# Patient Record
Sex: MALE | Race: WHITE | NOT HISPANIC OR LATINO | URBAN - METROPOLITAN AREA
[De-identification: names, ages, dates, MRNs, and addresses within clinical notes are randomized per-mention and may not be internally consistent; named-entity substitution may affect disease eponyms.]

---

## 2023-11-18 ENCOUNTER — INPATIENT (INPATIENT)
Facility: HOSPITAL | Age: 73
LOS: 17 days | Discharge: NURSING, REHAB OR LONG-TERM CARE | DRG: 981 | End: 2023-12-06
Attending: SURGERY | Admitting: STUDENT IN AN ORGANIZED HEALTH CARE EDUCATION/TRAINING PROGRAM
Payer: MEDICARE

## 2023-11-18 VITALS
DIASTOLIC BLOOD PRESSURE: 79 MMHG | WEIGHT: 279.99 LBS | RESPIRATION RATE: 28 BRPM | HEART RATE: 136 BPM | HEIGHT: 69 IN | OXYGEN SATURATION: 94 % | SYSTOLIC BLOOD PRESSURE: 128 MMHG

## 2023-11-18 DIAGNOSIS — I25.10 ATHEROSCLEROTIC HEART DISEASE OF NATIVE CORONARY ARTERY WITHOUT ANGINA PECTORIS: ICD-10-CM

## 2023-11-18 DIAGNOSIS — Z90.49 ACQUIRED ABSENCE OF OTHER SPECIFIED PARTS OF DIGESTIVE TRACT: Chronic | ICD-10-CM

## 2023-11-18 DIAGNOSIS — I48.92 UNSPECIFIED ATRIAL FLUTTER: ICD-10-CM

## 2023-11-18 DIAGNOSIS — Z95.1 PRESENCE OF AORTOCORONARY BYPASS GRAFT: Chronic | ICD-10-CM

## 2023-11-18 DIAGNOSIS — N17.9 ACUTE KIDNEY FAILURE, UNSPECIFIED: ICD-10-CM

## 2023-11-18 DIAGNOSIS — R06.02 SHORTNESS OF BREATH: ICD-10-CM

## 2023-11-18 DIAGNOSIS — I50.20 UNSPECIFIED SYSTOLIC (CONGESTIVE) HEART FAILURE: ICD-10-CM

## 2023-11-18 DIAGNOSIS — J96.01 ACUTE RESPIRATORY FAILURE WITH HYPOXIA: ICD-10-CM

## 2023-11-18 LAB
ALBUMIN SERPL ELPH-MCNC: 3.5 G/DL — SIGNIFICANT CHANGE UP (ref 3.3–5.2)
ALBUMIN SERPL ELPH-MCNC: 3.5 G/DL — SIGNIFICANT CHANGE UP (ref 3.3–5.2)
ALP SERPL-CCNC: 96 U/L — SIGNIFICANT CHANGE UP (ref 40–120)
ALP SERPL-CCNC: 96 U/L — SIGNIFICANT CHANGE UP (ref 40–120)
ALT FLD-CCNC: 22 U/L — SIGNIFICANT CHANGE UP
ALT FLD-CCNC: 22 U/L — SIGNIFICANT CHANGE UP
ANION GAP SERPL CALC-SCNC: 20 MMOL/L — HIGH (ref 5–17)
ANION GAP SERPL CALC-SCNC: 20 MMOL/L — HIGH (ref 5–17)
APPEARANCE UR: CLEAR — SIGNIFICANT CHANGE UP
APPEARANCE UR: CLEAR — SIGNIFICANT CHANGE UP
APTT BLD: 33.3 SEC — SIGNIFICANT CHANGE UP (ref 24.5–35.6)
APTT BLD: 33.3 SEC — SIGNIFICANT CHANGE UP (ref 24.5–35.6)
APTT BLD: 35.8 SEC — HIGH (ref 24.5–35.6)
APTT BLD: 35.8 SEC — HIGH (ref 24.5–35.6)
AST SERPL-CCNC: 24 U/L — SIGNIFICANT CHANGE UP
AST SERPL-CCNC: 24 U/L — SIGNIFICANT CHANGE UP
B PERT IGG+IGM PNL SER: ABNORMAL
B PERT IGG+IGM PNL SER: ABNORMAL
BACTERIA # UR AUTO: NEGATIVE /HPF — SIGNIFICANT CHANGE UP
BACTERIA # UR AUTO: NEGATIVE /HPF — SIGNIFICANT CHANGE UP
BASOPHILS # BLD AUTO: 0.08 K/UL — SIGNIFICANT CHANGE UP (ref 0–0.2)
BASOPHILS # BLD AUTO: 0.08 K/UL — SIGNIFICANT CHANGE UP (ref 0–0.2)
BASOPHILS NFR BLD AUTO: 0.5 % — SIGNIFICANT CHANGE UP (ref 0–2)
BASOPHILS NFR BLD AUTO: 0.5 % — SIGNIFICANT CHANGE UP (ref 0–2)
BILIRUB SERPL-MCNC: 1.4 MG/DL — SIGNIFICANT CHANGE UP (ref 0.4–2)
BILIRUB SERPL-MCNC: 1.4 MG/DL — SIGNIFICANT CHANGE UP (ref 0.4–2)
BILIRUB UR-MCNC: ABNORMAL
BILIRUB UR-MCNC: ABNORMAL
BUN SERPL-MCNC: 48 MG/DL — HIGH (ref 8–20)
BUN SERPL-MCNC: 48 MG/DL — HIGH (ref 8–20)
CALCIUM SERPL-MCNC: 9.1 MG/DL — SIGNIFICANT CHANGE UP (ref 8.4–10.5)
CALCIUM SERPL-MCNC: 9.1 MG/DL — SIGNIFICANT CHANGE UP (ref 8.4–10.5)
CAST: 16 /LPF — HIGH (ref 0–4)
CAST: 16 /LPF — HIGH (ref 0–4)
CHLORIDE SERPL-SCNC: 102 MMOL/L — SIGNIFICANT CHANGE UP (ref 96–108)
CHLORIDE SERPL-SCNC: 102 MMOL/L — SIGNIFICANT CHANGE UP (ref 96–108)
CK SERPL-CCNC: 43 U/L — SIGNIFICANT CHANGE UP (ref 30–200)
CK SERPL-CCNC: 43 U/L — SIGNIFICANT CHANGE UP (ref 30–200)
CO2 SERPL-SCNC: 21 MMOL/L — LOW (ref 22–29)
CO2 SERPL-SCNC: 21 MMOL/L — LOW (ref 22–29)
COLOR FLD: YELLOW
COLOR FLD: YELLOW
COLOR SPEC: SIGNIFICANT CHANGE UP
COLOR SPEC: SIGNIFICANT CHANGE UP
CREAT SERPL-MCNC: 2.12 MG/DL — HIGH (ref 0.5–1.3)
CREAT SERPL-MCNC: 2.12 MG/DL — HIGH (ref 0.5–1.3)
D DIMER BLD IA.RAPID-MCNC: 769 NG/ML DDU — HIGH
D DIMER BLD IA.RAPID-MCNC: 769 NG/ML DDU — HIGH
DIFF PNL FLD: NEGATIVE — SIGNIFICANT CHANGE UP
DIFF PNL FLD: NEGATIVE — SIGNIFICANT CHANGE UP
EGFR: 33 ML/MIN/1.73M2 — LOW
EGFR: 33 ML/MIN/1.73M2 — LOW
EOSINOPHIL # BLD AUTO: 0.01 K/UL — SIGNIFICANT CHANGE UP (ref 0–0.5)
EOSINOPHIL # BLD AUTO: 0.01 K/UL — SIGNIFICANT CHANGE UP (ref 0–0.5)
EOSINOPHIL NFR BLD AUTO: 0.1 % — SIGNIFICANT CHANGE UP (ref 0–6)
EOSINOPHIL NFR BLD AUTO: 0.1 % — SIGNIFICANT CHANGE UP (ref 0–6)
GAS PNL BLDA: SIGNIFICANT CHANGE UP
GAS PNL BLDA: SIGNIFICANT CHANGE UP
GLUCOSE SERPL-MCNC: 157 MG/DL — HIGH (ref 70–99)
GLUCOSE SERPL-MCNC: 157 MG/DL — HIGH (ref 70–99)
GLUCOSE UR QL: NEGATIVE MG/DL — SIGNIFICANT CHANGE UP
GLUCOSE UR QL: NEGATIVE MG/DL — SIGNIFICANT CHANGE UP
HCT VFR BLD CALC: 47.2 % — SIGNIFICANT CHANGE UP (ref 39–50)
HCT VFR BLD CALC: 47.2 % — SIGNIFICANT CHANGE UP (ref 39–50)
HGB BLD-MCNC: 15.3 G/DL — SIGNIFICANT CHANGE UP (ref 13–17)
HGB BLD-MCNC: 15.3 G/DL — SIGNIFICANT CHANGE UP (ref 13–17)
IMM GRANULOCYTES NFR BLD AUTO: 0.7 % — SIGNIFICANT CHANGE UP (ref 0–0.9)
IMM GRANULOCYTES NFR BLD AUTO: 0.7 % — SIGNIFICANT CHANGE UP (ref 0–0.9)
INR BLD: 1.45 RATIO — HIGH (ref 0.85–1.18)
INR BLD: 1.45 RATIO — HIGH (ref 0.85–1.18)
KETONES UR-MCNC: ABNORMAL MG/DL
KETONES UR-MCNC: ABNORMAL MG/DL
LEUKOCYTE ESTERASE UR-ACNC: ABNORMAL
LEUKOCYTE ESTERASE UR-ACNC: ABNORMAL
LYMPHOCYTES # BLD AUTO: 0.86 K/UL — LOW (ref 1–3.3)
LYMPHOCYTES # BLD AUTO: 0.86 K/UL — LOW (ref 1–3.3)
LYMPHOCYTES # BLD AUTO: 5.3 % — LOW (ref 13–44)
LYMPHOCYTES # BLD AUTO: 5.3 % — LOW (ref 13–44)
LYMPHOCYTES # FLD: 10 % — SIGNIFICANT CHANGE UP
LYMPHOCYTES # FLD: 10 % — SIGNIFICANT CHANGE UP
MAGNESIUM SERPL-MCNC: 1.8 MG/DL — SIGNIFICANT CHANGE UP (ref 1.6–2.6)
MAGNESIUM SERPL-MCNC: 1.8 MG/DL — SIGNIFICANT CHANGE UP (ref 1.6–2.6)
MCHC RBC-ENTMCNC: 28.3 PG — SIGNIFICANT CHANGE UP (ref 27–34)
MCHC RBC-ENTMCNC: 28.3 PG — SIGNIFICANT CHANGE UP (ref 27–34)
MCHC RBC-ENTMCNC: 32.4 GM/DL — SIGNIFICANT CHANGE UP (ref 32–36)
MCHC RBC-ENTMCNC: 32.4 GM/DL — SIGNIFICANT CHANGE UP (ref 32–36)
MCV RBC AUTO: 87.4 FL — SIGNIFICANT CHANGE UP (ref 80–100)
MCV RBC AUTO: 87.4 FL — SIGNIFICANT CHANGE UP (ref 80–100)
MONOCYTES # BLD AUTO: 1.45 K/UL — HIGH (ref 0–0.9)
MONOCYTES # BLD AUTO: 1.45 K/UL — HIGH (ref 0–0.9)
MONOCYTES NFR BLD AUTO: 9 % — SIGNIFICANT CHANGE UP (ref 2–14)
MONOCYTES NFR BLD AUTO: 9 % — SIGNIFICANT CHANGE UP (ref 2–14)
MONOS+MACROS # FLD: 5 % — SIGNIFICANT CHANGE UP
MONOS+MACROS # FLD: 5 % — SIGNIFICANT CHANGE UP
NEUTROPHILS # BLD AUTO: 13.69 K/UL — HIGH (ref 1.8–7.4)
NEUTROPHILS # BLD AUTO: 13.69 K/UL — HIGH (ref 1.8–7.4)
NEUTROPHILS NFR BLD AUTO: 84.4 % — HIGH (ref 43–77)
NEUTROPHILS NFR BLD AUTO: 84.4 % — HIGH (ref 43–77)
NEUTROPHILS-BODY FLUID: 85 % — SIGNIFICANT CHANGE UP
NEUTROPHILS-BODY FLUID: 85 % — SIGNIFICANT CHANGE UP
NITRITE UR-MCNC: NEGATIVE — SIGNIFICANT CHANGE UP
NITRITE UR-MCNC: NEGATIVE — SIGNIFICANT CHANGE UP
NT-PROBNP SERPL-SCNC: 5657 PG/ML — HIGH (ref 0–300)
NT-PROBNP SERPL-SCNC: 5657 PG/ML — HIGH (ref 0–300)
PH FLD: 7 — SIGNIFICANT CHANGE UP
PH FLD: 7 — SIGNIFICANT CHANGE UP
PH UR: 5 — SIGNIFICANT CHANGE UP (ref 5–8)
PH UR: 5 — SIGNIFICANT CHANGE UP (ref 5–8)
PLATELET # BLD AUTO: 278 K/UL — SIGNIFICANT CHANGE UP (ref 150–400)
PLATELET # BLD AUTO: 278 K/UL — SIGNIFICANT CHANGE UP (ref 150–400)
POTASSIUM SERPL-MCNC: 4 MMOL/L — SIGNIFICANT CHANGE UP (ref 3.5–5.3)
POTASSIUM SERPL-MCNC: 4 MMOL/L — SIGNIFICANT CHANGE UP (ref 3.5–5.3)
POTASSIUM SERPL-SCNC: 4 MMOL/L — SIGNIFICANT CHANGE UP (ref 3.5–5.3)
POTASSIUM SERPL-SCNC: 4 MMOL/L — SIGNIFICANT CHANGE UP (ref 3.5–5.3)
PROT SERPL-MCNC: 6.9 G/DL — SIGNIFICANT CHANGE UP (ref 6.6–8.7)
PROT SERPL-MCNC: 6.9 G/DL — SIGNIFICANT CHANGE UP (ref 6.6–8.7)
PROT UR-MCNC: 100 MG/DL
PROT UR-MCNC: 100 MG/DL
PROTHROM AB SERPL-ACNC: 15.9 SEC — HIGH (ref 9.5–13)
PROTHROM AB SERPL-ACNC: 15.9 SEC — HIGH (ref 9.5–13)
RAPID RVP RESULT: SIGNIFICANT CHANGE UP
RAPID RVP RESULT: SIGNIFICANT CHANGE UP
RBC # BLD: 5.4 M/UL — SIGNIFICANT CHANGE UP (ref 4.2–5.8)
RBC # BLD: 5.4 M/UL — SIGNIFICANT CHANGE UP (ref 4.2–5.8)
RBC # FLD: 16.2 % — HIGH (ref 10.3–14.5)
RBC # FLD: 16.2 % — HIGH (ref 10.3–14.5)
RBC CASTS # UR COMP ASSIST: 1 /HPF — SIGNIFICANT CHANGE UP (ref 0–4)
RBC CASTS # UR COMP ASSIST: 1 /HPF — SIGNIFICANT CHANGE UP (ref 0–4)
RCV VOL RI: 3000 /UL — HIGH (ref 0–0)
RCV VOL RI: 3000 /UL — HIGH (ref 0–0)
SARS-COV-2 RNA SPEC QL NAA+PROBE: SIGNIFICANT CHANGE UP
SARS-COV-2 RNA SPEC QL NAA+PROBE: SIGNIFICANT CHANGE UP
SODIUM SERPL-SCNC: 143 MMOL/L — SIGNIFICANT CHANGE UP (ref 135–145)
SODIUM SERPL-SCNC: 143 MMOL/L — SIGNIFICANT CHANGE UP (ref 135–145)
SP GR SPEC: 1.02 — SIGNIFICANT CHANGE UP (ref 1–1.03)
SP GR SPEC: 1.02 — SIGNIFICANT CHANGE UP (ref 1–1.03)
SQUAMOUS # UR AUTO: 2 /HPF — SIGNIFICANT CHANGE UP (ref 0–5)
SQUAMOUS # UR AUTO: 2 /HPF — SIGNIFICANT CHANGE UP (ref 0–5)
TOTAL NUCLEATED CELL COUNT, BODY FLUID: 3159 /UL — SIGNIFICANT CHANGE UP
TOTAL NUCLEATED CELL COUNT, BODY FLUID: 3159 /UL — SIGNIFICANT CHANGE UP
TROPONIN T, HIGH SENSITIVITY RESULT: 41 NG/L — SIGNIFICANT CHANGE UP (ref 0–51)
TROPONIN T, HIGH SENSITIVITY RESULT: 41 NG/L — SIGNIFICANT CHANGE UP (ref 0–51)
UROBILINOGEN FLD QL: 1 MG/DL — SIGNIFICANT CHANGE UP (ref 0.2–1)
UROBILINOGEN FLD QL: 1 MG/DL — SIGNIFICANT CHANGE UP (ref 0.2–1)
WBC # BLD: 16.2 K/UL — HIGH (ref 3.8–10.5)
WBC # BLD: 16.2 K/UL — HIGH (ref 3.8–10.5)
WBC # FLD AUTO: 16.2 K/UL — HIGH (ref 3.8–10.5)
WBC # FLD AUTO: 16.2 K/UL — HIGH (ref 3.8–10.5)
WBC UR QL: 1 /HPF — SIGNIFICANT CHANGE UP (ref 0–5)
WBC UR QL: 1 /HPF — SIGNIFICANT CHANGE UP (ref 0–5)

## 2023-11-18 PROCEDURE — 93306 TTE W/DOPPLER COMPLETE: CPT | Mod: 26

## 2023-11-18 PROCEDURE — 32557 INSERT CATH PLEURA W/ IMAGE: CPT | Mod: LT

## 2023-11-18 PROCEDURE — 99223 1ST HOSP IP/OBS HIGH 75: CPT

## 2023-11-18 PROCEDURE — 99291 CRITICAL CARE FIRST HOUR: CPT

## 2023-11-18 PROCEDURE — 71045 X-RAY EXAM CHEST 1 VIEW: CPT | Mod: 26,76

## 2023-11-18 PROCEDURE — 71250 CT THORAX DX C-: CPT | Mod: 26,MA

## 2023-11-18 PROCEDURE — 99223 1ST HOSP IP/OBS HIGH 75: CPT | Mod: 25

## 2023-11-18 PROCEDURE — 99221 1ST HOSP IP/OBS SF/LOW 40: CPT

## 2023-11-18 RX ORDER — METOPROLOL TARTRATE 50 MG
5 TABLET ORAL ONCE
Refills: 0 | Status: COMPLETED | OUTPATIENT
Start: 2023-11-18 | End: 2023-11-18

## 2023-11-18 RX ORDER — ASPIRIN/CALCIUM CARB/MAGNESIUM 324 MG
81 TABLET ORAL DAILY
Refills: 0 | Status: DISCONTINUED | OUTPATIENT
Start: 2023-11-18 | End: 2023-11-27

## 2023-11-18 RX ORDER — SPIRONOLACTONE 25 MG/1
25 TABLET, FILM COATED ORAL DAILY
Refills: 0 | Status: DISCONTINUED | OUTPATIENT
Start: 2023-11-18 | End: 2023-11-23

## 2023-11-18 RX ORDER — HEPARIN SODIUM 5000 [USP'U]/ML
10000 INJECTION INTRAVENOUS; SUBCUTANEOUS EVERY 6 HOURS
Refills: 0 | Status: DISCONTINUED | OUTPATIENT
Start: 2023-11-18 | End: 2023-11-22

## 2023-11-18 RX ORDER — METOPROLOL TARTRATE 50 MG
50 TABLET ORAL EVERY 6 HOURS
Refills: 0 | Status: DISCONTINUED | OUTPATIENT
Start: 2023-11-18 | End: 2023-11-25

## 2023-11-18 RX ORDER — METOPROLOL TARTRATE 50 MG
25 TABLET ORAL EVERY 6 HOURS
Refills: 0 | Status: DISCONTINUED | OUTPATIENT
Start: 2023-11-18 | End: 2023-11-18

## 2023-11-18 RX ORDER — ACETAMINOPHEN 500 MG
650 TABLET ORAL EVERY 6 HOURS
Refills: 0 | Status: DISCONTINUED | OUTPATIENT
Start: 2023-11-18 | End: 2023-11-27

## 2023-11-18 RX ORDER — ATORVASTATIN CALCIUM 80 MG/1
20 TABLET, FILM COATED ORAL AT BEDTIME
Refills: 0 | Status: DISCONTINUED | OUTPATIENT
Start: 2023-11-18 | End: 2023-11-27

## 2023-11-18 RX ORDER — METOPROLOL TARTRATE 50 MG
100 TABLET ORAL
Refills: 0 | Status: DISCONTINUED | OUTPATIENT
Start: 2023-11-18 | End: 2023-11-18

## 2023-11-18 RX ORDER — HEPARIN SODIUM 5000 [USP'U]/ML
5000 INJECTION INTRAVENOUS; SUBCUTANEOUS EVERY 6 HOURS
Refills: 0 | Status: DISCONTINUED | OUTPATIENT
Start: 2023-11-18 | End: 2023-11-22

## 2023-11-18 RX ORDER — HEPARIN SODIUM 5000 [USP'U]/ML
INJECTION INTRAVENOUS; SUBCUTANEOUS
Qty: 25000 | Refills: 0 | Status: DISCONTINUED | OUTPATIENT
Start: 2023-11-18 | End: 2023-11-22

## 2023-11-18 RX ORDER — LANOLIN ALCOHOL/MO/W.PET/CERES
3 CREAM (GRAM) TOPICAL AT BEDTIME
Refills: 0 | Status: DISCONTINUED | OUTPATIENT
Start: 2023-11-18 | End: 2023-11-27

## 2023-11-18 RX ORDER — FUROSEMIDE 40 MG
40 TABLET ORAL EVERY 12 HOURS
Refills: 0 | Status: DISCONTINUED | OUTPATIENT
Start: 2023-11-18 | End: 2023-11-24

## 2023-11-18 RX ORDER — HEPARIN SODIUM 5000 [USP'U]/ML
5000 INJECTION INTRAVENOUS; SUBCUTANEOUS EVERY 12 HOURS
Refills: 0 | Status: DISCONTINUED | OUTPATIENT
Start: 2023-11-18 | End: 2023-11-18

## 2023-11-18 RX ORDER — ONDANSETRON 8 MG/1
4 TABLET, FILM COATED ORAL EVERY 8 HOURS
Refills: 0 | Status: DISCONTINUED | OUTPATIENT
Start: 2023-11-18 | End: 2023-12-06

## 2023-11-18 RX ORDER — SODIUM CHLORIDE 9 MG/ML
3 INJECTION INTRAMUSCULAR; INTRAVENOUS; SUBCUTANEOUS ONCE
Refills: 0 | Status: COMPLETED | OUTPATIENT
Start: 2023-11-18 | End: 2023-11-18

## 2023-11-18 RX ORDER — METOPROLOL TARTRATE 50 MG
5 TABLET ORAL EVERY 6 HOURS
Refills: 0 | Status: DISCONTINUED | OUTPATIENT
Start: 2023-11-18 | End: 2023-11-27

## 2023-11-18 RX ADMIN — HEPARIN SODIUM 10000 UNIT(S): 5000 INJECTION INTRAVENOUS; SUBCUTANEOUS at 19:28

## 2023-11-18 RX ADMIN — ATORVASTATIN CALCIUM 20 MILLIGRAM(S): 80 TABLET, FILM COATED ORAL at 21:52

## 2023-11-18 RX ADMIN — SODIUM CHLORIDE 3 MILLILITER(S): 9 INJECTION INTRAMUSCULAR; INTRAVENOUS; SUBCUTANEOUS at 08:51

## 2023-11-18 RX ADMIN — Medication 100 MILLIGRAM(S): at 15:29

## 2023-11-18 RX ADMIN — Medication 50 MILLIGRAM(S): at 19:28

## 2023-11-18 RX ADMIN — Medication 5 MILLIGRAM(S): at 18:24

## 2023-11-18 RX ADMIN — Medication 40 MILLIGRAM(S): at 18:21

## 2023-11-18 RX ADMIN — HEPARIN SODIUM 2300 UNIT(S)/HR: 5000 INJECTION INTRAVENOUS; SUBCUTANEOUS at 19:25

## 2023-11-18 RX ADMIN — Medication 5 MILLIGRAM(S): at 08:36

## 2023-11-18 RX ADMIN — Medication 5 MILLIGRAM(S): at 08:29

## 2023-11-18 NOTE — CONSULT NOTE ADULT - TIME BILLING
time spent reviewing chart, reviewing imaging, interpreting blood gas, bedside assessment, discussion with cardiology, documentation

## 2023-11-18 NOTE — CONSULT NOTE ADULT - SUBJECTIVE AND OBJECTIVE BOX
[facility]                                              CARDIOLOGY AT [place] [address]                                             Telephone: [phone]. Fax:[phone]                                                         CARDIOLOGY CONSULTATION NOTE                                                                                             Consult requested by:    History obtained by: Patient and medical record  Community Cardiologist:    obtained: Yes [  ] No [  ]  Reason for Consultation:   Avialable out pt records reviewed: Yes [  ] No [  ]    Chief complaint:    Patient is a 72y old  Male who presents with a chief complaint of SOB (18 Nov 2023 14:38)        HPI:  Patient is a 73 yo M w/ PMH of CAD s/p CABG, CHF, HTN, HLD presenting for chief complaint of SOB. Patient states he started experiencing SOB and LE swelling for the past few days He states he was seen by his Cardiologist on, Dr. Milian, on Tuesday and his Metoprolol was increased from 50mg to 100mg. He states his symptoms worsened yesterday therefore he came to the ED today. He states he has been unable to walk around without getting short of breath. He reports compliance with medications and diet. Patient denies fever, chills, chest pain, palpitations, cough, wheezing, abdominal pain, nausea, vomiting, diarrhea, constipation, bloody bowel movements, melena, hematemesis, urinary complaints, syncope, calf tenderness, paresthesias.  (18 Nov 2023 14:38)        CARDIAC TESTING   ECHO:    STRESS:    CATH:     ELECTROPHYSIOLOGY:       PAST MEDICAL HISTORY  Coronary artery disease involving native coronary artery of native heart, unspecified whether angina    CHF with unknown LVEF    Primary hypertension    Hyperlipidemia, unspecified hyperlipidemia type          PAST SURGICAL HISTORY  S/P CABG x 5    History of cholecystectomy          SOCIAL HISTORY:  Denies smoking/alcohol/drugs  CIGARETTES:     ALCOHOL:  DRUGS:      FAMILY HISTORY:  FAMILY HISTORY:  Family history of coronary artery bypass surgery (Father)    Family history of hypotension (Mother)        Family History of Cardiovascular Disease:  Yes [  ] No [  ]  Coronary Artery Disease in first degree relative: Yes [  ] No [  ]  Sudden Cardiac Death in First degree relative: Yes [  ] No [  ]      HOME MEDICATIONS:      CURRENT MEDICATIONS:  furosemide   Injectable 40 milliGRAM(s) IV Push every 12 hours  metolazone 5 milliGRAM(s) Oral daily  metoprolol tartrate 100 milliGRAM(s) Oral two times a day  metoprolol tartrate Injectable 5 milliGRAM(s) IV Push every 6 hours PRN     aspirin  chewable  atorvastatin  heparin   Injectable        ALLERGIES: Allergies    No Known Allergies    Intolerances          REVIEW OF SYMPTOMS:   CONSTITUTIONAL: No fever, no chills, no weight loss, no weight gain, no fatigue   ENMT:  No vertigo; No sinus or throat pain  NECK: No pain or stiffness  CARDIOVASCULAR: No chest pain, no dyspnea, no syncope/presyncope, no palpitations, no dizziness, no Orthopnea, no Paroxsymal nocturnal dyspnea  RESPIRATORY: no Shortness of breath, no cough, no wheezing  : No dysuria, no hematuria   GI: No dark color stool, no nausea, no diarrhea, no constipation, no abdominal pain   NEURO: No headache, no slurred speech   MUSCULOSKELETAL: No joint pain or swelling; No muscle, back, or extremity pain  PSYCH: No agitation, no anxiety.    ALL OTHER REVIEW OF SYSTEMS ARE NEGATIVE.      Vital Signs Last 24 Hrs  T(C): 37.1 (18 Nov 2023 15:30), Max: 37.1 (18 Nov 2023 15:30)  T(F): 98.8 (18 Nov 2023 15:30), Max: 98.8 (18 Nov 2023 15:30)  HR: 130 (18 Nov 2023 16:38) (130 - 137)  BP: 158/65 (18 Nov 2023 16:38) (112/56 - 158/65)  BP(mean): --  RR: 26 (18 Nov 2023 16:38) (20 - 32)  SpO2: 95% (18 Nov 2023 16:38) (91% - 95%)    Parameters below as of 18 Nov 2023 16:38  Patient On (Oxygen Delivery Method): nasal cannula  O2 Flow (L/min): 5    INTAKE AND OUTPUT:     PHYSICAL EXAM:  Constitutional: Comfortable . No acute distress.   HEENT: Atraumatic and normocephalic , neck is supple . no JVD. No carotid bruit.  CNS: A&Ox3. No focal deficits.   Respiratory: CTAB, unlabored   Cardiovascular: RRR normal s1 s2. No murmur. No rubs or gallop.  Gastrointestinal: Soft, non-tender. +Bowel sounds.   MSK: full ROM extremities x 4  Extremities: No edema. No cyanosis   Psychiatric: Calm . no agitation.   Skin: Warm and dry, no ulcers on extremities       LABS:                        15.3   16.20 )-----------( 278      ( 18 Nov 2023 09:02 )             47.2     11-18    143  |  102  |  48.0  ----------------------------<  157  4.0   |  21.0  |  2.12    Ca    9.1      18 Nov 2023 09:02  Mg     1.8     11-18    TPro  6.9  /  Alb  3.5  /  TBili  1.4  /  DBili  x   /  AST  24  /  ALT  22  /  AlkPhos  96  11-18    CARDIAC MARKERS ( 18 Nov 2023 09:02 ): 43 U/L / x     / x        PT/INR - ( 18 Nov 2023 09:02 )   PT: 15.9 sec;   INR: 1.45 ratio    PTT - ( 18 Nov 2023 09:02 )  PTT:33.3 sec  Urinalysis Basic - ( 18 Nov 2023 09:02 )    Color: x / Appearance: x / SG: x / pH: x  Gluc: 157 mg/dL / Ketone: x  / Bili: x / Urobili: x   Blood: x / Protein: x / Nitrite: x   Leuk Esterase: x / RBC: x / WBC x   Sq Epi: x / Non Sq Epi: x / Bacteria: x          INTERPRETATION OF TELEMETRY: -140's    ECG:   Prior ECG: Yes [  ] No [  ]      RADIOLOGY & ADDITIONAL STUDIES:    X-ray:  reviewed by me.   CT scan:   MRI:   US:                                                [facility]                                              CARDIOLOGY AT [place] [address]                                             Telephone: [phone]. Fax:[phone]                                                         CARDIOLOGY CONSULTATION NOTE                                                                                             Consult requested by:  Dr. Flores  History obtained by: Patient and medical record  Community Cardiologist: Abdifatah Milian   obtained: Yes [  ] No [X]  Reason for Consultation: SOB  Available out pt records reviewed: Yes [  ] No [X]    Chief complaint:    Patient is a 72y old  Male who presents with a chief complaint of SOB (18 Nov 2023 14:38)    HPI: Patient is a 71 yo male former smoker w/ PMH of CAD, s/p 5V CABG, CHF (unknown EF), HTN, HLD presenting for chief complaint of SOB. Patient states he started experiencing SOB and LE swelling for the past few days He states he was seen by his Cardiologist on, Dr. Milian, on Tuesday and his Metoprolol was increased from 50mg to 100mg. He states his symptoms worsened yesterday therefore he came to the ED today. He states he has been unable to walk around without getting short of breath. One week ago he was walking 6 blocks with usually no symptoms, and since yesterday he has been having SOB with minimal or no exertion. He reports compliance with medications and diet. Patient denies fever, chills, chest pain, palpitations, cough, wheezing, abdominal pain, nausea, vomiting, diarrhea, constipation, bloody bowel movements, melena, hematemesis, urinary complaints, syncope, calf tenderness, paresthesias. A CXR was done in the ED which showed a moderate to large left pleural effusion. A noncontrast CT of the chest was done which showed a moderate to large loculated left pleural effusion. Thoracic surgery was called to evaluate the patient and a 14 fr. chest tube was inserted with 600 mL serous fluid out. An echo was done which revealed an LVEF of 20-25 % and severely reduced RVSF.    CARDIAC TESTING   ECHO: 11/18/2023  Left Ventricle: The left ventricular internal cavity size is moderate to severely increased. Global LV systolic function was severely decreased. Left ventricular ejection fraction, by visual estimation, is 20 to 25%. The mitral in-flow pattern reveals no discernable A-wave, therefore no comment on diastolic function can be made.  Right Ventricle: The right ventricular size is mildly enlarged. RV systolic function is severely reduced.  Left Atrium: Mildly enlarged left atrium.  Right Atrium: Normal right atrial size.  Pericardium: There is no evidence of pericardial effusion.  MitralValve: Mild thickening and calcification of the anterior and posterior mitral valve leaflets. There is mild mitral annular calcification. Mild mitral valve regurgitation is seen.  Tricuspid Valve: Adequate TR velocity was not obtained to accurately assess RVSP.  Aortic Valve: The aortic valve is trileaflet. Sclerotic aortic valve with normal opening.  Pulmonic Valve: Trace pulmonic valve regurgitation.  Aorta: The aortic root is normal in size and structure.  Pulmonary Artery: The main pulmonary artery is normal in size.  Venous: The inferior vena cava was normal sized, with respiratory size variation greater than 50%.    STRESS: N/A    CATH: N/A    ELECTROPHYSIOLOGY: N/A    PAST MEDICAL HISTORY  Coronary artery disease involving native coronary artery of native heart, unspecified whether angina  CHF with unknown LVEF  Primary hypertension  Hyperlipidemia, unspecified hyperlipidemia type    PAST SURGICAL HISTORY  S/P CABG x 5  History of cholecystectomy    SOCIAL HISTORY:   Marital: Single, lives alone  Tobacco: Former < 1ppd smoker for 20 years, quit 11/2021  ETOH: Denies  Drugs: Denies  Caffeine: 3-4 cups coffee daily    FAMILY HISTORY:  Family history of coronary artery bypass surgery (Father)  Family history of hypotension (Mother)    Family History of Cardiovascular Disease:  Yes [X] No [  ]  Coronary Artery Disease in first degree relative: Yes [X] No [  ]  Sudden Cardiac Death in First degree relative: Yes [  ] No [X]    Home Medications:  aspirin 81 mg oral capsule: 1 cap(s) orally once a day (18 Nov 2023 14:28)  Entresto 49 mg-51 mg oral tablet: 1 tab(s) orally 2 times a day (18 Nov 2023 14:28)  Lasix 40 mg oral tablet: 1 tab(s) orally once a day (18 Nov 2023 14:27)  Lipitor 20 mg oral tablet: 1 tab(s) orally once a day (18 Nov 2023 14:28)  metOLazone 5 mg oral tablet: 1 tab(s) orally once a day (18 Nov 2023 14:27)  Metoprolol Tartrate 100 mg oral tablet: 1 tab(s) orally 2 times a day (18 Nov 2023 14:27)    MEDICATIONS  (STANDING):  aspirin  chewable 81 milliGRAM(s) Oral daily  atorvastatin 20 milliGRAM(s) Oral at bedtime  furosemide   Injectable 40 milliGRAM(s) IV Push every 12 hours  heparin  Infusion.  Unit(s)/Hr (23 mL/Hr) IV Continuous <Continuous>  metolazone 5 milliGRAM(s) Oral daily  metoprolol tartrate 25 milliGRAM(s) Oral every 6 hours  spironolactone 25 milliGRAM(s) Oral daily    MEDICATIONS  (PRN):  acetaminophen     Tablet .. 650 milliGRAM(s) Oral every 6 hours PRN Temp greater or equal to 38C (100.4F), Mild Pain (1 - 3)  aluminum hydroxide/magnesium hydroxide/simethicone Suspension 30 milliLiter(s) Oral every 4 hours PRN Dyspepsia  heparin   Injectable 29920 Unit(s) IV Push every 6 hours PRN For aPTT less than 40  heparin   Injectable 5000 Unit(s) IV Push every 6 hours PRN For aPTT between 40 - 57  melatonin 3 milliGRAM(s) Oral at bedtime PRN Insomnia  metoprolol tartrate Injectable 5 milliGRAM(s) IV Push every 6 hours PRN RVR > 130  ondansetron Injectable 4 milliGRAM(s) IV Push every 8 hours PRN Nausea and/or Vomiting    ALLERGIES: No Known Allergies    REVIEW OF SYMPTOMS:   CONSTITUTIONAL: No fever, no chills, no weight loss, no weight gain, + fatigue   ENMT:  No vertigo; No sinus or throat pain  NECK: No pain or stiffness  CARDIOVASCULAR: No chest pain, + SAVAGE, no syncope/presyncope, no palpitations, no dizziness, + Orthopnea, + Paroxsymal nocturnal dyspnea, + edema  RESPIRATORY: + Shortness of breath, no cough, no wheezing  : No dysuria, no hematuria   GI: No dark color stool, no nausea, no diarrhea, no constipation, no abdominal pain   NEURO: No headache, no slurred speech   MUSCULOSKELETAL: No joint pain or swelling; No muscle, back, or extremity pain  PSYCH: No agitation, no anxiety.    ALL OTHER REVIEW OF SYSTEMS ARE NEGATIVE.    Vital Signs Last 24 Hrs  T(C): 37.1 (18 Nov 2023 15:30), Max: 37.1 (18 Nov 2023 15:30)  T(F): 98.8 (18 Nov 2023 15:30), Max: 98.8 (18 Nov 2023 15:30)  HR: 130 (18 Nov 2023 16:38) (130 - 137)  BP: 158/65 (18 Nov 2023 16:38) (112/56 - 158/65)  RR: 26 (18 Nov 2023 16:38) (20 - 32)  SpO2: 95% (18 Nov 2023 16:38) (91% - 95%)    Parameters below as of 18 Nov 2023 16:38  Patient On (Oxygen Delivery Method): nasal cannula  O2 Flow (L/min): 5    INTAKE AND OUTPUT:     PHYSICAL EXAM:  Constitutional: Comfortable . No acute distress.   HEENT: Atraumatic and normocephalic , neck is supple . no JVD. No carotid bruit.  CNS: A&Ox3. No focal deficits.   Respiratory: CTAB, unlabored   Cardiovascular: RRR normal s1 s2. No murmur. No rubs or gallop.  Gastrointestinal: Soft, non-tender. +Bowel sounds.   MSK: full ROM extremities x 4  Extremities: No edema. No cyanosis   Psychiatric: Calm . no agitation.   Skin: Warm and dry, no ulcers on extremities       LABS:                        15.3   16.20 )-----------( 278      ( 18 Nov 2023 09:02 )             47.2     11-18    143  |  102  |  48.0  ----------------------------<  157  4.0   |  21.0  |  2.12    Ca    9.1      18 Nov 2023 09:02  Mg     1.8     11-18    TPro  6.9  /  Alb  3.5  /  TBili  1.4  /  DBili  x   /  AST  24  /  ALT  22  /  AlkPhos  96  11-18    CARDIAC MARKERS ( 18 Nov 2023 09:02 ): 43 U/L / x     / x        PT/INR - ( 18 Nov 2023 09:02 )   PT: 15.9 sec;   INR: 1.45 ratio    PTT - ( 18 Nov 2023 09:02 )  PTT:33.3 sec  D-Dimer: 769    Blood Gas Profile - Arterial (11.18.23 @ 17:36)   pH, Arterial: 7.490: As of 6/9/2020 Reference Ranges have been amended for: DEBRA, COHB, GLUWB,   HCO3, KWB, METHHB, NAWB, O2HB, PCO2.  pCO2, Arterial: 31 mmHg  pO2, Arterial: 78 mmHg  HCO3, Arterial: 24 mmol/L  Base Excess, Arterial: 0.3 mmol/L  Oxygen Saturation, Arterial: 97.6 %  FIO2, Arterial: 6lpm n/c  Blood Gas Comments Arterial: 6lpm n/c  Blood Gas Source Arterial: Arterial    Urinalysis Basic - ( 18 Nov 2023 09:02 )  Color: x / Appearance: x / SG: x / pH: x  Gluc: 157 mg/dL / Ketone: x  / Bili: x / Urobili: x   Blood: x / Protein: x / Nitrite: x   Leuk Esterase: x / RBC: x / WBC x   Sq Epi: x / Non Sq Epi: x / Bacteria: x    INTERPRETATION OF TELEMETRY: Atrial flutter 130-140's    ECG:   Prior ECG: Yes [  ] No [  ]      RADIOLOGY & ADDITIONAL STUDIES:    X-ray:  reviewed by me. Large left pleural effusion  CT scan: 11/18/2023  ·	Few small lymph nodes are present in the mediastinum.  ·	Heart is enlarged in size. Patient is status post CABG. No pericardial effusion is noted.  ·	No endobronchial lesions are noted. Marked compressive atelectasis of the  left lung is noted. This is secondary to moderate to large loculated left pleural effusion. Right lung is essentially clear.  ·	Below the diaphragm, visualized portions of the abdomen demonstrate  thickening of both adrenal glands. Patient is status post cholecystectomy. Low-attenuation lesion in the right kidney is incompletely imaged on this exam.  ·	Degenerative changes of the spine are noted.    MRI:   US:

## 2023-11-18 NOTE — ED ADULT NURSE NOTE - OBJECTIVE STATEMENT
Pt biba from home with c/o progressively worsening sob/reddy since yesterday. along with lower leg swelling. saw Cardiologist on Tuesday and his meds were increased. speaking 5-6 word sentences. + tachycardic and tachypneic, + diminished breath sounds + soft distended abd, + non pitting edema mid calf bilaterally. denies chest pain, fever and chill.s

## 2023-11-18 NOTE — ED ADULT TRIAGE NOTE - PAIN RATING/NUMBER SCALE (0-10): ACTIVITY
Lab and Procedure Follow Up  Communication and follow up of lab work or procedures are important to us. We believe in sharing your results with you promptly. Joining [employer] is the fastest way to receive your results.     To sign up for [employer] account:  ? Log on to [email or web address]  ? Click on new user-sign up now  ? Please fill out the Identify Yourself and follow the directions  ? Or please talk with a Patient  (PSR) for assistance    If you are not participating in [employer], you will receive your results by mail if normal or by telephone if there is a message for you from your provider.  If you have a follow up appointment scheduled we will share your result at that time.  You should receive your results for:      Pap Smear within 2-3 weeks    Mammogram (from the Radiology Dept) within 1-2 weeks    Lab within 1-2 weeks    Ultrasound and Bone Density test within 1-2 weeks    All other test within 2-3 weeks    If you do not hear from our office either by telephone, [email or web address], or in writing within these time frames, please contact the office at [phone]    
5 (moderate pain)

## 2023-11-18 NOTE — ED PROVIDER NOTE - CLINICAL SUMMARY MEDICAL DECISION MAKING FREE TEXT BOX
72-year-old male with history of hypertension CAD status post quintuple bypass November 2021 followed by cardiology/Dr. Milian in  [place]  resents the ED via EMS complaining of worsening dyspnea on exertion since yesterday.  Patient states he was  seen by his cardiologist on Tuesday and metoprolol was increased from 50 mg to 100 mg daily.  Patient has been compliant with all his medications.   Patient states his legs have been swollen  for the last 2 days despite taking his medications.  Patient denies any associated chest pain, nausea, vomiting, diaphoresis or syncope.   EKG sinus tachycardia with no acute changes, O2 sat on room air 94%.  We will check labs including cardiac enzymes BNP and D-dimer chest x-ray and reevaluate

## 2023-11-18 NOTE — H&P ADULT - ASSESSMENT
Patient is a 73 yo M w/ PMH of CAD s/p CABG, CHF, HTN, HLD presenting for chief complaint of SOB. Admitted for acute hypoxemic respiratory failure 2/2 CHF/Pleural effusion.     Acute hypoxemic respiratory failure 2/2 CHF exacerbation/pleural effusion  - pro-BNP: 5657  - Cardiology consulted, follow up recommendations  - TTE done, follow up read  - TSx following, s/p CT. Follow up CXR and fluid studies  - Lasix 40mg IV BID  - Continue Metozalone  - Monitor I&Os, daily weights, fluid/salt restriction  - Age adjusted D-dimer unremarkable  - Supplemental oxygen PRN, wean as tolerated    DEVON vs CKD  - U/a  - Renal US  - Hold Entresto  - possibly in setting of volume overload  - avoid nephrotoxic medications  - monitor BMP, correct electrolytes as needed    Hx of CAD s/p CABG  - Continue ASA, Statin, Lopressor    DVT ppx: Heparin SQ     Patient is a 71 yo M w/ PMH of CAD s/p CABG, CHF, HTN, HLD presenting for chief complaint of SOB. Admitted for acute hypoxemic respiratory failure 2/2 CHF/Pleural effusion.     Acute hypoxemic respiratory failure 2/2 CHF exacerbation/pleural effusion  - pro-BNP: 5657  - Cardiology consulted, follow up recommendations  - TTE done, follow up read  - TSx following, s/p CT. Follow up CXR and fluid studies  - Lasix 40mg IV BID  - Continue Metozalone  - Monitor I&Os, daily weights, fluid/salt restriction  - Age adjusted D-dimer unremarkable  - Supplemental oxygen PRN, wean as tolerated    Leukocytosis  - Denies any signs of infection, afebrile  - Possibly stress induced?  - Check RVP, procalcitonin  - Hold antibiotics and monitor for now    DEVON vs CKD  - U/a  - Renal US  - Hold Entresto  - possibly in setting of volume overload  - avoid nephrotoxic medications  - monitor BMP, correct electrolytes as needed    Hx of CAD s/p CABG  - Continue ASA, Statin, Lopressor    DVT ppx: Heparin SQ     Patient is a 73 yo M w/ PMH of CAD s/p CABG, CHF, HTN, HLD presenting for chief complaint of SOB. Admitted for acute hypoxemic respiratory failure 2/2 CHF/Pleural effusion.     Acute hypoxemic respiratory failure 2/2 CHF exacerbation/pleural effusion  - pro-BNP: 5657  - Cardiology consulted, follow up recommendations  - TTE done, follow up read  - TSx following, s/p CT. Follow up CXR and fluid studies  - Lasix 40mg IV BID  - Continue Metozalone  - Monitor I&Os, daily weights, fluid/salt restriction  - Age adjusted D-dimer unremarkable  - Supplemental oxygen PRN, wean as tolerated    Afib w/ RVR  - Metoprolol 5mg IVP x 2  - Restart home Lopressor  - Metopolol IVP PRN  - Telemetry  - Hx of GIB on AC in past, will follow up with Cardiology regarding AC    Leukocytosis  - Denies any signs of infection, afebrile  - Possibly stress induced?  - Check RVP, procalcitonin  - Hold antibiotics and monitor for now    DEVON vs CKD  - U/a  - Renal US  - Hold Entresto  - possibly in setting of volume overload  - avoid nephrotoxic medications  - monitor BMP, correct electrolytes as needed    Hx of CAD s/p CABG  - Continue ASA, Statin, Lopressor    DVT ppx: Heparin SQ

## 2023-11-18 NOTE — ED ADULT NURSE NOTE - NSFALLHARMRISKINTERV_ED_ALL_ED

## 2023-11-18 NOTE — CONSULT NOTE ADULT - SUBJECTIVE AND OBJECTIVE BOX
HPI:      PAST MEDICAL & SURGICAL HISTORY:      REVIEW OF SYSTEMS      General:No Weight change/ Fatigue/ HA/Dizzy	    Skin/Breast: No Rashes/ Lesions/ Masses  	  Ophthalmologic: No Blurry vision/ Glaucoma/ Blindness  	  ENMT: No Hearing loss/ Drainage/ Lesions	    Respiratory and Thorax: No Cough/ Wheezing/ SOB/ Hemoptysis/ Sputum production  	  Cardiovascular: No Chest pain/ Palpitations/ Diaphoresis	    Gastrointestinal: No Nausea/ Vomiting/ Constipation/ Appetite Change	    Genitourinary: No Heamturia/ Dysuria/ Frequency change/ Impotence	    Musculoskeletal: No Pain/ Weakness/ Claudication	    Neurological: No Seizures/ TIA/CVA/ Parastesias	    Psychiatric: No Dementia/ Depression/ SI/HI	    Hematology/Lymphatics: No hx of bleeding/ Edema	    Endocrine:	No Hyperglycemia/ Hypoglycemia    Allergic/Immunologic:	 No Anaphylaxis/ Intolerance/ Recent illnesses    MEDICATIONS  (STANDING):    MEDICATIONS  (PRN):      Allergies    No Known Allergies    Intolerances        SOCIAL HISTORY:    FAMILY HISTORY:      Vital Signs Last 24 Hrs  T(C): 36.6 (18 Nov 2023 08:36), Max: 36.6 (18 Nov 2023 08:36)  T(F): 97.8 (18 Nov 2023 08:36), Max: 97.8 (18 Nov 2023 08:36)  HR: 133 (18 Nov 2023 08:36) (133 - 136)  BP: 124/72 (18 Nov 2023 08:36) (124/72 - 128/79)  BP(mean): --  RR: 32 (18 Nov 2023 08:36) (28 - 32)  SpO2: 95% (18 Nov 2023 08:36) (94% - 95%)    Parameters below as of 18 Nov 2023 08:36  Patient On (Oxygen Delivery Method): room air        General: WN/WD NAD  Neurology: Awake, nonfocal, LEE x 4  Eyes: Scleras clear, PERRLA/ EOMI, Gross vision intact  ENT:Gross hearing intact, grossly patent pharynx, no stridor  Neck: Neck supple, trachea midline, No JVD,   Respiratory: CTA B/L, No wheezing, rales, rhonchi  CV: RRR, S1S2, no murmurs, rubs or gallops  Abdominal: Soft, NT, ND +BS,   Extremities: No edema, + peripheral pulses  Skin: No Rashes, Hematoma, Ecchymosis  Lymphatic: No Neck, axilla, groin LAD  Psych: Oriented x 3, normal affect  Incisions:   Tubes:    LABS:                        15.3   16.20 )-----------( 278      ( 18 Nov 2023 09:02 )             47.2     11-18    143  |  102  |  48.0<H>  ----------------------------<  157<H>  4.0   |  21.0<L>  |  2.12<H>    Ca    9.1      18 Nov 2023 09:02    TPro  6.9  /  Alb  3.5  /  TBili  1.4  /  DBili  x   /  AST  24  /  ALT  22  /  AlkPhos  96  11-18    PT/INR - ( 18 Nov 2023 09:02 )   PT: 15.9 sec;   INR: 1.45 ratio         PTT - ( 18 Nov 2023 09:02 )  PTT:33.3 sec  Urinalysis Basic - ( 18 Nov 2023 09:02 )    Color: x / Appearance: x / SG: x / pH: x  Gluc: 157 mg/dL / Ketone: x  / Bili: x / Urobili: x   Blood: x / Protein: x / Nitrite: x   Leuk Esterase: x / RBC: x / WBC x   Sq Epi: x / Non Sq Epi: x / Bacteria: x        RADIOLOGY & ADDITIONAL STUDIES:    ASSESSMENT:   72yMale  PLAN: HPI:   72-year-old male with history of hypertension CAD status post quintuple bypass November 2021 followed by cardiology/Dr. Milian in  [place]  resents the ED via EMS complaining of worsening dyspnea on exertion since yesterday.  Patient states he was  seen by his cardiologist on Tuesday and metoprolol was increased from 50 mg to 100 mg daily.  Patient has been compliant with all his medications.   Patient states his legs have been swollen  for the last 2 days despite taking his medications.  Patient denies any associated chest pain, nausea, vomiting, diaphoresis or syncope  PAST MEDICAL & SURGICAL HISTORY:      REVIEW OF SYSTEMS      General: + Weight change/ + Fatigue/ HA/Dizzy	    Skin/Breast: No Rashes/ Lesions/ Masses  	  Ophthalmologic: No Blurry vision/ Glaucoma/ Blindness + glasses  	  ENMT: No Hearing loss/ Drainage/ Lesions	    Respiratory and Thorax: No Cough/ Wheezing/ + SOB/ Hemoptysis/ Sputum production  	  Cardiovascular: No Chest pain/ + Palpitations/ Diaphoresis	    Gastrointestinal: No Nausea/ Vomiting/ Constipation/ Appetite Change	    Genitourinary: No Hematuria/ Dysuria/ Frequency change/ Impotence	    Musculoskeletal: No Pain/ + leg Weakness/ Claudication  	    Neurological: No Seizures/ TIA/CVA/ Parastesias	    Psychiatric: No Dementia/ Depression/ SI/HI	    Hematology/Lymphatics: No hx of bleeding/ Edema	    Endocrine:	No Hyperglycemia/ Hypoglycemia    Allergic/Immunologic:	 No Anaphylaxis/ Intolerance/ Recent illnesses          Allergies    No Known Allergies    Intolerances        SOCIAL HISTORY: Single lives alone  retired retail     FAMILY HISTORY:      Vital Signs Last 24 Hrs  T(C): 36.6 (18 Nov 2023 08:36), Max: 36.6 (18 Nov 2023 08:36)  T(F): 97.8 (18 Nov 2023 08:36), Max: 97.8 (18 Nov 2023 08:36)  HR: 133 (18 Nov 2023 08:36) (133 - 136)  BP: 124/72 (18 Nov 2023 08:36) (124/72 - 128/79)  BP(mean): --  RR: 32 (18 Nov 2023 08:36) (28 - 32)  SpO2: 95% (18 Nov 2023 08:36) (94% - 95%)    Parameters below as of 18 Nov 2023 08:36  Patient On (Oxygen Delivery Method): room air        General: WN/WD NAD  Neurology: Awake, nonfocal, LEE x 4  Eyes: Scleras clear, PERRLA/ EOMI, Gross vision intact  ENT:Gross hearing intact, grossly patent pharynx, no stridor  Neck: Neck supple, trachea midline, No JVD,   Respiratory: Diminished on left side, No wheezing, rales, rhonchi  CV: RRR, S1S2, no murmurs, rubs or gallops  Abdominal: Soft, NT, ND +BS, Obese  Extremities: 2+ B/L LE  edema, + peripheral pulses  Skin: chr pigment changes bilateral lower extrem  Lymphatic: No Neck, axilla, groin LAD  Psych: Oriented x 3, normal affect      LABS:                        15.3   16.20 )-----------( 278      ( 18 Nov 2023 09:02 )             47.2     11-18    143  |  102  |  48.0<H>  ----------------------------<  157<H>  4.0   |  21.0<L>  |  2.12<H>    Ca    9.1      18 Nov 2023 09:02    TPro  6.9  /  Alb  3.5  /  TBili  1.4  /  DBili  x   /  AST  24  /  ALT  22  /  AlkPhos  96  11-18    PT/INR - ( 18 Nov 2023 09:02 )   PT: 15.9 sec;   INR: 1.45 ratio         PTT - ( 18 Nov 2023 09:02 )  PTT:33.3 sec  Urinalysis Basic - ( 18 Nov 2023 09:02 )    Color: x / Appearance: x / SG: x / pH: x  Gluc: 157 mg/dL / Ketone: x  / Bili: x / Urobili: x   Blood: x / Protein: x / Nitrite: x   Leuk Esterase: x / RBC: x / WBC x   Sq Epi: x / Non Sq Epi: x / Bacteria: x        RADIOLOGY & ADDITIONAL STUDIES:  CXR> Mod left effusion    ASSESSMENT:    72-year-old male with history of hypertension CAD status post quintuple bypass November 2021 followed by cardiology/Dr. Milian in  [place]  resents the ED via EMS complaining of worsening dyspnea on exertion since yesterday.  Thoracic surgery consulted treatment Left effusion    PLAN:  CT of chest evaluate effusion  Poss pigtail placement after CT  Images to be reviewed by Dr Encarnacion

## 2023-11-18 NOTE — ED ADULT NURSE REASSESSMENT NOTE - NS ED NURSE REASSESS COMMENT FT1
oob to bathroom with assist ( stretcher outside of door) secondary to bowel movement. pt required help with wiping and cleaning. 2 small quarter sized stage 1-2 breakdown noted between his buttock.
Patient A&Ox4, placed /cardiac monitor.  Pt taken to echo.
Patient tolerated pigtail insertion well, draining clear yellow fluid.  A&Ox4, states decreasing SOB.  Remains tachycardic, tachypneic, denies chest pain.  Admitting MD present at bedside.  Orders pending.  Safety maintained with bed in lowest position, call bell within reach.
Patient A&Ox4, appears resting comfortably in bed.  Dr. Flores ordered metoprolol rescheduled to be given now. Chest tube draining, 900cc of clear yellow fluid drained at this time.  Safety maintained with bed in lowest position, call bell within reach.

## 2023-11-18 NOTE — H&P ADULT - NSHPPHYSICALEXAM_GEN_ALL_CORE
Vital Signs Last 24 Hrs  T(F): 97.8 (18 Nov 2023 08:36), Max: 97.8 (18 Nov 2023 08:36)  HR: 137 (18 Nov 2023 14:09) (130 - 137)  BP: 113/58 (18 Nov 2023 14:09) (112/56 - 139/61)  RR: 32 (18 Nov 2023 14:09) (20 - 32)  SpO2: 94% (18 Nov 2023 14:09) (91% - 95%)    Physical Exam:  Constitutional: NAD  HEENT: NC/AT, PERRL, EOMI, trachea midline, no JVD  Respiratory: Decreased breath sounds. +L CT  Cardiovascular: RRR, no m/g/r  Gastrointestinal: Soft, NT/ND, BS+  Vascular: 2+ peripheral pulses  Neurological: A/O x 3, no focal neurological deficits  Psych: Fair mood/affect  Musculoskeletal: LE swelling 2+ b/l  Skin: No obvious rash, lesions. No jaundice.

## 2023-11-18 NOTE — ED ADULT TRIAGE NOTE - CHIEF COMPLAINT QUOTE
Pt A&Ox4 states "I have been having trouble breathing got worse the past 2 days." BIBA c/o SOB/ SAVAGE x 1 week worsening. Patient has hx of Bypass 2021. Asa 324mg via EMS

## 2023-11-18 NOTE — CONSULT NOTE ADULT - SUBJECTIVE AND OBJECTIVE BOX
Patient is a 72y old  Male who presents with a chief complaint of SOB (2023 17:22)    HPI:  72M with hx of CAD s/P CABG 2021, HTN, HLD, Afib presented with progressive dyspnea over the last several days. Pt was seen by his cardiologist and had his lasix increased and metoprolol increased. Pt also notes increased LE edema. Endorses cough productive of clear sputum. Denies any known prior pulmonary disease. Denies any recent fevers/chills/sick contacts. Denies any associated chest pain. Denies any known prior pleural effusion or pleural drainage. Pt was found to have moderate to large L pleural effusion with associated atelectasis. Pt had L sided chest tube placed by thoracic with           PAST MEDICAL & SURGICAL HISTORY:  Coronary artery disease involving native coronary artery of native heart, unspecified whether angina  CHF with unknown LVEF  Primary hypertension  Hyperlipidemia, unspecified hyperlipidemia type  S/P CABG x 5  History of cholecystectomy    Medications:  furosemide   Injectable 40 milliGRAM(s) IV Push every 12 hours  metolazone 5 milliGRAM(s) Oral daily  metoprolol tartrate 50 milliGRAM(s) Oral every 6 hours  metoprolol tartrate Injectable 5 milliGRAM(s) IV Push every 6 hours PRN  spironolactone 25 milliGRAM(s) Oral daily  acetaminophen     Tablet .. 650 milliGRAM(s) Oral every 6 hours PRN  melatonin 3 milliGRAM(s) Oral at bedtime PRN  ondansetron Injectable 4 milliGRAM(s) IV Push every 8 hours PRN  aspirin  chewable 81 milliGRAM(s) Oral daily  heparin   Injectable 45332 Unit(s) IV Push every 6 hours PRN  heparin   Injectable 5000 Unit(s) IV Push every 6 hours PRN  heparin  Infusion.  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, worked in clothing design    FH: denies family hx of pulmonary disease/malignancy    ICU Vital Signs Last 24 Hrs  T(C): 37.1 (2023 15:30), Max: 37.1 (2023 15:30)  T(F): 98.8 (2023 15:30), Max: 98.8 (2023 15:30)  HR: 138 (2023 18:31) (130 - 138)  BP: 115/80 (2023 18:31) (112/56 - 158/65)  BP(mean): --  ABP: --  ABP(mean): --  RR: 22 (2023 18:31) (20 - 32)  SpO2: 96% (2023 18:) (91% - 96%)    O2 Parameters below as of 2023 18:  Patient On (Oxygen Delivery Method): nasal cannula  O2 Flow (L/min): 5          ABG - ( 2023 17:36 )  pH, Arterial: 7.490 pH, Blood: x     /  pCO2: 31    /  pO2: 78    / HCO3: 24    / Base Excess: 0.3   /  SaO2: 97.6                I&O's Detail        LABS:                        15.3   16.20 )-----------( 278      ( 2023 09:02 )             47.2     11-18    143  |  102  |  48.0<H>  ----------------------------<  157<H>  4.0   |  21.0<L>  |  2.12<H>    Ca    9.1      2023 09:02  Mg     1.8     11-18    TPro  6.9  /  Alb  3.5  /  TBili  1.4  /  DBili  x   /  AST  24  /  ALT  22  /  AlkPhos  96  11-18      CARDIAC MARKERS ( 2023 09:02 )  x     / x     / 43 U/L / x     / x          CAPILLARY BLOOD GLUCOSE        PT/INR - ( 2023 09:02 )   PT: 15.9 sec;   INR: 1.45 ratio         PTT - ( 2023 18:16 )  PTT:35.8 sec  Urinalysis Basic - ( 2023 16:45 )    Color: Dark Yellow / Appearance: Clear / S.023 / pH: x  Gluc: x / Ketone: Trace mg/dL  / Bili: Small / Urobili: 1.0 mg/dL   Blood: x / Protein: 100 mg/dL / Nitrite: Negative   Leuk Esterase: Trace / RBC: 1 /HPF / WBC 1 /HPF   Sq Epi: x / Non Sq Epi: 2 /HPF / Bacteria: Negative /HPF      CULTURES:  Rapid RVP Result: NotDetec ( @ 08:30)      Physical Examination:    General: No acute distress.      HEENT: Pupils equal, reactive to light.  Symmetric.    PULM: Clear to auscultation bilaterally, no significant sputum production    NECK: Supple, no lymphadenopathy, trachea midline    CVS: Regular rate and rhythm, no murmurs, rubs, or gallops    ABD: Soft, nondistended, nontender, normoactive bowel sounds, no masses    EXT: No edema, nontender    SKIN: Warm and well perfused, no rashes noted.    NEURO: Alert, oriented, interactive, nonfocal    DEVICES:     RADIOLOGY: ***    CRITICAL CARE TIME SPENT: ***   Patient is a 72y old  Male who presents with a chief complaint of SOB (2023 17:22)    HPI:  72M former smoker with hx of CAD s/P CABG 2021, HTN, HLD, post operative Afib presented with progressive dyspnea over the last several days. Pt was seen by his cardiologist and had his lasix increased and metoprolol increased. Pt also notes increased LE edema. Endorses cough productive of clear sputum. Denies any known prior pulmonary disease. Denies prior oxygen need. Denies any recent fevers/chills/sick contacts. Denies any associated chest pain. Denies any known prior pleural effusion or pleural drainage. Denies wheezing. Pt was found to have moderate to large L pleural effusion with associated atelectasis. Pt had L sided chest tube placed by thoracic with 600cc drained. Pt also noted to be in Afib/flutter with RVR but hemodynamically stable. Pt reports interval improvement in shortness of breath post drainage. ABG 7.49/31/78 on 6L NC. Echo showed EF 20-25% with severely reduced RV function.             PAST MEDICAL & SURGICAL HISTORY:  Coronary artery disease involving native coronary artery of native heart, unspecified whether angina  CHF with unknown LVEF  Primary hypertension  Hyperlipidemia, unspecified hyperlipidemia type  S/P CABG x 5  History of cholecystectomy    Medications:  furosemide   Injectable 40 milliGRAM(s) IV Push every 12 hours  metolazone 5 milliGRAM(s) Oral daily  metoprolol tartrate 50 milliGRAM(s) Oral every 6 hours  metoprolol tartrate Injectable 5 milliGRAM(s) IV Push every 6 hours PRN  spironolactone 25 milliGRAM(s) Oral daily  acetaminophen     Tablet .. 650 milliGRAM(s) Oral every 6 hours PRN  melatonin 3 milliGRAM(s) Oral at bedtime PRN  ondansetron Injectable 4 milliGRAM(s) IV Push every 8 hours PRN  aspirin  chewable 81 milliGRAM(s) Oral daily  heparin   Injectable 72666 Unit(s) IV Push every 6 hours PRN  heparin   Injectable 5000 Unit(s) IV Push every 6 hours PRN  heparin  Infusion.  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, worked in clothing design    FH: denies family hx of pulmonary disease/malignancy    ICU Vital Signs Last 24 Hrs  T(C): 37.1 (2023 15:30), Max: 37.1 (2023 15:30)  T(F): 98.8 (2023 15:30), Max: 98.8 (2023 15:30)  HR: 138 (2023 18:31) (130 - 138)  BP: 115/80 (2023 18:31) (112/56 - 158/65)  BP(mean): --  ABP: --  ABP(mean): --  RR: 22 (2023 18:31) (20 - 32)  SpO2: 96% (2023 18:31) (91% - 96%)    O2 Parameters below as of 2023 18:31  Patient On (Oxygen Delivery Method): nasal cannula  O2 Flow (L/min): 5    ABG - ( 2023 17:36 )  pH, Arterial: 7.490 pH, Blood: x     /  pCO2: 31    /  pO2: 78    / HCO3: 24    / Base Excess: 0.3   /  SaO2: 97.6      I&O's Detail    LABS:                        15.3   16.20 )-----------( 278      ( 2023 09:02 )             47.2     11-18    143  |  102  |  48.0<H>  ----------------------------<  157<H>  4.0   |  21.0<L>  |  2.12<H>    Ca    9.1      2023 09:02  Mg     1.8     11-18    TPro  6.9  /  Alb  3.5  /  TBili  1.4  /  DBili  x   /  AST  24  /  ALT  22  /  AlkPhos  96  11-18      CARDIAC MARKERS ( 2023 09:02 )  x     / x     / 43 U/L / x     / x          CAPILLARY BLOOD GLUCOSE        PT/INR - ( 2023 09:02 )   PT: 15.9 sec;   INR: 1.45 ratio         PTT - ( 2023 18:16 )  PTT:35.8 sec  Urinalysis Basic - ( 2023 16:45 )    Color: Dark Yellow / Appearance: Clear / S.023 / pH: x  Gluc: x / Ketone: Trace mg/dL  / Bili: Small / Urobili: 1.0 mg/dL   Blood: x / Protein: 100 mg/dL / Nitrite: Negative   Leuk Esterase: Trace / RBC: 1 /HPF / WBC 1 /HPF   Sq Epi: x / Non Sq Epi: 2 /HPF / Bacteria: Negative /HPF      CULTURES:  Rapid RVP Result: NotDetec ( @ 08:30)      Physical Examination:    General: awake, alert, in NAD      HEENT: NC/AT, anicteric, MMM    PULM: L base crackles, L sided chest tube with straw colored drainage     NECK: Supple, trachea midline    CVS: irregular, tachycardiac     ABD: Soft, nondistended, nontender, normoactive bowel sounds    EXT: 2+ bilateral edema, nontender    SKIN: Warm and well perfused, no rashes noted.    NEURO: Alert, oriented, interactive, nonfocal    ROS: negative except per HPI    RADIOLOGY:   < from: CT Chest No Cont (23 @ 12:04) >    CT scan of the chest was obtained without administration of intravenous   contrast.    Few small lymph nodes are present in the mediastinum.    Heart is enlarged in size. Patient is status post CABG. No pericardial   effusion is noted.    No endobronchial lesions are noted. Marked compressive atelectasis of the   left lung is noted. This is secondary to moderate to large loculated left   pleural effusion. Right lung is essentially clear.    Below the diaphragm, visualized portions of the abdomen demonstrate   thickening of both adrenal glands. Patient is status post   cholecystectomy. Low-attenuation lesion in the right kidney is   incompletely imaged on this exam.    Degenerative changes of the spine are noted.    IMPRESSION: Moderate to large loculated left pleural effusion.    < end of copied text >

## 2023-11-18 NOTE — H&P ADULT - HISTORY OF PRESENT ILLNESS
Patient is a 71 yo M w/ PMH of CAD s/p CABG, CHF, HTN, HLD presenting for chief complaint of SOB. Patient states he started experiencing SOB and LE swelling for the past few days He states he was seen by his Cardiologist on, Dr. Milian, on Tuesday and his Metoprolol was increased from 50mg to 100mg. He states his symptoms worsened yesterday therefore he came to the ED today. He states he has been unable to walk around without getting short of breath. He reports compliance with medications and diet. Patient denies fever, chills, chest pain, palpitations, cough, wheezing, abdominal pain, nausea, vomiting, diarrhea, constipation, bloody bowel movements, melena, hematemesis, urinary complaints, syncope, calf tenderness, paresthesias.  Patient is a 73 yo M w/ PMH of CAD s/p CABG, CHF, HTN, HLD, Afib (not on AC due to hx of GIB) presenting for chief complaint of SOB. Patient states he started experiencing SOB and LE swelling for the past few days He states he was seen by his Cardiologist on, Dr. Milian, on Tuesday and his Metoprolol was increased from 50mg to 100mg. He states his symptoms worsened yesterday therefore he came to the ED today. He states he has been unable to walk around without getting short of breath. He reports compliance with medications and diet. Patient denies fever, chills, chest pain, palpitations, cough, wheezing, abdominal pain, nausea, vomiting, diarrhea, constipation, bloody bowel movements, melena, hematemesis, urinary complaints, syncope, calf tenderness, paresthesias.

## 2023-11-18 NOTE — ED PROVIDER NOTE - PROGRESS NOTE DETAILS
Patient seen by cardiology and CT surgery for evaluation of large left pleural effusion.  CT results as noted.  Case discussed with hospitalist and will admit to telemetry

## 2023-11-18 NOTE — CONSULT NOTE ADULT - ASSESSMENT
Patient is a 71 yo male former smoker w/ PMH of CAD, s/p 5V CABG, CHF (unknown EF), HTN, HLD presenting for chief complaint of SOB. Patient states he started experiencing SOB and LE swelling for the past few days He states he was seen by his Cardiologist on, Dr. Milian, on Tuesday and his Metoprolol was increased from 50mg to 100mg. He states his symptoms worsened yesterday therefore he came to the ED today. He states he has been unable to walk around without getting short of breath. One week ago he was walking 6 blocks with usually no symptoms, and since yesterday he has been having SOB with minimal or no exertion. He reports compliance with medications and diet. Patient denies fever, chills, chest pain, palpitations, cough, wheezing, abdominal pain, nausea, vomiting, diarrhea, constipation, bloody bowel movements, melena, hematemesis, urinary complaints, syncope, calf tenderness, paresthesias. A CXR was done in the ED which showed a moderate to large left pleural effusion. A noncontrast CT of the chest was done which showed a moderate to large loculated left pleural effusion. Thoracic surgery was called to evaluate the patient and a 14 fr. chest tube was inserted with 600 mL serous fluid out. An echo was done which revealed an LVEF of 20-25 % and severely reduced RVSF.

## 2023-11-18 NOTE — CONSULT NOTE ADULT - SUBJECTIVE AND OBJECTIVE BOX
[place] [facility]  [facility]   Office: [address] [place] [address]  Telephone: [phone] Fax:[phone]      HPI:  Pt is a 71 y/o m, PMHx significant for CAD s/p CABG x4, CHF(?EF), HTN, HLD, remote AF (transient in the setting of post op CABG), who presented to [facility] ED with complaints of worsening SOB x several days. Pt states he was evaluated by his primary cardiologist (Dr. Milian) on Tuesday for a follow up and had his Metoprolol increased from 50mg BID to 100mg BID, after expressing complaints of some mild SOB . Pt reports his symptoms progressively worsened throughout the week, prompting his visit to the ED today. Pt hypoxic to the 70s on arrival and was placed on supplemental O2 and diuresed. TTE revealed  LVEF of 20-25 % and severely reduced RVSF. CT chest revealed a moderate to large loculated left pleural effusion, and pt underwent L sided chest tube placement with 600cc of serous drainage. Pt noted to be in AFL with RVR, for which the EP service has been consulted.    At time of assessment pt remains in AFL with ventricular rates in the 130s despite multiple pushes of IV lopressor in conjunction with PO metoprolol. Pt reports a brief remote hx of AF after undergoing a CABG in . Pt states he was started on Xarelto at the time and subsequently developed melena so AC was stopped. Pt reports his AF only lasted for the morgan-operative period and reports no known recurrence. At this time pt reports significant improvement in SOB s/p chest tube placement. Currently denies chest pain, palpitations, dizziness, fever, chills.    EKG: AFL, ventricular rate 145.   Telemetry: Atrial flutter with ventricular rates averaging 130s        PAST MEDICAL & SURGICAL HISTORY:  Coronary artery disease involving native coronary artery of native heart, unspecified whether angina      CHF with unknown LVEF      Primary hypertension      Hyperlipidemia, unspecified hyperlipidemia type      S/P CABG x 5      History of cholecystectomy          REVIEW OF SYSTEMS:    CONSTITUTIONAL: No fever, weight loss, or fatigue  EYES: No visual disturbances  NECK: No pain or stiffness  RESPIRATORY: + SOB (improving)   CARDIOVASCULAR: see HPI  GASTROINTESTINAL: No abdominal or epigastric pain. No nausea, vomiting, or hematemesis; No diarrhea or constipation. No melena or hematochezia.  NEUROLOGICAL: No headaches, memory loss, loss of strength, numbness, or tremors  SKIN: No itching, burning, rashes, or lesions       MEDICATIONS  (STANDING):  aspirin  chewable 81 milliGRAM(s) Oral daily  atorvastatin 20 milliGRAM(s) Oral at bedtime  furosemide   Injectable 40 milliGRAM(s) IV Push every 12 hours  heparin  Infusion.  Unit(s)/Hr (23 mL/Hr) IV Continuous <Continuous>  metolazone 5 milliGRAM(s) Oral daily  metoprolol tartrate 50 milliGRAM(s) Oral every 6 hours  spironolactone 25 milliGRAM(s) Oral daily    MEDICATIONS  (PRN):  acetaminophen     Tablet .. 650 milliGRAM(s) Oral every 6 hours PRN Temp greater or equal to 38C (100.4F), Mild Pain (1 - 3)  aluminum hydroxide/magnesium hydroxide/simethicone Suspension 30 milliLiter(s) Oral every 4 hours PRN Dyspepsia  heparin   Injectable 24309 Unit(s) IV Push every 6 hours PRN For aPTT less than 40  heparin   Injectable 5000 Unit(s) IV Push every 6 hours PRN For aPTT between 40 - 57  melatonin 3 milliGRAM(s) Oral at bedtime PRN Insomnia  metoprolol tartrate Injectable 5 milliGRAM(s) IV Push every 6 hours PRN RVR > 130  ondansetron Injectable 4 milliGRAM(s) IV Push every 8 hours PRN Nausea and/or Vomiting      Allergies    No Known Allergies    Intolerances        SOCIAL HISTORY:  Former smoker  Denies ETOH  Denies illicit drug use    FAMILY HISTORY:  Family history of coronary artery bypass surgery (Father)    Family history of hypotension (Mother)        Vital Signs Last 24 Hrs  T(C): 37.1 (2023 15:30), Max: 37.1 (2023 15:30)  T(F): 98.8 (2023 15:30), Max: 98.8 (2023 15:30)  HR: 138 (2023 18:31) (130 - 138)  BP: 115/80 (2023 18:31) (112/56 - 158/65)  BP(mean): --  RR: 22 (2023 18:31) (20 - 32)  SpO2: 96% (2023 18:31) (91% - 96%)    Parameters below as of 2023 18:31  Patient On (Oxygen Delivery Method): nasal cannula  O2 Flow (L/min): 5      Physical Exam:    Constitutional: NAD  Head: normocephalic atraumatic  Resp: Decreased breath sound L lung.  Cardiac: Tachycardic. S1, S1. No murmur.   Abdomen: soft, nondistended, bowel sounds active, nontender to palpation in all four quadrants    Musculoskeletal: full range of motion all extremities  Neuro: Alert and oriented x 3, motor & sensation grossly in tact  Skin: color normal, no rashes or injury  Psych: mood calm   extrems: + 2 LE edema b/l    LABS:                        15.3   16.20 )-----------( 278      ( 2023 09:02 )             47.2   11-18    143  |  102  |  48.0<H>  ----------------------------<  157<H>  4.0   |  21.0<L>  |  2.12<H>    Ca    9.1      2023 09:02  Mg     1.8     11-18    TPro  6.9  /  Alb  3.5  /  TBili  1.4  /  DBili  x   /  AST  24  /  ALT  22  /  AlkPhos  96  11-18  LIVER FUNCTIONS - ( 2023 09:02 )  Alb: 3.5 g/dL / Pro: 6.9 g/dL / ALK PHOS: 96 U/L / ALT: 22 U/L / AST: 24 U/L / GGT: x           PT/INR - ( 2023 09:02 )   PT: 15.9 sec;   INR: 1.45 ratio         PTT - ( 2023 09:02 )  PTT:33.3 secCARDIAC MARKERS ( 2023 09:02 )  x     / x     / 43 U/L / x     / x          Urinalysis Basic - ( 2023 16:45 )    Color: Dark Yellow / Appearance: Clear / S.023 / pH: x  Gluc: x / Ketone: Trace mg/dL  / Bili: Small / Urobili: 1.0 mg/dL   Blood: x / Protein: 100 mg/dL / Nitrite: Negative   Leuk Esterase: Trace / RBC: 1 /HPF / WBC 1 /HPF   Sq Epi: x / Non Sq Epi: 2 /HPF / Bacteria: Negative /HPF        RADIOLOGY & ADDITIONAL STUDIES:    Summary:   1. There is mild concentric left ventricular hypertrophy.   2. Moderate to severely increased left ventricular internal cavity size.   3. Left ventricular ejection fraction, by visual estimation, is 20 to   25%.   4. Severely decreased global left ventricular systolic function.   5. The mitral in-flow pattern reveals no discernable A-wave, therefore   no comment on diastolic function can be made.   6. Mildly enlarged right ventricle.   7. Severely reduced RV systolic function.   8. Mildly enlarged left atrium.   9. Normal right atrial size.  10. Sclerotic aortic valve with normal opening.  11. Mild thickening and calcification of the anterior and posterior   mitral valve leaflets.  12. Mild mitral annular calcification.  13. Mild mitral valve regurgitation.  14. The main pulmonary artery is normal in size.  15. There is no evidence of pericardial effusion.  .  < from: CT Chest No Cont (23 @ 12:04) >  IMPRESSION: Moderate to large loculated left pleural effusion.    < end of copied text >          Assessment:    Pt is a 71 y/o m, PMHx significant for CAD s/p CABG x4, CHF(?EF), HTN, HLD, remote AF (transient in the setting of post op CABG), who presented to [facility] ED with complaints of worsening SOB x several days. Pt states he was evaluated by his primary cardiologist (Dr. Milian) on Tuesday for a follow up and had his Metoprolol increased from 50mg BID to 100mg BID, after expressing complaints of some mild SOB . Pt reports his symptoms progressively worsened throughout the week, prompting his visit to the ED today. Pt hypoxic to the 70s on arrival and was placed on supplemental O2 and diuresed. TTE revealed  LVEF of 20-25 % and severely reduced RVSF. CT chest revealed a moderate to large loculated left pleural effusion, and pt underwent L sided chest tube placement with 600cc of serous drainage. Pt noted to be in AFL with RVR, for which the EP service has been consulted.    At time of assessment pt remains in AFL with ventricular rates in the 130s despite multiple pushes of IV lopressor in conjunction with PO metoprolol. Pt reports a brief remote hx of AF after undergoing a CABG in . Pt states he was started on Xarelto at the time and subsequently developed melena so AC was stopped. Pt reports his AF only lasted for the morgan-operative period and reports no known recurrence. At this time pt reports significant improvement in SOB s/p chest tube placement. Currently denies chest pain, palpitations, dizziness, fever, chills.      Plan  1) AFL with RVR  - Ventricular rates currently averaging 130s  - Increase Metoprolol to 50mg Q6HR. Titrate as BP tolerates  - Rates will be difficult to control while in atrial flutter  - Would avoid use of digoxin given elevated renal function (2.12, baseline unknown). If rates are still uncontrolled despite continued beta blocker therapy, would only given Digoix .25mg x 1, followed by an additional .25mg in 6 hours (NO maintenance dosing). AS per Dr. Bowers    - CHADS2-Vasc: 4 (age, CHF, CAD, and HTN). Agree with Heparin infusion. Monitor PTTs and maintain therapeutic levels  - Can consider QUIN / DCCV when further optimized from HF perspective, as well as post ischemic evaluation  - Maintain telemetry monitoring    2) HFrEF (20-25%)  - Baseline EF unknown  - Records from Dr. Wisemanots office to be obtained  - GDMT and diuresis as per general cardiology  - LHC / RHC tentatively planned for 23    3) L pleural effusion  - s/p chest tube   - management as per thoracic surgery team    Above discussed with Dr. Law

## 2023-11-18 NOTE — CONSULT NOTE ADULT - NS ATTEND AMEND GEN_ALL_CORE FT
Patient seen and examined by me.  Patients son at bedside  T(C): 36.6 (11-18-23 @ 19:22), Max: 37.1 (11-18-23 @ 15:30)  HR: 135 (11-18-23 @ 19:22) (130 - 138)  BP: 123/68 (11-18-23 @ 19:22) (112/56 - 158/65)  RR: 23 (11-18-23 @ 19:22) (20 - 32)  SpO2: 95% (11-18-23 @ 19:22) (91% - 96%)  Patient alert and awake.  Chest- Bilateral Clear BS  Cardiac- S1 and S2  Abdomen- Soft    Assessment/Plan:  1. Acute HFrEF  2. Afib/Afl  3. CAD-S/P CABG    I have discussed my recommendation with the PA which are outlined above.  Will follow.

## 2023-11-18 NOTE — CONSULT NOTE ADULT - ASSESSMENT
72M former smoker with hx of CAD s/P CABG 11/2021, HTN, HLD, Afib presented with acute hypoxic respiratory failure in the setting of HF exacerbation and loculated L pleural effusion now s/p L chest tube placement     Acute hypoxic respiratory failure in the setting of HFrEF exacerbation/L loculated pleural effusion/possible PE  c/w diuresis w/ close monitoring of renal function   monitor strict I/Os   f/u pleural fluid chemistry/cultures and can send cytology   daily CXR while chest tube in place   may need to consider tpa/dornase effusion persists despite chest tube   repeat CT chest post drainage to assess underlying parenchyma   can check procal  rate control   wean oxygen as tolerated for goal sat >90-92%   f/u LE duplex   on heparin drip  would attempt to obtain any prior imaging completed   will need outpatient PFTs and possible sleep study   would obtain prior echo to assess chronicity of reduced LV/RV function   planned for R/LHC cath Monday   pt does not currently require ICU level of care, can monitor on SDU; call with any change in status   tele/

## 2023-11-18 NOTE — ED PROVIDER NOTE - CONSTITUTIONAL, MLM
normal... Well appearing, awake, alert, oriented to person, place, time/situation and appears anxious and uncomfortable

## 2023-11-18 NOTE — ED PROVIDER NOTE - OBJECTIVE STATEMENT
72-year-old male with history of hypertension CAD status post quintuple bypass November 2021 followed by cardiology/Dr. Milian in  [place]  resents the ED via EMS complaining of worsening dyspnea on exertion since yesterday.  Patient states he was  seen by his cardiologist on Tuesday and metoprolol was increased from 50 mg to 100 mg daily.  Patient has been compliant with all his medications.   Patient states his legs have been swollen  for the last 2 days despite taking his medications.  Patient denies any associated chest pain, nausea, vomiting, diaphoresis or syncope

## 2023-11-19 DIAGNOSIS — J90 PLEURAL EFFUSION, NOT ELSEWHERE CLASSIFIED: ICD-10-CM

## 2023-11-19 LAB
ALBUMIN FLD-MCNC: 2.6 G/DL — SIGNIFICANT CHANGE UP
ALBUMIN FLD-MCNC: 2.6 G/DL — SIGNIFICANT CHANGE UP
ALBUMIN SERPL ELPH-MCNC: 2.9 G/DL — LOW (ref 3.3–5.2)
ALBUMIN SERPL ELPH-MCNC: 2.9 G/DL — LOW (ref 3.3–5.2)
ALP SERPL-CCNC: 81 U/L — SIGNIFICANT CHANGE UP (ref 40–120)
ALP SERPL-CCNC: 81 U/L — SIGNIFICANT CHANGE UP (ref 40–120)
ALT FLD-CCNC: 22 U/L — SIGNIFICANT CHANGE UP
ALT FLD-CCNC: 22 U/L — SIGNIFICANT CHANGE UP
ANION GAP SERPL CALC-SCNC: 16 MMOL/L — SIGNIFICANT CHANGE UP (ref 5–17)
ANION GAP SERPL CALC-SCNC: 16 MMOL/L — SIGNIFICANT CHANGE UP (ref 5–17)
ANISOCYTOSIS BLD QL: SLIGHT — SIGNIFICANT CHANGE UP
ANISOCYTOSIS BLD QL: SLIGHT — SIGNIFICANT CHANGE UP
APTT BLD: 46.5 SEC — HIGH (ref 24.5–35.6)
APTT BLD: 46.5 SEC — HIGH (ref 24.5–35.6)
APTT BLD: 80.8 SEC — HIGH (ref 24.5–35.6)
APTT BLD: 80.8 SEC — HIGH (ref 24.5–35.6)
APTT BLD: >200 SEC — CRITICAL HIGH (ref 24.5–35.6)
APTT BLD: >200 SEC — CRITICAL HIGH (ref 24.5–35.6)
AST SERPL-CCNC: 30 U/L — SIGNIFICANT CHANGE UP
AST SERPL-CCNC: 30 U/L — SIGNIFICANT CHANGE UP
BASOPHILS # BLD AUTO: 0 K/UL — SIGNIFICANT CHANGE UP (ref 0–0.2)
BASOPHILS # BLD AUTO: 0 K/UL — SIGNIFICANT CHANGE UP (ref 0–0.2)
BASOPHILS NFR BLD AUTO: 0 % — SIGNIFICANT CHANGE UP (ref 0–2)
BASOPHILS NFR BLD AUTO: 0 % — SIGNIFICANT CHANGE UP (ref 0–2)
BILIRUB SERPL-MCNC: 1.1 MG/DL — SIGNIFICANT CHANGE UP (ref 0.4–2)
BILIRUB SERPL-MCNC: 1.1 MG/DL — SIGNIFICANT CHANGE UP (ref 0.4–2)
BUN SERPL-MCNC: 56 MG/DL — HIGH (ref 8–20)
BUN SERPL-MCNC: 56 MG/DL — HIGH (ref 8–20)
BURR CELLS BLD QL SMEAR: PRESENT — SIGNIFICANT CHANGE UP
BURR CELLS BLD QL SMEAR: PRESENT — SIGNIFICANT CHANGE UP
CALCIUM SERPL-MCNC: 8.5 MG/DL — SIGNIFICANT CHANGE UP (ref 8.4–10.5)
CALCIUM SERPL-MCNC: 8.5 MG/DL — SIGNIFICANT CHANGE UP (ref 8.4–10.5)
CHLORIDE SERPL-SCNC: 102 MMOL/L — SIGNIFICANT CHANGE UP (ref 96–108)
CHLORIDE SERPL-SCNC: 102 MMOL/L — SIGNIFICANT CHANGE UP (ref 96–108)
CO2 SERPL-SCNC: 22 MMOL/L — SIGNIFICANT CHANGE UP (ref 22–29)
CO2 SERPL-SCNC: 22 MMOL/L — SIGNIFICANT CHANGE UP (ref 22–29)
CREAT SERPL-MCNC: 2 MG/DL — HIGH (ref 0.5–1.3)
CREAT SERPL-MCNC: 2 MG/DL — HIGH (ref 0.5–1.3)
EGFR: 35 ML/MIN/1.73M2 — LOW
EGFR: 35 ML/MIN/1.73M2 — LOW
EOSINOPHIL # BLD AUTO: 0 K/UL — SIGNIFICANT CHANGE UP (ref 0–0.5)
EOSINOPHIL # BLD AUTO: 0 K/UL — SIGNIFICANT CHANGE UP (ref 0–0.5)
EOSINOPHIL NFR BLD AUTO: 0 % — SIGNIFICANT CHANGE UP (ref 0–6)
EOSINOPHIL NFR BLD AUTO: 0 % — SIGNIFICANT CHANGE UP (ref 0–6)
GIANT PLATELETS BLD QL SMEAR: PRESENT — SIGNIFICANT CHANGE UP
GIANT PLATELETS BLD QL SMEAR: PRESENT — SIGNIFICANT CHANGE UP
GLUCOSE FLD-MCNC: 47 MG/DL — SIGNIFICANT CHANGE UP
GLUCOSE FLD-MCNC: 47 MG/DL — SIGNIFICANT CHANGE UP
GLUCOSE SERPL-MCNC: 136 MG/DL — HIGH (ref 70–99)
GLUCOSE SERPL-MCNC: 136 MG/DL — HIGH (ref 70–99)
GRAM STN FLD: SIGNIFICANT CHANGE UP
GRAM STN FLD: SIGNIFICANT CHANGE UP
HCT VFR BLD CALC: 44.1 % — SIGNIFICANT CHANGE UP (ref 39–50)
HCT VFR BLD CALC: 44.1 % — SIGNIFICANT CHANGE UP (ref 39–50)
HCV AB S/CO SERPL IA: 0.05 S/CO — SIGNIFICANT CHANGE UP (ref 0–0.99)
HCV AB S/CO SERPL IA: 0.05 S/CO — SIGNIFICANT CHANGE UP (ref 0–0.99)
HCV AB SERPL-IMP: SIGNIFICANT CHANGE UP
HCV AB SERPL-IMP: SIGNIFICANT CHANGE UP
HGB BLD-MCNC: 13.7 G/DL — SIGNIFICANT CHANGE UP (ref 13–17)
HGB BLD-MCNC: 13.7 G/DL — SIGNIFICANT CHANGE UP (ref 13–17)
LDH SERPL L TO P-CCNC: 386 U/L — SIGNIFICANT CHANGE UP
LDH SERPL L TO P-CCNC: 386 U/L — SIGNIFICANT CHANGE UP
LYMPHOCYTES # BLD AUTO: 0.71 K/UL — LOW (ref 1–3.3)
LYMPHOCYTES # BLD AUTO: 0.71 K/UL — LOW (ref 1–3.3)
LYMPHOCYTES # BLD AUTO: 4.4 % — LOW (ref 13–44)
LYMPHOCYTES # BLD AUTO: 4.4 % — LOW (ref 13–44)
MAGNESIUM SERPL-MCNC: 1.9 MG/DL — SIGNIFICANT CHANGE UP (ref 1.6–2.6)
MAGNESIUM SERPL-MCNC: 1.9 MG/DL — SIGNIFICANT CHANGE UP (ref 1.6–2.6)
MANUAL SMEAR VERIFICATION: SIGNIFICANT CHANGE UP
MANUAL SMEAR VERIFICATION: SIGNIFICANT CHANGE UP
MCHC RBC-ENTMCNC: 27.1 PG — SIGNIFICANT CHANGE UP (ref 27–34)
MCHC RBC-ENTMCNC: 27.1 PG — SIGNIFICANT CHANGE UP (ref 27–34)
MCHC RBC-ENTMCNC: 31.1 GM/DL — LOW (ref 32–36)
MCHC RBC-ENTMCNC: 31.1 GM/DL — LOW (ref 32–36)
MCV RBC AUTO: 87.3 FL — SIGNIFICANT CHANGE UP (ref 80–100)
MCV RBC AUTO: 87.3 FL — SIGNIFICANT CHANGE UP (ref 80–100)
MICROCYTES BLD QL: SLIGHT — SIGNIFICANT CHANGE UP
MICROCYTES BLD QL: SLIGHT — SIGNIFICANT CHANGE UP
MONOCYTES # BLD AUTO: 1.28 K/UL — HIGH (ref 0–0.9)
MONOCYTES # BLD AUTO: 1.28 K/UL — HIGH (ref 0–0.9)
MONOCYTES NFR BLD AUTO: 7.9 % — SIGNIFICANT CHANGE UP (ref 2–14)
MONOCYTES NFR BLD AUTO: 7.9 % — SIGNIFICANT CHANGE UP (ref 2–14)
NEUTROPHILS # BLD AUTO: 14.17 K/UL — HIGH (ref 1.8–7.4)
NEUTROPHILS # BLD AUTO: 14.17 K/UL — HIGH (ref 1.8–7.4)
NEUTROPHILS NFR BLD AUTO: 87.7 % — HIGH (ref 43–77)
NEUTROPHILS NFR BLD AUTO: 87.7 % — HIGH (ref 43–77)
OVALOCYTES BLD QL SMEAR: SLIGHT — SIGNIFICANT CHANGE UP
OVALOCYTES BLD QL SMEAR: SLIGHT — SIGNIFICANT CHANGE UP
PLAT MORPH BLD: NORMAL — SIGNIFICANT CHANGE UP
PLAT MORPH BLD: NORMAL — SIGNIFICANT CHANGE UP
PLATELET # BLD AUTO: 256 K/UL — SIGNIFICANT CHANGE UP (ref 150–400)
PLATELET # BLD AUTO: 256 K/UL — SIGNIFICANT CHANGE UP (ref 150–400)
POIKILOCYTOSIS BLD QL AUTO: SIGNIFICANT CHANGE UP
POIKILOCYTOSIS BLD QL AUTO: SIGNIFICANT CHANGE UP
POLYCHROMASIA BLD QL SMEAR: SIGNIFICANT CHANGE UP
POLYCHROMASIA BLD QL SMEAR: SIGNIFICANT CHANGE UP
POTASSIUM SERPL-MCNC: 3.6 MMOL/L — SIGNIFICANT CHANGE UP (ref 3.5–5.3)
POTASSIUM SERPL-MCNC: 3.6 MMOL/L — SIGNIFICANT CHANGE UP (ref 3.5–5.3)
POTASSIUM SERPL-SCNC: 3.6 MMOL/L — SIGNIFICANT CHANGE UP (ref 3.5–5.3)
POTASSIUM SERPL-SCNC: 3.6 MMOL/L — SIGNIFICANT CHANGE UP (ref 3.5–5.3)
PROCALCITONIN SERPL-MCNC: 2.59 NG/ML — HIGH (ref 0.02–0.1)
PROCALCITONIN SERPL-MCNC: 2.59 NG/ML — HIGH (ref 0.02–0.1)
PROT FLD-MCNC: 4.7 G/DL — SIGNIFICANT CHANGE UP
PROT FLD-MCNC: 4.7 G/DL — SIGNIFICANT CHANGE UP
PROT SERPL-MCNC: 6.3 G/DL — LOW (ref 6.6–8.7)
PROT SERPL-MCNC: 6.3 G/DL — LOW (ref 6.6–8.7)
RBC # BLD: 5.05 M/UL — SIGNIFICANT CHANGE UP (ref 4.2–5.8)
RBC # BLD: 5.05 M/UL — SIGNIFICANT CHANGE UP (ref 4.2–5.8)
RBC # FLD: 16.1 % — HIGH (ref 10.3–14.5)
RBC # FLD: 16.1 % — HIGH (ref 10.3–14.5)
RBC BLD AUTO: ABNORMAL
RBC BLD AUTO: ABNORMAL
SODIUM SERPL-SCNC: 140 MMOL/L — SIGNIFICANT CHANGE UP (ref 135–145)
SODIUM SERPL-SCNC: 140 MMOL/L — SIGNIFICANT CHANGE UP (ref 135–145)
SPECIMEN SOURCE: SIGNIFICANT CHANGE UP
SPECIMEN SOURCE: SIGNIFICANT CHANGE UP
WBC # BLD: 16.16 K/UL — HIGH (ref 3.8–10.5)
WBC # BLD: 16.16 K/UL — HIGH (ref 3.8–10.5)
WBC # FLD AUTO: 16.16 K/UL — HIGH (ref 3.8–10.5)
WBC # FLD AUTO: 16.16 K/UL — HIGH (ref 3.8–10.5)

## 2023-11-19 PROCEDURE — 99231 SBSQ HOSP IP/OBS SF/LOW 25: CPT

## 2023-11-19 PROCEDURE — 99232 SBSQ HOSP IP/OBS MODERATE 35: CPT | Mod: 25

## 2023-11-19 PROCEDURE — 76775 US EXAM ABDO BACK WALL LIM: CPT | Mod: 26

## 2023-11-19 PROCEDURE — 93970 EXTREMITY STUDY: CPT | Mod: 26

## 2023-11-19 PROCEDURE — 99233 SBSQ HOSP IP/OBS HIGH 50: CPT

## 2023-11-19 PROCEDURE — 71045 X-RAY EXAM CHEST 1 VIEW: CPT | Mod: 26

## 2023-11-19 RX ORDER — METOPROLOL TARTRATE 50 MG
5 TABLET ORAL ONCE
Refills: 0 | Status: COMPLETED | OUTPATIENT
Start: 2023-11-19 | End: 2023-11-19

## 2023-11-19 RX ADMIN — Medication 50 MILLIGRAM(S): at 11:46

## 2023-11-19 RX ADMIN — Medication 40 MILLIGRAM(S): at 06:48

## 2023-11-19 RX ADMIN — SPIRONOLACTONE 25 MILLIGRAM(S): 25 TABLET, FILM COATED ORAL at 06:41

## 2023-11-19 RX ADMIN — HEPARIN SODIUM 1900 UNIT(S)/HR: 5000 INJECTION INTRAVENOUS; SUBCUTANEOUS at 11:25

## 2023-11-19 RX ADMIN — Medication 50 MILLIGRAM(S): at 22:24

## 2023-11-19 RX ADMIN — Medication 5 MILLIGRAM(S): at 21:30

## 2023-11-19 RX ADMIN — ATORVASTATIN CALCIUM 20 MILLIGRAM(S): 80 TABLET, FILM COATED ORAL at 21:30

## 2023-11-19 RX ADMIN — Medication 81 MILLIGRAM(S): at 11:46

## 2023-11-19 RX ADMIN — HEPARIN SODIUM 0 UNIT(S)/HR: 5000 INJECTION INTRAVENOUS; SUBCUTANEOUS at 10:25

## 2023-11-19 RX ADMIN — HEPARIN SODIUM 2300 UNIT(S)/HR: 5000 INJECTION INTRAVENOUS; SUBCUTANEOUS at 02:28

## 2023-11-19 RX ADMIN — Medication 50 MILLIGRAM(S): at 06:41

## 2023-11-19 RX ADMIN — Medication 50 MILLIGRAM(S): at 00:11

## 2023-11-19 RX ADMIN — HEPARIN SODIUM 2200 UNIT(S)/HR: 5000 INJECTION INTRAVENOUS; SUBCUTANEOUS at 20:19

## 2023-11-19 RX ADMIN — HEPARIN SODIUM 2200 UNIT(S)/HR: 5000 INJECTION INTRAVENOUS; SUBCUTANEOUS at 18:07

## 2023-11-19 RX ADMIN — Medication 40 MILLIGRAM(S): at 17:08

## 2023-11-19 RX ADMIN — Medication 5 MILLIGRAM(S): at 20:18

## 2023-11-19 RX ADMIN — HEPARIN SODIUM 2200 UNIT(S)/HR: 5000 INJECTION INTRAVENOUS; SUBCUTANEOUS at 19:12

## 2023-11-19 RX ADMIN — HEPARIN SODIUM 2300 UNIT(S)/HR: 5000 INJECTION INTRAVENOUS; SUBCUTANEOUS at 06:40

## 2023-11-19 NOTE — PROGRESS NOTE ADULT - PROBLEM SELECTOR PLAN 1
Left PTC placed 11/18  Maintain to H2O seal  Follow up pleural fluid analysis, culture and cytology  Record drainage q12 hours to facilitate timely removal  Daily CXR while PTC in place  Rest of care per primary team  Thoracic Surgery to follow  Plan discussed with Thoracic Surgery Attending/Team in AM rounds

## 2023-11-19 NOTE — PROGRESS NOTE ADULT - SUBJECTIVE AND OBJECTIVE BOX
Subjective: Patient seen and assessed for . Patient states " ." At time of exam,    Pertinent events of the past 24 hours: Uneventful, recovering as expected    VITAL SIGNS  Vital Signs Last 24 Hrs  T(C): 37.1 (11-19-23 @ 07:32), Max: 37.1 (11-18-23 @ 15:30)  T(F): 98.7 (11-19-23 @ 07:32), Max: 98.8 (11-18-23 @ 15:30)  HR: 120 (11-19-23 @ 07:32) (120 - 138)  BP: 100/61 (11-19-23 @ 07:32) (97/62 - 158/65)  RR: 19 (11-19-23 @ 07:32) (19 - 32)  SpO2: 95% (11-19-23 @ 07:32) (91% - 96%)  on (O2)              MEDICATIONS  acetaminophen     Tablet .. 650 milliGRAM(s) Oral every 6 hours PRN  aluminum hydroxide/magnesium hydroxide/simethicone Suspension 30 milliLiter(s) Oral every 4 hours PRN  aspirin  chewable 81 milliGRAM(s) Oral daily  atorvastatin 20 milliGRAM(s) Oral at bedtime  furosemide   Injectable 40 milliGRAM(s) IV Push every 12 hours  heparin   Injectable 11228 Unit(s) IV Push every 6 hours PRN  heparin   Injectable 5000 Unit(s) IV Push every 6 hours PRN  heparin  Infusion.  Unit(s)/Hr IV Continuous <Continuous>  melatonin 3 milliGRAM(s) Oral at bedtime PRN  metolazone 5 milliGRAM(s) Oral daily  metoprolol tartrate 50 milliGRAM(s) Oral every 6 hours  metoprolol tartrate Injectable 5 milliGRAM(s) IV Push every 6 hours PRN  ondansetron Injectable 4 milliGRAM(s) IV Push every 8 hours PRN  spironolactone 25 milliGRAM(s) Oral daily    PHYSICAL EXAM  .physical    Weights:  Daily Height in cm: 175.26 (18 Nov 2023 08:25)    Daily   Admit Wt: Drug Dosing Weight  Height (cm): 175.3 (18 Nov 2023 08:25)  Weight (kg): 127 (18 Nov 2023 08:25)  BMI (kg/m2): 41.3 (18 Nov 2023 08:25)  BSA (m2): 2.38 (18 Nov 2023 08:25)    All laboratory results, radiology and medications reviewed.    LABS  11-19    140  |  102  |  56.0<H>  ----------------------------<  136<H>  3.6   |  22.0  |  2.00<H>    Ca    8.5      19 Nov 2023 01:29  Mg     1.9     11-19    TPro  6.3<L>  /  Alb  2.9<L>  /  TBili  1.1  /  DBili  x   /  AST  30  /  ALT  22  /  AlkPhos  81  11-19                                 13.7   16.16 )-----------( 256      ( 19 Nov 2023 01:29 )             44.1          PT/INR - ( 18 Nov 2023 09:02 )   PT: 15.9 sec;   INR: 1.45 ratio         PTT - ( 19 Nov 2023 01:29 )  PTT:80.8 sec  Bilirubin Total: 1.1 mg/dL (11-19 @ 01:29)  Bilirubin Total: 1.4 mg/dL (11-18 @ 09:02)      < from: Xray Chest 1 View- PORTABLE-Urgent (Xray Chest 1 View- PORTABLE-Urgent .) (11.18.23 @ 15:02) >    INTERPRETATION:  AP chest on November 18,2023 at 8:41 AM. 2 images.   Patient is short of breath.    COMPARISON: None available.    Heart size difficult to assess. Sternotomy is noted.    There is a quite large density occupying the left mid and lower chest   which on follow-up CAT scan to moderate to large loculated effusion.    Follow-up AP chest on November 18, 2023 at 2:53 PM. A catheter left chest   tube is been inserted the base. There is little change in the effusion.    IMPRESSION: Sizable left effusion. Catheter chest tube inserted without   much change.    < end of copied text >                        Subjective: Patient seen and assessed for loculated left pleural effusion. Patient states "I feel okay." At time of exam, patient in NAD, Pt denies chest pain, palpitations, dizziness, headache, shortness of breath, abdominal pain or N/V/D/C.     Pertinent events of the past 24 hours: Uneventful, recovering as expected    VITAL SIGNS  Vital Signs Last 24 Hrs  T(C): 37.1 (11-19-23 @ 07:32), Max: 37.1 (11-18-23 @ 15:30)  T(F): 98.7 (11-19-23 @ 07:32), Max: 98.8 (11-18-23 @ 15:30)  HR: 120 (11-19-23 @ 07:32) (120 - 138)  BP: 100/61 (11-19-23 @ 07:32) (97/62 - 158/65)  RR: 19 (11-19-23 @ 07:32) (19 - 32)  SpO2: 95% (11-19-23 @ 07:32) (91% - 96%)  on (O2)              MEDICATIONS  acetaminophen     Tablet .. 650 milliGRAM(s) Oral every 6 hours PRN  aluminum hydroxide/magnesium hydroxide/simethicone Suspension 30 milliLiter(s) Oral every 4 hours PRN  aspirin  chewable 81 milliGRAM(s) Oral daily  atorvastatin 20 milliGRAM(s) Oral at bedtime  furosemide   Injectable 40 milliGRAM(s) IV Push every 12 hours  heparin   Injectable 71185 Unit(s) IV Push every 6 hours PRN  heparin   Injectable 5000 Unit(s) IV Push every 6 hours PRN  heparin  Infusion.  Unit(s)/Hr IV Continuous <Continuous>  melatonin 3 milliGRAM(s) Oral at bedtime PRN  metolazone 5 milliGRAM(s) Oral daily  metoprolol tartrate 50 milliGRAM(s) Oral every 6 hours  metoprolol tartrate Injectable 5 milliGRAM(s) IV Push every 6 hours PRN  ondansetron Injectable 4 milliGRAM(s) IV Push every 8 hours PRN  spironolactone 25 milliGRAM(s) Oral daily    PHYSICAL EXAM  Constitutional: NAD  Neuro: A+O x 3, non-focal, speech clear and intact  CV: regular rate, regular rhythm, +S1S2, no murmurs or rub  Pulm/chest: expiratory wheeze noted for b/l upper airways, otherwise clear to ascultation, no accessory muscle use noted  Abd: +BS, soft, NT, ND  Ext: LEE x 4, +B/L LE edema   Skin: warm, well perfused  Psych: calm, appropriate affect  Drains: Left PTC to H2O seal draining serous fluid, no air leak no SQ air, c/d/i     Weights:  Daily Height in cm: 175.26 (18 Nov 2023 08:25)    Daily   Admit Wt: Drug Dosing Weight  Height (cm): 175.3 (18 Nov 2023 08:25)  Weight (kg): 127 (18 Nov 2023 08:25)  BMI (kg/m2): 41.3 (18 Nov 2023 08:25)  BSA (m2): 2.38 (18 Nov 2023 08:25)    All laboratory results, radiology and medications reviewed.    LABS  11-19    140  |  102  |  56.0<H>  ----------------------------<  136<H>  3.6   |  22.0  |  2.00<H>    Ca    8.5      19 Nov 2023 01:29  Mg     1.9     11-19    TPro  6.3<L>  /  Alb  2.9<L>  /  TBili  1.1  /  DBili  x   /  AST  30  /  ALT  22  /  AlkPhos  81  11-19                                 13.7   16.16 )-----------( 256      ( 19 Nov 2023 01:29 )             44.1          PT/INR - ( 18 Nov 2023 09:02 )   PT: 15.9 sec;   INR: 1.45 ratio         PTT - ( 19 Nov 2023 01:29 )  PTT:80.8 sec  Bilirubin Total: 1.1 mg/dL (11-19 @ 01:29)  Bilirubin Total: 1.4 mg/dL (11-18 @ 09:02)      < from: Xray Chest 1 View- PORTABLE-Urgent (Xray Chest 1 View- PORTABLE-Urgent .) (11.18.23 @ 15:02) >    INTERPRETATION:  AP chest on November 18,2023 at 8:41 AM. 2 images.   Patient is short of breath.    COMPARISON: None available.    Heart size difficult to assess. Sternotomy is noted.    There is a quite large density occupying the left mid and lower chest   which on follow-up CAT scan to moderate to large loculated effusion.    Follow-up AP chest on November 18, 2023 at 2:53 PM. A catheter left chest   tube is been inserted the base. There is little change in the effusion.    IMPRESSION: Sizable left effusion. Catheter chest tube inserted without   much change.    < end of copied text >

## 2023-11-19 NOTE — PROGRESS NOTE ADULT - SUBJECTIVE AND OBJECTIVE BOX
[facility] Division of Hospital Medicine    Chief Complaint:      SUBJECTIVE / OVERNIGHT EVENTS:    Patient says leg swelling improving.    MEDICATIONS  (STANDING):  aspirin  chewable 81 milliGRAM(s) Oral daily  atorvastatin 20 milliGRAM(s) Oral at bedtime  furosemide   Injectable 40 milliGRAM(s) IV Push every 12 hours  heparin  Infusion.  Unit(s)/Hr (23 mL/Hr) IV Continuous <Continuous>  metolazone 5 milliGRAM(s) Oral daily  metoprolol tartrate 50 milliGRAM(s) Oral every 6 hours  spironolactone 25 milliGRAM(s) Oral daily    MEDICATIONS  (PRN):  acetaminophen     Tablet .. 650 milliGRAM(s) Oral every 6 hours PRN Temp greater or equal to 38C (100.4F), Mild Pain (1 - 3)  aluminum hydroxide/magnesium hydroxide/simethicone Suspension 30 milliLiter(s) Oral every 4 hours PRN Dyspepsia  heparin   Injectable 08150 Unit(s) IV Push every 6 hours PRN For aPTT less than 40  heparin   Injectable 5000 Unit(s) IV Push every 6 hours PRN For aPTT between 40 - 57  melatonin 3 milliGRAM(s) Oral at bedtime PRN Insomnia  metoprolol tartrate Injectable 5 milliGRAM(s) IV Push every 6 hours PRN RVR > 130  ondansetron Injectable 4 milliGRAM(s) IV Push every 8 hours PRN Nausea and/or Vomiting        I&O's Summary    19 Nov 2023 07:01  -  19 Nov 2023 11:24  --------------------------------------------------------  IN: 65 mL / OUT: 500 mL / NET: -435 mL        PHYSICAL EXAM:  Vital Signs Last 24 Hrs  T(C): 36.6 (19 Nov 2023 09:45), Max: 37.1 (18 Nov 2023 15:30)  T(F): 97.8 (19 Nov 2023 09:45), Max: 98.8 (18 Nov 2023 15:30)  HR: 130 (19 Nov 2023 09:45) (120 - 138)  BP: 90/66 (19 Nov 2023 09:45) (90/66 - 158/65)  BP(mean): 74 (19 Nov 2023 09:45) (63 - 74)  RR: 21 (19 Nov 2023 09:45) (19 - 32)  SpO2: 96% (19 Nov 2023 09:45) (94% - 96%)    Parameters below as of 19 Nov 2023 09:45  Patient On (Oxygen Delivery Method): nasal cannula  O2 Flow (L/min): 5          CONSTITUTIONAL: NAD,   ENMT: normocephalic, atraumatic  RESPIRATORY: crackles right lower chest and decreased breath sounds left lower chest. chest tube in place.  CARDIOVASCULAR: Regular rhythm and tachycardia, normal S1 and S2, no murmur/rub/gallop  ABDOMEN: Nontender to palpation, no rebound/guarding; No hepatosplenomegaly  MUSCLOSKELETAL:  b/l LE edema  PSYCH: A+O to person, place, and time; affect appropriate  NEUROLOGY: CN 2-12 are intact and symmetric; no gross deficits;   SKIN: No rashes; no palpable lesions    LABS:                        13.7   16.16 )-----------( 256      ( 19 Nov 2023 01:29 )             44.1     11-19    140  |  102  |  56.0<H>  ----------------------------<  136<H>  3.6   |  22.0  |  2.00<H>    Ca    8.5      19 Nov 2023 01:29  Mg     1.9     11-19    TPro  6.3<L>  /  Alb  2.9<L>  /  TBili  1.1  /  DBili  x   /  AST  30  /  ALT  22  /  AlkPhos  81  11-19    PT/INR - ( 18 Nov 2023 09:02 )   PT: 15.9 sec;   INR: 1.45 ratio         PTT - ( 19 Nov 2023 08:20 )  PTT:>200.0 sec  CARDIAC MARKERS ( 18 Nov 2023 09:02 )  x     / x     / 43 U/L / x     / x          Urinalysis Basic - ( 19 Nov 2023 01:29 )    Color: x / Appearance: x / SG: x / pH: x  Gluc: 136 mg/dL / Ketone: x  / Bili: x / Urobili: x   Blood: x / Protein: x / Nitrite: x   Leuk Esterase: x / RBC: x / WBC x   Sq Epi: x / Non Sq Epi: x / Bacteria: x        Culture - Body Fluid with Gram Stain (collected 18 Nov 2023 14:00)  Source: Pleural Fl Pleural Fluid  Gram Stain (19 Nov 2023 01:10):    polymorphonuclear leukocytes seen    No organisms seen    by cytocentrifuge      CAPILLARY BLOOD GLUCOSE            RADIOLOGY & ADDITIONAL TESTS:  Results Reviewed:   Imaging Personally Reviewed:  Electrocardiogram Personally Reviewed:

## 2023-11-19 NOTE — PROGRESS NOTE ADULT - PROBLEM SELECTOR PLAN 1
- EF now 20-25%, severely reduced RV funciton, mild MR.   - Patient is still acute decompensated.   GDMT       Beta Blocker: Continue metoprolol 50 mg every 6 hours for rapid atrial flutter. Will transition to XL prior to D/C       RAAS Inhibitor: Will hold in setting of DEVON       MRA: Continue Aldactone 25 mg daily       Diuretic: Continue Lasix 40 mg IV every 12 hours and metolazone 5mg.       SGLT2i: Unable to add due to CKD.       Other: N/A  - Monitor on telemetry.   - Strict i/o and daily weights.   - Keep K > 4, Mg > 2.   - Monitor renal function with ongoing diuresis.  - Will try and get records from Dr. Milian's office.

## 2023-11-19 NOTE — PROGRESS NOTE ADULT - ASSESSMENT
72-year-old male with history of hypertension CAD status post quintuple bypass November 2021 followed by cardiology/Dr. Milian in  [place]  resents the ED via EMS complaining of worsening dyspnea on exertion since yesterday.  Thoracic surgery consulted treatment Left effusion

## 2023-11-19 NOTE — PROGRESS NOTE ADULT - PROBLEM SELECTOR PLAN 2
- CHADS2-Vasc: 4 for age, CHF, CAD, and HTN  - Anticoagulation: Continue heparin gtt.   - Rate/Rhythm Control: Continue metoprolol 50mg PO q6, unable to increase due to blood pressure.  - EP following.   - Possible QUIN/DCCV tomorrow after R/LHC.  - Continue telemetry monitoring.

## 2023-11-19 NOTE — PROGRESS NOTE ADULT - ASSESSMENT
A/P: Patient is a 71 yo male former smoker w/ PMH of CAD, s/p 5V CABG, CHF (unknown EF), HTN, HLD presenting for chief complaint of SOB. Patient states he started experiencing SOB and LE swelling for the past few days He states he was seen by his Cardiologist on, Dr. Milian, on Tuesday and his Metoprolol was increased from 50mg to 100mg. He states his symptoms worsened yesterday therefore he came to the ED today. He states he has been unable to walk around without getting short of breath. One week ago he was walking 6 blocks with usually no symptoms, and since yesterday he has been having SOB with minimal or no exertion. He reports compliance with medications and diet. Patient denies fever, chills, chest pain, palpitations, cough, wheezing, abdominal pain, nausea, vomiting, diarrhea, constipation, bloody bowel movements, melena, hematemesis, urinary complaints, syncope, calf tenderness, paresthesias. A CXR was done in the ED which showed a moderate to large left pleural effusion. A noncontrast CT of the chest was done which showed a moderate to large loculated left pleural effusion. Thoracic surgery was called to evaluate the patient and a 14 fr. chest tube was inserted with 600 mL serous fluid out. An echo was done which revealed an LVEF of 20-25 % and severely reduced RVSF.

## 2023-11-19 NOTE — PROGRESS NOTE ADULT - ASSESSMENT
Patient is a 73 yo M w/ PMH of CAD s/p CABG, CHF, HTN, HLD presenting for chief complaint of SOB. Admitted for acute hypoxemic respiratory failure 2/2 CHF/Pleural effusion.     Acute hypoxemic respiratory failure 2/2 CHF exacerbation/pleural effusion  on 6L O2 this morning. wean as able.    acute on chronic systolic CHF  SOB slightly improved as is LE edema  - Lasix 40mg IV BID, Metozalone, and aldactone      Aflutter w/ RVR  HR in 130s.  c/w Metopolol 50 mg Q6H and heparin gtt. EP following and will consider QUIN/CV if HR stays uncontrolled.    Leukocytosis  -no signs of infection    DEVON vs CKD  creatinine 2. not sure what is baseline.  - Renal US without any hydronephrosis       CAD s/p CABG  - Continue ASA, Statin, Lopressor    DVT ppx: Heparin SQ Patient is a 73 yo M w/ PMH of CAD s/p CABG, CHF, HTN, HLD presenting for chief complaint of SOB. Admitted for acute hypoxemic respiratory failure 2/2 CHF/Pleural effusion.     Acute hypoxemic respiratory failure 2/2 CHF exacerbation/pleural effusion  on 6L O2 this morning. wean as able.    acute on chronic systolic CHF  SOB slightly improved as is LE edema  - Lasix 40mg IV BID, Metozalone, and aldactone    Left pleural effusion  s/p chest tube placement      A. flutter w/ RVR  HR in 130s.  c/w Metopolol 50 mg Q6H and heparin gtt. EP following and will consider QUIN/CV if HR stays uncontrolled.    Leukocytosis  -no signs of infection    DEVON vs CKD  creatinine 2. not sure what is baseline.  - Renal US without any hydronephrosis       CAD s/p CABG  - Continue ASA, Statin, Lopressor    DVT ppx: Heparin SQ

## 2023-11-19 NOTE — PROGRESS NOTE ADULT - NS ATTEND AMEND GEN_ALL_CORE FT
Patient seen and examined by me.  Patient is feeling much better than yesterday  Patient alert and awake.  Chest- Bilateral Clear BS  Cardiac- S1 and S2  Abdomen- Soft    Assessment/Plan:  1. Acute HFrEF  2. Afib/Afl  3. CAD-S/P CABG    I have discussed my recommendation with the PA which are outlined above.  Will follow.

## 2023-11-19 NOTE — PROGRESS NOTE ADULT - SUBJECTIVE AND OBJECTIVE BOX
[facility]                                                         CARDIOLOGY AT [facility]                                                                  [address], [place] [address]                                                         Telephone: [phone]. Fax:[phone]                                                                             PROGRESS NOTE    Reason for follow up: HFrEF  Update: Patient states that his breathing is improving today, but still with significant fluid overload. Patient's renal function is stable at this time, but still in rapid atrial flutter.       Review of symptoms:   Cardiac:  No chest pain. No dyspnea. No palpitations.  Respiratory: no cough. No dyspnea  Gastrointestinal: No diarrhea. No abdominal pain. No bleeding.   Neuro: No focal neuro complaints.    Vitals:  T(C): 37.1 (11-19-23 @ 07:32), Max: 37.1 (11-18-23 @ 15:30)  HR: 120 (11-19-23 @ 07:32) (120 - 138)  BP: 100/61 (11-19-23 @ 07:32) (97/62 - 158/65)  RR: 19 (11-19-23 @ 07:32) (19 - 32)  SpO2: 95% (11-19-23 @ 07:32) (91% - 96%)  Wt(kg): --  I&O's Summary    Weight (kg): 127 (11-18 @ 08:25)    PHYSICAL EXAM:  Appearance: Comfortable. No acute distress  HEENT:  Atraumatic. Normocephalic.  Normal oral mucosa  Neurologic: A & O x 3, no gross focal deficits.  Cardiovascular: Tachycardic S1S2, No murmur, no rubs/gallops. No JVD  Respiratory: Decreased BS at b/l bases  Gastrointestinal:  Soft, Non-tender, + BS  Lower Extremities: 2+ Peripheral Pulses, No clubbing, cyanosis, 2+ b/l LE edema  Psychiatry: Patient is calm. No agitation.   Skin: warm and dry.    CURRENT CARDIAC MEDICATIONS:  furosemide   Injectable 40 milliGRAM(s) IV Push every 12 hours  metolazone 5 milliGRAM(s) Oral daily  metoprolol tartrate 50 milliGRAM(s) Oral every 6 hours  metoprolol tartrate Injectable 5 milliGRAM(s) IV Push every 6 hours PRN  spironolactone 25 milliGRAM(s) Oral daily      CURRENT OTHER MEDICATIONS:  acetaminophen     Tablet .. 650 milliGRAM(s) Oral every 6 hours PRN Temp greater or equal to 38C (100.4F), Mild Pain (1 - 3)  melatonin 3 milliGRAM(s) Oral at bedtime PRN Insomnia  ondansetron Injectable 4 milliGRAM(s) IV Push every 8 hours PRN Nausea and/or Vomiting  aluminum hydroxide/magnesium hydroxide/simethicone Suspension 30 milliLiter(s) Oral every 4 hours PRN Dyspepsia  atorvastatin 20 milliGRAM(s) Oral at bedtime  aspirin  chewable 81 milliGRAM(s) Oral daily  heparin   Injectable 89634 Unit(s) IV Push every 6 hours PRN For aPTT less than 40  heparin   Injectable 5000 Unit(s) IV Push every 6 hours PRN For aPTT between 40 - 57  heparin  Infusion.  Unit(s)/Hr (23 mL/Hr) IV Continuous <Continuous>      LABS:	 	  ( 18 Nov 2023 09:02 )  Troponin T  X    ,  CPK  43   , CKMB  X    , BNP X                                  13.7   16.16 )-----------( 256      ( 19 Nov 2023 01:29 )             44.1     11-19    140  |  102  |  56.0<H>  ----------------------------<  136<H>  3.6   |  22.0  |  2.00<H>    Ca    8.5      19 Nov 2023 01:29  Mg     1.9     11-19    TPro  6.3<L>  /  Alb  2.9<L>  /  TBili  1.1  /  DBili  x   /  AST  30  /  ALT  22  /  AlkPhos  81  11-19    PT/INR/PTT ( 19 Nov 2023 01:29 )                       :                       :      X            :       80.8                  .        .                   .              .           .       X           .                                       Lipid Profile:   HgA1c:   TSH:     TELEMETRY: Atrial flutter with RVR  ECG:    DIAGNOSTIC TESTING:  [ ] Echocardiogram:     < from: TTE Echo Complete w/o Contrast w/ Doppler (11.18.23 @ 12:34) >  PHYSICIAN INTERPRETATION:  Left Ventricle: The left ventricular internal cavity size is moderate to   severely increased.  Global LV systolic function was severely decreased. Left ventricular   ejection fraction, by visual estimation, is 20 to 25%. The mitral in-flow   pattern reveals no discernable A-wave, therefore no comment on diastolic   function can be made.  Right Ventricle: The right ventricular size is mildly enlarged. RV   systolic function is severely reduced.  Left Atrium: Mildly enlarged left atrium.  Right Atrium: Normal right atrial size.  Pericardium: There is no evidence of pericardial effusion.  MitralValve: Mild thickening and calcification of the anterior and   posterior mitral valve leaflets. There is mild mitral annular   calcification. Mild mitral valve regurgitation is seen.  Tricuspid Valve: Adequate TR velocity was not obtained to accurately   assess RVSP.  Aortic Valve: The aortic valve is trileaflet. Sclerotic aortic valve with   normal opening.  Pulmonic Valve: Trace pulmonic valve regurgitation.  Aorta: The aortic root is normal in size and structure.  Pulmonary Artery: The main pulmonary artery is normal in size.  Venous: The inferior vena cava was normal sized, with respiratory size   variation greater than 50%.      Summary:   1. There is mild concentric left ventricular hypertrophy.   2. Moderate to severely increased left ventricular internal cavity size.   3. Left ventricular ejection fraction, by visual estimation, is 20 to   25%.   4. Severely decreased global left ventricular systolic function.   5. The mitral in-flow pattern reveals no discernable A-wave, therefore   no comment on diastolic function can be made.   6. Mildly enlarged right ventricle.   7. Severely reduced RV systolic function.   8. Mildly enlarged left atrium.   9. Normal right atrial size.  10. Sclerotic aortic valve with normal opening.  11. Mild thickening and calcification of the anterior and posterior   mitral valve leaflets.  12. Mild mitral annular calcification.  13. Mild mitral valve regurgitation.  14. The main pulmonary artery is normal in size.  15. There is no evidence of pericardial effusion.    MD Nury Electronically signed on 11/18/2023 at 4:20:53 PM    < end of copied text >    [ ]  Catheterization:    [ ] Stress Test:    OTHER:

## 2023-11-20 ENCOUNTER — TRANSCRIPTION ENCOUNTER (OUTPATIENT)
Age: 73
End: 2023-11-20

## 2023-11-20 PROBLEM — I25.10 ATHEROSCLEROTIC HEART DISEASE OF NATIVE CORONARY ARTERY WITHOUT ANGINA PECTORIS: Chronic | Status: ACTIVE | Noted: 2023-11-18

## 2023-11-20 PROBLEM — I50.9 HEART FAILURE, UNSPECIFIED: Chronic | Status: ACTIVE | Noted: 2023-11-18

## 2023-11-20 PROBLEM — E78.5 HYPERLIPIDEMIA, UNSPECIFIED: Chronic | Status: ACTIVE | Noted: 2023-11-18

## 2023-11-20 PROBLEM — I10 ESSENTIAL (PRIMARY) HYPERTENSION: Chronic | Status: ACTIVE | Noted: 2023-11-18

## 2023-11-20 LAB
ANION GAP SERPL CALC-SCNC: 14 MMOL/L — SIGNIFICANT CHANGE UP (ref 5–17)
ANION GAP SERPL CALC-SCNC: 14 MMOL/L — SIGNIFICANT CHANGE UP (ref 5–17)
APTT BLD: 55.9 SEC — HIGH (ref 24.5–35.6)
APTT BLD: 55.9 SEC — HIGH (ref 24.5–35.6)
APTT BLD: 82.1 SEC — HIGH (ref 24.5–35.6)
APTT BLD: 82.1 SEC — HIGH (ref 24.5–35.6)
APTT BLD: 82.6 SEC — HIGH (ref 24.5–35.6)
APTT BLD: 82.6 SEC — HIGH (ref 24.5–35.6)
BUN SERPL-MCNC: 60.1 MG/DL — HIGH (ref 8–20)
BUN SERPL-MCNC: 60.1 MG/DL — HIGH (ref 8–20)
CALCIUM SERPL-MCNC: 8.6 MG/DL — SIGNIFICANT CHANGE UP (ref 8.4–10.5)
CALCIUM SERPL-MCNC: 8.6 MG/DL — SIGNIFICANT CHANGE UP (ref 8.4–10.5)
CHLORIDE SERPL-SCNC: 101 MMOL/L — SIGNIFICANT CHANGE UP (ref 96–108)
CHLORIDE SERPL-SCNC: 101 MMOL/L — SIGNIFICANT CHANGE UP (ref 96–108)
CO2 SERPL-SCNC: 24 MMOL/L — SIGNIFICANT CHANGE UP (ref 22–29)
CO2 SERPL-SCNC: 24 MMOL/L — SIGNIFICANT CHANGE UP (ref 22–29)
CREAT SERPL-MCNC: 1.8 MG/DL — HIGH (ref 0.5–1.3)
CREAT SERPL-MCNC: 1.8 MG/DL — HIGH (ref 0.5–1.3)
EGFR: 40 ML/MIN/1.73M2 — LOW
EGFR: 40 ML/MIN/1.73M2 — LOW
GLUCOSE SERPL-MCNC: 143 MG/DL — HIGH (ref 70–99)
GLUCOSE SERPL-MCNC: 143 MG/DL — HIGH (ref 70–99)
HCT VFR BLD CALC: 43.7 % — SIGNIFICANT CHANGE UP (ref 39–50)
HCT VFR BLD CALC: 43.7 % — SIGNIFICANT CHANGE UP (ref 39–50)
HGB BLD-MCNC: 13.7 G/DL — SIGNIFICANT CHANGE UP (ref 13–17)
HGB BLD-MCNC: 13.7 G/DL — SIGNIFICANT CHANGE UP (ref 13–17)
LDH SERPL L TO P-CCNC: 217 U/L — HIGH (ref 98–192)
LDH SERPL L TO P-CCNC: 217 U/L — HIGH (ref 98–192)
MAGNESIUM SERPL-MCNC: 1.9 MG/DL — SIGNIFICANT CHANGE UP (ref 1.6–2.6)
MAGNESIUM SERPL-MCNC: 1.9 MG/DL — SIGNIFICANT CHANGE UP (ref 1.6–2.6)
MAGNESIUM SERPL-MCNC: 1.9 MG/DL — SIGNIFICANT CHANGE UP (ref 1.8–2.6)
MAGNESIUM SERPL-MCNC: 1.9 MG/DL — SIGNIFICANT CHANGE UP (ref 1.8–2.6)
MCHC RBC-ENTMCNC: 27.4 PG — SIGNIFICANT CHANGE UP (ref 27–34)
MCHC RBC-ENTMCNC: 27.4 PG — SIGNIFICANT CHANGE UP (ref 27–34)
MCHC RBC-ENTMCNC: 31.4 GM/DL — LOW (ref 32–36)
MCHC RBC-ENTMCNC: 31.4 GM/DL — LOW (ref 32–36)
MCV RBC AUTO: 87.4 FL — SIGNIFICANT CHANGE UP (ref 80–100)
MCV RBC AUTO: 87.4 FL — SIGNIFICANT CHANGE UP (ref 80–100)
PLATELET # BLD AUTO: 254 K/UL — SIGNIFICANT CHANGE UP (ref 150–400)
PLATELET # BLD AUTO: 254 K/UL — SIGNIFICANT CHANGE UP (ref 150–400)
POTASSIUM SERPL-MCNC: 3.4 MMOL/L — LOW (ref 3.5–5.3)
POTASSIUM SERPL-MCNC: 3.4 MMOL/L — LOW (ref 3.5–5.3)
POTASSIUM SERPL-SCNC: 3.4 MMOL/L — LOW (ref 3.5–5.3)
POTASSIUM SERPL-SCNC: 3.4 MMOL/L — LOW (ref 3.5–5.3)
RBC # BLD: 5 M/UL — SIGNIFICANT CHANGE UP (ref 4.2–5.8)
RBC # BLD: 5 M/UL — SIGNIFICANT CHANGE UP (ref 4.2–5.8)
RBC # FLD: 16 % — HIGH (ref 10.3–14.5)
RBC # FLD: 16 % — HIGH (ref 10.3–14.5)
SODIUM SERPL-SCNC: 139 MMOL/L — SIGNIFICANT CHANGE UP (ref 135–145)
SODIUM SERPL-SCNC: 139 MMOL/L — SIGNIFICANT CHANGE UP (ref 135–145)
WBC # BLD: 13.18 K/UL — HIGH (ref 3.8–10.5)
WBC # BLD: 13.18 K/UL — HIGH (ref 3.8–10.5)
WBC # FLD AUTO: 13.18 K/UL — HIGH (ref 3.8–10.5)
WBC # FLD AUTO: 13.18 K/UL — HIGH (ref 3.8–10.5)

## 2023-11-20 PROCEDURE — 88305 TISSUE EXAM BY PATHOLOGIST: CPT | Mod: 26

## 2023-11-20 PROCEDURE — 71045 X-RAY EXAM CHEST 1 VIEW: CPT | Mod: 26

## 2023-11-20 PROCEDURE — 88112 CYTOPATH CELL ENHANCE TECH: CPT | Mod: 26

## 2023-11-20 PROCEDURE — 99233 SBSQ HOSP IP/OBS HIGH 50: CPT

## 2023-11-20 PROCEDURE — 88312 SPECIAL STAINS GROUP 1: CPT | Mod: 26

## 2023-11-20 PROCEDURE — 99497 ADVNCD CARE PLAN 30 MIN: CPT

## 2023-11-20 PROCEDURE — 71250 CT THORAX DX C-: CPT | Mod: 26

## 2023-11-20 PROCEDURE — 99232 SBSQ HOSP IP/OBS MODERATE 35: CPT

## 2023-11-20 PROCEDURE — 99231 SBSQ HOSP IP/OBS SF/LOW 25: CPT

## 2023-11-20 RX ORDER — POTASSIUM CHLORIDE 20 MEQ
40 PACKET (EA) ORAL ONCE
Refills: 0 | Status: COMPLETED | OUTPATIENT
Start: 2023-11-20 | End: 2023-11-20

## 2023-11-20 RX ORDER — POTASSIUM CHLORIDE 20 MEQ
40 PACKET (EA) ORAL EVERY 4 HOURS
Refills: 0 | Status: COMPLETED | OUTPATIENT
Start: 2023-11-20 | End: 2023-11-20

## 2023-11-20 RX ORDER — INFLUENZA VIRUS VACCINE 15; 15; 15; 15 UG/.5ML; UG/.5ML; UG/.5ML; UG/.5ML
0.7 SUSPENSION INTRAMUSCULAR ONCE
Refills: 0 | Status: DISCONTINUED | OUTPATIENT
Start: 2023-11-20 | End: 2023-11-28

## 2023-11-20 RX ADMIN — HEPARIN SODIUM 2500 UNIT(S)/HR: 5000 INJECTION INTRAVENOUS; SUBCUTANEOUS at 06:39

## 2023-11-20 RX ADMIN — Medication 5 MILLIGRAM(S): at 09:27

## 2023-11-20 RX ADMIN — Medication 40 MILLIEQUIVALENT(S): at 13:56

## 2023-11-20 RX ADMIN — HEPARIN SODIUM 2500 UNIT(S)/HR: 5000 INJECTION INTRAVENOUS; SUBCUTANEOUS at 18:32

## 2023-11-20 RX ADMIN — Medication 40 MILLIGRAM(S): at 05:32

## 2023-11-20 RX ADMIN — Medication 40 MILLIEQUIVALENT(S): at 08:25

## 2023-11-20 RX ADMIN — HEPARIN SODIUM 2500 UNIT(S)/HR: 5000 INJECTION INTRAVENOUS; SUBCUTANEOUS at 01:32

## 2023-11-20 RX ADMIN — HEPARIN SODIUM 2500 UNIT(S)/HR: 5000 INJECTION INTRAVENOUS; SUBCUTANEOUS at 19:25

## 2023-11-20 RX ADMIN — HEPARIN SODIUM 2500 UNIT(S)/HR: 5000 INJECTION INTRAVENOUS; SUBCUTANEOUS at 15:24

## 2023-11-20 RX ADMIN — Medication 40 MILLIGRAM(S): at 20:19

## 2023-11-20 RX ADMIN — HEPARIN SODIUM 2500 UNIT(S)/HR: 5000 INJECTION INTRAVENOUS; SUBCUTANEOUS at 07:17

## 2023-11-20 RX ADMIN — Medication 5 MILLIGRAM(S): at 02:33

## 2023-11-20 RX ADMIN — Medication 5 MILLIGRAM(S): at 22:09

## 2023-11-20 RX ADMIN — HEPARIN SODIUM 5000 UNIT(S): 5000 INJECTION INTRAVENOUS; SUBCUTANEOUS at 01:30

## 2023-11-20 RX ADMIN — ATORVASTATIN CALCIUM 20 MILLIGRAM(S): 80 TABLET, FILM COATED ORAL at 20:19

## 2023-11-20 RX ADMIN — Medication 50 MILLIGRAM(S): at 20:18

## 2023-11-20 RX ADMIN — Medication 40 MILLIEQUIVALENT(S): at 18:33

## 2023-11-20 RX ADMIN — Medication 50 MILLIGRAM(S): at 05:32

## 2023-11-20 RX ADMIN — Medication 50 MILLIGRAM(S): at 13:56

## 2023-11-20 RX ADMIN — Medication 81 MILLIGRAM(S): at 13:56

## 2023-11-20 RX ADMIN — Medication 50 MILLIGRAM(S): at 23:09

## 2023-11-20 RX ADMIN — SPIRONOLACTONE 25 MILLIGRAM(S): 25 TABLET, FILM COATED ORAL at 05:32

## 2023-11-20 RX ADMIN — HEPARIN SODIUM 2500 UNIT(S)/HR: 5000 INJECTION INTRAVENOUS; SUBCUTANEOUS at 08:23

## 2023-11-20 NOTE — PROGRESS NOTE ADULT - ASSESSMENT
72-year-old male with history of hypertension CAD status post quintuple bypass November 2021 followed by cardiology/Dr. Milian in  [place]  resents the ED via EMS complaining of worsening dyspnea on exertion since yesterday.  Thoracic surgery consulted treatment Left effusion.     Left PTC palced- 600 ml serous on insertion  11/20 CXR with increasing opacification of left chest- PTC flushed/stripped minimal output- CT Chest pending

## 2023-11-20 NOTE — PROGRESS NOTE ADULT - SUBJECTIVE AND OBJECTIVE BOX
Subjective: Patient seen and assessed for left pleural effusion. Patient states "I feel fine." At time of exam, patient in NAD, Pt denies chest pain, palpitations, dizziness, headache, shortness of breath, abdominal pain or N/V/D/C.     Pertinent events of the past 24 hours: Increasing left chest opacification on CXR     Tele: Aflutter 130s    VITAL SIGNS  Vital Signs Last 24 Hrs  T(C): 36.5 (11-20-23 @ 08:13), Max: 36.9 (11-20-23 @ 00:10)  T(F): 97.7 (11-20-23 @ 08:13), Max: 98.4 (11-20-23 @ 00:10)  HR: 132 (11-20-23 @ 09:11) (127 - 137)  BP: 114/70 (11-20-23 @ 09:11) (90/60 - 121/74)  RR: 32 (11-20-23 @ 09:11) (17 - 33)  SpO2: 96% (11-20-23 @ 09:11) (94% - 100%)  on (O2)              MEDICATIONS  acetaminophen     Tablet .. 650 milliGRAM(s) Oral every 6 hours PRN  aluminum hydroxide/magnesium hydroxide/simethicone Suspension 30 milliLiter(s) Oral every 4 hours PRN  aspirin  chewable 81 milliGRAM(s) Oral daily  atorvastatin 20 milliGRAM(s) Oral at bedtime  furosemide   Injectable 40 milliGRAM(s) IV Push every 12 hours  heparin   Injectable 37033 Unit(s) IV Push every 6 hours PRN  heparin   Injectable 5000 Unit(s) IV Push every 6 hours PRN  heparin  Infusion.  Unit(s)/Hr IV Continuous <Continuous>  influenza  Vaccine (HIGH DOSE) 0.7 milliLiter(s) IntraMuscular once  melatonin 3 milliGRAM(s) Oral at bedtime PRN  metolazone 5 milliGRAM(s) Oral daily  metoprolol tartrate 50 milliGRAM(s) Oral every 6 hours  metoprolol tartrate Injectable 5 milliGRAM(s) IV Push every 6 hours PRN  ondansetron Injectable 4 milliGRAM(s) IV Push every 8 hours PRN  potassium chloride    Tablet ER 40 milliEquivalent(s) Oral every 4 hours  spironolactone 25 milliGRAM(s) Oral daily    PHYSICAL EXAM  Constitutional: NAD  Neuro: A+O x 3, non-focal, speech clear and intact  CV: regular rate, regular rhythm, +S1S2, no murmurs or rub  Pulm/chest: Left chest diminished, otherwise clear to ascultation, no accessory muscle use noted  Abd: +BS, soft, NT, ND  Ext: LEE x 4, +B/L LE edema   Skin: warm, well perfused  Psych: calm, appropriate affect  Drains: Left PTC to H2O seal draining serous fluid, no air leak no SQ air, c/d/i     Intake and Output  11-19 @ 07:01  -  11-20 @ 07:00  --------------------------------------------------------  IN: 717 mL / OUT: 2300 mL / NET: -1583 mL    11-20 @ 07:01 - 11-20 @ 09:44  --------------------------------------------------------  IN: 150 mL / OUT: 400 mL / NET: -250 mL    Weights:  Daily     Daily   Admit Wt: Drug Dosing Weight  Height (cm): 175.3 (18 Nov 2023 08:25)  Weight (kg): 127 (18 Nov 2023 08:25)  BMI (kg/m2): 41.3 (18 Nov 2023 08:25)  BSA (m2): 2.38 (18 Nov 2023 08:25)    All laboratory results, radiology and medications reviewed.    LABS  11-20    139  |  101  |  60.1<H>  ----------------------------<  143<H>  3.4<L>   |  24.0  |  1.80<H>    Ca    8.6      20 Nov 2023 00:57  Mg     1.9     11-20    TPro  6.3<L>  /  Alb  2.9<L>  /  TBili  1.1  /  DBili  x   /  AST  30  /  ALT  22  /  AlkPhos  81  11-19                                 13.7   13.18 )-----------( 254      ( 20 Nov 2023 00:57 )             43.7          PTT - ( 20 Nov 2023 07:17 )  PTT:82.6 sec      < from: CT Chest No Cont (11.20.23 @ 09:09) >    INTERPRETATION:  Clinical information: Follow-up examination. Exam is   compared to previous study of 11/18/2023.    CT scan of the chest was obtained without administration of intravenous   contrast.    Several lymph nodes are present in the pretracheal space, AP window,   anterior mediastinum and the subcarinal region.    Heart is enlarged in size. Patient is status post CABG. No pericardial  effusion is noted.    Mucous/secretions are noted within the left lower lobe bronchus. 1.4 cm   ill-defined opacity is noted in the right lower lobe. Near complete   atelectasis of the left lung is noted. Large loculated left pleural   effusion is noted. A left pigtail catheter and droplets of air are   present within the left pleural effusion.    Below the diaphragm, visualized portions of the abdomen demonstrate   patient to be status post cholecystectomy. Extensive thickening of both   adrenal glands is noted. Low-attenuation lesion in the right kidney is   incompletely imaged on this exam.    Degenerative changes of the spine are noted. Subcutaneous emphysema is   noted in the left lateral chest wall.    IMPRESSION: Large loculated lefthydropneumothorax containing a pigtail   catheter.    Mucus/secretions in the left lower lobe bronchus.    1.4 cm ill-defined opacity is noted in the right lower lobe. It is   indeterminate based on this exam. Follow-up CT scan is recommended in 3   months to ensure resolution.    < end of copied text >

## 2023-11-20 NOTE — PROGRESS NOTE ADULT - CONVERSATION DETAILS
Discussed diagnosis and prognosis of CHF. this is new diagnosis for him apparently. he says he has not been admitted to hospital before with CHF. explained to him the incurable nature of CHF and need to be compliant with diet and meds. He voiced understanding. He asked me how CHF is treated and I explained the medications used to treat CHF. discussed his preference for resuscitation efforts including DNR/DNI. He said he will think about it and let me know tomorrow. He wants to continue with aggressive care at the moment

## 2023-11-20 NOTE — DIETITIAN INITIAL EVALUATION ADULT - ORAL INTAKE PTA/DIET HISTORY
Pt found asleep. spoke with sister at bedside. Report ht 6'1 and unsure of UBW. weight per chart 280lbs. Reports that he has a good appetite and eating well. Will defer education until pt more alert.

## 2023-11-20 NOTE — PROGRESS NOTE ADULT - PROBLEM SELECTOR PLAN 1
Left PTC placed 11/18  Maintain to H2O seal  Pleural fluid exudative by light's criteria  Culture negative  Follow up cytology  Record drainage q12 hours to facilitate timely removal  Daily CXR while PTC in place  Increasing left chest opacification  CT Chest as above- Dr. Encarnacion to review- formal plan to follow   Encourage incentive spirometry/Chest PT   Rest of care per primary team  Thoracic Surgery to follow  Plan discussed with Thoracic Surgery Attending/Team in AM rounds

## 2023-11-20 NOTE — DISCHARGE NOTE NURSING/CASE MANAGEMENT/SOCIAL WORK - PATIENT PORTAL LINK FT
You can access the FollowMyHealth Patient Portal offered by [facility] by registering at the following website: [email or web address]. By joining [employer]’s FollowMyHealth portal, you will also be able to view your health information using other applications (apps) compatible with our system. You can access the FollowMyHealth Patient Portal offered by [facility] by registering at the following website: [email or web address]. By joining [employer]’s FollowMyHealth portal, you will also be able to view your health information using other applications (apps) compatible with our system. You can access the FollowMyHealth Patient Portal offered by [facility] by registering at the following website: [email or web address]. By joining [employer]’s FollowMyHealth portal, you will also be able to view your health information using other applications (apps) compatible with our system.

## 2023-11-20 NOTE — DISCHARGE NOTE NURSING/CASE MANAGEMENT/SOCIAL WORK - NSDCFUADDAPPT_GEN_ALL_CORE_FT
STAR patient : CHF    Your primary MD office will call you for an appt.     Cardio  appt. made for you with Dr Cunningham's office.     You reported no concerns in obtaining medications , new meds will be sent to [facility] at d/c. for your convenience.    STAR patient : CHF    Your primary MD office will call you for an appt.     Cardio  appt. made for you with Dr Cunningham's office.     You reported no concerns in obtaining medications , new meds will be sent to [facility] at d/c. for your convenience.    STAR patient : CHF    Your primary MD office will call you for an appt.     Cardio  appt. made for you with Dr Cunningham's office.     You reported no concerns in obtaining medications , new meds will be sent to [facility] at d/c. for your convenience.    STAR patient : CHF    Appt. made with Dr Edmonds- for 12/11/23, @ 9:00- [facility].  If you are unable to attend your pre-scheduled appt.   please contact the office directly at [phone]    Cardio  appt. made for you with Dr Cunningham's office.     You reported no concerns in obtaining medications , new meds will be sent to [facility] at d/c. for your convenience.    STAR patient : CHF    Appt. made with Dr Edmonds- for 12/11/23, @ 9:00- [facility].  If you are unable to attend your pre-scheduled appt.   please contact the office directly at [phone]    Cardio  appt. made for you with Dr Cunningham's office.     You reported no concerns in obtaining medications , new meds will be sent to [facility] at d/c. for your convenience.    STAR patient : CHF    Appt. made with Dr Edmonds- for 12/11/23, @ 9:00- [facility].  If you are unable to attend your pre-scheduled appt.   please contact the office directly at [phone]    Cardio  appt. made for you with Dr Cunningham's office.     You reported no concerns in obtaining medications , new meds will be sent to [facility] at d/c. for your convenience.

## 2023-11-20 NOTE — PROGRESS NOTE ADULT - NS ATTEND AMEND GEN_ALL_CORE FT
Patient seen and examined by me. Seen in presence of: n/a. 71 y/o male with PMH of CAD Cagb, CHF  (unknown EF) HTN, HLD presents with sob and CT revealed  a moderate to large loculated left pleural effusion.  Echo revealed echo was done which revealed an LVEF of 20-25 % and severely reduced RVSF. found to be in AFL with RVR,  b/p on soft side so difficult to treat.  Scheduled for QUIN cardioversion [facility]  RHC today however worsening opacification of left lung and increasing o2 requirements    Atrial flutter with RVR  Hypoxemic respiratory failure  Mechanical ventilation  HFrEF, NYHA IV, ACC C  Severe RV systolic dysfunction  Biventricular failure  Coronary artery disease s/p CABG  hypertension  hyperlipidemia  Mod-larger loculated left pleural effusion    T(C): 36.4 (11-20-23 @ 15:21), Max: 36.9 (11-20-23 @ 00:10)  HR: 138 (11-20-23 @ 20:00) (127 - 138)  BP: 105/80 (11-20-23 @ 20:00) (93/69 - 121/74)  RR: 22 (11-20-23 @ 20:00) (18 - 33)  SpO2: 97% (11-20-23 @ 20:00) (93% - 98%)    Chest: decreased breath sounds  Cardiac: irregularly irregular  Abdomen: Soft      EP plan: QUIN/CV + Bronchcoscopy 11/21.    continued diuresis  ischemic evaluation to be considered once hemodynamically more appropriate.    I have discussed my recommendation with the ACP which are outlined above. Thank you for allowing me to participate in the care of this patient.

## 2023-11-20 NOTE — DIETITIAN INITIAL EVALUATION ADULT - PERTINENT LABORATORY DATA
11-20    139  |  101  |  60.1<H>  ----------------------------<  143<H>  3.4<L>   |  24.0  |  1.80<H>    Ca    8.6      20 Nov 2023 00:57  Mg     1.9     11-20    TPro  6.3<L>  /  Alb  2.9<L>  /  TBili  1.1  /  DBili  x   /  AST  30  /  ALT  22  /  AlkPhos  81  11-19

## 2023-11-20 NOTE — DISCHARGE NOTE NURSING/CASE MANAGEMENT/SOCIAL WORK - NSDCCRTYPESERV_GEN_ALL_CORE_FT
Appointment made with DR. SIMMS  ( in Dr Cunningham group )  on  12/11  at  9:00am  at  [address] [place] [address] .    Appointment made with DR. SIMMS  ( in Dr Cunningham group )  on  12/11  at  9:00am  at  [address] [place] [address] .    Appointment made with DR. SIMMS  ( in Dr Cunningham group )  on  12/11  at  9:00am  at  [address] [place] [address] .

## 2023-11-20 NOTE — PROGRESS NOTE ADULT - PROBLEM SELECTOR PLAN 1
EF likely low previously  was on Entresto out patient  Bnp [id number]  IN: 717 mL / OUT: 2300 mL / NET: -1583 mL    GDMT:  Diuretic:  Lasix 40 q12h  Metolazone 5mg qd  MRA:  spironolactone  Beta blocker:  Metoprolol   Entresto on hold due to b/p

## 2023-11-20 NOTE — DISCHARGE NOTE NURSING/CASE MANAGEMENT/SOCIAL WORK - NSDCCRNUMBER_GEN_ALL_CORE_FT
If you are unable to attend your pre-scheduled appointment, please contact the office directly at [phone]  to reschedule. If you are unable to attend your pre-scheduled appointment, please contact the office directly at [phone]  to reschedule. If you are unable to attend your pre-scheduled appointment, please contact the office directly at [phone]  to reschedule.

## 2023-11-20 NOTE — DIETITIAN INITIAL EVALUATION ADULT - OTHER INFO
72-year-old male with history of hypertension CAD status post quintuple bypass November 2021 followed by cardiology/Dr. Milian in  [place]  resents the ED via EMS complaining of worsening dyspnea on exertion since yesterday.  Thoracic surgery consulted treatment Left effusion.

## 2023-11-20 NOTE — PROGRESS NOTE ADULT - PROBLEM SELECTOR PLAN 2
c/w heparin and metoprolol  Cardioversion when more stable c/w heparin and metoprolol  Cardioversion when more stable  Metoprolol  Rate control  for now but is difficult

## 2023-11-20 NOTE — PROGRESS NOTE ADULT - SUBJECTIVE AND OBJECTIVE BOX
[facility]                                                         CARDIOLOGY AT [facility]                                                                  [address] [address]                                                         Telephone: [phone]. Fax:[phone]                                                                             PROGRESS NOTE    Reason for follow up: Follow up on atrial fib /sob  Update:  CXR with total opacification of lung  only 700 cc via chest tube  may need broch  02 requirements have increased  Remains in Atrial fib with rapid jose response      Review of symptoms:   Cardiac:  No chest pain. ++ dyspnea. No palpitations.  Respiratory: no cough. No dyspnea  Gastrointestinal: No diarrhea. No abdominal pain. No bleeding.   Neuro: No focal neuro complaints.      Vitals:  T(C): 36.5 (11-20-23 @ 08:13), Max: 36.9 (11-20-23 @ 00:10)  HR: 130 (11-20-23 @ 08:13) (127 - 137)  BP: 102/70 (11-20-23 @ 08:13) (90/60 - 121/74)  RR: 33 (11-20-23 @ 08:13) (17 - 33)  SpO2: 94% (11-20-23 @ 08:13) (94% - 100%)  Wt(kg): --  I&O's Summary    19 Nov 2023 07:01  -  20 Nov 2023 07:00  --------------------------------------------------------  IN: 717 mL / OUT: 2300 mL / NET: -1583 mL    20 Nov 2023 07:01  -  20 Nov 2023 09:07  --------------------------------------------------------  IN: 0 mL / OUT: 300 mL / NET: -300 mL      Weight (kg): 127 (11-18 @ 08:25)    PHYSICAL EXAM:  Appearance: Comfortable. No acute distress  HEENT:  Atraumatic. Normocephalic.  Normal oral mucosa  Neurologic: A & O x 3, no gross focal deficits.  Cardiovascular: RRR S1 S2, No murmur, no rubs/gallops. No JVD  Respiratory: Lungs Decreased bs left lung  Gastrointestinal:  Soft, Non-tender, + BS  Lower Extremities: trace edema  Psychiatry: Patient is calm. No agitation.   Skin: warm and dry.      CURRENT MEDICATIONS:  MEDICATIONS  (STANDING):  aspirin  chewable 81 milliGRAM(s) Oral daily  atorvastatin 20 milliGRAM(s) Oral at bedtime  furosemide   Injectable 40 milliGRAM(s) IV Push every 12 hours  heparin  Infusion.  Unit(s)/Hr (23 mL/Hr) IV Continuous <Continuous>  influenza  Vaccine (HIGH DOSE) 0.7 milliLiter(s) IntraMuscular once  metolazone 5 milliGRAM(s) Oral daily  metoprolol tartrate 50 milliGRAM(s) Oral every 6 hours  potassium chloride    Tablet ER 40 milliEquivalent(s) Oral every 4 hours  spironolactone 25 milliGRAM(s) Oral daily      LABS:	 	  CARDIAC MARKERS ( 18 Nov 2023 09:02 )  x     / x     / 43 U/L / x     / x      p-BNP 18 Nov 2023 09:02: x                              13.7   13.18 )-----------( 254      ( 20 Nov 2023 00:57 )             43.7     11-20    139  |  101  |  60.1<H>  ----------------------------<  143<H>  3.4<L>   |  24.0  |  1.80<H>    Ca    8.6      20 Nov 2023 00:57  Mg     1.9     11-20    TPro  6.3<L>  /  Alb  2.9<L>  /  TBili  1.1  /  DBili  x   /  AST  30  /  ALT  22  /  AlkPhos  81  11-19    TELEMETRY: Atrial fib with mod ventricular response    DIAGNOSTIC TESTING:  [ ] Echocardiogram: < from: TTE Echo Complete w/o Contrast w/ Doppler (11.18.23 @ 12:34) >  Summary:   1. There is mild concentric left ventricular hypertrophy.   2. Moderate to severely increased left ventricular internal cavity size.   3. Left ventricular ejection fraction, by visual estimation, is 20 to   25%.   4. Severely decreased global left ventricular systolic function.   5. The mitral in-flow pattern reveals no discernable A-wave, therefore   no comment on diastolic function can be made.   6. Mildly enlarged right ventricle.   7. Severely reduced RV systolic function.   8. Mildly enlarged left atrium.   9. Normal right atrial size.  10. Sclerotic aortic valve with normal opening.  11. Mild thickening and calcification of the anterior and posterior   mitral valve leaflets.  12. Mild mitral annular calcification.  13. Mild mitral valve regurgitation.  14. The main pulmonary artery is normal in size.  15. There is no evidence of pericardial effusion.         Writer verified patient with parent/guardian. Writer discussed lab results and medication with parent. Parent voiced verbal understanding                                                            [facility]                                                         CARDIOLOGY AT [facility]                                                                  [address] [address]                                                         Telephone: [phone]. Fax:[phone]                                                                             PROGRESS NOTE    Reason for follow up: Follow up on atrial flutter /sob  Update:  CXR with total opacification of lung  only 700 cc via chest tube  may need broch  02 requirements have increased  Remains in Atrial fib with rapid jose response      Review of symptoms:   Cardiac:  No chest pain. ++ dyspnea. No palpitations.  Respiratory: no cough. No dyspnea  Gastrointestinal: No diarrhea. No abdominal pain. No bleeding.   Neuro: No focal neuro complaints.      Vitals:  T(C): 36.5 (11-20-23 @ 08:13), Max: 36.9 (11-20-23 @ 00:10)  HR: 130 (11-20-23 @ 08:13) (127 - 137)  BP: 102/70 (11-20-23 @ 08:13) (90/60 - 121/74)  RR: 33 (11-20-23 @ 08:13) (17 - 33)  SpO2: 94% (11-20-23 @ 08:13) (94% - 100%)  Wt(kg): --  I&O's Summary    19 Nov 2023 07:01  -  20 Nov 2023 07:00  --------------------------------------------------------  IN: 717 mL / OUT: 2300 mL / NET: -1583 mL    20 Nov 2023 07:01  -  20 Nov 2023 09:07  --------------------------------------------------------  IN: 0 mL / OUT: 300 mL / NET: -300 mL      Weight (kg): 127 (11-18 @ 08:25)    PHYSICAL EXAM:  Appearance: Comfortable. No acute distress  HEENT:  Atraumatic. Normocephalic.  Normal oral mucosa  Neurologic: A & O x 3, no gross focal deficits.  Cardiovascular: RRR S1 S2, No murmur, no rubs/gallops. No JVD  Respiratory: Lungs Decreased bs left lung  Gastrointestinal:  Soft, Non-tender, + BS  Lower Extremities: trace edema  Psychiatry: Patient is calm. No agitation.   Skin: warm and dry.      CURRENT MEDICATIONS:  MEDICATIONS  (STANDING):  aspirin  chewable 81 milliGRAM(s) Oral daily  atorvastatin 20 milliGRAM(s) Oral at bedtime  furosemide   Injectable 40 milliGRAM(s) IV Push every 12 hours  heparin  Infusion.  Unit(s)/Hr (23 mL/Hr) IV Continuous <Continuous>  influenza  Vaccine (HIGH DOSE) 0.7 milliLiter(s) IntraMuscular once  metolazone 5 milliGRAM(s) Oral daily  metoprolol tartrate 50 milliGRAM(s) Oral every 6 hours  potassium chloride    Tablet ER 40 milliEquivalent(s) Oral every 4 hours  spironolactone 25 milliGRAM(s) Oral daily      LABS:	 	  CARDIAC MARKERS ( 18 Nov 2023 09:02 )  x     / x     / 43 U/L / x     / x      p-BNP 18 Nov 2023 09:02: x                              13.7   13.18 )-----------( 254      ( 20 Nov 2023 00:57 )             43.7     11-20    139  |  101  |  60.1<H>  ----------------------------<  143<H>  3.4<L>   |  24.0  |  1.80<H>    Ca    8.6      20 Nov 2023 00:57  Mg     1.9     11-20    TPro  6.3<L>  /  Alb  2.9<L>  /  TBili  1.1  /  DBili  x   /  AST  30  /  ALT  22  /  AlkPhos  81  11-19    TELEMETRY: Atrial fib with mod ventricular response    DIAGNOSTIC TESTING:  [ ] Echocardiogram: < from: TTE Echo Complete w/o Contrast w/ Doppler (11.18.23 @ 12:34) >  Summary:   1. There is mild concentric left ventricular hypertrophy.   2. Moderate to severely increased left ventricular internal cavity size.   3. Left ventricular ejection fraction, by visual estimation, is 20 to   25%.   4. Severely decreased global left ventricular systolic function.   5. The mitral in-flow pattern reveals no discernable A-wave, therefore   no comment on diastolic function can be made.   6. Mildly enlarged right ventricle.   7. Severely reduced RV systolic function.   8. Mildly enlarged left atrium.   9. Normal right atrial size.  10. Sclerotic aortic valve with normal opening.  11. Mild thickening and calcification of the anterior and posterior   mitral valve leaflets.  12. Mild mitral annular calcification.  13. Mild mitral valve regurgitation.  14. The main pulmonary artery is normal in size.  15. There is no evidence of pericardial effusion.

## 2023-11-20 NOTE — PROGRESS NOTE ADULT - ASSESSMENT
Patient is a 73 yo M w/ PMH of CAD s/p CABG, CHF, HTN, HLD presenting for chief complaint of SOB. Admitted for acute hypoxemic respiratory failure 2/2 CHF/Pleural effusion.     Acute hypoxemic respiratory failure 2/2 CHF exacerbation/pleural effusion  on 3L O2 this morning. wean as able.    acute on chronic systolic CHF  SOB improved as is LE edema. He denies feeling dyspnea but is slightly tachypneic  - Lasix 40mg IV BID, Metozalone, and aldactone. BP low normal so will not tolerate ACEI or ARB for now. was on enteresto at home.    Left hydropneumothorax  s/p chest tube placement. CT chest was done today as per thoracic and it showed large left hydropneumothorax.      A. flutter w/ RVR  HR persisting in 130s.  c/w Metopolol 50 mg Q6H and heparin gtt. EP following and will consider QUIN/CV after improvement in CHF symptoms.    Hypokalemia. replaced.    Leukocytosis  -no signs of infection    DEVON vs CKD  creatinine 2. not sure what is baseline.  - Renal US without any hydronephrosis       CAD s/p CABG  - Continue ASA, Statin, Lopressor    DVT ppx: Heparin SQ

## 2023-11-20 NOTE — CHART NOTE - NSCHARTNOTEFT_GEN_A_CORE
Spoke with thoracic surgical team. Will plan on a combined QUIN/DCCV + bronchoscopy for tomorrow. Please keep SLOANE blakely MD.

## 2023-11-20 NOTE — PROGRESS NOTE ADULT - ASSESSMENT
71yo male with PMH for CAD s/p CABG x4, CHF(?EF), HTN, HLD, remote AF (transient in the postop setting of CABG with no reoccurrence- not on AC) who presented to [facility] ED with complaints of worsening SOB found to be hypoxic to the 70s with a large loculated left pleural effusion and in atrial flutter. TTE with LVEF of 20-25 % and severely reduced RVSF.     Left sided chest tube placed on 11/18; CXR with increasing left chest opacification- CT chest ordered.     Recommendations:     1. AFL with RVR  - Continue telemetry monitoring  - HR ~130bpm. Rates will be difficult to control while in atrial flutter. Pt is currently on Metoprolol 50mg every 6 hours. Cant use Cardizem given depressed EF and would avoid Digoxin given renal function (Cr ~2.0). Will d/w Attending.   - CHADS2-Vasc: 4 (age, CHF, CAD, and HTN). Currently on Heparin infusion. Monitor PTTs and maintain therapeutic levels.  - Will plan on a QUIN guided cardioversion once optimized from HF perspective, as well as post ischemic evaluation    2. HFrEF (20-25%)  - Baseline EF unknown  - GDMT and diuresis as per general cardiology  - R/LHC cancelled for today    3. L pleural effusion  - s/p chest tube   - management as per thoracic surgery team         73yo male with PMH for CAD s/p CABG x4, CHF(?EF), HTN, HLD, remote AF (transient in the postop setting of CABG with no reoccurrence- not on AC) who presented to [facility] ED with complaints of worsening SOB found to be hypoxic to the 70s with a large loculated left pleural effusion and in atrial flutter. TTE with LVEF of 20-25 % and severely reduced RVSF.     Left sided chest tube placed on 11/18; CXR with increasing left chest opacification- CT chest ordered.     Recommendations:     1. AFL with RVR  - Continue telemetry monitoring  - HR ~130bpm. Rates will be difficult to control while in atrial flutter. Pt is currently on Metoprolol 50mg every 6 hours. Cant use Cardizem given depressed EF and would avoid Digoxin given renal function (Cr ~2.0). Will likely tolerate HRs- will d/w Attending.   - CHADS2-Vasc: 4 (age, CHF, CAD, and HTN). Currently on Heparin infusion. Monitor PTTs and maintain therapeutic levels.  - Will plan on a QUIN guided cardioversion once optimized from HF perspective, as well as post ischemic evaluation    2. HFrEF (20-25%)  - Baseline EF unknown  - GDMT and diuresis as per general cardiology  - R/LHC cancelled for today    3. L pleural effusion  - s/p chest tube   - management as per thoracic surgery team

## 2023-11-20 NOTE — DIETITIAN INITIAL EVALUATION ADULT - NSFNSGIIOFT_GEN_A_CORE
11-19-23 @ 07:01  -  11-20-23 @ 07:00  --------------------------------------------------------  OUT:    Chest Tube (mL): 275 mL  Total OUT: 275 mL    Total NET: -275 mL

## 2023-11-20 NOTE — DIETITIAN INITIAL EVALUATION ADULT - PERTINENT MEDS FT
MEDICATIONS  (STANDING):  aspirin  chewable 81 milliGRAM(s) Oral daily  atorvastatin 20 milliGRAM(s) Oral at bedtime  furosemide   Injectable 40 milliGRAM(s) IV Push every 12 hours  heparin  Infusion.  Unit(s)/Hr (23 mL/Hr) IV Continuous <Continuous>  influenza  Vaccine (HIGH DOSE) 0.7 milliLiter(s) IntraMuscular once  metolazone 5 milliGRAM(s) Oral daily  metoprolol tartrate 50 milliGRAM(s) Oral every 6 hours  potassium chloride    Tablet ER 40 milliEquivalent(s) Oral every 4 hours  spironolactone 25 milliGRAM(s) Oral daily    MEDICATIONS  (PRN):  acetaminophen     Tablet .. 650 milliGRAM(s) Oral every 6 hours PRN Temp greater or equal to 38C (100.4F), Mild Pain (1 - 3)  aluminum hydroxide/magnesium hydroxide/simethicone Suspension 30 milliLiter(s) Oral every 4 hours PRN Dyspepsia  heparin   Injectable 83608 Unit(s) IV Push every 6 hours PRN For aPTT less than 40  heparin   Injectable 5000 Unit(s) IV Push every 6 hours PRN For aPTT between 40 - 57  melatonin 3 milliGRAM(s) Oral at bedtime PRN Insomnia  metoprolol tartrate Injectable 5 milliGRAM(s) IV Push every 6 hours PRN RVR > 130  ondansetron Injectable 4 milliGRAM(s) IV Push every 8 hours PRN Nausea and/or Vomiting

## 2023-11-20 NOTE — PROGRESS NOTE ADULT - ASSESSMENT
73 y/o male with PMH of CAD Cagb, CHF  (unknown EF) HTN, HLD presents with sob and CT revealed  a moderate to large loculated left pleural effusion.  Echo revealed echo was done which revealed an LVEF of 20-25 % and severely reduced RVSF. found to be in AFL with RVR,  b/p on soft side so difficult to treat.  Scheduled for QUIN cardioversion C  RHC today however worsening opacification of left lung and increasing o2 requirements

## 2023-11-20 NOTE — DISCHARGE NOTE NURSING/CASE MANAGEMENT/SOCIAL WORK - NSDCPEFALRISK_GEN_ALL_CORE
For information on Fall & Injury Prevention, visit: [email or web address] OR  [email or web address] OR  https://www.cdc.gov/steadi/patient.html For information on Fall & Injury Prevention, visit: [email or web address]://[email or web address]/[email or web address]-[email or web address]-[email or web address] OR  https://[email or web address]/[email or web address]-[email or web address]-[email or web address]-injury OR  https://www.cdc.gov/steadi/patient.html For information on Fall & Injury Prevention, visit: [email or web address]://[email or web address] OR  [email or web address]://[email or web address]-injury OR  https://www.cdc.gov/steadi/patient.html

## 2023-11-20 NOTE — PROGRESS NOTE ADULT - SUBJECTIVE AND OBJECTIVE BOX
[facility] Division of Hospital Medicine    Chief Complaint:      SUBJECTIVE / OVERNIGHT EVENTS:    Patient denies any complains.    MEDICATIONS  (STANDING):  aspirin  chewable 81 milliGRAM(s) Oral daily  atorvastatin 20 milliGRAM(s) Oral at bedtime  furosemide   Injectable 40 milliGRAM(s) IV Push every 12 hours  heparin  Infusion.  Unit(s)/Hr (23 mL/Hr) IV Continuous <Continuous>  influenza  Vaccine (HIGH DOSE) 0.7 milliLiter(s) IntraMuscular once  metolazone 5 milliGRAM(s) Oral daily  metoprolol tartrate 50 milliGRAM(s) Oral every 6 hours  potassium chloride    Tablet ER 40 milliEquivalent(s) Oral every 4 hours  spironolactone 25 milliGRAM(s) Oral daily    MEDICATIONS  (PRN):  acetaminophen     Tablet .. 650 milliGRAM(s) Oral every 6 hours PRN Temp greater or equal to 38C (100.4F), Mild Pain (1 - 3)  aluminum hydroxide/magnesium hydroxide/simethicone Suspension 30 milliLiter(s) Oral every 4 hours PRN Dyspepsia  heparin   Injectable 73109 Unit(s) IV Push every 6 hours PRN For aPTT less than 40  heparin   Injectable 5000 Unit(s) IV Push every 6 hours PRN For aPTT between 40 - 57  melatonin 3 milliGRAM(s) Oral at bedtime PRN Insomnia  metoprolol tartrate Injectable 5 milliGRAM(s) IV Push every 6 hours PRN RVR > 130  ondansetron Injectable 4 milliGRAM(s) IV Push every 8 hours PRN Nausea and/or Vomiting        I&O's Summary    19 Nov 2023 07:01  -  20 Nov 2023 07:00  --------------------------------------------------------  IN: 717 mL / OUT: 2300 mL / NET: -1583 mL    20 Nov 2023 07:01  -  20 Nov 2023 14:31  --------------------------------------------------------  IN: 475 mL / OUT: 920 mL / NET: -445 mL        PHYSICAL EXAM:  Vital Signs Last 24 Hrs  T(C): 36.5 (20 Nov 2023 08:13), Max: 36.9 (20 Nov 2023 00:10)  T(F): 97.7 (20 Nov 2023 08:13), Max: 98.4 (20 Nov 2023 00:10)  HR: 131 (20 Nov 2023 14:00) (127 - 137)  BP: 106/62 (20 Nov 2023 14:00) (90/60 - 121/74)  BP(mean): 96 (20 Nov 2023 06:00) (65 - 96)  RR: 27 (20 Nov 2023 14:00) (17 - 33)  SpO2: 93% (20 Nov 2023 14:00) (93% - 100%)    Parameters below as of 20 Nov 2023 14:00  Patient On (Oxygen Delivery Method): nasal cannula  O2 Flow (L/min): 3          CONSTITUTIONAL: NAD,   ENMT: normocephalic, atraumatic  RESPIRATORY: crackles right lower chest and decreased breath sounds left lower chest. chest tube in place.  CARDIOVASCULAR: Regular rhythm and tachycardia, normal S1 and S2, no murmur/rub/gallop  ABDOMEN: Nontender to palpation, no rebound/guarding; No hepatosplenomegaly  MUSCLOSKELETAL:  b/l LE edema  PSYCH: A+O to person, place, and time; affect appropriate  NEUROLOGY: CN 2-12 are intact and symmetric; no gross deficits;   SKIN: No rashes; no palpable lesions  LABS:                        13.7   13.18 )-----------( 254      ( 20 Nov 2023 00:57 )             43.7     11-20    139  |  101  |  60.1<H>  ----------------------------<  143<H>  3.4<L>   |  24.0  |  1.80<H>    Ca    8.6      20 Nov 2023 00:57  Mg     1.9     11-20    TPro  6.3<L>  /  Alb  2.9<L>  /  TBili  1.1  /  DBili  x   /  AST  30  /  ALT  22  /  AlkPhos  81  11-19    PTT - ( 20 Nov 2023 07:17 )  PTT:82.6 sec      Urinalysis Basic - ( 20 Nov 2023 00:57 )    Color: x / Appearance: x / SG: x / pH: x  Gluc: 143 mg/dL / Ketone: x  / Bili: x / Urobili: x   Blood: x / Protein: x / Nitrite: x   Leuk Esterase: x / RBC: x / WBC x   Sq Epi: x / Non Sq Epi: x / Bacteria: x        Culture - Fungal, Body Fluid (collected 18 Nov 2023 14:00)  Source: Pleural Fl Pleural Fluid  Preliminary Report (20 Nov 2023 09:27):    Testing in progress    Culture - Body Fluid with Gram Stain (collected 18 Nov 2023 14:00)  Source: Pleural Fl Pleural Fluid  Gram Stain (19 Nov 2023 01:10):    polymorphonuclear leukocytes seen    No organisms seen    by cytocentrifuge  Preliminary Report (19 Nov 2023 18:56):    No growth to date.      CAPILLARY BLOOD GLUCOSE            RADIOLOGY & ADDITIONAL TESTS:  Results Reviewed:   Imaging Personally Reviewed:  Electrocardiogram Personally Reviewed:

## 2023-11-20 NOTE — DIETITIAN INITIAL EVALUATION ADULT - NSFNSPHYEXAMSKINFT_GEN_A_CORE
Pressure Injury 1: Right:, buttock, Stage II  Pressure Injury 2: Left:, buttock, Stage II  Pressure Injury 3: Left:, buttock, Suspected deep tissue injury  Pressure Injury 4: Right:, buttock, Suspected deep tissue injury  Pressure Injury 5: none, none  Pressure Injury 6: none, none  Pressure Injury 7: none, none  Pressure Injury 8: none, none  Pressure Injury 9: none, none  Pressure Injury 10: none, none  Pressure Injury 11: none, none

## 2023-11-21 PROBLEM — Z00.00 ENCOUNTER FOR PREVENTIVE HEALTH EXAMINATION: Status: ACTIVE | Noted: 2023-11-21

## 2023-11-21 LAB
ANION GAP SERPL CALC-SCNC: 16 MMOL/L — SIGNIFICANT CHANGE UP (ref 5–17)
ANION GAP SERPL CALC-SCNC: 16 MMOL/L — SIGNIFICANT CHANGE UP (ref 5–17)
APTT BLD: 60.9 SEC — HIGH (ref 24.5–35.6)
APTT BLD: 60.9 SEC — HIGH (ref 24.5–35.6)
APTT BLD: 72.2 SEC — HIGH (ref 24.5–35.6)
APTT BLD: 72.2 SEC — HIGH (ref 24.5–35.6)
BUN SERPL-MCNC: 59.9 MG/DL — HIGH (ref 8–20)
BUN SERPL-MCNC: 59.9 MG/DL — HIGH (ref 8–20)
CALCIUM SERPL-MCNC: 9 MG/DL — SIGNIFICANT CHANGE UP (ref 8.4–10.5)
CALCIUM SERPL-MCNC: 9 MG/DL — SIGNIFICANT CHANGE UP (ref 8.4–10.5)
CHLORIDE SERPL-SCNC: 98 MMOL/L — SIGNIFICANT CHANGE UP (ref 96–108)
CHLORIDE SERPL-SCNC: 98 MMOL/L — SIGNIFICANT CHANGE UP (ref 96–108)
CO2 SERPL-SCNC: 24 MMOL/L — SIGNIFICANT CHANGE UP (ref 22–29)
CO2 SERPL-SCNC: 24 MMOL/L — SIGNIFICANT CHANGE UP (ref 22–29)
CREAT SERPL-MCNC: 1.67 MG/DL — HIGH (ref 0.5–1.3)
CREAT SERPL-MCNC: 1.67 MG/DL — HIGH (ref 0.5–1.3)
EGFR: 44 ML/MIN/1.73M2 — LOW
EGFR: 44 ML/MIN/1.73M2 — LOW
GLUCOSE SERPL-MCNC: 172 MG/DL — HIGH (ref 70–99)
GLUCOSE SERPL-MCNC: 172 MG/DL — HIGH (ref 70–99)
HCT VFR BLD CALC: 48.3 % — SIGNIFICANT CHANGE UP (ref 39–50)
HCT VFR BLD CALC: 48.3 % — SIGNIFICANT CHANGE UP (ref 39–50)
HGB BLD-MCNC: 15 G/DL — SIGNIFICANT CHANGE UP (ref 13–17)
HGB BLD-MCNC: 15 G/DL — SIGNIFICANT CHANGE UP (ref 13–17)
MAGNESIUM SERPL-MCNC: 2 MG/DL — SIGNIFICANT CHANGE UP (ref 1.6–2.6)
MAGNESIUM SERPL-MCNC: 2 MG/DL — SIGNIFICANT CHANGE UP (ref 1.6–2.6)
MCHC RBC-ENTMCNC: 27.7 PG — SIGNIFICANT CHANGE UP (ref 27–34)
MCHC RBC-ENTMCNC: 27.7 PG — SIGNIFICANT CHANGE UP (ref 27–34)
MCHC RBC-ENTMCNC: 31.1 GM/DL — LOW (ref 32–36)
MCHC RBC-ENTMCNC: 31.1 GM/DL — LOW (ref 32–36)
MCV RBC AUTO: 89.1 FL — SIGNIFICANT CHANGE UP (ref 80–100)
MCV RBC AUTO: 89.1 FL — SIGNIFICANT CHANGE UP (ref 80–100)
PLATELET # BLD AUTO: 193 K/UL — SIGNIFICANT CHANGE UP (ref 150–400)
PLATELET # BLD AUTO: 193 K/UL — SIGNIFICANT CHANGE UP (ref 150–400)
POTASSIUM SERPL-MCNC: 3.9 MMOL/L — SIGNIFICANT CHANGE UP (ref 3.5–5.3)
POTASSIUM SERPL-MCNC: 3.9 MMOL/L — SIGNIFICANT CHANGE UP (ref 3.5–5.3)
POTASSIUM SERPL-SCNC: 3.9 MMOL/L — SIGNIFICANT CHANGE UP (ref 3.5–5.3)
POTASSIUM SERPL-SCNC: 3.9 MMOL/L — SIGNIFICANT CHANGE UP (ref 3.5–5.3)
RBC # BLD: 5.42 M/UL — SIGNIFICANT CHANGE UP (ref 4.2–5.8)
RBC # BLD: 5.42 M/UL — SIGNIFICANT CHANGE UP (ref 4.2–5.8)
RBC # FLD: 15.9 % — HIGH (ref 10.3–14.5)
RBC # FLD: 15.9 % — HIGH (ref 10.3–14.5)
SODIUM SERPL-SCNC: 138 MMOL/L — SIGNIFICANT CHANGE UP (ref 135–145)
SODIUM SERPL-SCNC: 138 MMOL/L — SIGNIFICANT CHANGE UP (ref 135–145)
WBC # BLD: 14.11 K/UL — HIGH (ref 3.8–10.5)
WBC # BLD: 14.11 K/UL — HIGH (ref 3.8–10.5)
WBC # FLD AUTO: 14.11 K/UL — HIGH (ref 3.8–10.5)
WBC # FLD AUTO: 14.11 K/UL — HIGH (ref 3.8–10.5)

## 2023-11-21 PROCEDURE — 99233 SBSQ HOSP IP/OBS HIGH 50: CPT

## 2023-11-21 PROCEDURE — 99231 SBSQ HOSP IP/OBS SF/LOW 25: CPT

## 2023-11-21 PROCEDURE — 71045 X-RAY EXAM CHEST 1 VIEW: CPT | Mod: 26

## 2023-11-21 PROCEDURE — 99232 SBSQ HOSP IP/OBS MODERATE 35: CPT

## 2023-11-21 RX ORDER — POLYETHYLENE GLYCOL 3350 17 G/17G
17 POWDER, FOR SOLUTION ORAL DAILY
Refills: 0 | Status: DISCONTINUED | OUTPATIENT
Start: 2023-11-21 | End: 2023-11-27

## 2023-11-21 RX ORDER — SENNA PLUS 8.6 MG/1
2 TABLET ORAL AT BEDTIME
Refills: 0 | Status: DISCONTINUED | OUTPATIENT
Start: 2023-11-21 | End: 2023-11-27

## 2023-11-21 RX ADMIN — SPIRONOLACTONE 25 MILLIGRAM(S): 25 TABLET, FILM COATED ORAL at 06:05

## 2023-11-21 RX ADMIN — HEPARIN SODIUM 2500 UNIT(S)/HR: 5000 INJECTION INTRAVENOUS; SUBCUTANEOUS at 06:54

## 2023-11-21 RX ADMIN — HEPARIN SODIUM 2500 UNIT(S)/HR: 5000 INJECTION INTRAVENOUS; SUBCUTANEOUS at 07:23

## 2023-11-21 RX ADMIN — Medication 50 MILLIGRAM(S): at 11:16

## 2023-11-21 RX ADMIN — ATORVASTATIN CALCIUM 20 MILLIGRAM(S): 80 TABLET, FILM COATED ORAL at 22:29

## 2023-11-21 RX ADMIN — Medication 40 MILLIGRAM(S): at 17:02

## 2023-11-21 RX ADMIN — HEPARIN SODIUM 2500 UNIT(S)/HR: 5000 INJECTION INTRAVENOUS; SUBCUTANEOUS at 21:37

## 2023-11-21 RX ADMIN — Medication 81 MILLIGRAM(S): at 11:16

## 2023-11-21 RX ADMIN — Medication 50 MILLIGRAM(S): at 17:02

## 2023-11-21 RX ADMIN — Medication 40 MILLIGRAM(S): at 06:05

## 2023-11-21 RX ADMIN — Medication 50 MILLIGRAM(S): at 06:05

## 2023-11-21 RX ADMIN — HEPARIN SODIUM 2500 UNIT(S)/HR: 5000 INJECTION INTRAVENOUS; SUBCUTANEOUS at 17:10

## 2023-11-21 RX ADMIN — HEPARIN SODIUM 2500 UNIT(S)/HR: 5000 INJECTION INTRAVENOUS; SUBCUTANEOUS at 19:17

## 2023-11-21 NOTE — PROGRESS NOTE ADULT - NS ATTEND AMEND GEN_ALL_CORE FT
Atrial flutter with RVR and LV dysfunction. Also has loculated left pleural effusion of undetermined etiology which may require bronchoscopy. Would prefer rate control for flutter until bronchoscopy is performed. Rates much lower than 130 may not be achievable in atrial flutter and would have to be acceptable until cardioversion can be performed. Would avoid cardioversion prior to bronchoscopy as that may require interruption of anticoagulation - which is a high risk for CVA post DCCV.

## 2023-11-21 NOTE — PROGRESS NOTE ADULT - ASSESSMENT
72-year-old male with history of hypertension CAD status post quintuple bypass November 2021 followed by cardiology/Dr. Milian in  [place]  resents the ED via EMS complaining of worsening dyspnea on exertion since yesterday.  Thoracic surgery consulted treatment Left effusion. Left PTC placed 11/28.

## 2023-11-21 NOTE — PROGRESS NOTE ADULT - ASSESSMENT
71 y/o male with PMH of CAD Cagb, CHF  (unknown EF) HTN, HLD presents with sob and CT revealed  a moderate to large loculated left pleural effusion.  Echo revealed echo was done which revealed an LVEF of 20-25 % and severely reduced RVSF. found to be in AFL with RVR,  b/p on soft side so difficult to treat.  Scheduled for QUIN cardioversion C  RHC today however worsening opacification of left lung and increasing o2 requirements

## 2023-11-21 NOTE — PROGRESS NOTE ADULT - SUBJECTIVE AND OBJECTIVE BOX
[facility] Division of Hospital Medicine    Chief Complaint:      SUBJECTIVE / OVERNIGHT EVENTS:    Patient denies any complaints    MEDICATIONS  (STANDING):  aspirin  chewable 81 milliGRAM(s) Oral daily  atorvastatin 20 milliGRAM(s) Oral at bedtime  furosemide   Injectable 40 milliGRAM(s) IV Push every 12 hours  heparin  Infusion.  Unit(s)/Hr (23 mL/Hr) IV Continuous <Continuous>  influenza  Vaccine (HIGH DOSE) 0.7 milliLiter(s) IntraMuscular once  metolazone 5 milliGRAM(s) Oral daily  metoprolol tartrate 50 milliGRAM(s) Oral every 6 hours  polyethylene glycol 3350 17 Gram(s) Oral daily  senna 2 Tablet(s) Oral at bedtime  spironolactone 25 milliGRAM(s) Oral daily    MEDICATIONS  (PRN):  acetaminophen     Tablet .. 650 milliGRAM(s) Oral every 6 hours PRN Temp greater or equal to 38C (100.4F), Mild Pain (1 - 3)  aluminum hydroxide/magnesium hydroxide/simethicone Suspension 30 milliLiter(s) Oral every 4 hours PRN Dyspepsia  bisacodyl Suppository 10 milliGRAM(s) Rectal daily PRN Constipation  heparin   Injectable 08853 Unit(s) IV Push every 6 hours PRN For aPTT less than 40  heparin   Injectable 5000 Unit(s) IV Push every 6 hours PRN For aPTT between 40 - 57  melatonin 3 milliGRAM(s) Oral at bedtime PRN Insomnia  metoprolol tartrate Injectable 5 milliGRAM(s) IV Push every 6 hours PRN RVR > 130  ondansetron Injectable 4 milliGRAM(s) IV Push every 8 hours PRN Nausea and/or Vomiting        I&O's Summary    20 Nov 2023 07:01  -  21 Nov 2023 07:00  --------------------------------------------------------  IN: 1520 mL / OUT: 2705 mL / NET: -1185 mL    21 Nov 2023 07:01  -  21 Nov 2023 15:57  --------------------------------------------------------  IN: 390 mL / OUT: 700 mL / NET: -310 mL        PHYSICAL EXAM:  Vital Signs Last 24 Hrs  T(C): 37.2 (21 Nov 2023 07:28), Max: 37.2 (21 Nov 2023 07:28)  T(F): 98.9 (21 Nov 2023 07:28), Max: 98.9 (21 Nov 2023 07:28)  HR: 131 (21 Nov 2023 12:00) (131 - 138)  BP: 116/66 (21 Nov 2023 12:00) (93/69 - 124/103)  BP(mean): 98 (21 Nov 2023 08:00) (66 - 98)  RR: 30 (21 Nov 2023 12:00) (20 - 33)  SpO2: 96% (21 Nov 2023 12:00) (94% - 100%)    Parameters below as of 21 Nov 2023 12:00  Patient On (Oxygen Delivery Method): nasal cannula  O2 Flow (L/min): 2          CONSTITUTIONAL: NAD,   ENMT: normocephalic, atraumatic  RESPIRATORY: crackles right lower chest and decreased breath sounds left lower chest. chest tube in place.  CARDIOVASCULAR: Regular rhythm and tachycardia, normal S1 and S2, no murmur/rub/gallop  ABDOMEN: Nontender to palpation, no rebound/guarding; No hepatosplenomegaly  MUSCLOSKELETAL:  b/l LE edema  PSYCH: A+O to person, place, and time; affect appropriate  NEUROLOGY: CN 2-12 are intact and symmetric; no gross deficits;   SKIN: No rashes; no palpable lesions    LABS:                        15.0   14.11 )-----------( 193      ( 21 Nov 2023 05:50 )             48.3     11-21    138  |  98  |  59.9<H>  ----------------------------<  172<H>  3.9   |  24.0  |  1.67<H>    Ca    9.0      21 Nov 2023 05:50  Mg     2.0     11-21      PTT - ( 21 Nov 2023 05:50 )  PTT:72.2 sec      Urinalysis Basic - ( 21 Nov 2023 05:50 )    Color: x / Appearance: x / SG: x / pH: x  Gluc: 172 mg/dL / Ketone: x  / Bili: x / Urobili: x   Blood: x / Protein: x / Nitrite: x   Leuk Esterase: x / RBC: x / WBC x   Sq Epi: x / Non Sq Epi: x / Bacteria: x        CAPILLARY BLOOD GLUCOSE            RADIOLOGY & ADDITIONAL TESTS:  Results Reviewed:   Imaging Personally Reviewed:  Electrocardiogram Personally Reviewed:

## 2023-11-21 NOTE — PROGRESS NOTE ADULT - PROBLEM SELECTOR PLAN 1
Left PTC placed 11/18  Maintain to H2O seal  Pleural fluid exudative by light's criteria  Culture negative  Follow up cytology  Record drainage q12 hours to facilitate timely removal  Daily CXR while PTC in place  Increasing left chest opacification  CT chest reviewed by Dr. Encarnacion, pt will need VATS decort. Plan for OR tomorrow. NPO after midnight. Please turn heparin gtt off at 9AM.   Spoke with EP, recommending VATs prior to cardioversion due to need for anticoagulation post cardioversion.   Encourage incentive spirometry/Chest PT   Rest of care per primary team  Thoracic Surgery to follow  Plan discussed with Dr. Encarnacion

## 2023-11-21 NOTE — PROGRESS NOTE ADULT - SUBJECTIVE AND OBJECTIVE BOX
Subjective:  Patient seen and examined for left pleural effusion, s/p L PTC placed 11/18. Patient resting in bed on 2L NC, in no apparent distress. Patient reports "my breathing has been okay" Patient denies chest pain, SOB, abdominal pain, N/V/D/C, or any other acute complaints at this time.    PAST MEDICAL & SURGICAL HISTORY:  Coronary artery disease involving native coronary artery of native heart, unspecified whether angina    CHF with unknown LVEF    Primary hypertension    Hyperlipidemia, unspecified hyperlipidemia type    S/P CABG x 5    History of cholecystectomy    VITAL SIGNS  Vital Signs Last 24 Hrs  T(C): 36.4 (11-21-23 @ 17:05), Max: 37.2 (11-21-23 @ 07:28)  T(F): 97.6 (11-21-23 @ 17:05), Max: 98.9 (11-21-23 @ 07:28)  HR: 134 (11-21-23 @ 16:00) (131 - 138)  BP: 115/50 (11-21-23 @ 16:00) (93/69 - 124/103)  RR: 33 (11-21-23 @ 16:00) (20 - 33)  SpO2: 95% (11-21-23 @ 16:00) (94% - 100%)  on (O2)              MEDICATIONS  acetaminophen     Tablet .. 650 milliGRAM(s) Oral every 6 hours PRN  aluminum hydroxide/magnesium hydroxide/simethicone Suspension 30 milliLiter(s) Oral every 4 hours PRN  aspirin  chewable 81 milliGRAM(s) Oral daily  atorvastatin 20 milliGRAM(s) Oral at bedtime  bisacodyl Suppository 10 milliGRAM(s) Rectal daily PRN  furosemide   Injectable 40 milliGRAM(s) IV Push every 12 hours  heparin   Injectable 20713 Unit(s) IV Push every 6 hours PRN  heparin   Injectable 5000 Unit(s) IV Push every 6 hours PRN  heparin  Infusion.  Unit(s)/Hr IV Continuous <Continuous>  influenza  Vaccine (HIGH DOSE) 0.7 milliLiter(s) IntraMuscular once  melatonin 3 milliGRAM(s) Oral at bedtime PRN  metolazone 5 milliGRAM(s) Oral daily  metoprolol tartrate 50 milliGRAM(s) Oral every 6 hours  metoprolol tartrate Injectable 5 milliGRAM(s) IV Push every 6 hours PRN  ondansetron Injectable 4 milliGRAM(s) IV Push every 8 hours PRN  polyethylene glycol 3350 17 Gram(s) Oral daily  senna 2 Tablet(s) Oral at bedtime  spironolactone 25 milliGRAM(s) Oral daily        11-20 @ 07:01  -  11-21 @ 07:00  --------------------------------------------------------  IN: 1520 mL / OUT: 2705 mL / NET: -1185 mL    11-21 @ 07:01  -  11-21 @ 17:27  --------------------------------------------------------  IN: 390 mL / OUT: 700 mL / NET: -310 mL      Weights:  Daily     Daily   Admit Wt: Drug Dosing Weight  Height (cm): 175.3 (18 Nov 2023 08:25)  Weight (kg): 127 (18 Nov 2023 08:25)  BMI (kg/m2): 41.3 (18 Nov 2023 08:25)  BSA (m2): 2.38 (18 Nov 2023 08:25)    LABS  11-21    138  |  98  |  59.9<H>  ----------------------------<  172<H>  3.9   |  24.0  |  1.67<H>    Ca    9.0      21 Nov 2023 05:50  Mg     2.0     11-21                                   15.0   14.11 )-----------( 193      ( 21 Nov 2023 05:50 )             48.3          PTT - ( 21 Nov 2023 05:50 )  PTT:72.2 sec      DIAGNOSTICS:  All laboratory results, radiology and medications reviewed.    < from: CT Chest No Cont (11.20.23 @ 09:09) >  IMPRESSION: Large loculated lefthydropneumothorax containing a pigtail   catheter.    Mucus/secretions in the left lower lobe bronchus.    1.4 cm ill-defined opacity is noted in the right lower lobe. It is   indeterminate based on this exam. Follow-up CT scan is recommended in 3   months to ensure resolution.    --- End of Report ---      CARIE BENITEZ MD; Attending Radiologist  This document has been electronically signed. Nov 20 2023  9:29AM    < end of copied text >      PHYSICAL EXAM  General: alert, awake, no acute distress  Neurology: alert and oriented x 3, nonfocal, no gross deficits  Respiratory: decreased breath sounds on the left, on 2L NC, no accessory muscle use  CV: tachycardic, normal S1, S2  Abdomen: soft, nontender, nondistended, + bowel sounds  Extremities: warm, well perfused. + b/l LE edema  Chest tubes: L PTC in place to WS, no airleak noted, draining serous fluid

## 2023-11-21 NOTE — PROGRESS NOTE ADULT - PROBLEM SELECTOR PLAN 1
Low na diet  Lasix 40 IVP bid   Metlozone 5 mg qd    Cardiac meds  furosemide   Injectable 40 milliGRAM(s) IV Push every 12 hours  metoprolol tartrate 50 milliGRAM(s) Oral every 6 hours  spironolactone 25 milliGRAM(s) Oral daily  Metlozone 5 mg qd  consdier arb once diuresed and after cath Low na diet  Hydropneumothorax  additional chest tube to be inserted   c/w Lasix 40 IVP bid   Metolazone 5 mg qd    Cardiac meds  furosemide   Injectable 40 milliGRAM(s) IV Push every 12 hours  metoprolol tartrate 50 milliGRAM(s) Oral every 6 hours  spironolactone 25 milliGRAM(s) Oral daily  Metlozone 5 mg qd  consdier arb once diuresed and after cath

## 2023-11-21 NOTE — PROGRESS NOTE ADULT - ASSESSMENT
Patient is a 73 yo M w/ PMH of CAD s/p CABG, CHF, HTN, HLD presenting for chief complaint of SOB. Admitted for acute hypoxemic respiratory failure 2/2 CHF/Pleural effusion.     Acute hypoxemic respiratory failure 2/2 CHF exacerbation/pleural effusion  on 2L O2 this morning. wean as able.    acute on chronic systolic CHF  patient denies feeling SOB. still has leg edema  - Lasix 40mg IV BID, Metozalone, and aldactone. BP low normal so will not tolerate ACEI or ARB for now. was on enteresto at home.    Left hydropneumothorax  s/p chest tube placement. CT chest showed large left hydropneumothorax and secretions . There was a plan for broncho in tandem with QUIN but both were canceled today       A. flutter w/ RVR  HR persisting in 130s.  c/w Metopolol 50 mg Q6H and heparin gtt. EP following    Hypokalemia. resolved    Leukocytosis  -no signs of infection    DEVON vs CKD  creatinine trending down and is 1.6. not sure what is baseline.  - Renal US without any hydronephrosis       CAD s/p CABG  - Continue ASA, Statin, Lopressor    DVT ppx: Heparin SQ

## 2023-11-21 NOTE — PROGRESS NOTE ADULT - SUBJECTIVE AND OBJECTIVE BOX
[facility]                                                         CARDIOLOGY AT [facility]                                                                  [address] [address]                                                         Telephone: [phone]. Fax:[phone]                                                                             PROGRESS NOTE    Reason for follow up:  CHF & Atrial fib  Update: Additional chest tube to be inserted by Thoracic  Will speak to interventional tomorrow regarding diagnostic cath  Remains atrial fib with rapid rates  Patient states he feels better today      Review of symptoms:   Cardiac:  No chest pain. No dyspnea. No palpitations.  Respiratory: no cough. No dyspnea  Gastrointestinal: No diarrhea. No abdominal pain. No bleeding.   Neuro: No focal neuro complaints.      Vitals:  T(C): 37.2 (11-21-23 @ 07:28), Max: 37.2 (11-21-23 @ 07:28)  HR: 133 (11-21-23 @ 08:00) (131 - 138)  BP: 113/89 (11-21-23 @ 08:00) (93/69 - 119/77)  RR: 29 (11-21-23 @ 08:00) (20 - 33)  SpO2: 95% (11-21-23 @ 08:00) (93% - 100%)  Wt(kg): --  I&O's Summary    20 Nov 2023 07:01  -  21 Nov 2023 07:00  --------------------------------------------------------  IN: 1520 mL / OUT: 2705 mL / NET: -1185 mL      Weight (kg): 127 (11-18 @ 08:25)      PHYSICAL EXAM:  Appearance: Comfortable. No acute distress  HEENT:  Atraumatic. Normocephalic.  Normal oral mucosa  Neurologic: A & O x 3, no gross focal deficits.  Cardiovascular: RRR S1 S2, Regular  Respiratory: Decreased breath sounds in Left lung   Gastrointestinal:  Soft, Non-tender, + BS  Lower Extremities: + edema   Psychiatry: Patient is calm. No agitation.   Skin: warm and dry.      CURRENT MEDICATIONS:  MEDICATIONS  (STANDING):  aspirin  chewable 81 milliGRAM(s) Oral daily  atorvastatin 20 milliGRAM(s) Oral at bedtime  furosemide   Injectable 40 milliGRAM(s) IV Push every 12 hours  heparin  Infusion.  Unit(s)/Hr (23 mL/Hr) IV Continuous <Continuous>  influenza  Vaccine (HIGH DOSE) 0.7 milliLiter(s) IntraMuscular once  metolazone 5 milliGRAM(s) Oral daily  metoprolol tartrate 50 milliGRAM(s) Oral every 6 hours  polyethylene glycol 3350 17 Gram(s) Oral daily  senna 2 Tablet(s) Oral at bedtime  spironolactone 25 milliGRAM(s) Oral daily    LABS:	 	                       15.0   14.11 )-----------( 193      ( 21 Nov 2023 05:50 )             48.3     11-21    138  |  98  |  59.9<H>  ----------------------------<  172<H>  3.9   |  24.0  |  1.67<H>    Ca    9.0      21 Nov 2023 05:50  Mg     2.0     11-21    TELEMETRY: Atrial fib  DIAGNOSTIC TESTING:  [ ] Echocardiogram: < from: TTE Echo Complete w/o Contrast w/ Doppler (11.18.23 @ 12:34) >  Summary:   1. There is mild concentric left ventricular hypertrophy.   2. Moderate to severely increased left ventricular internal cavity size.   3. Left ventricular ejection fraction, by visual estimation, is 20 to   25%.   4. Severely decreased global left ventricular systolic function.   5. The mitral in-flow pattern reveals no discernable A-wave, therefore   no comment on diastolic function can be made.   6. Mildly enlarged right ventricle.   7. Severely reduced RV systolic function.   8. Mildly enlarged left atrium.   9. Normal right atrial size.  10. Sclerotic aortic valve with normal opening.  11. Mild thickening and calcification of the anterior and posterior   mitral valve leaflets.  12. Mild mitral annular calcification.  13. Mild mitral valve regurgitation.  14. The main pulmonary artery is normal in size.  15. There is no evidence of pericardial effusion.

## 2023-11-21 NOTE — PROGRESS NOTE ADULT - SUBJECTIVE AND OBJECTIVE BOX
Patient seen today in bed sitting up fasting. No overnight complaints. Awating today's procedures  Per OR scheduling patient not on for any procedures today with Dr. Encarnacion. Deferred due to pending LHC/RHC    TELE: Atrial flutter in the mid 130s with occasional PVCs     MEDICATIONS  (STANDING):  aspirin  chewable 81 milliGRAM(s) Oral daily  atorvastatin 20 milliGRAM(s) Oral at bedtime  furosemide   Injectable 40 milliGRAM(s) IV Push every 12 hours  heparin  Infusion.  Unit(s)/Hr (23 mL/Hr) IV Continuous <Continuous>  influenza  Vaccine (HIGH DOSE) 0.7 milliLiter(s) IntraMuscular once  metolazone 5 milliGRAM(s) Oral daily  metoprolol tartrate 50 milliGRAM(s) Oral every 6 hours  spironolactone 25 milliGRAM(s) Oral daily    MEDICATIONS  (PRN):  acetaminophen     Tablet .. 650 milliGRAM(s) Oral every 6 hours PRN Temp greater or equal to 38C (100.4F), Mild Pain (1 - 3)  aluminum hydroxide/magnesium hydroxide/simethicone Suspension 30 milliLiter(s) Oral every 4 hours PRN Dyspepsia  heparin   Injectable 5000 Unit(s) IV Push every 6 hours PRN For aPTT between 40 - 57  heparin   Injectable 43006 Unit(s) IV Push every 6 hours PRN For aPTT less than 40  melatonin 3 milliGRAM(s) Oral at bedtime PRN Insomnia  metoprolol tartrate Injectable 5 milliGRAM(s) IV Push every 6 hours PRN RVR > 130  ondansetron Injectable 4 milliGRAM(s) IV Push every 8 hours PRN Nausea and/or Vomiting    Allergies  No Known Allergies    PAST MEDICAL & SURGICAL HISTORY:  Coronary artery disease involving native coronary artery of native heart, unspecified whether angina  CHF with unknown LVEF  Primary hypertension  Hyperlipidemia, unspecified hyperlipidemia type  S/P CABG x 5  History of cholecystectomy    Vital Signs Last 24 Hrs  T(C): 36.2 (21 Nov 2023 04:00), Max: 36.9 (21 Nov 2023 00:00)  T(F): 97.1 (21 Nov 2023 04:00), Max: 98.4 (21 Nov 2023 00:00)  HR: 133 (21 Nov 2023 08:00) (131 - 138)  BP: 113/89 (21 Nov 2023 08:00) (93/69 - 119/77)  BP(mean): 98 (21 Nov 2023 08:00) (66 - 98)  RR: 29 (21 Nov 2023 08:00) (20 - 33)  SpO2: 95% (21 Nov 2023 08:00) (93% - 100%)    Parameters below as of 21 Nov 2023 08:00  Patient On (Oxygen Delivery Method): nasal cannula  O2 Flow (L/min): 2    Physical Exam:  Constitutional: NAD, AAOx3  Cardiovascular: +S1S2 IR; rapid aflutter at time of exam; 2/6 GREG at LSB  Pulmonary: Chest tube in place clearing yellowish fluid. Absent breath sounds on L compared to R  GI: soft NTND +BS  Extremities: 2+ pitting edema bilaterally   Neuro: non focal, LEE x4    LABS:                        15.0   14.11 )-----------( 193      ( 21 Nov 2023 05:50 )             48.3     138  |  98  |  59.9<H>  ----------------------------<  172<H>  3.9   |  24.0  |  1.67<H>  Ca    9.0      21 Nov 2023 05:50  Mg     2.0     11-21  PTT - ( 21 Nov 2023 05:50 )  PTT:72.2 sec    CT chest: 11/20/23  IMPRESSION: Large loculated left hydropneumothorax containing a pigtail   catheter.  Mucus/secretions in the left lower lobe bronchus.  1.4 cm ill-defined opacity is noted in the right lower lobe. It is   indeterminate based on this exam. Follow-up CT scan is recommended in 3   months to ensure resolution.    TTE 11/18/23  Summary:   1. There is mild concentric left ventricular hypertrophy.   2. Moderate to severely increased left ventricular internal cavity size.   3. Left ventricular ejection fraction, by visual estimation, is 20 to 25%.   4. Severely decreased global left ventricular systolic function.   5. The mitral in-flow pattern reveals no discernable A-wave, therefore no comment on diastolic function can be made.   6. Mildly enlarged right ventricle.   7. Severely reduced RV systolic function.   8. Mildly enlarged left atrium.   9. Normal right atrial size.  10. Sclerotic aortic valve with normal opening.  11. Mild thickening and calcification of the anterior and posterior mitral valve leaflets.  12. Mild mitral annular calcification.  13. Mild mitral valve regurgitation.  14. The main pulmonary artery is normal in size.  15. There is no evidence of pericardial effusion.      A/P  72 year old male patient with a history of obesity, CAD s/p CABG x4 (2020 [facility] Mignon at [facility]), HFrEF, EF 20-25%, HTN, HLD, remote AF (transient in the postop setting of CABG with no reoccurrence- not on AC) who is admitted with hypoxia, large loculated left pleural effusion, acute HFrEF exacerbation with reduced RV function, and in rapid likley typical atrial flutter. EP following for rapid atrial flutter    Remains rapid currently in the 130s. CHADSVASC: 4  Has been NPO for attempted combined QUIN/DCCV vs ablation with simultaneous bronchoscopy - currently postponed due to lack of ischemic evalution    - Awaiting Cardiology follow up  - Rate control will be difficult to achieve due to nature of atrial flutter  - Continue IV heparin and rate control with Lopressor q6h. Can uptitrate if clinically feasible.   - Will follow closely. EP plan contingent on ischemic evaluation and Thoracic plan. If rhythm control is pursued will need to be on uninterrupted anticoagulation for 30 days.   - Will discuss with Dr. Ceballos

## 2023-11-21 NOTE — PROGRESS NOTE ADULT - NS ATTEND AMEND GEN_ALL_CORE FT
Patient was planned for QUIN/DCCV + Bronchoscopy which has been postponed for request of ischemic evaluation prior too procedures.    Not a candidate for CCTA given rapid atrial flutter.  Not a candidate for nuclear stress test or stress echo given rapid atrial flutter    LHC is only remaining option but he has elevated procal + WBC count. We will discuss with interventional cardiology tomorrow AM if LHC an option.

## 2023-11-21 NOTE — PROGRESS NOTE ADULT - PROBLEM SELECTOR PLAN 2
c/w iv heparin   metoprolol 50 q6h for rate control  Cardioversion per EP  will are considering Cath tomorrow

## 2023-11-22 DIAGNOSIS — I48.92 UNSPECIFIED ATRIAL FLUTTER: ICD-10-CM

## 2023-11-22 LAB
ANION GAP SERPL CALC-SCNC: 16 MMOL/L — SIGNIFICANT CHANGE UP (ref 5–17)
ANION GAP SERPL CALC-SCNC: 16 MMOL/L — SIGNIFICANT CHANGE UP (ref 5–17)
APTT BLD: 25.5 SEC — SIGNIFICANT CHANGE UP (ref 24.5–35.6)
APTT BLD: 25.5 SEC — SIGNIFICANT CHANGE UP (ref 24.5–35.6)
APTT BLD: 34.6 SEC — SIGNIFICANT CHANGE UP (ref 24.5–35.6)
APTT BLD: 34.6 SEC — SIGNIFICANT CHANGE UP (ref 24.5–35.6)
APTT BLD: 71.9 SEC — HIGH (ref 24.5–35.6)
APTT BLD: 71.9 SEC — HIGH (ref 24.5–35.6)
BLD GP AB SCN SERPL QL: SIGNIFICANT CHANGE UP
BLD GP AB SCN SERPL QL: SIGNIFICANT CHANGE UP
BUN SERPL-MCNC: 69.1 MG/DL — HIGH (ref 8–20)
BUN SERPL-MCNC: 69.1 MG/DL — HIGH (ref 8–20)
CALCIUM SERPL-MCNC: 9.1 MG/DL — SIGNIFICANT CHANGE UP (ref 8.4–10.5)
CALCIUM SERPL-MCNC: 9.1 MG/DL — SIGNIFICANT CHANGE UP (ref 8.4–10.5)
CHLORIDE SERPL-SCNC: 99 MMOL/L — SIGNIFICANT CHANGE UP (ref 96–108)
CHLORIDE SERPL-SCNC: 99 MMOL/L — SIGNIFICANT CHANGE UP (ref 96–108)
CO2 SERPL-SCNC: 25 MMOL/L — SIGNIFICANT CHANGE UP (ref 22–29)
CO2 SERPL-SCNC: 25 MMOL/L — SIGNIFICANT CHANGE UP (ref 22–29)
CREAT SERPL-MCNC: 1.83 MG/DL — HIGH (ref 0.5–1.3)
CREAT SERPL-MCNC: 1.83 MG/DL — HIGH (ref 0.5–1.3)
EGFR: 39 ML/MIN/1.73M2 — LOW
EGFR: 39 ML/MIN/1.73M2 — LOW
GLUCOSE SERPL-MCNC: 174 MG/DL — HIGH (ref 70–99)
GLUCOSE SERPL-MCNC: 174 MG/DL — HIGH (ref 70–99)
HCT VFR BLD CALC: 44.7 % — SIGNIFICANT CHANGE UP (ref 39–50)
HCT VFR BLD CALC: 44.7 % — SIGNIFICANT CHANGE UP (ref 39–50)
HGB BLD-MCNC: 13.6 G/DL — SIGNIFICANT CHANGE UP (ref 13–17)
HGB BLD-MCNC: 13.6 G/DL — SIGNIFICANT CHANGE UP (ref 13–17)
INR BLD: 1.46 RATIO — HIGH (ref 0.85–1.18)
INR BLD: 1.46 RATIO — HIGH (ref 0.85–1.18)
MAGNESIUM SERPL-MCNC: 2.1 MG/DL — SIGNIFICANT CHANGE UP (ref 1.6–2.6)
MAGNESIUM SERPL-MCNC: 2.1 MG/DL — SIGNIFICANT CHANGE UP (ref 1.6–2.6)
MCHC RBC-ENTMCNC: 26.8 PG — LOW (ref 27–34)
MCHC RBC-ENTMCNC: 26.8 PG — LOW (ref 27–34)
MCHC RBC-ENTMCNC: 30.4 GM/DL — LOW (ref 32–36)
MCHC RBC-ENTMCNC: 30.4 GM/DL — LOW (ref 32–36)
MCV RBC AUTO: 88.2 FL — SIGNIFICANT CHANGE UP (ref 80–100)
MCV RBC AUTO: 88.2 FL — SIGNIFICANT CHANGE UP (ref 80–100)
PLATELET # BLD AUTO: 273 K/UL — SIGNIFICANT CHANGE UP (ref 150–400)
PLATELET # BLD AUTO: 273 K/UL — SIGNIFICANT CHANGE UP (ref 150–400)
POTASSIUM SERPL-MCNC: 3.6 MMOL/L — SIGNIFICANT CHANGE UP (ref 3.5–5.3)
POTASSIUM SERPL-MCNC: 3.6 MMOL/L — SIGNIFICANT CHANGE UP (ref 3.5–5.3)
POTASSIUM SERPL-SCNC: 3.6 MMOL/L — SIGNIFICANT CHANGE UP (ref 3.5–5.3)
POTASSIUM SERPL-SCNC: 3.6 MMOL/L — SIGNIFICANT CHANGE UP (ref 3.5–5.3)
PROTHROM AB SERPL-ACNC: 16 SEC — HIGH (ref 9.5–13)
PROTHROM AB SERPL-ACNC: 16 SEC — HIGH (ref 9.5–13)
RBC # BLD: 5.07 M/UL — SIGNIFICANT CHANGE UP (ref 4.2–5.8)
RBC # BLD: 5.07 M/UL — SIGNIFICANT CHANGE UP (ref 4.2–5.8)
RBC # FLD: 15.9 % — HIGH (ref 10.3–14.5)
RBC # FLD: 15.9 % — HIGH (ref 10.3–14.5)
SODIUM SERPL-SCNC: 140 MMOL/L — SIGNIFICANT CHANGE UP (ref 135–145)
SODIUM SERPL-SCNC: 140 MMOL/L — SIGNIFICANT CHANGE UP (ref 135–145)
WBC # BLD: 17.1 K/UL — HIGH (ref 3.8–10.5)
WBC # BLD: 17.1 K/UL — HIGH (ref 3.8–10.5)
WBC # FLD AUTO: 17.1 K/UL — HIGH (ref 3.8–10.5)
WBC # FLD AUTO: 17.1 K/UL — HIGH (ref 3.8–10.5)

## 2023-11-22 PROCEDURE — 99233 SBSQ HOSP IP/OBS HIGH 50: CPT

## 2023-11-22 PROCEDURE — 71045 X-RAY EXAM CHEST 1 VIEW: CPT | Mod: 26

## 2023-11-22 PROCEDURE — 99232 SBSQ HOSP IP/OBS MODERATE 35: CPT

## 2023-11-22 PROCEDURE — 99223 1ST HOSP IP/OBS HIGH 75: CPT

## 2023-11-22 RX ORDER — HEPARIN SODIUM 5000 [USP'U]/ML
2500 INJECTION INTRAVENOUS; SUBCUTANEOUS
Qty: 25000 | Refills: 0 | Status: DISCONTINUED | OUTPATIENT
Start: 2023-11-22 | End: 2023-11-23

## 2023-11-22 RX ORDER — HEPARIN SODIUM 5000 [USP'U]/ML
10000 INJECTION INTRAVENOUS; SUBCUTANEOUS EVERY 6 HOURS
Refills: 0 | Status: DISCONTINUED | OUTPATIENT
Start: 2023-11-22 | End: 2023-11-27

## 2023-11-22 RX ORDER — HEPARIN SODIUM 5000 [USP'U]/ML
2500 INJECTION INTRAVENOUS; SUBCUTANEOUS
Qty: 25000 | Refills: 0 | Status: DISCONTINUED | OUTPATIENT
Start: 2023-11-22 | End: 2023-11-22

## 2023-11-22 RX ORDER — DIGOXIN 250 MCG
125 TABLET ORAL EVERY 8 HOURS
Refills: 0 | Status: COMPLETED | OUTPATIENT
Start: 2023-11-22 | End: 2023-11-23

## 2023-11-22 RX ORDER — HEPARIN SODIUM 5000 [USP'U]/ML
5000 INJECTION INTRAVENOUS; SUBCUTANEOUS EVERY 6 HOURS
Refills: 0 | Status: DISCONTINUED | OUTPATIENT
Start: 2023-11-22 | End: 2023-11-27

## 2023-11-22 RX ORDER — DIGOXIN 250 MCG
250 TABLET ORAL ONCE
Refills: 0 | Status: COMPLETED | OUTPATIENT
Start: 2023-11-22 | End: 2023-11-22

## 2023-11-22 RX ORDER — HEPARIN SODIUM 5000 [USP'U]/ML
10000 INJECTION INTRAVENOUS; SUBCUTANEOUS ONCE
Refills: 0 | Status: COMPLETED | OUTPATIENT
Start: 2023-11-22 | End: 2023-11-22

## 2023-11-22 RX ADMIN — HEPARIN SODIUM 10000 UNIT(S): 5000 INJECTION INTRAVENOUS; SUBCUTANEOUS at 23:40

## 2023-11-22 RX ADMIN — POLYETHYLENE GLYCOL 3350 17 GRAM(S): 17 POWDER, FOR SOLUTION ORAL at 12:52

## 2023-11-22 RX ADMIN — HEPARIN SODIUM 2500 UNIT(S)/HR: 5000 INJECTION INTRAVENOUS; SUBCUTANEOUS at 15:32

## 2023-11-22 RX ADMIN — HEPARIN SODIUM 2500 UNIT(S)/HR: 5000 INJECTION INTRAVENOUS; SUBCUTANEOUS at 19:49

## 2023-11-22 RX ADMIN — HEPARIN SODIUM 3000 UNIT(S)/HR: 5000 INJECTION INTRAVENOUS; SUBCUTANEOUS at 23:42

## 2023-11-22 RX ADMIN — Medication 50 MILLIGRAM(S): at 06:13

## 2023-11-22 RX ADMIN — HEPARIN SODIUM 2500 UNIT(S)/HR: 5000 INJECTION INTRAVENOUS; SUBCUTANEOUS at 04:51

## 2023-11-22 RX ADMIN — ATORVASTATIN CALCIUM 20 MILLIGRAM(S): 80 TABLET, FILM COATED ORAL at 22:49

## 2023-11-22 RX ADMIN — SPIRONOLACTONE 25 MILLIGRAM(S): 25 TABLET, FILM COATED ORAL at 08:47

## 2023-11-22 RX ADMIN — SENNA PLUS 2 TABLET(S): 8.6 TABLET ORAL at 22:48

## 2023-11-22 RX ADMIN — Medication 40 MILLIGRAM(S): at 17:55

## 2023-11-22 RX ADMIN — HEPARIN SODIUM 10000 UNIT(S): 5000 INJECTION INTRAVENOUS; SUBCUTANEOUS at 15:34

## 2023-11-22 RX ADMIN — Medication 5 MILLIGRAM(S): at 03:22

## 2023-11-22 RX ADMIN — Medication 125 MICROGRAM(S): at 20:04

## 2023-11-22 RX ADMIN — Medication 81 MILLIGRAM(S): at 12:52

## 2023-11-22 RX ADMIN — Medication 50 MILLIGRAM(S): at 19:00

## 2023-11-22 RX ADMIN — HEPARIN SODIUM 2500 UNIT(S)/HR: 5000 INJECTION INTRAVENOUS; SUBCUTANEOUS at 19:47

## 2023-11-22 RX ADMIN — Medication 50 MILLIGRAM(S): at 01:41

## 2023-11-22 RX ADMIN — Medication 250 MICROGRAM(S): at 12:51

## 2023-11-22 RX ADMIN — Medication 50 MILLIGRAM(S): at 12:52

## 2023-11-22 RX ADMIN — HEPARIN SODIUM 2500 UNIT(S)/HR: 5000 INJECTION INTRAVENOUS; SUBCUTANEOUS at 09:14

## 2023-11-22 RX ADMIN — HEPARIN SODIUM 2500 UNIT(S)/HR: 5000 INJECTION INTRAVENOUS; SUBCUTANEOUS at 15:18

## 2023-11-22 RX ADMIN — HEPARIN SODIUM 2500 UNIT(S)/HR: 5000 INJECTION INTRAVENOUS; SUBCUTANEOUS at 07:31

## 2023-11-22 NOTE — PROGRESS NOTE ADULT - ASSESSMENT
72 year old male patient with a history of obesity, CAD s/p CABG x4 (2020 Talhajoon [place] at [facility]), HFrEF, EF 20-25%, HTN, HLD, remote AF (transient in the postop setting of CABG with no reoccurrence- not on AC) who is admitted with hypoxia, large loculated left pleural effusion, acute HFrEF exacerbation with reduced RV function, and in rapid likley typical atrial flutter. EP following for rapid atrial flutter    Remains rapid currently in the 130s. CHADSVASC: 4  Planned for VATS procedure today with Thoracic team    Plan:    1. AFL with RVR  - Continue telemetry monitoring  - HR ~130bpm. Rates will be difficult to control while in atrial flutter. Pt is currently on Metoprolol 50mg every 6 hours. Can uptitrate if clinically feasible. Cant use Cardizem given depressed EF and would avoid Digoxin given renal function (Cr ~2.0). Will likely tolerate HRs- will d/w Attending.   - CHADS2-Vasc: 4 (age, CHF, CAD, and HTN). Currently on Heparin infusion. Monitor PTTs and maintain therapeutic levels.  - EP plan contingent on ischemic evaluation and Thoracic plan. If rhythm control is pursued will need to be on uninterrupted anticoagulation for 30 days.     2. HFrEF (20-25%)  - GDMT and diuresis as per general cardiology  - Cardiology with plans for LHC later this week.    3. Loculated pleural effusion  - management as per thoracic surgery team  - VATS planned for today with Thoracic team      72 year old male patient with a history of obesity, CAD s/p CABG x4 (2020 Talhajoon Groves at [facility]), HFrEF, EF 20-25%, HTN, HLD, remote AF (transient in the postop setting of CABG with no reoccurrence- not on AC) who is admitted with hypoxia, large loculated left pleural effusion, acute HFrEF exacerbation with reduced RV function, and in rapid likley typical atrial flutter. EP following for rapid atrial flutter    Remains rapid currently in the 130s. CHADSVASC: 4  Planned for VATS procedure today with Thoracic team    Plan:    1. AFL with RVR  - Continue telemetry monitoring  - HR ~130bpm. Rates will be difficult to control while in atrial flutter. Pt is currently on Metoprolol 50mg every 6 hours. Can uptitrate if clinically feasible. Cant use Cardizem given depressed EF and would avoid Digoxin given renal function (Cr ~2.0).   - CHADS2-Vasc: 4 (age, CHF, CAD, and HTN). Currently on Heparin infusion. Monitor PTTs and maintain therapeutic levels.  - EP plan contingent on ischemic evaluation and Thoracic plan. If rhythm control is pursued will need to be on uninterrupted anticoagulation for 30 days.     2. HFrEF (20-25%)  - GDMT and diuresis as per general cardiology  - Cardiology with plans for LHC later this week.    3. Loculated pleural effusion  - management as per thoracic surgery team  - VATS planned for today with Thoracic team      72 year old male patient with a history of obesity, CAD s/p CABG x4 (2020 Talha Groves at [facility]), HFrEF, EF 20-25%, HTN, HLD, remote AF (transient in the postop setting of CABG with no reoccurrence- not on AC) who is admitted with hypoxia, large loculated left pleural effusion, acute HFrEF exacerbation with reduced RV function, and in rapid likley typical atrial flutter. EP following for rapid atrial flutter    Remains rapid currently in the 130s. CHADSVASC: 4  Planned for VATS procedure today with Thoracic team    Plan:    1. AFL with RVR  - Continue telemetry monitoring  - HR ~130bpm. Rates will be difficult to control while in atrial flutter. Pt is currently on Metoprolol 50mg every 6 hours. Can uptitrate if clinically feasible. Cant use Cardizem given depressed EF and would avoid Digoxin given renal function (Cr ~2.0).   - CHADS2-Vasc: 4 (age, CHF, CAD, and HTN). Currently on Heparin infusion. Monitor PTTs and maintain therapeutic levels.  - EP plan contingent on ischemic evaluation and Thoracic plan. If rhythm control is pursued will need to be on uninterrupted anticoagulation for 30 days.     2. HFrEF (20-25%)  - GDMT and diuresis as per general cardiology  - Cardiology with plans for C later this week.    3. Loculated pleural effusion  - management as per thoracic surgery team  - VATS planned for today with Thoracic team     *****Addendum****  VATS procedure postponed now until next week in hoped to achieve better rate control.  We are limited in rate control but can do a trial of RENALLY dosed digoxin for short term use only. Recommend giving 0.25mcg x 1 now, followed by 0.125mcg every 8 hours x 2 doses, followed by 0.0625mcg daily. Digoxin level should be checked on Day 4 (a couple hours before dose). A few days after his VATS procedure when his AC can be resumed, will plan on a QUIN guided cardioversion verses ablation.      72 year old male patient with a history of obesity, CAD s/p CABG x4 (2020 Talha Groves at [facility]), HFrEF, EF 20-25%, HTN, HLD, remote AF (transient in the postop setting of CABG with no reoccurrence- not on AC) who is admitted with hypoxia, large loculated left pleural effusion, acute HFrEF exacerbation with reduced RV function, and in rapid likley typical atrial flutter. EP following for rapid atrial flutter    Remains rapid currently in the 130s. CHADSVASC: 4  Planned for VATS procedure today with Thoracic team    Plan:    1. AFL with RVR  - Continue telemetry monitoring  - HR ~130bpm. Rates will be difficult to control while in atrial flutter. Pt is currently on Metoprolol 50mg every 6 hours. Can uptitrate if clinically feasible. Cant use Cardizem given depressed EF and would avoid Digoxin given renal function (Cr ~2.0).   - CHADS2-Vasc: 4 (age, CHF, CAD, and HTN). Currently on Heparin infusion. Monitor PTTs and maintain therapeutic levels.  - EP plan contingent on ischemic evaluation and Thoracic plan. If rhythm control is pursued will need to be on uninterrupted anticoagulation for 30 days.     2. HFrEF (20-25%)  - GDMT and diuresis as per general cardiology  - Cardiology with plans for C later this week.    3. Loculated pleural effusion  - management as per thoracic surgery team  - VATS planned for today with Thoracic team     *****Addendum****  VATS procedure postponed now until next week in hoped to achieve better rate control.  We are limited in rate control but can do a trial of RENALLY dosed digoxin for short term use only. Recommend giving 250mcg x 1 now, followed by 125mcg every 8 hours x 2 doses, followed by 62.5mcg daily. Digoxin level should be checked on Day 4 (a couple hours before dose). A few days after his VATS procedure when his AC can be resumed, will plan on a QUIN guided cardioversion verses ablation.      72 year old male patient with a history of obesity, CAD s/p CABG x4 (2020 Talha Groves at [facility]), HFrEF, EF 20-25%, HTN, HLD, remote AF (transient in the postop setting of CABG with no reoccurrence- not on AC) who is admitted with hypoxia, large loculated left pleural effusion, acute HFrEF exacerbation with reduced RV function, and in rapid likley typical atrial flutter. EP following for rapid atrial flutter    Remains rapid currently in the 130s. CHADSVASC: 4  Planned for VATS procedure today with Thoracic team    Plan:    1. AFL with RVR  - Continue telemetry monitoring  - HR ~130bpm. Rates will be difficult to control while in atrial flutter. Pt is currently on Metoprolol 50mg every 6 hours. Can uptitrate if clinically feasible. Cant use Cardizem given depressed EF and would avoid Digoxin given renal function (Cr ~2.0).   - CHADS2-Vasc: 4 (age, CHF, CAD, and HTN). Currently on Heparin infusion. Monitor PTTs and maintain therapeutic levels.  - EP plan contingent on ischemic evaluation and Thoracic plan. If rhythm control is pursued will need to be on uninterrupted anticoagulation for 30 days.     2. HFrEF (20-25%)  - GDMT and diuresis as per general cardiology  - Cardiology with plans for C later this week.    3. Loculated pleural effusion  - management as per thoracic surgery team  - VATS planned for today with Thoracic team     *****Addendum****  VATS procedure postponed now until next week in hoped to achieve better rate control.  We are limited in rate control but can do a trial of RENALLY dosed digoxin for short term use only. Recommend giving 250mcg x 1 now, followed by 125mcg every 8 hours x 2 doses, followed by 125mcg daily. Digoxin level should be checked on Day 4 (a couple hours before dose). A few days after his VATS procedure when his AC can be resumed, will plan on a QUIN guided cardioversion verses ablation.

## 2023-11-22 NOTE — PROGRESS NOTE ADULT - NS ATTEND AMEND GEN_ALL_CORE FT
.  .  Patient was planned for VATS procedure today but remains in AFL with rapid rates. Discussed with both Dr. Mujica & Alysha. Patient requires VATS to treat his loculated pleural effusions and this should be intervened upon soon (cannot wait 1 month). VATS cannot be performed on anticoagulation so therefore we must pursue rate control of his atrial flutter to allow for optimization for VATS surgery. Although he has renal dysfunction, I would recommend using Digoxin for rate control in addition to Metoprolol.    Once VATS is complete and AC is acceptable from thoracic surgery endpoint, I would recommend QUIN +/- CV vs ablation. Ischemic evaluation can be performed before CV/ablation if needed as per Cardiology.    Gaurav Bolivar MD  Clinical Cardiac Electrophysiology

## 2023-11-22 NOTE — PROGRESS NOTE ADULT - PROBLEM SELECTOR PLAN 1
Left PTC placed 11/18  Maintain to H2O seal  Pleural fluid exudative by light's criteria  Culture negative  Follow up cytology  Record drainage q12 hours to facilitate timely removal  Daily CXR while PTC in place  Increasing left chest opacification  CT chest reviewed by Dr. Encarnacion, pt will need VATS decort.    Per EP, best optimization is to control his HR prior to surgery.    Plan for surgery on Monday 11/27 with goal of controlling his rate prior. EP will plan for cardioversion after VATS once AC can be restarted.   Encourage incentive spirometry/Chest PT   Rest of care per primary team  Thoracic Surgery to follow  Plan discussed with Dr. Encarnacion

## 2023-11-22 NOTE — PROGRESS NOTE ADULT - SUBJECTIVE AND OBJECTIVE BOX
Subjective: Pt seen and examined, resting comfortable in bed, voices frustration about procedures being cancelled. Pt is scheduled for VATS today with thoracic team.     TELE: AFL ~130bpm, PVCs    MEDICATIONS  (STANDING):  aspirin  chewable 81 milliGRAM(s) Oral daily  atorvastatin 20 milliGRAM(s) Oral at bedtime  furosemide   Injectable 40 milliGRAM(s) IV Push every 12 hours  heparin  Infusion.  Unit(s)/Hr (23 mL/Hr) IV Continuous <Continuous>  influenza  Vaccine (HIGH DOSE) 0.7 milliLiter(s) IntraMuscular once  metolazone 5 milliGRAM(s) Oral daily  metoprolol tartrate 50 milliGRAM(s) Oral every 6 hours  polyethylene glycol 3350 17 Gram(s) Oral daily  senna 2 Tablet(s) Oral at bedtime  spironolactone 25 milliGRAM(s) Oral daily    MEDICATIONS  (PRN):  acetaminophen     Tablet .. 650 milliGRAM(s) Oral every 6 hours PRN Temp greater or equal to 38C (100.4F), Mild Pain (1 - 3)  aluminum hydroxide/magnesium hydroxide/simethicone Suspension 30 milliLiter(s) Oral every 4 hours PRN Dyspepsia  bisacodyl Suppository 10 milliGRAM(s) Rectal daily PRN Constipation  heparin   Injectable 96195 Unit(s) IV Push every 6 hours PRN For aPTT less than 40  heparin   Injectable 5000 Unit(s) IV Push every 6 hours PRN For aPTT between 40 - 57  melatonin 3 milliGRAM(s) Oral at bedtime PRN Insomnia  metoprolol tartrate Injectable 5 milliGRAM(s) IV Push every 6 hours PRN RVR > 130  ondansetron Injectable 4 milliGRAM(s) IV Push every 8 hours PRN Nausea and/or Vomiting      Allergies  No Known Allergies      Vital Signs Last 24 Hrs  T(C): 36.6 (22 Nov 2023 08:07), Max: 36.7 (21 Nov 2023 20:39)  T(F): 97.9 (22 Nov 2023 08:07), Max: 98.1 (21 Nov 2023 20:39)  HR: 132 (22 Nov 2023 10:00) (129 - 138)  BP: 105/75 (22 Nov 2023 10:00) (99/59 - 127/72)  BP(mean): 81 (22 Nov 2023 06:03) (70 - 86)  RR: 26 (22 Nov 2023 10:00) (26 - 33)  SpO2: 95% (22 Nov 2023 10:00) (94% - 98%)    Parameters below as of 22 Nov 2023 10:00  Patient On (Oxygen Delivery Method): nasal cannula  O2 Flow (L/min): 6      Physical Exam:  Constitutional: NAD, AAOx3, on supplemental O2  Cardiovascular: Regular, tachycardic   Pulmonary: Limited BS on left   GI: obese, soft NTND   Extremities: 1+ LE edema with venous stasis changes  Neuro: non focal, LEE x4    LABS:                        13.6   17.10 )-----------( 273      ( 22 Nov 2023 07:28 )             44.7     11-22    140  |  99  |  69.1<H>  ----------------------------<  174<H>  3.6   |  25.0  |  1.83<H>    Ca    9.1      22 Nov 2023 07:28  Mg     2.1     11-22      PTT - ( 22 Nov 2023 07:28 )  PTT:71.9 sec  Urinalysis Basic - ( 22 Nov 2023 07:28 )    Color: x / Appearance: x / SG: x / pH: x  Gluc: 174 mg/dL / Ketone: x  / Bili: x / Urobili: x   Blood: x / Protein: x / Nitrite: x   Leuk Esterase: x / RBC: x / WBC x   Sq Epi: x / Non Sq Epi: x / Bacteria: x        RADIOLOGY & ADDITIONAL TESTS:  TTE 11/18/2023  Summary:   1. There is mild concentric left ventricular hypertrophy.   2. Moderate to severely increased left ventricular internal cavity size.   3. Left ventricular ejection fraction, by visual estimation, is 20 to   25%.   4. Severely decreased global left ventricular systolic function.   5. The mitral in-flow pattern reveals no discernable A-wave, therefore   no comment on diastolic function can be made.   6. Mildly enlarged right ventricle.   7. Severely reduced RV systolic function.   8. Mildly enlarged left atrium.   9. Normal right atrial size.  10. Sclerotic aortic valve with normal opening.  11. Mild thickening and calcification of the anterior and posterior   mitral valve leaflets.  12. Mild mitral annular calcification.  13. Mild mitral valve regurgitation.  14. The main pulmonary artery is normal in size.  15. There is no evidence of pericardial effusion.

## 2023-11-22 NOTE — PROGRESS NOTE ADULT - ASSESSMENT
72-year-old male with history of hypertension CAD status post quintuple bypass November 2021 followed by cardiology/Dr. Milian in  [place]  resents the ED via EMS complaining of worsening dyspnea on exertion since yesterday.  Thoracic surgery consulted treatment Left effusion. Left PTC placed 11/28.

## 2023-11-22 NOTE — PROGRESS NOTE ADULT - SUBJECTIVE AND OBJECTIVE BOX
[facility] Division of Hospital Medicine    Chief Complaint:      SUBJECTIVE / OVERNIGHT EVENTS:    Patient denies any complaints.     MEDICATIONS  (STANDING):  aspirin  chewable 81 milliGRAM(s) Oral daily  atorvastatin 20 milliGRAM(s) Oral at bedtime  digoxin     Tablet 125 MICROGram(s) Oral every 8 hours  furosemide   Injectable 40 milliGRAM(s) IV Push every 12 hours  heparin  Infusion.  Unit(s)/Hr (23 mL/Hr) IV Continuous <Continuous>  influenza  Vaccine (HIGH DOSE) 0.7 milliLiter(s) IntraMuscular once  metolazone 5 milliGRAM(s) Oral daily  metoprolol tartrate 50 milliGRAM(s) Oral every 6 hours  polyethylene glycol 3350 17 Gram(s) Oral daily  senna 2 Tablet(s) Oral at bedtime  spironolactone 25 milliGRAM(s) Oral daily    MEDICATIONS  (PRN):  acetaminophen     Tablet .. 650 milliGRAM(s) Oral every 6 hours PRN Temp greater or equal to 38C (100.4F), Mild Pain (1 - 3)  aluminum hydroxide/magnesium hydroxide/simethicone Suspension 30 milliLiter(s) Oral every 4 hours PRN Dyspepsia  bisacodyl Suppository 10 milliGRAM(s) Rectal daily PRN Constipation  heparin   Injectable 09891 Unit(s) IV Push every 6 hours PRN For aPTT less than 40  heparin   Injectable 5000 Unit(s) IV Push every 6 hours PRN For aPTT between 40 - 57  melatonin 3 milliGRAM(s) Oral at bedtime PRN Insomnia  metoprolol tartrate Injectable 5 milliGRAM(s) IV Push every 6 hours PRN RVR > 130  ondansetron Injectable 4 milliGRAM(s) IV Push every 8 hours PRN Nausea and/or Vomiting        I&O's Summary    21 Nov 2023 07:01  -  22 Nov 2023 07:00  --------------------------------------------------------  IN: 965 mL / OUT: 2230 mL / NET: -1265 mL        PHYSICAL EXAM:  Vital Signs Last 24 Hrs  T(C): 36.6 (22 Nov 2023 08:07), Max: 36.7 (21 Nov 2023 20:39)  T(F): 97.9 (22 Nov 2023 08:07), Max: 98.1 (21 Nov 2023 20:39)  HR: 132 (22 Nov 2023 10:00) (129 - 138)  BP: 105/75 (22 Nov 2023 10:00) (99/59 - 127/72)  BP(mean): 81 (22 Nov 2023 06:03) (70 - 86)  RR: 26 (22 Nov 2023 10:00) (26 - 33)  SpO2: 95% (22 Nov 2023 10:00) (94% - 98%)    Parameters below as of 22 Nov 2023 10:00  Patient On (Oxygen Delivery Method): nasal cannula  O2 Flow (L/min): 6          CONSTITUTIONAL: NAD,   ENMT: normocephalic, atraumatic  RESPIRATORY: crackles right lower chest and decreased breath sounds left lower chest. chest tube in place.  CARDIOVASCULAR: Regular rhythm and tachycardia, normal S1 and S2, no murmur/rub/gallop  ABDOMEN: Nontender to palpation, no rebound/guarding; No hepatosplenomegaly  MUSCLOSKELETAL:  b/l LE edema  PSYCH: A+O to person, place, and time; affect appropriate  NEUROLOGY: CN 2-12 are intact and symmetric; no gross deficits;   SKIN: No rashes; no palpable lesions    LABS:                        13.6   17.10 )-----------( 273      ( 22 Nov 2023 07:28 )             44.7     11-22    140  |  99  |  69.1<H>  ----------------------------<  174<H>  3.6   |  25.0  |  1.83<H>    Ca    9.1      22 Nov 2023 07:28  Mg     2.1     11-22      PT/INR - ( 22 Nov 2023 12:52 )   PT: 16.0 sec;   INR: 1.46 ratio         PTT - ( 22 Nov 2023 12:52 )  PTT:34.6 sec      Urinalysis Basic - ( 22 Nov 2023 07:28 )    Color: x / Appearance: x / SG: x / pH: x  Gluc: 174 mg/dL / Ketone: x  / Bili: x / Urobili: x   Blood: x / Protein: x / Nitrite: x   Leuk Esterase: x / RBC: x / WBC x   Sq Epi: x / Non Sq Epi: x / Bacteria: x        CAPILLARY BLOOD GLUCOSE            RADIOLOGY & ADDITIONAL TESTS:  Results Reviewed:   Imaging Personally Reviewed:  Electrocardiogram Personally Reviewed:

## 2023-11-22 NOTE — PROGRESS NOTE ADULT - NS ATTEND AMEND GEN_ALL_CORE FT
Discussed with Thoracic Surgery.    Elevating WBC count and suspected infection pulmonary origin, along with uncontrolled Atrial arrythmia makes this candidate elevated risk.  He requires a VATS decortication for treatment.    TS team will check with anaesthesia. We will then engage regarding whether to proceed with LHC or accept non-modifiable risks at present. Patient understands the plan from the cardiovascular standpoint.    Will be on standby to engage Interventional Cardiology if required.

## 2023-11-22 NOTE — CHART NOTE - NSCHARTNOTEFT_GEN_A_CORE
Called by RN for SOB. Seen and assessed at bedside, patient endorsing SOB which began about an hour ago. He is due for Lasix, patient with multiple drinks at bedside. States he often gets the same SOB at home and breathing exercises help but he feels the NC limits his ability to take deep breaths. Patient removed NC with provider at bedside and did breathing exercises endorsing improvement in symptoms.     VSS, continues to have A-Flutter with rates in the 130s.     PE:  General: Laying comfortably in bed, NAD  Respiratory: Nonlabored, clear to auscultation b/l, no accessory muscle use, speaks in full sentences  Cardiac: Clear S1/S2, brisk cap refill  Neuro: A&Ox3, pupils equal and reactive to light b/l, nonfocal, follows commands    A/P: SOB likely secondary to CHF  - Instructed patient to limit fluid intake. Patient to receive Lasix, RN to evaluate for improvement in respiratory status after administration. Will defer CXR given clear lungs and lack of respiratory distress, consider if no improvement with standing Lasix.    - RN to continue to monitor, to alert provider with any change in patient status.

## 2023-11-22 NOTE — PROGRESS NOTE ADULT - SUBJECTIVE AND OBJECTIVE BOX
[facility]                                                         CARDIOLOGY AT [facility]                                                                  [address], [place] [address]                                                         Telephone: [phone]. Fax:[phone]                                                                             PROGRESS NOTE    Reason for follow up: HFrEF, aflutter  Update: going for VATS today      Review of symptoms:   Cardiac:  No chest pain. No dyspnea. No palpitations.  Respiratory: no cough. No dyspnea  Gastrointestinal: No diarrhea. No abdominal pain. No bleeding.   Neuro: No focal neuro complaints.    Vitals:  T(C): 36.6 (11-22-23 @ 08:07), Max: 36.7 (11-21-23 @ 20:39)  HR: 133 (11-22-23 @ 06:03) (129 - 138)  BP: 104/70 (11-22-23 @ 06:03) (101/52 - 127/72)  RR: 28 (11-22-23 @ 06:03) (28 - 33)  SpO2: 96% (11-22-23 @ 06:03) (94% - 98%)  Wt(kg): --  I&O's Summary    21 Nov 2023 07:01  -  22 Nov 2023 07:00  --------------------------------------------------------  IN: 965 mL / OUT: 2230 mL / NET: -1265 mL      Weight (kg): 127 (11-18 @ 08:25)    PHYSICAL EXAM:  Appearance: Comfortable. No acute distress  HEENT:  Atraumatic. Normocephalic.  Normal oral mucosa  Neurologic: A & O x 3, no gross focal deficits.  Cardiovascular: irregular S1 S2, No murmur, no rubs/gallops. No JVD  Respiratory: Lungs clear to auscultation, unlabored   Gastrointestinal:  Soft, Non-tender, + BS  Lower Extremities: 2+ Peripheral Pulses, No clubbing, cyanosis, +edema  Psychiatry: Patient is calm. No agitation.   Skin: warm and dry.    CURRENT CARDIAC MEDICATIONS:  furosemide   Injectable 40 milliGRAM(s) IV Push every 12 hours  metolazone 5 milliGRAM(s) Oral daily  metoprolol tartrate 50 milliGRAM(s) Oral every 6 hours  metoprolol tartrate Injectable 5 milliGRAM(s) IV Push every 6 hours PRN  spironolactone 25 milliGRAM(s) Oral daily      CURRENT OTHER MEDICATIONS:  acetaminophen     Tablet .. 650 milliGRAM(s) Oral every 6 hours PRN Temp greater or equal to 38C (100.4F), Mild Pain (1 - 3)  melatonin 3 milliGRAM(s) Oral at bedtime PRN Insomnia  ondansetron Injectable 4 milliGRAM(s) IV Push every 8 hours PRN Nausea and/or Vomiting  aluminum hydroxide/magnesium hydroxide/simethicone Suspension 30 milliLiter(s) Oral every 4 hours PRN Dyspepsia  bisacodyl Suppository 10 milliGRAM(s) Rectal daily PRN Constipation  polyethylene glycol 3350 17 Gram(s) Oral daily  senna 2 Tablet(s) Oral at bedtime  atorvastatin 20 milliGRAM(s) Oral at bedtime  aspirin  chewable 81 milliGRAM(s) Oral daily  heparin   Injectable 40442 Unit(s) IV Push every 6 hours PRN For aPTT less than 40  heparin   Injectable 5000 Unit(s) IV Push every 6 hours PRN For aPTT between 40 - 57  heparin  Infusion.  Unit(s)/Hr (23 mL/Hr) IV Continuous <Continuous>  influenza  Vaccine (HIGH DOSE) 0.7 milliLiter(s) IntraMuscular once      LABS:	 	  ( 18 Nov 2023 09:02 )  Troponin T  X    ,  CPK  43   , CKMB  X    , BNP X                                  13.6   17.10 )-----------( 273      ( 22 Nov 2023 07:28 )             44.7     11-22    140  |  99  |  69.1<H>  ----------------------------<  174<H>  3.6   |  25.0  |  1.83<H>    Ca    9.1      22 Nov 2023 07:28  Mg     2.1     11-22      PT/INR/PTT ( 22 Nov 2023 07:28 )                       :                       :      X            :       71.9                  .        .                   .              .           .       X           .                                       Lipid Profile:   HgA1c:   TSH:     TELEMETRY: aflutter 130s      DIAGNOSTIC TESTING:  [ ] Echocardiogram:   < from: TTE Echo Complete w/o Contrast w/ Doppler (11.18.23 @ 12:34) >  PHYSICIAN INTERPRETATION:  Left Ventricle: The left ventricular internal cavity size is moderate to   severely increased.  Global LV systolic function was severely decreased. Left ventricular   ejection fraction, by visual estimation, is 20 to 25%. The mitral in-flow   pattern reveals no discernable A-wave, therefore no comment on diastolic   function can be made.  Right Ventricle: The right ventricular size is mildly enlarged. RV   systolic function is severely reduced.  Left Atrium: Mildly enlarged left atrium.  Right Atrium: Normal right atrial size.  Pericardium: There is no evidence of pericardial effusion.  MitralValve: Mild thickening and calcification of the anterior and   posterior mitral valve leaflets. There is mild mitral annular   calcification. Mild mitral valve regurgitation is seen.  Tricuspid Valve: Adequate TR velocity was not obtained to accurately   assess RVSP.  Aortic Valve: The aortic valve is trileaflet. Sclerotic aortic valve with   normal opening.  Pulmonic Valve: Trace pulmonic valve regurgitation.  Aorta: The aortic root is normal in size and structure.  Pulmonary Artery: The main pulmonary artery is normal in size.  Venous: The inferior vena cava was normal sized, with respiratory size   variation greater than 50%.    < end of copied text >

## 2023-11-22 NOTE — PROGRESS NOTE ADULT - SUBJECTIVE AND OBJECTIVE BOX
Subjective:  Patient seen and examined for large loculated left pleural effusion, s/p L PTC placed 11/18. Patient resting in bed, on 2L NC in no apparent distress. Patient reports "My breathing is good". Patient denies chest pain, SOB, abdominal pain, N/V/D/C, or any other acute complaints at this time.      PAST MEDICAL & SURGICAL HISTORY:  Coronary artery disease involving native coronary artery of native heart, unspecified whether angina    CHF with unknown LVEF    Primary hypertension    Hyperlipidemia, unspecified hyperlipidemia type    S/P CABG x 5    History of cholecystectomy     VITAL SIGNS  Vital Signs Last 24 Hrs  T(C): 36.6 (11-22-23 @ 08:07), Max: 36.7 (11-21-23 @ 20:39)  T(F): 97.9 (11-22-23 @ 08:07), Max: 98.1 (11-21-23 @ 20:39)  HR: 132 (11-22-23 @ 10:00) (129 - 138)  BP: 105/75 (11-22-23 @ 10:00) (99/59 - 127/72)  RR: 26 (11-22-23 @ 10:00) (26 - 33)  SpO2: 95% (11-22-23 @ 10:00) (94% - 98%)  on (O2)              MEDICATIONS  acetaminophen     Tablet .. 650 milliGRAM(s) Oral every 6 hours PRN  aluminum hydroxide/magnesium hydroxide/simethicone Suspension 30 milliLiter(s) Oral every 4 hours PRN  aspirin  chewable 81 milliGRAM(s) Oral daily  atorvastatin 20 milliGRAM(s) Oral at bedtime  bisacodyl Suppository 10 milliGRAM(s) Rectal daily PRN  digoxin     Tablet 250 MICROGram(s) Oral once  digoxin     Tablet 125 MICROGram(s) Oral every 8 hours  furosemide   Injectable 40 milliGRAM(s) IV Push every 12 hours  heparin   Injectable 39716 Unit(s) IV Push every 6 hours PRN  heparin   Injectable 5000 Unit(s) IV Push every 6 hours PRN  heparin  Infusion.  Unit(s)/Hr IV Continuous <Continuous>  influenza  Vaccine (HIGH DOSE) 0.7 milliLiter(s) IntraMuscular once  melatonin 3 milliGRAM(s) Oral at bedtime PRN  metolazone 5 milliGRAM(s) Oral daily  metoprolol tartrate 50 milliGRAM(s) Oral every 6 hours  metoprolol tartrate Injectable 5 milliGRAM(s) IV Push every 6 hours PRN  ondansetron Injectable 4 milliGRAM(s) IV Push every 8 hours PRN  polyethylene glycol 3350 17 Gram(s) Oral daily  senna 2 Tablet(s) Oral at bedtime  spironolactone 25 milliGRAM(s) Oral daily        11-21 @ 07:01  -  11-22 @ 07:00  --------------------------------------------------------  IN: 965 mL / OUT: 2230 mL / NET: -1265 mL      Weights:  Daily     Daily   Admit Wt: Drug Dosing Weight  Height (cm): 175.3 (18 Nov 2023 08:25)  Weight (kg): 127 (18 Nov 2023 08:25)  BMI (kg/m2): 41.3 (18 Nov 2023 08:25)  BSA (m2): 2.38 (18 Nov 2023 08:25)    LABS  11-22    140  |  99  |  69.1<H>  ----------------------------<  174<H>  3.6   |  25.0  |  1.83<H>    Ca    9.1      22 Nov 2023 07:28  Mg     2.1     11-22                                   13.6   17.10 )-----------( 273      ( 22 Nov 2023 07:28 )             44.7          PTT - ( 22 Nov 2023 07:28 )  PTT:71.9 sec      DIAGNOSTICS:  All laboratory results, radiology and medications reviewed.    PHYSICAL EXAM  General: *well nourished, well developed, no acute distress  Neurology: *alert and oriented x 3, nonfocal, no gross deficits  Respiratory: clear to auscultation bilaterally*  CV: regular rate and rhythm, normal S1, S2*  Abdomen: soft, nontender, nondistended, positive bowel sounds, last bowel movement   Extremities: warm, well perfused. no edema*. + DP pulses  Incisions: midline sternal incision, + mepilex, c/d/i. sternum stable.  Chest tubes:   Epicardial Wires:    > EPM (settings) / isolated       Subjective:  Patient seen and examined for large loculated left pleural effusion, s/p L PTC placed 11/18. Patient resting in bed, on 2L NC in no apparent distress. Patient reports "My breathing is good". Patient denies chest pain, SOB, abdominal pain, N/V/D/C, or any other acute complaints at this time.      PAST MEDICAL & SURGICAL HISTORY:  Coronary artery disease involving native coronary artery of native heart, unspecified whether angina    CHF with unknown LVEF    Primary hypertension    Hyperlipidemia, unspecified hyperlipidemia type    S/P CABG x 5    History of cholecystectomy     VITAL SIGNS  Vital Signs Last 24 Hrs  T(C): 36.6 (11-22-23 @ 08:07), Max: 36.7 (11-21-23 @ 20:39)  T(F): 97.9 (11-22-23 @ 08:07), Max: 98.1 (11-21-23 @ 20:39)  HR: 132 (11-22-23 @ 10:00) (129 - 138)  BP: 105/75 (11-22-23 @ 10:00) (99/59 - 127/72)  RR: 26 (11-22-23 @ 10:00) (26 - 33)  SpO2: 95% (11-22-23 @ 10:00) (94% - 98%)  on (O2)              MEDICATIONS  acetaminophen     Tablet .. 650 milliGRAM(s) Oral every 6 hours PRN  aluminum hydroxide/magnesium hydroxide/simethicone Suspension 30 milliLiter(s) Oral every 4 hours PRN  aspirin  chewable 81 milliGRAM(s) Oral daily  atorvastatin 20 milliGRAM(s) Oral at bedtime  bisacodyl Suppository 10 milliGRAM(s) Rectal daily PRN  digoxin     Tablet 250 MICROGram(s) Oral once  digoxin     Tablet 125 MICROGram(s) Oral every 8 hours  furosemide   Injectable 40 milliGRAM(s) IV Push every 12 hours  heparin   Injectable 29668 Unit(s) IV Push every 6 hours PRN  heparin   Injectable 5000 Unit(s) IV Push every 6 hours PRN  heparin  Infusion.  Unit(s)/Hr IV Continuous <Continuous>  influenza  Vaccine (HIGH DOSE) 0.7 milliLiter(s) IntraMuscular once  melatonin 3 milliGRAM(s) Oral at bedtime PRN  metolazone 5 milliGRAM(s) Oral daily  metoprolol tartrate 50 milliGRAM(s) Oral every 6 hours  metoprolol tartrate Injectable 5 milliGRAM(s) IV Push every 6 hours PRN  ondansetron Injectable 4 milliGRAM(s) IV Push every 8 hours PRN  polyethylene glycol 3350 17 Gram(s) Oral daily  senna 2 Tablet(s) Oral at bedtime  spironolactone 25 milliGRAM(s) Oral daily        11-21 @ 07:01  -  11-22 @ 07:00  --------------------------------------------------------  IN: 965 mL / OUT: 2230 mL / NET: -1265 mL      Weights:  Daily     Daily   Admit Wt: Drug Dosing Weight  Height (cm): 175.3 (18 Nov 2023 08:25)  Weight (kg): 127 (18 Nov 2023 08:25)  BMI (kg/m2): 41.3 (18 Nov 2023 08:25)  BSA (m2): 2.38 (18 Nov 2023 08:25)    LABS  11-22    140  |  99  |  69.1<H>  ----------------------------<  174<H>  3.6   |  25.0  |  1.83<H>    Ca    9.1      22 Nov 2023 07:28  Mg     2.1     11-22                                   13.6   17.10 )-----------( 273      ( 22 Nov 2023 07:28 )             44.7          PTT - ( 22 Nov 2023 07:28 )  PTT:71.9 sec      DIAGNOSTICS:  All laboratory results, radiology and medications reviewed.    PHYSICAL EXAM  General: alert, awake, no acute distress  Neurology: alert and oriented x 3, nonfocal, no gross deficits  Respiratory: decreased breath sounds on the left, on 2L NC, no accessory muscle use  CV: tachycardic, normal S1, S2  Abdomen: soft, nontender, nondistended, + bowel sounds  Extremities: warm, well perfused. + b/l LE edema  Chest tubes: L PTC in place to WS, no airleak noted, draining serous fluid

## 2023-11-22 NOTE — PROGRESS NOTE ADULT - ASSESSMENT
Patient is a 71 yo M w/ PMH of CAD s/p CABG, CHF, HTN, HLD presenting for chief complaint of SOB. Admitted for acute hypoxemic respiratory failure 2/2 CHF/Pleural effusion.     Acute hypoxemic respiratory failure 2/2 CHF exacerbation/pleural effusion  Patient became SOB and O2 was increased to 6L O2 this morning. wean as able.    acute on chronic systolic CHF  patient denies feeling SOB. still has leg edema  - Lasix 40mg IV BID, Metozalone, and aldactone. BP low normal so will not tolerate ACEI or ARB for now. was on enteresto at home.    Left hydropneumothorax  s/p chest tube placement. CT chest showed large left hydropneumothorax and secretions. Thoracic surgery plan to perform VATS next Monday after his HR is better controlled.       A. flutter w/ RVR  HR persisting in 130s.  c/w Metopolol 50 mg Q6H and heparin gtt. EP following and has decided to use digoxin to control the rate in preparation for the VATS    Hypokalemia. resolved    Leukocytosis  -no obvious source of infection    DEVON vs CKD  creatinine increased to 1.8. not sure what is baseline.  - Renal US without any hydronephrosis       CAD s/p CABG  - Continue ASA, Statin, Lopressor    DVT ppx: Heparin SQ

## 2023-11-23 LAB
ANION GAP SERPL CALC-SCNC: 13 MMOL/L — SIGNIFICANT CHANGE UP (ref 5–17)
ANION GAP SERPL CALC-SCNC: 13 MMOL/L — SIGNIFICANT CHANGE UP (ref 5–17)
APTT BLD: 66.5 SEC — HIGH (ref 24.5–35.6)
APTT BLD: 66.5 SEC — HIGH (ref 24.5–35.6)
APTT BLD: 89.3 SEC — HIGH (ref 24.5–35.6)
APTT BLD: 89.3 SEC — HIGH (ref 24.5–35.6)
APTT BLD: >200 SEC — CRITICAL HIGH (ref 24.5–35.6)
BUN SERPL-MCNC: 74.4 MG/DL — HIGH (ref 8–20)
BUN SERPL-MCNC: 74.4 MG/DL — HIGH (ref 8–20)
CALCIUM SERPL-MCNC: 9 MG/DL — SIGNIFICANT CHANGE UP (ref 8.4–10.5)
CALCIUM SERPL-MCNC: 9 MG/DL — SIGNIFICANT CHANGE UP (ref 8.4–10.5)
CHLORIDE SERPL-SCNC: 94 MMOL/L — LOW (ref 96–108)
CHLORIDE SERPL-SCNC: 94 MMOL/L — LOW (ref 96–108)
CO2 SERPL-SCNC: 26 MMOL/L — SIGNIFICANT CHANGE UP (ref 22–29)
CO2 SERPL-SCNC: 26 MMOL/L — SIGNIFICANT CHANGE UP (ref 22–29)
CREAT SERPL-MCNC: 1.79 MG/DL — HIGH (ref 0.5–1.3)
CREAT SERPL-MCNC: 1.79 MG/DL — HIGH (ref 0.5–1.3)
EGFR: 40 ML/MIN/1.73M2 — LOW
EGFR: 40 ML/MIN/1.73M2 — LOW
GLUCOSE SERPL-MCNC: 184 MG/DL — HIGH (ref 70–99)
GLUCOSE SERPL-MCNC: 184 MG/DL — HIGH (ref 70–99)
GRAM STN FLD: ABNORMAL
GRAM STN FLD: ABNORMAL
HCT VFR BLD CALC: 43.1 % — SIGNIFICANT CHANGE UP (ref 39–50)
HCT VFR BLD CALC: 43.1 % — SIGNIFICANT CHANGE UP (ref 39–50)
HCT VFR BLD CALC: 43.4 % — SIGNIFICANT CHANGE UP (ref 39–50)
HCT VFR BLD CALC: 43.4 % — SIGNIFICANT CHANGE UP (ref 39–50)
HCT VFR BLD CALC: 44 % — SIGNIFICANT CHANGE UP (ref 39–50)
HCT VFR BLD CALC: 44 % — SIGNIFICANT CHANGE UP (ref 39–50)
HGB BLD-MCNC: 13.5 G/DL — SIGNIFICANT CHANGE UP (ref 13–17)
HGB BLD-MCNC: 13.5 G/DL — SIGNIFICANT CHANGE UP (ref 13–17)
HGB BLD-MCNC: 13.6 G/DL — SIGNIFICANT CHANGE UP (ref 13–17)
HGB BLD-MCNC: 13.6 G/DL — SIGNIFICANT CHANGE UP (ref 13–17)
HGB BLD-MCNC: 13.9 G/DL — SIGNIFICANT CHANGE UP (ref 13–17)
HGB BLD-MCNC: 13.9 G/DL — SIGNIFICANT CHANGE UP (ref 13–17)
MAGNESIUM SERPL-MCNC: 2.1 MG/DL — SIGNIFICANT CHANGE UP (ref 1.6–2.6)
MAGNESIUM SERPL-MCNC: 2.1 MG/DL — SIGNIFICANT CHANGE UP (ref 1.6–2.6)
MAGNESIUM SERPL-MCNC: 2.2 MG/DL — SIGNIFICANT CHANGE UP (ref 1.6–2.6)
MAGNESIUM SERPL-MCNC: 2.2 MG/DL — SIGNIFICANT CHANGE UP (ref 1.6–2.6)
MCHC RBC-ENTMCNC: 27.6 PG — SIGNIFICANT CHANGE UP (ref 27–34)
MCHC RBC-ENTMCNC: 31.3 GM/DL — LOW (ref 32–36)
MCHC RBC-ENTMCNC: 31.6 GM/DL — LOW (ref 32–36)
MCHC RBC-ENTMCNC: 31.6 GM/DL — LOW (ref 32–36)
MCV RBC AUTO: 87.3 FL — SIGNIFICANT CHANGE UP (ref 80–100)
MCV RBC AUTO: 87.3 FL — SIGNIFICANT CHANGE UP (ref 80–100)
MCV RBC AUTO: 88 FL — SIGNIFICANT CHANGE UP (ref 80–100)
MCV RBC AUTO: 88 FL — SIGNIFICANT CHANGE UP (ref 80–100)
MCV RBC AUTO: 88.2 FL — SIGNIFICANT CHANGE UP (ref 80–100)
MCV RBC AUTO: 88.2 FL — SIGNIFICANT CHANGE UP (ref 80–100)
PLATELET # BLD AUTO: 244 K/UL — SIGNIFICANT CHANGE UP (ref 150–400)
PLATELET # BLD AUTO: 244 K/UL — SIGNIFICANT CHANGE UP (ref 150–400)
PLATELET # BLD AUTO: 253 K/UL — SIGNIFICANT CHANGE UP (ref 150–400)
PLATELET # BLD AUTO: 253 K/UL — SIGNIFICANT CHANGE UP (ref 150–400)
PLATELET # BLD AUTO: 266 K/UL — SIGNIFICANT CHANGE UP (ref 150–400)
PLATELET # BLD AUTO: 266 K/UL — SIGNIFICANT CHANGE UP (ref 150–400)
POTASSIUM SERPL-MCNC: 3.6 MMOL/L — SIGNIFICANT CHANGE UP (ref 3.5–5.3)
POTASSIUM SERPL-MCNC: 3.6 MMOL/L — SIGNIFICANT CHANGE UP (ref 3.5–5.3)
POTASSIUM SERPL-SCNC: 3.6 MMOL/L — SIGNIFICANT CHANGE UP (ref 3.5–5.3)
POTASSIUM SERPL-SCNC: 3.6 MMOL/L — SIGNIFICANT CHANGE UP (ref 3.5–5.3)
RBC # BLD: 4.9 M/UL — SIGNIFICANT CHANGE UP (ref 4.2–5.8)
RBC # BLD: 4.9 M/UL — SIGNIFICANT CHANGE UP (ref 4.2–5.8)
RBC # BLD: 4.92 M/UL — SIGNIFICANT CHANGE UP (ref 4.2–5.8)
RBC # BLD: 4.92 M/UL — SIGNIFICANT CHANGE UP (ref 4.2–5.8)
RBC # BLD: 5.04 M/UL — SIGNIFICANT CHANGE UP (ref 4.2–5.8)
RBC # BLD: 5.04 M/UL — SIGNIFICANT CHANGE UP (ref 4.2–5.8)
RBC # FLD: 15.9 % — HIGH (ref 10.3–14.5)
SODIUM SERPL-SCNC: 133 MMOL/L — LOW (ref 135–145)
SODIUM SERPL-SCNC: 133 MMOL/L — LOW (ref 135–145)
WBC # BLD: 16.94 K/UL — HIGH (ref 3.8–10.5)
WBC # BLD: 16.94 K/UL — HIGH (ref 3.8–10.5)
WBC # BLD: 18.58 K/UL — HIGH (ref 3.8–10.5)
WBC # BLD: 18.58 K/UL — HIGH (ref 3.8–10.5)
WBC # BLD: 19.09 K/UL — HIGH (ref 3.8–10.5)
WBC # BLD: 19.09 K/UL — HIGH (ref 3.8–10.5)
WBC # FLD AUTO: 16.94 K/UL — HIGH (ref 3.8–10.5)
WBC # FLD AUTO: 16.94 K/UL — HIGH (ref 3.8–10.5)
WBC # FLD AUTO: 18.58 K/UL — HIGH (ref 3.8–10.5)
WBC # FLD AUTO: 18.58 K/UL — HIGH (ref 3.8–10.5)
WBC # FLD AUTO: 19.09 K/UL — HIGH (ref 3.8–10.5)
WBC # FLD AUTO: 19.09 K/UL — HIGH (ref 3.8–10.5)

## 2023-11-23 PROCEDURE — 99233 SBSQ HOSP IP/OBS HIGH 50: CPT

## 2023-11-23 PROCEDURE — 99231 SBSQ HOSP IP/OBS SF/LOW 25: CPT

## 2023-11-23 PROCEDURE — 71045 X-RAY EXAM CHEST 1 VIEW: CPT | Mod: 26

## 2023-11-23 RX ORDER — PIPERACILLIN AND TAZOBACTAM 4; .5 G/20ML; G/20ML
3.38 INJECTION, POWDER, LYOPHILIZED, FOR SOLUTION INTRAVENOUS ONCE
Refills: 0 | Status: COMPLETED | OUTPATIENT
Start: 2023-11-23 | End: 2023-11-23

## 2023-11-23 RX ORDER — DIGOXIN 250 MCG
250 TABLET ORAL DAILY
Refills: 0 | Status: DISCONTINUED | OUTPATIENT
Start: 2023-11-24 | End: 2023-11-24

## 2023-11-23 RX ORDER — HEPARIN SODIUM 5000 [USP'U]/ML
2100 INJECTION INTRAVENOUS; SUBCUTANEOUS
Qty: 25000 | Refills: 0 | Status: DISCONTINUED | OUTPATIENT
Start: 2023-11-23 | End: 2023-11-27

## 2023-11-23 RX ORDER — DIGOXIN 250 MCG
125 TABLET ORAL ONCE
Refills: 0 | Status: COMPLETED | OUTPATIENT
Start: 2023-11-23 | End: 2023-11-23

## 2023-11-23 RX ORDER — PIPERACILLIN AND TAZOBACTAM 4; .5 G/20ML; G/20ML
3.38 INJECTION, POWDER, LYOPHILIZED, FOR SOLUTION INTRAVENOUS EVERY 8 HOURS
Refills: 0 | Status: DISCONTINUED | OUTPATIENT
Start: 2023-11-24 | End: 2023-11-30

## 2023-11-23 RX ADMIN — HEPARIN SODIUM 2100 UNIT(S)/HR: 5000 INJECTION INTRAVENOUS; SUBCUTANEOUS at 19:23

## 2023-11-23 RX ADMIN — HEPARIN SODIUM 2100 UNIT(S)/HR: 5000 INJECTION INTRAVENOUS; SUBCUTANEOUS at 07:48

## 2023-11-23 RX ADMIN — PIPERACILLIN AND TAZOBACTAM 200 GRAM(S): 4; .5 INJECTION, POWDER, LYOPHILIZED, FOR SOLUTION INTRAVENOUS at 23:42

## 2023-11-23 RX ADMIN — Medication 50 MILLIGRAM(S): at 12:47

## 2023-11-23 RX ADMIN — Medication 81 MILLIGRAM(S): at 12:47

## 2023-11-23 RX ADMIN — Medication 125 MICROGRAM(S): at 04:05

## 2023-11-23 RX ADMIN — POLYETHYLENE GLYCOL 3350 17 GRAM(S): 17 POWDER, FOR SOLUTION ORAL at 12:50

## 2023-11-23 RX ADMIN — SENNA PLUS 2 TABLET(S): 8.6 TABLET ORAL at 22:18

## 2023-11-23 RX ADMIN — Medication 50 MILLIGRAM(S): at 18:33

## 2023-11-23 RX ADMIN — HEPARIN SODIUM 2100 UNIT(S)/HR: 5000 INJECTION INTRAVENOUS; SUBCUTANEOUS at 12:45

## 2023-11-23 RX ADMIN — HEPARIN SODIUM 2100 UNIT(S)/HR: 5000 INJECTION INTRAVENOUS; SUBCUTANEOUS at 04:26

## 2023-11-23 RX ADMIN — Medication 50 MILLIGRAM(S): at 23:44

## 2023-11-23 RX ADMIN — Medication 40 MILLIGRAM(S): at 18:33

## 2023-11-23 RX ADMIN — HEPARIN SODIUM 0 UNIT(S)/HR: 5000 INJECTION INTRAVENOUS; SUBCUTANEOUS at 03:28

## 2023-11-23 RX ADMIN — HEPARIN SODIUM 2100 UNIT(S)/HR: 5000 INJECTION INTRAVENOUS; SUBCUTANEOUS at 18:58

## 2023-11-23 RX ADMIN — SPIRONOLACTONE 25 MILLIGRAM(S): 25 TABLET, FILM COATED ORAL at 05:20

## 2023-11-23 RX ADMIN — HEPARIN SODIUM 2100 UNIT(S)/HR: 5000 INJECTION INTRAVENOUS; SUBCUTANEOUS at 06:11

## 2023-11-23 RX ADMIN — ATORVASTATIN CALCIUM 20 MILLIGRAM(S): 80 TABLET, FILM COATED ORAL at 22:18

## 2023-11-23 RX ADMIN — Medication 50 MILLIGRAM(S): at 06:10

## 2023-11-23 RX ADMIN — Medication 40 MILLIGRAM(S): at 05:20

## 2023-11-23 RX ADMIN — Medication 125 MICROGRAM(S): at 16:32

## 2023-11-23 RX ADMIN — Medication 50 MILLIGRAM(S): at 00:03

## 2023-11-23 NOTE — CHART NOTE - NSCHARTNOTEFT_GEN_A_CORE
MICU consulted for Afib with RVR. Spoke with EP who recommend increasing digoxin dose en lieu of esmolol drip at this time. Will regroup with cardiac anesthesia tomorrow and reconsider esmolol drip at that time. Please reconsult for any further concerns.

## 2023-11-23 NOTE — PROGRESS NOTE ADULT - SUBJECTIVE AND OBJECTIVE BOX
Subjective: Pt seen and examined, reports some increasing SOB. Denies CP, palp, or dizziness. WBC noted to be uptrending, 18.6 today. Denies any fevers or chills     TELE: AFL ~130bpm     MEDICATIONS  (STANDING):  aspirin  chewable 81 milliGRAM(s) Oral daily  atorvastatin 20 milliGRAM(s) Oral at bedtime  furosemide   Injectable 40 milliGRAM(s) IV Push every 12 hours  heparin  Infusion. 2100 Unit(s)/Hr (21 mL/Hr) IV Continuous <Continuous>  influenza  Vaccine (HIGH DOSE) 0.7 milliLiter(s) IntraMuscular once  metolazone 5 milliGRAM(s) Oral daily  metoprolol tartrate 50 milliGRAM(s) Oral every 6 hours  polyethylene glycol 3350 17 Gram(s) Oral daily  senna 2 Tablet(s) Oral at bedtime  spironolactone 25 milliGRAM(s) Oral daily    MEDICATIONS  (PRN):  acetaminophen     Tablet .. 650 milliGRAM(s) Oral every 6 hours PRN Temp greater or equal to 38C (100.4F), Mild Pain (1 - 3)  aluminum hydroxide/magnesium hydroxide/simethicone Suspension 30 milliLiter(s) Oral every 4 hours PRN Dyspepsia  bisacodyl Suppository 10 milliGRAM(s) Rectal daily PRN Constipation  heparin   Injectable 81571 Unit(s) IV Push every 6 hours PRN For aPTT less than 40  heparin   Injectable 5000 Unit(s) IV Push every 6 hours PRN For aPTT between 40 - 57  melatonin 3 milliGRAM(s) Oral at bedtime PRN Insomnia  metoprolol tartrate Injectable 5 milliGRAM(s) IV Push every 6 hours PRN RVR > 130  ondansetron Injectable 4 milliGRAM(s) IV Push every 8 hours PRN Nausea and/or Vomiting      Allergies  No Known Allergies      Vital Signs Last 24 Hrs  T(C): 36.5 (23 Nov 2023 07:43), Max: 36.7 (23 Nov 2023 04:00)  T(F): 97.7 (23 Nov 2023 07:43), Max: 98.1 (23 Nov 2023 04:00)  HR: 129 (23 Nov 2023 06:00) (129 - 137)  BP: 98/65 (23 Nov 2023 06:00) (83/79 - 111/48)  BP(mean): 76 (23 Nov 2023 06:00) (59 - 79)  RR: 18 (23 Nov 2023 06:00) (18 - 33)  SpO2: 95% (23 Nov 2023 06:00) (95% - 98%)    Parameters below as of 23 Nov 2023 06:00  Patient On (Oxygen Delivery Method): nasal cannula  O2 Flow (L/min): 6      Physical Exam:  Constitutional: NAD, AAOx3, on supplemental O2  Cardiovascular: Regular, tachycardic   Pulmonary: Limited BS on left   GI: obese, soft NTND   Extremities: 1+ LE edema with venous stasis changes  Neuro: non focal, LEE x4    LABS:                        13.9   18.58 )-----------( 244      ( 23 Nov 2023 02:32 )             44.0     11-23    133<L>  |  94<L>  |  74.4<H>  ----------------------------<  184<H>  3.6   |  26.0  |  1.79<H>    Ca    9.0      23 Nov 2023 02:32  Mg     2.1     11-23      PT/INR - ( 22 Nov 2023 12:52 )   PT: 16.0 sec;   INR: 1.46 ratio         PTT - ( 23 Nov 2023 06:32 )  PTT:89.3 sec  Urinalysis Basic - ( 23 Nov 2023 02:32 )    Color: x / Appearance: x / SG: x / pH: x  Gluc: 184 mg/dL / Ketone: x  / Bili: x / Urobili: x   Blood: x / Protein: x / Nitrite: x   Leuk Esterase: x / RBC: x / WBC x   Sq Epi: x / Non Sq Epi: x / Bacteria: x        RADIOLOGY & ADDITIONAL TESTS:  ADIOLOGY & ADDITIONAL TESTS:  TTE 11/18/2023  Summary:   1. There is mild concentric left ventricular hypertrophy.   2. Moderate to severely increased left ventricular internal cavity size.   3. Left ventricular ejection fraction, by visual estimation, is 20 to   25%.   4. Severely decreased global left ventricular systolic function.   5. The mitral in-flow pattern reveals no discernable A-wave, therefore   no comment on diastolic function can be made.   6. Mildly enlarged right ventricle.   7. Severely reduced RV systolic function.   8. Mildly enlarged left atrium.   9. Normal right atrial size.  10. Sclerotic aortic valve with normal opening.  11. Mild thickening and calcification of the anterior and posterior   mitral valve leaflets.  12. Mild mitral annular calcification.  13. Mild mitral valve regurgitation.  14. The main pulmonary artery is normal in size.  15. There is no evidence of pericardial effusion.

## 2023-11-23 NOTE — PROGRESS NOTE ADULT - ASSESSMENT
72 year old male patient with a history of obesity, CAD s/p CABG x4 ([address] [place] at [facility]), HFrEF, EF 20-25%, HTN, HLD, remote AF (transient in the postop setting of CABG with no reoccurrence- not on AC) who is admitted with hypoxia, large loculated left pleural effusion, acute HFrEF exacerbation with reduced RV function, and in rapid likley typical atrial flutter. EP following for rapid atrial flutter    Remains rapid currently in the 130s. CHADSVASC: 4  Planned for VATS procedure on Monday with Thoracic team pending better HR control. Started on renally dosed Digoxin but HR remain ~130bpm.     Plan:    1. AFL with RVR  - Continue telemetry monitoring  - HR ~130bpm. Rates will be difficult to control while in atrial flutter. Pt is currently on Metoprolol 50mg every 6 hours and renally dosed Digoxin.   - CHADS2-Vasc: 4 (age, CHF, CAD, and HTN). Currently on Heparin infusion. Monitor PTTs and maintain therapeutic levels.  - EP plan contingent on ischemic evaluation and Thoracic plan. If rhythm control is pursued will need to be on uninterrupted anticoagulation for 30 days.     2. HFrEF (20-25%)  - GDMT and diuresis as per general cardiology  - Cardiology with plans for [facility]     3. Loculated pleural effusion  - management as per thoracic surgery team  - VATS possibly Monday     4. Leukocytosis  - Mgmt per primary team        72 year old male patient with a history of obesity, CAD s/p CABG x4 (2020 [place] at [facility]), HFrEF, EF 20-25%, HTN, HLD, remote AF (transient in the postop setting of CABG with no reoccurrence- not on AC) who is admitted with hypoxia, large loculated left pleural effusion, acute HFrEF exacerbation with reduced RV function, and in rapid likley typical atrial flutter. EP following for rapid atrial flutter    Remains rapid currently in the 130s. CHADSVASC: 4  Planned for VATS procedure on Monday with Thoracic team pending better HR control. Started on renally dosed Digoxin but HR remain ~130bpm.     Plan:    1. AFL with RVR  - Continue telemetry monitoring  - HR ~130bpm. Rates will be difficult to control while in atrial flutter. Pt is currently on Metoprolol 50mg every 6 hours and renally dosed Digoxin. Will consider upgrading to MICU for Esmolol gtt.  - CHADS2-Vasc: 4 (age, CHF, CAD, and HTN). Currently on Heparin infusion. Monitor PTTs and maintain therapeutic levels.  - EP plan contingent on ischemic evaluation and Thoracic plan. If rhythm control is pursued will need to be on uninterrupted anticoagulation for 30 days.     2. HFrEF (20-25%)  - GDMT and diuresis as per general cardiology  - Cardiology with plans for [facility]     3. Loculated pleural effusion  - management as per thoracic surgery team  - VATS possibly Monday     4. Leukocytosis  - Mgmt per primary team     - Full recommendations to follow        72 year old male patient with a history of obesity, CAD s/p CABG x4 (2020 [place] at [facility]), HFrEF, EF 20-25%, HTN, HLD, remote AF (transient in the postop setting of CABG with no reoccurrence- not on AC) who is admitted with hypoxia, large loculated left pleural effusion, acute HFrEF exacerbation with reduced RV function, and in rapid likley typical atrial flutter. EP following for rapid atrial flutter    Remains rapid currently in the 130s. CHADSVASC: 4  Planned for VATS procedure on Monday with Thoracic team pending better HR control. Started on renally dosed Digoxin but HR remain ~130bpm.     Plan:    1. AFL with RVR  - Continue telemetry monitoring  - HR ~130bpm. Rates will be difficult to control while in atrial flutter. Pt is currently on Metoprolol 50mg every 6 hours and renally dosed Digoxin. Will consider upgrading to MICU for Esmolol gtt.  - CHADS2-Vasc: 4 (age, CHF, CAD, and HTN). Currently on Heparin infusion. Monitor PTTs and maintain therapeutic levels.  - EP plan contingent on ischemic evaluation and Thoracic plan. If rhythm control is pursued will need to be on uninterrupted anticoagulation for 30 days.     2. HFrEF (20-25%)  - GDMT and diuresis as per general cardiology  - Cardiology with plans for [facility]     3. Loculated pleural effusion  - management as per thoracic surgery team  - VATS possibly Monday     4. Leukocytosis  - Mgmt per primary team     ***Addendum***  -MICU consulted and after a shared discussion agreed to a trial of higher dose Digoxin at 250mcg daily to see if that help achieve better heart rate control prior to starting Esmolol gtt.     - Full recommendations to follow

## 2023-11-23 NOTE — PROGRESS NOTE ADULT - SUBJECTIVE AND OBJECTIVE BOX
[facility] Division of Hospital Medicine    Chief Complaint:      SUBJECTIVE / OVERNIGHT EVENTS:    Patient denies any complaints.    MEDICATIONS  (STANDING):  aspirin  chewable 81 milliGRAM(s) Oral daily  atorvastatin 20 milliGRAM(s) Oral at bedtime  furosemide   Injectable 40 milliGRAM(s) IV Push every 12 hours  heparin  Infusion. 2100 Unit(s)/Hr (21 mL/Hr) IV Continuous <Continuous>  influenza  Vaccine (HIGH DOSE) 0.7 milliLiter(s) IntraMuscular once  metolazone 5 milliGRAM(s) Oral daily  metoprolol tartrate 50 milliGRAM(s) Oral every 6 hours  polyethylene glycol 3350 17 Gram(s) Oral daily  senna 2 Tablet(s) Oral at bedtime  spironolactone 25 milliGRAM(s) Oral daily    MEDICATIONS  (PRN):  acetaminophen     Tablet .. 650 milliGRAM(s) Oral every 6 hours PRN Temp greater or equal to 38C (100.4F), Mild Pain (1 - 3)  aluminum hydroxide/magnesium hydroxide/simethicone Suspension 30 milliLiter(s) Oral every 4 hours PRN Dyspepsia  bisacodyl Suppository 10 milliGRAM(s) Rectal daily PRN Constipation  heparin   Injectable 01880 Unit(s) IV Push every 6 hours PRN For aPTT less than 40  heparin   Injectable 5000 Unit(s) IV Push every 6 hours PRN For aPTT between 40 - 57  melatonin 3 milliGRAM(s) Oral at bedtime PRN Insomnia  metoprolol tartrate Injectable 5 milliGRAM(s) IV Push every 6 hours PRN RVR > 130  ondansetron Injectable 4 milliGRAM(s) IV Push every 8 hours PRN Nausea and/or Vomiting        I&O's Summary    22 Nov 2023 07:01  -  23 Nov 2023 07:00  --------------------------------------------------------  IN: 419 mL / OUT: 405 mL / NET: 14 mL        PHYSICAL EXAM:  Vital Signs Last 24 Hrs  T(C): 36.5 (23 Nov 2023 07:43), Max: 36.7 (23 Nov 2023 04:00)  T(F): 97.7 (23 Nov 2023 07:43), Max: 98.1 (23 Nov 2023 04:00)  HR: 132 (23 Nov 2023 10:00) (129 - 137)  BP: 103/80 (23 Nov 2023 10:00) (83/79 - 120/105)  BP(mean): 87 (23 Nov 2023 10:00) (59 - 111)  RR: 30 (23 Nov 2023 10:00) (18 - 33)  SpO2: 90% (23 Nov 2023 08:00) (90% - 98%)    Parameters below as of 23 Nov 2023 08:00  Patient On (Oxygen Delivery Method): nasal cannula  O2 Flow (L/min): 6          CONSTITUTIONAL: NAD,   ENMT: normocephalic, atraumatic  RESPIRATORY: crackles right lower chest and decreased breath sounds left lower chest. chest tube in place.  CARDIOVASCULAR: Regular rhythm and tachycardia, normal S1 and S2, no murmur/rub/gallop  ABDOMEN: Nontender to palpation, no rebound/guarding; No hepatosplenomegaly  MUSCLOSKELETAL:  b/l LE edema  PSYCH: A+O to person, place, and time; affect appropriate  NEUROLOGY: CN 2-12 are intact and symmetric; no gross deficits;   SKIN: No rashes; no palpable lesions    LABS:                        13.5   19.09 )-----------( 266      ( 23 Nov 2023 11:22 )             43.1     11-23    133<L>  |  94<L>  |  74.4<H>  ----------------------------<  184<H>  3.6   |  26.0  |  1.79<H>    Ca    9.0      23 Nov 2023 02:32  Mg     2.1     11-23      PT/INR - ( 22 Nov 2023 12:52 )   PT: 16.0 sec;   INR: 1.46 ratio         PTT - ( 23 Nov 2023 10:27 )  PTT:66.5 sec      Urinalysis Basic - ( 23 Nov 2023 02:32 )    Color: x / Appearance: x / SG: x / pH: x  Gluc: 184 mg/dL / Ketone: x  / Bili: x / Urobili: x   Blood: x / Protein: x / Nitrite: x   Leuk Esterase: x / RBC: x / WBC x   Sq Epi: x / Non Sq Epi: x / Bacteria: x        CAPILLARY BLOOD GLUCOSE            RADIOLOGY & ADDITIONAL TESTS:  Results Reviewed:   Imaging Personally Reviewed:  Electrocardiogram Personally Reviewed:

## 2023-11-23 NOTE — PROGRESS NOTE ADULT - ASSESSMENT
Patient is a 71 yo M w/ PMH of CAD s/p CABG, CHF, HTN, HLD presenting for chief complaint of SOB. Admitted for acute hypoxemic respiratory failure 2/2 CHF/Pleural effusion.     Acute hypoxemic respiratory failure 2/2 CHF exacerbation/pleural effusion  Patient still on 6L O2 this morning. wean as able.    acute on chronic systolic CHF  patient denies feeling SOB. still has leg edema  - Lasix 40mg IV BID, Metozalone, but aldactone discontinued to allow higher BP for esmolol gtt. BP low normal so will not tolerate ACEI or ARB for now. was on enteresto at home.    Left hydropneumothorax  s/p chest tube placement. CT chest showed large left hydropneumothorax and secretions. Thoracic surgery plan to perform VATS next Monday after his HR is better controlled.       A. flutter w/ RVR  HR persisting in 130s.  on Metopolol 50 mg Q6H and digoxin and heparin gtt. EP following and discussed with Dr. Bolivar who wants to start him on esmolol gtt for better HR control in preparation for the VATS. I spoke with MICU PA for transfering the patient to MICU and they will come and assess the patient. will dc metoprolol once esmolol gtt stopped.     Hypokalemia. resolved    Leukocytosis  -no obvious source of infection. blood cultures ordered    DEVON vs CKD  creatinine stable around 1.8. not sure what is baseline.  - Renal US without any hydronephrosis       CAD s/p CABG  - Continue ASA, Statin, Lopressor    DVT ppx: Heparin SQ

## 2023-11-23 NOTE — PROGRESS NOTE ADULT - SUBJECTIVE AND OBJECTIVE BOX
Subjective: Patient seen and assessed for . Patient states " ." At time of exam,    VITAL SIGNS  Vital Signs Last 24 Hrs  T(C): 36.5 (11-23-23 @ 07:43), Max: 36.7 (11-23-23 @ 04:00)  T(F): 97.7 (11-23-23 @ 07:43), Max: 98.1 (11-23-23 @ 04:00)  HR: 130 (11-23-23 @ 08:00) (129 - 137)  BP: 120/105 (11-23-23 @ 08:00) (83/79 - 120/105)  RR: 25 (11-23-23 @ 08:00) (18 - 33)  SpO2: 90% (11-23-23 @ 08:00) (90% - 98%)  on (O2)              MEDICATIONS  acetaminophen     Tablet .. 650 milliGRAM(s) Oral every 6 hours PRN  aluminum hydroxide/magnesium hydroxide/simethicone Suspension 30 milliLiter(s) Oral every 4 hours PRN  aspirin  chewable 81 milliGRAM(s) Oral daily  atorvastatin 20 milliGRAM(s) Oral at bedtime  bisacodyl Suppository 10 milliGRAM(s) Rectal daily PRN  furosemide   Injectable 40 milliGRAM(s) IV Push every 12 hours  heparin   Injectable 67973 Unit(s) IV Push every 6 hours PRN  heparin   Injectable 5000 Unit(s) IV Push every 6 hours PRN  heparin  Infusion. 2100 Unit(s)/Hr IV Continuous <Continuous>  influenza  Vaccine (HIGH DOSE) 0.7 milliLiter(s) IntraMuscular once  melatonin 3 milliGRAM(s) Oral at bedtime PRN  metolazone 5 milliGRAM(s) Oral daily  metoprolol tartrate 50 milliGRAM(s) Oral every 6 hours  metoprolol tartrate Injectable 5 milliGRAM(s) IV Push every 6 hours PRN  ondansetron Injectable 4 milliGRAM(s) IV Push every 8 hours PRN  polyethylene glycol 3350 17 Gram(s) Oral daily  senna 2 Tablet(s) Oral at bedtime  spironolactone 25 milliGRAM(s) Oral daily    PHYSICAL EXAM  .physical    Intake and Output  11-22 @ 07:01  -  11-23 @ 07:00  --------------------------------------------------------  IN: 419 mL / OUT: 405 mL / NET: 14 mL    Weights:  Daily     Daily   Admit Wt: Drug Dosing Weight  Height (cm): 175.3 (18 Nov 2023 08:25)  Weight (kg): 127 (18 Nov 2023 08:25)  BMI (kg/m2): 41.3 (18 Nov 2023 08:25)  BSA (m2): 2.38 (18 Nov 2023 08:25)    All laboratory results, radiology and medications reviewed.    LABS  11-23    133<L>  |  94<L>  |  74.4<H>  ----------------------------<  184<H>  3.6   |  26.0  |  1.79<H>    Ca    9.0      23 Nov 2023 02:32  Mg     2.1     11-23                                   13.9   18.58 )-----------( 244      ( 23 Nov 2023 02:32 )             44.0          PT/INR - ( 22 Nov 2023 12:52 )   PT: 16.0 sec;   INR: 1.46 ratio         PTT - ( 23 Nov 2023 06:32 )  PTT:89.3 sec    < from: Xray Chest 1 View- PORTABLE-Routine (Xray Chest 1 View- PORTABLE-Routine in AM.) (11.22.23 @ 05:26) >    INTERPRETATION:  Chest one view 11/21/2023 5:04 AM    HISTORY: Follow-up pleural effusion    COMPARISON STUDY: 11/20/2023    Frontal expiratory view of the chest shows the heart to be probably   similar in size. Left base pigtail catheter is again noted. The lungs   show less congestion of the right lung with similar left chest whiteout   and there is no evidence of pneumothorax nor right pleural effusion.    Chest one view 11/22/2023 5:14 AM  Compared to the prior study, there is slight clearing of the left base.    IMPRESSION:  Leftchest whiteout as noted.    < end of copied text >                      Subjective: Patient seen and assessed for loculated left pleural effusion. Patient states "I feel okay, but they're going to move me to the ICU." At time of exam, Pt denies chest pain, palpitations, dizziness, headache, shortness of breath, abdominal pain or N/V/D/C.     VITAL SIGNS  Vital Signs Last 24 Hrs  T(C): 36.5 (11-23-23 @ 07:43), Max: 36.7 (11-23-23 @ 04:00)  T(F): 97.7 (11-23-23 @ 07:43), Max: 98.1 (11-23-23 @ 04:00)  HR: 130 (11-23-23 @ 08:00) (129 - 137)  BP: 120/105 (11-23-23 @ 08:00) (83/79 - 120/105)  RR: 25 (11-23-23 @ 08:00) (18 - 33)  SpO2: 90% (11-23-23 @ 08:00) (90% - 98%)  on (O2)              MEDICATIONS  acetaminophen     Tablet .. 650 milliGRAM(s) Oral every 6 hours PRN  aluminum hydroxide/magnesium hydroxide/simethicone Suspension 30 milliLiter(s) Oral every 4 hours PRN  aspirin  chewable 81 milliGRAM(s) Oral daily  atorvastatin 20 milliGRAM(s) Oral at bedtime  bisacodyl Suppository 10 milliGRAM(s) Rectal daily PRN  furosemide   Injectable 40 milliGRAM(s) IV Push every 12 hours  heparin   Injectable 65585 Unit(s) IV Push every 6 hours PRN  heparin   Injectable 5000 Unit(s) IV Push every 6 hours PRN  heparin  Infusion. 2100 Unit(s)/Hr IV Continuous <Continuous>  influenza  Vaccine (HIGH DOSE) 0.7 milliLiter(s) IntraMuscular once  melatonin 3 milliGRAM(s) Oral at bedtime PRN  metolazone 5 milliGRAM(s) Oral daily  metoprolol tartrate 50 milliGRAM(s) Oral every 6 hours  metoprolol tartrate Injectable 5 milliGRAM(s) IV Push every 6 hours PRN  ondansetron Injectable 4 milliGRAM(s) IV Push every 8 hours PRN  polyethylene glycol 3350 17 Gram(s) Oral daily  senna 2 Tablet(s) Oral at bedtime  spironolactone 25 milliGRAM(s) Oral daily    PHYSICAL EXAM  Constitutional: NAD  Neuro: A+O x 3, non-focal, speech clear and intact  CV: tachycardic 130s,  +S1S2  Pulm/chest: lung sounds CTA and equal bilaterally, no accessory muscle use noted  Abd: +BS, soft, NT, ND  Ext: LEE x 4, no C/C/E  Skin: warm, well perfused  Psych: calm, appropriate affect  Drains: Left PTC to H2O seal draining thin serous fluid, no air leak, no SQ air, c/d/i      Intake and Output  11-22 @ 07:01  -  11-23 @ 07:00  --------------------------------------------------------  IN: 419 mL / OUT: 405 mL / NET: 14 mL    Weights:  Daily     Daily   Admit Wt: Drug Dosing Weight  Height (cm): 175.3 (18 Nov 2023 08:25)  Weight (kg): 127 (18 Nov 2023 08:25)  BMI (kg/m2): 41.3 (18 Nov 2023 08:25)  BSA (m2): 2.38 (18 Nov 2023 08:25)    All laboratory results, radiology and medications reviewed.    LABS  11-23    133<L>  |  94<L>  |  74.4<H>  ----------------------------<  184<H>  3.6   |  26.0  |  1.79<H>    Ca    9.0      23 Nov 2023 02:32  Mg     2.1     11-23                                   13.9   18.58 )-----------( 244      ( 23 Nov 2023 02:32 )             44.0          PT/INR - ( 22 Nov 2023 12:52 )   PT: 16.0 sec;   INR: 1.46 ratio         PTT - ( 23 Nov 2023 06:32 )  PTT:89.3 sec    < from: Xray Chest 1 View- PORTABLE-Routine (Xray Chest 1 View- PORTABLE-Routine in AM.) (11.22.23 @ 05:26) >    INTERPRETATION:  Chest one view 11/21/2023 5:04 AM    HISTORY: Follow-up pleural effusion    COMPARISON STUDY: 11/20/2023    Frontal expiratory view of the chest shows the heart to be probably   similar in size. Left base pigtail catheter is again noted. The lungs   show less congestion of the right lung with similar left chest whiteout   and there is no evidence of pneumothorax nor right pleural effusion.    Chest one view 11/22/2023 5:14 AM  Compared to the prior study, there is slight clearing of the left base.    IMPRESSION:  Leftchest whiteout as noted.    < end of copied text >

## 2023-11-23 NOTE — PROGRESS NOTE ADULT - ASSESSMENT
72-year-old male with history of hypertension CAD status post quintuple bypass November 2021 followed by cardiology/Dr. Milian in  [place]  resents the ED via EMS complaining of worsening dyspnea on exertion since yesterday.  Thoracic surgery consulted treatment Left effusion. Left PTC placed 11/28.

## 2023-11-23 NOTE — PROGRESS NOTE ADULT - NS ATTEND AMEND GEN_ALL_CORE FT
.  .  Rates continue to be very difficult to control. Will trial higher dose of Digoxin on top of current doses of Metoprolol. Unfortunately ICU beds are full at the moment but eventually can consider transfer to MICU for Esmolol gtt. Will need to discuss with anesthesia team to ensure they are OK with having patient undergo surgery on esmolol gtt as other efforts to control rates have been limited. Continue heparin gtt.    Gaurav Bolivar MD  Clinical Cardiac Electrophysiology

## 2023-11-24 LAB
ALBUMIN SERPL ELPH-MCNC: 2.8 G/DL — LOW (ref 3.3–5.2)
ALBUMIN SERPL ELPH-MCNC: 2.8 G/DL — LOW (ref 3.3–5.2)
ALP SERPL-CCNC: 222 U/L — HIGH (ref 40–120)
ALP SERPL-CCNC: 222 U/L — HIGH (ref 40–120)
ALT FLD-CCNC: 135 U/L — HIGH
ALT FLD-CCNC: 135 U/L — HIGH
ANION GAP SERPL CALC-SCNC: 18 MMOL/L — HIGH (ref 5–17)
ANION GAP SERPL CALC-SCNC: 18 MMOL/L — HIGH (ref 5–17)
ANION GAP SERPL CALC-SCNC: 19 MMOL/L — HIGH (ref 5–17)
ANION GAP SERPL CALC-SCNC: 19 MMOL/L — HIGH (ref 5–17)
APPEARANCE UR: ABNORMAL
APPEARANCE UR: ABNORMAL
APTT BLD: 61.7 SEC — HIGH (ref 24.5–35.6)
APTT BLD: 61.7 SEC — HIGH (ref 24.5–35.6)
APTT BLD: 65.6 SEC — HIGH (ref 24.5–35.6)
APTT BLD: 65.6 SEC — HIGH (ref 24.5–35.6)
AST SERPL-CCNC: 131 U/L — HIGH
AST SERPL-CCNC: 131 U/L — HIGH
BACTERIA # UR AUTO: NEGATIVE /HPF — SIGNIFICANT CHANGE UP
BACTERIA # UR AUTO: NEGATIVE /HPF — SIGNIFICANT CHANGE UP
BASOPHILS # BLD AUTO: 0 K/UL — SIGNIFICANT CHANGE UP (ref 0–0.2)
BASOPHILS # BLD AUTO: 0 K/UL — SIGNIFICANT CHANGE UP (ref 0–0.2)
BASOPHILS NFR BLD AUTO: 0 % — SIGNIFICANT CHANGE UP (ref 0–2)
BASOPHILS NFR BLD AUTO: 0 % — SIGNIFICANT CHANGE UP (ref 0–2)
BILIRUB SERPL-MCNC: 1.7 MG/DL — SIGNIFICANT CHANGE UP (ref 0.4–2)
BILIRUB SERPL-MCNC: 1.7 MG/DL — SIGNIFICANT CHANGE UP (ref 0.4–2)
BILIRUB UR-MCNC: NEGATIVE — SIGNIFICANT CHANGE UP
BILIRUB UR-MCNC: NEGATIVE — SIGNIFICANT CHANGE UP
BUN SERPL-MCNC: 79.8 MG/DL — HIGH (ref 8–20)
BUN SERPL-MCNC: 79.8 MG/DL — HIGH (ref 8–20)
BUN SERPL-MCNC: 85.2 MG/DL — HIGH (ref 8–20)
BUN SERPL-MCNC: 85.2 MG/DL — HIGH (ref 8–20)
CALCIUM SERPL-MCNC: 8.8 MG/DL — SIGNIFICANT CHANGE UP (ref 8.4–10.5)
CALCIUM SERPL-MCNC: 8.8 MG/DL — SIGNIFICANT CHANGE UP (ref 8.4–10.5)
CALCIUM SERPL-MCNC: 9.5 MG/DL — SIGNIFICANT CHANGE UP (ref 8.4–10.5)
CALCIUM SERPL-MCNC: 9.5 MG/DL — SIGNIFICANT CHANGE UP (ref 8.4–10.5)
CAST: 4 /LPF — SIGNIFICANT CHANGE UP (ref 0–4)
CAST: 4 /LPF — SIGNIFICANT CHANGE UP (ref 0–4)
CHLORIDE SERPL-SCNC: 92 MMOL/L — LOW (ref 96–108)
CHLORIDE SERPL-SCNC: 92 MMOL/L — LOW (ref 96–108)
CHLORIDE SERPL-SCNC: 93 MMOL/L — LOW (ref 96–108)
CHLORIDE SERPL-SCNC: 93 MMOL/L — LOW (ref 96–108)
CO2 SERPL-SCNC: 25 MMOL/L — SIGNIFICANT CHANGE UP (ref 22–29)
CO2 SERPL-SCNC: 25 MMOL/L — SIGNIFICANT CHANGE UP (ref 22–29)
CO2 SERPL-SCNC: 29 MMOL/L — SIGNIFICANT CHANGE UP (ref 22–29)
CO2 SERPL-SCNC: 29 MMOL/L — SIGNIFICANT CHANGE UP (ref 22–29)
COLOR SPEC: SIGNIFICANT CHANGE UP
COLOR SPEC: SIGNIFICANT CHANGE UP
CREAT SERPL-MCNC: 1.96 MG/DL — HIGH (ref 0.5–1.3)
CREAT SERPL-MCNC: 1.96 MG/DL — HIGH (ref 0.5–1.3)
CREAT SERPL-MCNC: 2.15 MG/DL — HIGH (ref 0.5–1.3)
CREAT SERPL-MCNC: 2.15 MG/DL — HIGH (ref 0.5–1.3)
DIFF PNL FLD: NEGATIVE — SIGNIFICANT CHANGE UP
DIFF PNL FLD: NEGATIVE — SIGNIFICANT CHANGE UP
EGFR: 32 ML/MIN/1.73M2 — LOW
EGFR: 32 ML/MIN/1.73M2 — LOW
EGFR: 36 ML/MIN/1.73M2 — LOW
EGFR: 36 ML/MIN/1.73M2 — LOW
EOSINOPHIL # BLD AUTO: 0 K/UL — SIGNIFICANT CHANGE UP (ref 0–0.5)
EOSINOPHIL # BLD AUTO: 0 K/UL — SIGNIFICANT CHANGE UP (ref 0–0.5)
EOSINOPHIL NFR BLD AUTO: 0 % — SIGNIFICANT CHANGE UP (ref 0–6)
EOSINOPHIL NFR BLD AUTO: 0 % — SIGNIFICANT CHANGE UP (ref 0–6)
GIANT PLATELETS BLD QL SMEAR: PRESENT — SIGNIFICANT CHANGE UP
GIANT PLATELETS BLD QL SMEAR: PRESENT — SIGNIFICANT CHANGE UP
GLUCOSE BLDC GLUCOMTR-MCNC: 150 MG/DL — HIGH (ref 70–99)
GLUCOSE BLDC GLUCOMTR-MCNC: 150 MG/DL — HIGH (ref 70–99)
GLUCOSE SERPL-MCNC: 166 MG/DL — HIGH (ref 70–99)
GLUCOSE SERPL-MCNC: 166 MG/DL — HIGH (ref 70–99)
GLUCOSE SERPL-MCNC: 206 MG/DL — HIGH (ref 70–99)
GLUCOSE SERPL-MCNC: 206 MG/DL — HIGH (ref 70–99)
GLUCOSE UR QL: NEGATIVE MG/DL — SIGNIFICANT CHANGE UP
GLUCOSE UR QL: NEGATIVE MG/DL — SIGNIFICANT CHANGE UP
HCT VFR BLD CALC: 46.5 % — SIGNIFICANT CHANGE UP (ref 39–50)
HCT VFR BLD CALC: 46.5 % — SIGNIFICANT CHANGE UP (ref 39–50)
HCT VFR BLD CALC: 48.1 % — SIGNIFICANT CHANGE UP (ref 39–50)
HCT VFR BLD CALC: 48.1 % — SIGNIFICANT CHANGE UP (ref 39–50)
HGB BLD-MCNC: 14.2 G/DL — SIGNIFICANT CHANGE UP (ref 13–17)
HGB BLD-MCNC: 14.2 G/DL — SIGNIFICANT CHANGE UP (ref 13–17)
HGB BLD-MCNC: 14.9 G/DL — SIGNIFICANT CHANGE UP (ref 13–17)
HGB BLD-MCNC: 14.9 G/DL — SIGNIFICANT CHANGE UP (ref 13–17)
INR BLD: 1.48 RATIO — HIGH (ref 0.85–1.18)
INR BLD: 1.48 RATIO — HIGH (ref 0.85–1.18)
KETONES UR-MCNC: NEGATIVE MG/DL — SIGNIFICANT CHANGE UP
KETONES UR-MCNC: NEGATIVE MG/DL — SIGNIFICANT CHANGE UP
LACTATE SERPL-SCNC: 1.7 MMOL/L — SIGNIFICANT CHANGE UP (ref 0.5–2)
LACTATE SERPL-SCNC: 1.7 MMOL/L — SIGNIFICANT CHANGE UP (ref 0.5–2)
LACTATE SERPL-SCNC: 4.1 MMOL/L — CRITICAL HIGH (ref 0.5–2)
LACTATE SERPL-SCNC: 4.1 MMOL/L — CRITICAL HIGH (ref 0.5–2)
LEUKOCYTE ESTERASE UR-ACNC: NEGATIVE — SIGNIFICANT CHANGE UP
LEUKOCYTE ESTERASE UR-ACNC: NEGATIVE — SIGNIFICANT CHANGE UP
LYMPHOCYTES # BLD AUTO: 1.01 K/UL — SIGNIFICANT CHANGE UP (ref 1–3.3)
LYMPHOCYTES # BLD AUTO: 1.01 K/UL — SIGNIFICANT CHANGE UP (ref 1–3.3)
LYMPHOCYTES # BLD AUTO: 11.3 % — LOW (ref 13–44)
LYMPHOCYTES # BLD AUTO: 11.3 % — LOW (ref 13–44)
MAGNESIUM SERPL-MCNC: 2.2 MG/DL — SIGNIFICANT CHANGE UP (ref 1.6–2.6)
MAGNESIUM SERPL-MCNC: 2.2 MG/DL — SIGNIFICANT CHANGE UP (ref 1.6–2.6)
MANUAL SMEAR VERIFICATION: SIGNIFICANT CHANGE UP
MANUAL SMEAR VERIFICATION: SIGNIFICANT CHANGE UP
MCHC RBC-ENTMCNC: 26.6 PG — LOW (ref 27–34)
MCHC RBC-ENTMCNC: 26.6 PG — LOW (ref 27–34)
MCHC RBC-ENTMCNC: 27.3 PG — SIGNIFICANT CHANGE UP (ref 27–34)
MCHC RBC-ENTMCNC: 27.3 PG — SIGNIFICANT CHANGE UP (ref 27–34)
MCHC RBC-ENTMCNC: 30.5 GM/DL — LOW (ref 32–36)
MCHC RBC-ENTMCNC: 30.5 GM/DL — LOW (ref 32–36)
MCHC RBC-ENTMCNC: 31 GM/DL — LOW (ref 32–36)
MCHC RBC-ENTMCNC: 31 GM/DL — LOW (ref 32–36)
MCV RBC AUTO: 87.1 FL — SIGNIFICANT CHANGE UP (ref 80–100)
MCV RBC AUTO: 87.1 FL — SIGNIFICANT CHANGE UP (ref 80–100)
MCV RBC AUTO: 88.1 FL — SIGNIFICANT CHANGE UP (ref 80–100)
MCV RBC AUTO: 88.1 FL — SIGNIFICANT CHANGE UP (ref 80–100)
MONOCYTES # BLD AUTO: 0.23 K/UL — SIGNIFICANT CHANGE UP (ref 0–0.9)
MONOCYTES # BLD AUTO: 0.23 K/UL — SIGNIFICANT CHANGE UP (ref 0–0.9)
MONOCYTES NFR BLD AUTO: 2.6 % — SIGNIFICANT CHANGE UP (ref 2–14)
MONOCYTES NFR BLD AUTO: 2.6 % — SIGNIFICANT CHANGE UP (ref 2–14)
MYELOCYTES NFR BLD: 1.7 % — HIGH (ref 0–0)
MYELOCYTES NFR BLD: 1.7 % — HIGH (ref 0–0)
NEUTROPHILS # BLD AUTO: 7.41 K/UL — HIGH (ref 1.8–7.4)
NEUTROPHILS # BLD AUTO: 7.41 K/UL — HIGH (ref 1.8–7.4)
NEUTROPHILS NFR BLD AUTO: 82.6 % — HIGH (ref 43–77)
NEUTROPHILS NFR BLD AUTO: 82.6 % — HIGH (ref 43–77)
NITRITE UR-MCNC: NEGATIVE — SIGNIFICANT CHANGE UP
NITRITE UR-MCNC: NEGATIVE — SIGNIFICANT CHANGE UP
NON-GYNECOLOGICAL CYTOLOGY STUDY: SIGNIFICANT CHANGE UP
NON-GYNECOLOGICAL CYTOLOGY STUDY: SIGNIFICANT CHANGE UP
OVALOCYTES BLD QL SMEAR: SLIGHT — SIGNIFICANT CHANGE UP
OVALOCYTES BLD QL SMEAR: SLIGHT — SIGNIFICANT CHANGE UP
PH UR: 5 — SIGNIFICANT CHANGE UP (ref 5–8)
PH UR: 5 — SIGNIFICANT CHANGE UP (ref 5–8)
PLAT MORPH BLD: NORMAL — SIGNIFICANT CHANGE UP
PLAT MORPH BLD: NORMAL — SIGNIFICANT CHANGE UP
PLATELET # BLD AUTO: 268 K/UL — SIGNIFICANT CHANGE UP (ref 150–400)
PLATELET # BLD AUTO: 268 K/UL — SIGNIFICANT CHANGE UP (ref 150–400)
PLATELET # BLD AUTO: 328 K/UL — SIGNIFICANT CHANGE UP (ref 150–400)
PLATELET # BLD AUTO: 328 K/UL — SIGNIFICANT CHANGE UP (ref 150–400)
POIKILOCYTOSIS BLD QL AUTO: SLIGHT — SIGNIFICANT CHANGE UP
POIKILOCYTOSIS BLD QL AUTO: SLIGHT — SIGNIFICANT CHANGE UP
POLYCHROMASIA BLD QL SMEAR: SLIGHT — SIGNIFICANT CHANGE UP
POLYCHROMASIA BLD QL SMEAR: SLIGHT — SIGNIFICANT CHANGE UP
POTASSIUM SERPL-MCNC: 3.4 MMOL/L — LOW (ref 3.5–5.3)
POTASSIUM SERPL-MCNC: 3.4 MMOL/L — LOW (ref 3.5–5.3)
POTASSIUM SERPL-MCNC: 3.7 MMOL/L — SIGNIFICANT CHANGE UP (ref 3.5–5.3)
POTASSIUM SERPL-MCNC: 3.7 MMOL/L — SIGNIFICANT CHANGE UP (ref 3.5–5.3)
POTASSIUM SERPL-SCNC: 3.4 MMOL/L — LOW (ref 3.5–5.3)
POTASSIUM SERPL-SCNC: 3.4 MMOL/L — LOW (ref 3.5–5.3)
POTASSIUM SERPL-SCNC: 3.7 MMOL/L — SIGNIFICANT CHANGE UP (ref 3.5–5.3)
POTASSIUM SERPL-SCNC: 3.7 MMOL/L — SIGNIFICANT CHANGE UP (ref 3.5–5.3)
PROCALCITONIN SERPL-MCNC: 0.74 NG/ML — HIGH (ref 0.02–0.1)
PROCALCITONIN SERPL-MCNC: 0.74 NG/ML — HIGH (ref 0.02–0.1)
PROT SERPL-MCNC: 7.4 G/DL — SIGNIFICANT CHANGE UP (ref 6.6–8.7)
PROT SERPL-MCNC: 7.4 G/DL — SIGNIFICANT CHANGE UP (ref 6.6–8.7)
PROT UR-MCNC: NEGATIVE MG/DL — SIGNIFICANT CHANGE UP
PROT UR-MCNC: NEGATIVE MG/DL — SIGNIFICANT CHANGE UP
PROTHROM AB SERPL-ACNC: 16.2 SEC — HIGH (ref 9.5–13)
PROTHROM AB SERPL-ACNC: 16.2 SEC — HIGH (ref 9.5–13)
RAPID RVP RESULT: SIGNIFICANT CHANGE UP
RAPID RVP RESULT: SIGNIFICANT CHANGE UP
RBC # BLD: 5.34 M/UL — SIGNIFICANT CHANGE UP (ref 4.2–5.8)
RBC # BLD: 5.34 M/UL — SIGNIFICANT CHANGE UP (ref 4.2–5.8)
RBC # BLD: 5.46 M/UL — SIGNIFICANT CHANGE UP (ref 4.2–5.8)
RBC # BLD: 5.46 M/UL — SIGNIFICANT CHANGE UP (ref 4.2–5.8)
RBC # FLD: 15.9 % — HIGH (ref 10.3–14.5)
RBC BLD AUTO: ABNORMAL
RBC BLD AUTO: ABNORMAL
RBC CASTS # UR COMP ASSIST: 1 /HPF — SIGNIFICANT CHANGE UP (ref 0–4)
RBC CASTS # UR COMP ASSIST: 1 /HPF — SIGNIFICANT CHANGE UP (ref 0–4)
SARS-COV-2 RNA SPEC QL NAA+PROBE: SIGNIFICANT CHANGE UP
SARS-COV-2 RNA SPEC QL NAA+PROBE: SIGNIFICANT CHANGE UP
SODIUM SERPL-SCNC: 137 MMOL/L — SIGNIFICANT CHANGE UP (ref 135–145)
SODIUM SERPL-SCNC: 137 MMOL/L — SIGNIFICANT CHANGE UP (ref 135–145)
SODIUM SERPL-SCNC: 139 MMOL/L — SIGNIFICANT CHANGE UP (ref 135–145)
SODIUM SERPL-SCNC: 139 MMOL/L — SIGNIFICANT CHANGE UP (ref 135–145)
SP GR SPEC: 1.01 — SIGNIFICANT CHANGE UP (ref 1–1.03)
SP GR SPEC: 1.01 — SIGNIFICANT CHANGE UP (ref 1–1.03)
SQUAMOUS # UR AUTO: 1 /HPF — SIGNIFICANT CHANGE UP (ref 0–5)
SQUAMOUS # UR AUTO: 1 /HPF — SIGNIFICANT CHANGE UP (ref 0–5)
UROBILINOGEN FLD QL: 1 MG/DL — SIGNIFICANT CHANGE UP (ref 0.2–1)
UROBILINOGEN FLD QL: 1 MG/DL — SIGNIFICANT CHANGE UP (ref 0.2–1)
VARIANT LYMPHS # BLD: 1.8 % — SIGNIFICANT CHANGE UP (ref 0–6)
VARIANT LYMPHS # BLD: 1.8 % — SIGNIFICANT CHANGE UP (ref 0–6)
WBC # BLD: 18.95 K/UL — HIGH (ref 3.8–10.5)
WBC # BLD: 18.95 K/UL — HIGH (ref 3.8–10.5)
WBC # BLD: 8.97 K/UL — SIGNIFICANT CHANGE UP (ref 3.8–10.5)
WBC # BLD: 8.97 K/UL — SIGNIFICANT CHANGE UP (ref 3.8–10.5)
WBC # FLD AUTO: 18.95 K/UL — HIGH (ref 3.8–10.5)
WBC # FLD AUTO: 18.95 K/UL — HIGH (ref 3.8–10.5)
WBC # FLD AUTO: 8.97 K/UL — SIGNIFICANT CHANGE UP (ref 3.8–10.5)
WBC # FLD AUTO: 8.97 K/UL — SIGNIFICANT CHANGE UP (ref 3.8–10.5)
WBC UR QL: 0 /HPF — SIGNIFICANT CHANGE UP (ref 0–5)
WBC UR QL: 0 /HPF — SIGNIFICANT CHANGE UP (ref 0–5)

## 2023-11-24 PROCEDURE — 71045 X-RAY EXAM CHEST 1 VIEW: CPT | Mod: 26

## 2023-11-24 PROCEDURE — 99233 SBSQ HOSP IP/OBS HIGH 50: CPT

## 2023-11-24 PROCEDURE — 99231 SBSQ HOSP IP/OBS SF/LOW 25: CPT

## 2023-11-24 RX ORDER — VANCOMYCIN HCL 1 G
1000 VIAL (EA) INTRAVENOUS
Refills: 0 | Status: DISCONTINUED | OUTPATIENT
Start: 2023-11-25 | End: 2023-11-25

## 2023-11-24 RX ORDER — DIGOXIN 250 MCG
125 TABLET ORAL DAILY
Refills: 0 | Status: DISCONTINUED | OUTPATIENT
Start: 2023-11-25 | End: 2023-11-25

## 2023-11-24 RX ORDER — SODIUM CHLORIDE 9 MG/ML
500 INJECTION INTRAMUSCULAR; INTRAVENOUS; SUBCUTANEOUS ONCE
Refills: 0 | Status: COMPLETED | OUTPATIENT
Start: 2023-11-24 | End: 2023-11-24

## 2023-11-24 RX ORDER — VANCOMYCIN HCL 1 G
1000 VIAL (EA) INTRAVENOUS ONCE
Refills: 0 | Status: COMPLETED | OUTPATIENT
Start: 2023-11-24 | End: 2023-11-24

## 2023-11-24 RX ORDER — ALBUMIN HUMAN 25 %
100 VIAL (ML) INTRAVENOUS ONCE
Refills: 0 | Status: COMPLETED | OUTPATIENT
Start: 2023-11-24 | End: 2023-11-24

## 2023-11-24 RX ORDER — VANCOMYCIN HCL 1 G
1000 VIAL (EA) INTRAVENOUS EVERY 12 HOURS
Refills: 0 | Status: DISCONTINUED | OUTPATIENT
Start: 2023-11-24 | End: 2023-11-24

## 2023-11-24 RX ORDER — POTASSIUM CHLORIDE 20 MEQ
40 PACKET (EA) ORAL ONCE
Refills: 0 | Status: COMPLETED | OUTPATIENT
Start: 2023-11-24 | End: 2023-11-24

## 2023-11-24 RX ORDER — SODIUM CHLORIDE 9 MG/ML
250 INJECTION INTRAMUSCULAR; INTRAVENOUS; SUBCUTANEOUS ONCE
Refills: 0 | Status: COMPLETED | OUTPATIENT
Start: 2023-11-24 | End: 2023-11-24

## 2023-11-24 RX ORDER — ACETAMINOPHEN 500 MG
1000 TABLET ORAL ONCE
Refills: 0 | Status: COMPLETED | OUTPATIENT
Start: 2023-11-24 | End: 2023-11-24

## 2023-11-24 RX ADMIN — HEPARIN SODIUM 2100 UNIT(S)/HR: 5000 INJECTION INTRAVENOUS; SUBCUTANEOUS at 07:24

## 2023-11-24 RX ADMIN — Medication 50 MILLIGRAM(S): at 06:41

## 2023-11-24 RX ADMIN — Medication 250 MILLIGRAM(S): at 01:42

## 2023-11-24 RX ADMIN — Medication 400 MILLIGRAM(S): at 01:42

## 2023-11-24 RX ADMIN — Medication 650 MILLIGRAM(S): at 10:37

## 2023-11-24 RX ADMIN — PIPERACILLIN AND TAZOBACTAM 25 GRAM(S): 4; .5 INJECTION, POWDER, LYOPHILIZED, FOR SOLUTION INTRAVENOUS at 05:49

## 2023-11-24 RX ADMIN — Medication 50 MILLILITER(S): at 10:36

## 2023-11-24 RX ADMIN — POLYETHYLENE GLYCOL 3350 17 GRAM(S): 17 POWDER, FOR SOLUTION ORAL at 10:39

## 2023-11-24 RX ADMIN — PIPERACILLIN AND TAZOBACTAM 25 GRAM(S): 4; .5 INJECTION, POWDER, LYOPHILIZED, FOR SOLUTION INTRAVENOUS at 14:07

## 2023-11-24 RX ADMIN — SODIUM CHLORIDE 250 MILLILITER(S): 9 INJECTION INTRAMUSCULAR; INTRAVENOUS; SUBCUTANEOUS at 02:52

## 2023-11-24 RX ADMIN — Medication 250 MILLIGRAM(S): at 05:43

## 2023-11-24 RX ADMIN — Medication 40 MILLIEQUIVALENT(S): at 10:37

## 2023-11-24 RX ADMIN — SODIUM CHLORIDE 500 MILLILITER(S): 9 INJECTION INTRAMUSCULAR; INTRAVENOUS; SUBCUTANEOUS at 04:36

## 2023-11-24 RX ADMIN — Medication 50 MILLIGRAM(S): at 10:37

## 2023-11-24 RX ADMIN — Medication 650 MILLIGRAM(S): at 11:55

## 2023-11-24 RX ADMIN — Medication 50 MILLIGRAM(S): at 23:28

## 2023-11-24 RX ADMIN — HEPARIN SODIUM 2100 UNIT(S)/HR: 5000 INJECTION INTRAVENOUS; SUBCUTANEOUS at 19:07

## 2023-11-24 RX ADMIN — PIPERACILLIN AND TAZOBACTAM 25 GRAM(S): 4; .5 INJECTION, POWDER, LYOPHILIZED, FOR SOLUTION INTRAVENOUS at 22:01

## 2023-11-24 RX ADMIN — ATORVASTATIN CALCIUM 20 MILLIGRAM(S): 80 TABLET, FILM COATED ORAL at 22:01

## 2023-11-24 RX ADMIN — Medication 40 MILLIGRAM(S): at 05:32

## 2023-11-24 RX ADMIN — Medication 81 MILLIGRAM(S): at 10:37

## 2023-11-24 RX ADMIN — Medication 1000 MILLIGRAM(S): at 02:00

## 2023-11-24 RX ADMIN — SENNA PLUS 2 TABLET(S): 8.6 TABLET ORAL at 22:01

## 2023-11-24 RX ADMIN — Medication 250 MICROGRAM(S): at 05:42

## 2023-11-24 NOTE — CHART NOTE - NSCHARTNOTEFT_GEN_A_CORE
Pt with BP 82/50  Seen at bedside, resting comfortably in NAD, A&Ox3  Denies chest pain, SOB, n/v/d/c, abdominal pain, HA, dizziness, blurry vision  Remainder of vital signs stable  Continues to spike fevers, cooling blanket ordered  500cc NS bolus ordered for hypotension, BP responding appropriately

## 2023-11-24 NOTE — PHARMACOTHERAPY INTERVENTION NOTE - COMMENTS
Vanco 1 g was administered at 0100 this am then another dose was administered at 0500 this morning.  Pt scr rising 2.15 today was 1.96 yesterday.  Will hold further doses today and order a level for tomorrow morning.  Decreased vanco frequency to daily from q12 and will have it start tomorrow.
Code Rapid Response Attended

## 2023-11-24 NOTE — PROGRESS NOTE ADULT - ASSESSMENT
Assessment/Recommendations:   72 year old male patient with HTN, hyperlipidemia, obesity BMI 41.3, CAD s/p CABG x4 (2020 [facility] at [facility]), HFrEF, EF 20-25%, remote AF (transient in the postop setting of CABG with no reoccurrence- not on AC). He presented to [facility] ED on 11/18 with several day history of dyspnea and lower extremity swelling. Admitted with acute respiratory failure due to large pleural effusion, acute HFrEF exacerbation with reduced RV function, and atrial flutter (likely typical) with RVR.   CT chest showed large loculated pleural effusion and he had chest tube placement on 11/18.        who is admitted with hypoxia, large loculated left pleural effusion, acute HFrEF exacerbation with reduced RV function, and in rapid likley typical atrial flutter. EP following for rapid atrial flutter    Remains rapid currently in the 130s. CHADSVASC: 4  Planned for VATS procedure on Monday with Thoracic team pending better HR control. Started on renally dosed Digoxin but HR remain ~130bpm.     Plan:    1. AFL with RVR  - Continue telemetry monitoring  - HR ~130bpm. Rates will be difficult to control while in atrial flutter. Pt is currently on Metoprolol 50mg every 6 hours and renally dosed Digoxin. Will consider upgrading to MICU for Esmolol gtt.  - CHADS2-Vasc: 4 (age, CHF, CAD, and HTN). Currently on Heparin infusion. Monitor PTTs and maintain therapeutic levels.  - EP plan contingent on ischemic evaluation and Thoracic plan. If rhythm control is pursued will need to be on uninterrupted anticoagulation for 30 days.     2. HFrEF (20-25%)  - GDMT and diuresis as per general cardiology  - Cardiology with plans for [facility]     3. Loculated pleural effusion  - management as per thoracic surgery team  - VATS possibly Monday     4. Leukocytosis  - Mgmt per primary team    Assessment/Recommendations:   72 year old male patient with HTN, hyperlipidemia, obesity BMI 41.3, CAD s/p CABG x4 (2020 [facility] Mignon at [facility]), HFrEF, EF 20-25%, remote AF (transient in the postop setting of CABG with no reoccurrence- not on AC). He presented to [facility] ED on 11/18 with several day history of dyspnea and lower extremity swelling. Admitted with acute respiratory failure due to large pleural effusion, acute HFrEF exacerbation with reduced RV function, and atrial flutter (likely typical) with RVR.   CT chest showed large loculated pleural effusion and he had chest tube placement on 11/18. He has worsening left chest opacification with loculation despite PTC. Thoracic surgery following for possible VATS.   He remains in atrial flutter with difficult to control rates. Digoxin was added to metoprolol with no real improvement noted.     # AFlutter with RVR   - HR ~ 130 bpm. Rates will be difficult to control while in atrial flutter.   - Currently on metoprolol tartrate 50mg Q 6 hrs, and digoxin 250 mcg daily. Will favor reducing digoxin to 125mcg alternating with 250mcg every other day in light of CKD. Creat 2.15 today.   - CHADSVASc = 4 (age, CHF, CAD, HTN). Maintain heparin gtt. Monitor PTT and maintain therapeutic levels.   - Continue telemetry monitoring.   - Monitor electrolytes. Keep K > 4, Mg > 2   - EP plan contingent upon ischemic workup, and thoracic plan.     # HFrEF, EF 20-25%   - Daily weights.   - Strict I/Os   - GDMT and diuresis as per general cardiology   - LHC/ischemic workup when medically optimized     # Loculated pleural effusion   - s/p PTC 11/18   - Thoracic surgery following. Possible VATS on 11/27     # Fever, leukocytosis   - s/p code sepsis overnight 11/23->11/24   - source likely left pleural effusion   - on empiric IV vanco, IV zosyn   - hypotension, lactate improved     Seen and discussed with Dr. Bolivar. Recommendations final when signed by EP attending.      Assessment/Recommendations:   72 year old male patient with HTN, hyperlipidemia, obesity BMI 41.3, CAD s/p CABG x4 (2020 [facility]ha Mignon at [facility]), HFrEF, EF 20-25%, remote AF (transient in the postop setting of CABG with no reoccurrence- not on AC). He presented to [facility] ED on 11/18 with several day history of dyspnea and lower extremity swelling. Admitted with acute respiratory failure due to large pleural effusion, acute HFrEF exacerbation with reduced RV function, and atrial flutter (likely typical) with RVR.   CT chest showed large loculated pleural effusion and he had chest tube placement on 11/18. He has worsening left chest opacification with loculation despite PTC. Thoracic surgery following for possible VATS.   He remains in atrial flutter with difficult to control rates. Digoxin was added to metoprolol with no real improvement noted.     # AFlutter with RVR   - HR ~ 130 bpm. Rates will be difficult to control while in atrial flutter.   - Currently on metoprolol tartrate 50mg Q 6 hrs, and digoxin 250 mcg daily. Will reduce digoxin to 125mcg daily in light of CKD. Creat 2.15 today.   - Digoxin level in AM   - CHADSVASc = 4 (age, CHF, CAD, HTN). Maintain heparin gtt. Monitor PTT and maintain therapeutic levels.   - Continue telemetry monitoring.   - Monitor electrolytes. Keep K > 4, Mg > 2   - EP plan contingent upon ischemic workup, and thoracic plan.     # HFrEF, EF 20-25%   - Daily weights.   - Strict I/Os   - GDMT and diuresis as per general cardiology   - LHC/ischemic workup when medically optimized     # Loculated pleural effusion   - s/p PTC 11/18   - Thoracic surgery following. Possible VATS on 11/27     # Fever, leukocytosis   - s/p code sepsis overnight 11/23->11/24   - source likely left pleural effusion   - on empiric IV vanco, IV zosyn   - hypotension, lactate improved     Seen and discussed with Dr. Bolivar. Recommendations final when signed by EP attending.      72 year old male patient with HTN, hyperlipidemia, obesity BMI 41.3, CAD s/p CABG x4 (2020 [facility]), HFrEF, EF 20-25%, remote AF (transient in the postop setting of CABG with no reoccurrence- not on AC). He presented to [facility] ED on 11/18 with several day history of dyspnea and lower extremity swelling. Admitted with acute respiratory failure due to large pleural effusion, acute HFrEF exacerbation with reduced RV function, and atrial flutter (likely typical) with RVR.   CT chest showed large loculated pleural effusion and he had chest tube placement on 11/18. He has worsening left chest opacification with loculation despite PTC. Thoracic surgery following for possible VATS.   He remains in atrial flutter with difficult to control rates. Digoxin was added to metoprolol with no real improvement noted.     # AFlutter with RVR   - HR ~ 130 bpm. Rates will be difficult to control while in atrial flutter.   - Currently on metoprolol tartrate 50mg Q 6 hrs, and digoxin 250 mcg daily. Will reduce digoxin to 125mcg daily in light of CKD. Creat 2.15 today.   - Digoxin level in AM   - CHADSVASc = 4 (age, CHF, CAD, HTN). Maintain heparin gtt. Monitor PTT and maintain therapeutic levels.   - Continue telemetry monitoring.   - Monitor electrolytes. Keep K > 4, Mg > 2   - EP plan contingent upon ischemic workup, and thoracic plan.     # HFrEF, EF 20-25%   - Daily weights.   - Strict I/Os   - GDMT and diuresis as per general cardiology   - LHC/ischemic workup when medically optimized     # Loculated pleural effusion   - s/p PTC 11/18   - Thoracic surgery following. Possible VATS on 11/27     # Fever, leukocytosis   - s/p code sepsis overnight 11/23->11/24   - source likely left pleural effusion   - on empiric IV vanco, IV zosyn   - hypotension, lactate improved     Seen and discussed with Dr. Bolivar. Recommendations final when signed by EP attending.

## 2023-11-24 NOTE — PROGRESS NOTE ADULT - PROBLEM SELECTOR PLAN 1
Left PTC placed 11/18  Maintain to H2O seal  Pleural fluid exudative by light's criteria  Culture negative  Follow up cytology  Record drainage q12 hours to facilitate timely removal  Daily CXR while PTC in place  Increasing left chest opacification  CT chest reviewed by Dr. Encarnacion, pt will need VATS decort.    discuss with EP increasing leucocytosis and febrile overnight 11/24, EP concern will need to move surgery sooner.   EP following will discuss with anesthesia on HR control     Plan for surgery on Sunday 11/26  EP will plan for cardioversion after VATS once AC can be restarted.   Encourage incentive spirometry/Chest PT   Rest of care per primary team  Thoracic Surgery to follow  Plan discussed with Dr. Go

## 2023-11-24 NOTE — PROGRESS NOTE ADULT - SUBJECTIVE AND OBJECTIVE BOX
Pt seen sitting in bedside chair. States his breathing is improved. Denies chest pain, palpitation, dizziness.   Pt with fever overnight, Tmax 101.3, and code sepsis protocol initiated.     PAST MEDICAL & SURGICAL HISTORY:  Coronary artery disease involving native coronary artery of native heart, unspecified whether angina  CHF with unknown LVEF  Primary hypertension  Hyperlipidemia, unspecified hyperlipidemia type  S/P CABG x 5  History of cholecystectomy    MEDICATIONS  (STANDING):  aspirin  chewable 81 milliGRAM(s) Oral daily  atorvastatin 20 milliGRAM(s) Oral at bedtime  digoxin     Tablet 250 MICROGram(s) Oral daily  furosemide   Injectable 40 milliGRAM(s) IV Push every 12 hours  heparin  Infusion. 2100 Unit(s)/Hr (21 mL/Hr) IV Continuous <Continuous>  influenza  Vaccine (HIGH DOSE) 0.7 milliLiter(s) IntraMuscular once  metolazone 5 milliGRAM(s) Oral daily  metoprolol tartrate 50 milliGRAM(s) Oral every 6 hours  piperacillin/tazobactam IVPB.. 3.375 Gram(s) IV Intermittent every 8 hours  polyethylene glycol 3350 17 Gram(s) Oral daily  senna 2 Tablet(s) Oral at bedtime    MEDICATIONS  (PRN):  acetaminophen     Tablet .. 650 milliGRAM(s) Oral every 6 hours PRN Temp greater or equal to 38C (100.4F), Mild Pain (1 - 3)  aluminum hydroxide/magnesium hydroxide/simethicone Suspension 30 milliLiter(s) Oral every 4 hours PRN Dyspepsia  bisacodyl Suppository 10 milliGRAM(s) Rectal daily PRN Constipation  heparin   Injectable 90486 Unit(s) IV Push every 6 hours PRN For aPTT less than 40  heparin   Injectable 5000 Unit(s) IV Push every 6 hours PRN For aPTT between 40 - 57  melatonin 3 milliGRAM(s) Oral at bedtime PRN Insomnia  metoprolol tartrate Injectable 5 milliGRAM(s) IV Push every 6 hours PRN RVR > 130  ondansetron Injectable 4 milliGRAM(s) IV Push every 8 hours PRN Nausea and/or Vomiting    Allergies:  No Known Allergies    Vital Signs Last 24 Hrs  T(C): 36.5 (2023 09:48), Max: 37.9 (2023 04:37)  T(F): 97.7 (2023 09:48), Max: 100.3 (2023 04:37)  HR: 135 (2023 08:00) (128 - 135)  BP: 95/46 (2023 08:00) (82/52 - 116/78)  BP(mean): 58 (2023 08:00) (58 - 94)  RR: 18 (2023 08:00) (18 - 37)  SpO2: 96% (2023 08:00) (94% - 98%)    Parameters below as of 2023 08:00  Patient On (Oxygen Delivery Method): nasal cannula  O2 Flow (L/min): 6    Physical Exam:  Constitutional: awake, alert, mild work of breathing  Cardiovascular: +S1S2 tachycardic   Pulmonary: decreased breath sounds   GI: soft NTND +BS  Extremities: 1+ pedal edema, venous stasis changes   Neuro: non focal, LEE x4    LABS:                        14.2   18.95 )-----------( 268      ( 2023 06:33 )             46.5     11    137  |  93<L>  |  85.2<H>  ----------------------------<  206<H>  3.4<L>   |  25.0  |  2.15<H>    Ca    8.8      2023 06:33  Mg     2.2         TPro  7.4  /  Alb  2.8<L>  /  TBili  1.7  /  DBili  x   /  AST  131<H>  /  ALT  135<H>  /  AlkPhos  222<H>  1124    PT/INR - ( 2023 01:25 )   PT: 16.2 sec;   INR: 1.48 ratio       PTT - ( 2023 06:33 )  PTT:65.6 sec  Urinalysis Basic - ( 2023 09:00 )    Color: Dark Yellow / Appearance: Cloudy / S.015 / pH: x  Gluc: x / Ketone: Negative mg/dL  / Bili: Negative / Urobili: 1.0 mg/dL   Blood: x / Protein: Negative mg/dL / Nitrite: Negative   Leuk Esterase: Negative / RBC: 1 /HPF / WBC 0 /HPF   Sq Epi: x / Non Sq Epi: 1 /HPF / Bacteria: Negative /HPF    RADIOLOGY & ADDITIONAL TESTS:  Echo: 23: Summary:   1. There is mild concentric left ventricular hypertrophy.   2. Moderate to severely increased left ventricular internal cavity size.   3. Left ventricular ejection fraction, by visual estimation, is 20 to 25%.   4. Severely decreased global left ventricular systolic function.   5. The mitral in-flow pattern reveals no discernable A-wave, therefore no comment on diastolic function can be made.   6. Mildly enlarged right ventricle.   7. Severely reduced RV systolic function.   8. Mildly enlarged left atrium.   9. Normal right atrial size.  10. Sclerotic aortic valve with normal opening.  11. Mild thickening and calcification of the anterior and posterior mitral valve leaflets.  12. Mild mitral annular calcification.  13. Mild mitral valve regurgitation.  14. The main pulmonary artery is normal in size.  15. There is no evidence of pericardial effusion.  Otf Frederick MD Electronically signed on 2023 at 4:20:53 PM    CXR: 23:   IMPRESSION:  Left chest whiteout as noted.  Thank you for the courtesy of this referral.  --- End of Report ---  BENNIE GREEN MD; Attending Interventional Radiologist  This document has been electronically signed. 2023  3:05PM

## 2023-11-24 NOTE — PROGRESS NOTE ADULT - ASSESSMENT
Patient is a 73 yo M w/ PMH of CAD s/p CABG, CHF, HTN, HLD presenting for chief complaint of SOB. Admitted for acute hypoxemic respiratory failure 2/2 CHF/Pleural effusion.     sepsis 2/2 left empyema. left pleural fluid is growing streptococcus intermedius. blood cultures pending. had fever overnight. c/w zosyn and vancomycin. has left sided chest tube. Plan for VATS on monday.    Hypotension. BP running low today. received 750 cc bolus and then albumiin. hold lasix, metolazone and holding parameters on metorpolol    Acute hypoxemic respiratory failure 2/2 CHF exacerbation/pleural effusion  Patient on 2L O2 this morning. wean as able.    acute on chronic systolic CHF  leg edema improving. hold diuretics due to low BP    A. flutter w/ RVR  HR persisting in 130s.  on Metopolol 50 mg Q6H with holding parameteres. digoxin dose increased and c/w heparin gtt. EP following. plan is to continue current regimen     Hypokalemia. replaced      DEVON   creatinine worsened today to 2.1. not sure what is baseline.  - Renal US without any hydronephrosis       CAD s/p CABG  - Continue ASA, Statin, Lopressor    DVT ppx: Heparin SQ

## 2023-11-24 NOTE — PROGRESS NOTE ADULT - ASSESSMENT
72-year-old male with history of hypertension CAD status post quintuple bypass November 2021 followed by cardiology/Dr. Milian in  [place]  resents the ED via EMS complaining of worsening dyspnea on exertion since yesterday.  Thoracic surgery consulted treatment Left effusion. Left PTC placed 11/28.

## 2023-11-24 NOTE — CHART NOTE - NSCHARTNOTEFT_GEN_A_CORE
Called by RN for hypotension (85/64)  Remainder of VSS (known AFlutter RVR unchanged from baseline this admission), patient asymptomatic in NAD.   MICU consulted overnight for Esmolol gtt in setting of uncontrolled A-Flutter. EP following.   RRT/Code sepsis overnight, received 750 cc IVF overnight.   Patient with EF of 20% and large pleural effusion.   25% albumin ordered STAT.   Repeat BP in 30 min.  RN to continue to monitor, to alert provider w/ any change in patient status. Called by RN for hypotension (85/64)  Remainder of VSS (known AFlutter RVR unchanged from baseline this admission), patient asymptomatic in NAD.   MICU consulted overnight for Esmolol gtt in setting of uncontrolled A-Flutter. EP following.   RRT/Code sepsis overnight, received 750 cc IVF overnight.   Patient with EF of 20% and large pleural effusion.   25% albumin ordered STAT.   Repeat BP in 30 min.  K repleted.   RN to continue to monitor, to alert provider w/ any change in patient status.

## 2023-11-24 NOTE — CHART NOTE - NSCHARTNOTEFT_GEN_A_CORE
CODE SEPSIS 2HR FOLLOW UP NOTE:    2HR follow up completed at 02:47. Code sepsis called at 00:47 for rectal temp 101.3, RR 30s, HR 150s    Pt re seen at bedside.  Rigors have resolved.  Denies chest pain, SOB, n/v/d/c, abdominal pain, HA, dizziness, blurry vision.    VITAL SIGNS:  T    RR 25  BP 99/88  SPO2 92%      PHYSICAL EXAM:  GENERAL: NAD, lying in bed comfortably  HEAD:  Atraumatic, Normocephalic  EYES: EOMI, conjunctiva and sclera clear  CHEST/LUNG: Decreased breath sounds to L side, unlabored  HEART: Tachycardic, irregular, +S1, S2  ABDOMEN: Soft, Nontender, Nondistended   EXTREMITIES:  2+ Peripheral Pulses, brisk capillary refill. No clubbing, cyanosis, or edema  NERVOUS SYSTEM:  Alert & Oriented X3, speech clear. Answers questions appropriately. No focal neurological deficits   MSK: FROM all 4 extremities, full and equal strength  SKIN: Warm, dry    Pt is a 72y old Male s/p a Code Sepsis called for fever 101.3, tachycardia 150, RR 30     SUSPECTED SOURCE: Likely L pleural effusion, culture from pleural fluid showing strep intermedius    STAT LABS Reviewed:   - CBC w/diff   - CMP   - Blood cultures x2 pending  - Lactate 4.1, repeat 2hrs activated  - Procalcitonin   - PT/PTT/INR  - UA pending  - Urine Culture if UA positive     IMAGING/DIAGNOSTIC STUDIES ORDERED:   CXR reviewed, appears L pleural effusion slightly improved compared to prior, pending official read by radiology    ANTIBIOTICS ORDERED:   Zosyn and vancomycin    FLUIDS GIVEN:   250cc NS bolus over 1hr given lactate 4.1      PLAN OF CARE/ INTERVENTIONS PLANNED:   - abx + fluids as above  - VS q30min x 1hr, then q1hr x 6hrs  - F/u outstanding labs + imaging      DISPOSITION:  - Remain at current level of care. CODE SEPSIS 2HR FOLLOW UP NOTE:    2HR follow up completed at 02:47. Code sepsis called at 00:47 for rectal temp 101.3, RR 30s, HR 150s    Pt re seen at bedside.  Rigors have resolved.  Denies chest pain, SOB, n/v/d/c, abdominal pain, HA, dizziness, blurry vision.    VITAL SIGNS:  T 101.6    RR 25  BP 99/88  SPO2 92%      PHYSICAL EXAM:  GENERAL: NAD, lying in bed comfortably  HEAD:  Atraumatic, Normocephalic  EYES: EOMI, conjunctiva and sclera clear  CHEST/LUNG: Decreased breath sounds to L side, unlabored  HEART: Tachycardic, irregular, +S1, S2  ABDOMEN: Soft, Nontender, Nondistended   EXTREMITIES:  2+ Peripheral Pulses, brisk capillary refill. No clubbing, cyanosis, or edema  NERVOUS SYSTEM:  Alert & Oriented X3, speech clear. Answers questions appropriately. No focal neurological deficits   MSK: FROM all 4 extremities, full and equal strength  SKIN: Warm, dry    Pt is a 72y old Male s/p a Code Sepsis called for fever 101.3, tachycardia 150, RR 30     SUSPECTED SOURCE: Likely L pleural effusion, culture from pleural fluid showing strep intermedius    STAT LABS Reviewed:   - CBC w/diff   - CMP   - Blood cultures x2 pending  - Lactate 4.1, repeat 2hrs activated  - Procalcitonin   - PT/PTT/INR  - UA pending  - Urine Culture if UA positive     IMAGING/DIAGNOSTIC STUDIES ORDERED:   CXR reviewed, appears L pleural effusion slightly improved compared to prior, pending official read by radiology    ANTIBIOTICS ORDERED:   Zosyn and vancomycin    FLUIDS GIVEN:   250cc NS bolus over 1hr given lactate 4.1      PLAN OF CARE/ INTERVENTIONS PLANNED:   - abx + fluids as above  - VS q30min x 1hr, then q1hr x 6hrs  - F/u outstanding labs + imaging      DISPOSITION:  - Remain at current level of care.

## 2023-11-24 NOTE — CHART NOTE - NSCHARTNOTEFT_GEN_A_CORE
RRT called for patient with rigors and subsequently upgraded to code sepsis after pt found to have rectal temp 101.3, RR 30s, HR 150s    Pt seen at bedside.  Denies chest pain, SOB, n/v/d/c, abdominal pain, HA, dizziness, blurry vision.    VITAL SIGNS:  T 101.3 rectal    RR 30  /60  SPO2 92%    PHYSICAL EXAM:  GENERAL: NAD, lying in bed comfortably  HEAD:  Atraumatic, Normocephalic  EYES: EOMI, conjunctiva and sclera clear  CHEST/LUNG: Clear to auscultation bilaterally; No rales, rhonchi, wheezing, or rubs. Unlabored respirations  HEART: Tachycardic, irregular, +S1, S2  ABDOMEN: Soft, Nontender, Nondistended   EXTREMITIES:  2+ Peripheral Pulses, brisk capillary refill. No clubbing, cyanosis, or edema  NERVOUS SYSTEM:  Alert & Oriented X3, speech clear. Answers questions appropriately. No focal neurological deficits   MSK: FROM all 4 extremities, full and equal strength  SKIN: Warm, dry    Pt is a 72y old Male s/p a Code Sepsis called for fever 101.3, tachycardia 150, RR 30     SUSPECTED SOURCE: Likely L pleural effusion, culture from pleural fluid showing strep intermedius    STAT LABS ORDERED:   - CBC w/diff  - CMP  - Blood cultures x2  - Lactate  - Procalcitonin   - PT/PTT/INR  - UA  - Urine Culture if UA positive     IMAGING/DIAGNOSTIC STUDIES ORDERED:   CXR    ANTIBIOTICS ORDERED:   Zosyn and vancomycin    FLUIDS GIVEN:   Defer fluids at this time given pleural effusion and EF 20-25%      PLAN OF CARE/ INTERVENTIONS PLANNED:   - abx + fluids as above  - VS q30min x 1hr, then q1hr x 6hrs  - F/u labs + imaging  - 2hr bedside follow up planned    DISPOSITION:  - Remain at current level of care  - Transfer to Telemetry  - Transfer to Stepdown  - Transfer to MICU/ CT ICU/ SICU/ Neuro ICU RRT called at 00:47 for patient with rigors and subsequently upgraded to code sepsis after pt found to have rectal temp 101.3, RR 30s, HR 150s    Pt seen at bedside.  Denies chest pain, SOB, n/v/d/c, abdominal pain, HA, dizziness, blurry vision.    VITAL SIGNS:  T 101.3 rectal    RR 30  /60  SPO2 92%    PHYSICAL EXAM:  GENERAL: NAD, lying in bed comfortably  HEAD:  Atraumatic, Normocephalic  EYES: EOMI, conjunctiva and sclera clear  CHEST/LUNG: Clear to auscultation bilaterally; No rales, rhonchi, wheezing, or rubs. Unlabored respirations  HEART: Tachycardic, irregular, +S1, S2  ABDOMEN: Soft, Nontender, Nondistended   EXTREMITIES:  2+ Peripheral Pulses, brisk capillary refill. No clubbing, cyanosis, or edema  NERVOUS SYSTEM:  Alert & Oriented X3, speech clear. Answers questions appropriately. No focal neurological deficits   MSK: FROM all 4 extremities, full and equal strength  SKIN: Warm, dry    Pt is a 72y old Male s/p a Code Sepsis called for fever 101.3, tachycardia 150, RR 30     SUSPECTED SOURCE: Likely L pleural effusion, culture from pleural fluid showing strep intermedius    STAT LABS ORDERED:   - CBC w/diff  - CMP  - Blood cultures x2  - Lactate  - Procalcitonin   - PT/PTT/INR  - UA  - Urine Culture if UA positive     IMAGING/DIAGNOSTIC STUDIES ORDERED:   CXR    ANTIBIOTICS ORDERED:   Zosyn and vancomycin    FLUIDS GIVEN:   Defer fluids at this time given pleural effusion and EF 20-25%      PLAN OF CARE/ INTERVENTIONS PLANNED:   - abx + fluids as above  - VS q30min x 1hr, then q1hr x 6hrs  - F/u labs + imaging  - 2hr bedside follow up planned    DISPOSITION:  - Remain at current level of care  - Transfer to Telemetry  - Transfer to Stepdown  - Transfer to MICU/ CT ICU/ SICU/ Neuro ICU RRT called at 00:47 for patient with rigors and subsequently upgraded to code sepsis after pt found to have rectal temp 101.3, RR 30s, HR 150s    Pt seen at bedside.  Denies chest pain, SOB, n/v/d/c, abdominal pain, HA, dizziness, blurry vision.    VITAL SIGNS:  T 101.3 rectal    RR 30  /60  SPO2 92%    PHYSICAL EXAM:  GENERAL: NAD, lying in bed comfortably  HEAD:  Atraumatic, Normocephalic  EYES: EOMI, conjunctiva and sclera clear  CHEST/LUNG: Clear to auscultation bilaterally; No rales, rhonchi, wheezing, or rubs. Unlabored respirations  HEART: Tachycardic, irregular, +S1, S2  ABDOMEN: Soft, Nontender, Nondistended   EXTREMITIES:  2+ Peripheral Pulses, brisk capillary refill. No clubbing, cyanosis, or edema  NERVOUS SYSTEM:  Alert & Oriented X3, speech clear. Answers questions appropriately. No focal neurological deficits   MSK: FROM all 4 extremities, full and equal strength  SKIN: Warm, dry    Pt is a 72y old Male s/p a Code Sepsis called for fever 101.3, tachycardia 150, RR 30     SUSPECTED SOURCE: Likely L pleural effusion, culture from pleural fluid showing strep intermedius    STAT LABS ORDERED:   - CBC w/diff  - CMP  - Blood cultures x2  - Lactate  - Procalcitonin   - PT/PTT/INR  - UA  - Urine Culture if UA positive     IMAGING/DIAGNOSTIC STUDIES ORDERED:   CXR    ANTIBIOTICS ORDERED:   Zosyn and vancomycin    FLUIDS GIVEN:   Defer fluids at this time given pleural effusion and EF 20-25%      PLAN OF CARE/ INTERVENTIONS PLANNED:   - abx + fluids as above  - VS q30min x 1hr, then q1hr x 6hrs  - F/u labs + imaging  - 2hr bedside follow up planned    DISPOSITION:  - Remain at current level of care RRT called at 00:47 for patient with rigors and subsequently upgraded to code sepsis after pt found to have rectal temp 101.3, RR 30s, HR 150s    Pt seen at bedside.  Denies chest pain, SOB, n/v/d/c, abdominal pain, HA, dizziness, blurry vision.    VITAL SIGNS:  T 101.3 rectal    RR 30  /60  SPO2 92%    PHYSICAL EXAM:  GENERAL: NAD, lying in bed comfortably  HEAD:  Atraumatic, Normocephalic  EYES: EOMI, conjunctiva and sclera clear  CHEST/LUNG: Decreased breath sounds over L lung  HEART: Tachycardic, irregular, +S1, S2  ABDOMEN: Soft, Nontender, Nondistended   EXTREMITIES:  2+ Peripheral Pulses, brisk capillary refill. No clubbing, cyanosis, or edema  NERVOUS SYSTEM:  Alert & Oriented X3, speech clear. Answers questions appropriately. No focal neurological deficits   MSK: FROM all 4 extremities, full and equal strength  SKIN: Warm, dry    Pt is a 72y old Male s/p a Code Sepsis called for fever 101.3, tachycardia 150, RR 30     SUSPECTED SOURCE: Likely L pleural effusion, culture from pleural fluid showing strep intermedius    STAT LABS ORDERED:   - CBC w/diff  - CMP  - Blood cultures x2  - Lactate  - Procalcitonin   - PT/PTT/INR  - UA  - Urine Culture if UA positive     IMAGING/DIAGNOSTIC STUDIES ORDERED:   CXR    ANTIBIOTICS ORDERED:   Zosyn and vancomycin    FLUIDS GIVEN:   Defer fluids at this time given pleural effusion and EF 20-25%      PLAN OF CARE/ INTERVENTIONS PLANNED:   - abx + fluids as above  - ofirmev   - VS q30min x 1hr, then q1hr x 6hrs  - F/u labs + imaging  - 2hr bedside follow up planned    DISPOSITION:  - Remain at current level of care

## 2023-11-24 NOTE — PROGRESS NOTE ADULT - NS ATTEST RISK PROBLEM GEN_ALL_CORE FT
acute respiratory failure with hypoxia. acute systolic CHF. A/flutter with RVR
uncontrolled atrial flutter and acute respiratory failure with hypoxia.
A.flutter with RVR, hypokalemia and acute systolic CHF
A.flutter with RVR. acute respiratory failure with hypoxia.
acute systolic CHF. A.flutter with RVR
sepsis and left empyema with low BP at high risk of decompensation.

## 2023-11-24 NOTE — PROGRESS NOTE ADULT - NS PANP COMMENT GEN_ALL_CORE FT
.  .  Patient with persistent 2:1 AFL. Earlier today his rates had improved a bit to the 100s suggestive of digoxin effect. Today however he is more hypotensive which may be iatrogenic due to higher doses of Metoprolol or may be reflective of sepsis as he is now febrile as well. I spoke with Dr. Mujica and he and I agree that it may be harder to get any better optimization of the patient before surgery. I also spoke with Dr. Encarnacion and informed him of such. I will discuss with anesthesia team this patient's case as I feel that it will be difficult to have any significant improvement in his rates without source control of his infection, which will likely only be achieved with VATS and washout. If he can be supported with IV esmolol + vasopressors as needed then we can pursue ischemic evaluation as well as restoration of normal rhythm with either cardioversion or ablation once he can be restarted on anticoagulation.    Gaurav Bolivar MD  Clinical Cardiac Electrophysiology

## 2023-11-24 NOTE — PROGRESS NOTE ADULT - SUBJECTIVE AND OBJECTIVE BOX
Brief summary: Pt being seen for left loculated hydropneumothorax , with plan for VATS 11/27  Patient seen and examined. Notes, flowsheets, medications, radiologic images and labs reviewed.    Patient denies acute pain with radiating or aggravating factors.  He denies chest pain, shortness of breath, palpitations, headache, dizziness, nausea, or vomiting.      Overnight events:  febrile overnight 100.3    PAST MEDICAL & SURGICAL HISTORY:  Coronary artery disease involving native coronary artery of native heart, unspecified whether angina      CHF with unknown LVEF      Primary hypertension      Hyperlipidemia, unspecified hyperlipidemia type      S/P CABG x 5      History of cholecystectomy          MEDICATIONS  acetaminophen     Tablet .. 650 milliGRAM(s) Oral every 6 hours PRN  aluminum hydroxide/magnesium hydroxide/simethicone Suspension 30 milliLiter(s) Oral every 4 hours PRN  aspirin  chewable 81 milliGRAM(s) Oral daily  atorvastatin 20 milliGRAM(s) Oral at bedtime  bisacodyl Suppository 10 milliGRAM(s) Rectal daily PRN  heparin   Injectable 39931 Unit(s) IV Push every 6 hours PRN  heparin   Injectable 5000 Unit(s) IV Push every 6 hours PRN  heparin  Infusion. 2100 Unit(s)/Hr IV Continuous <Continuous>  influenza  Vaccine (HIGH DOSE) 0.7 milliLiter(s) IntraMuscular once  melatonin 3 milliGRAM(s) Oral at bedtime PRN  metoprolol tartrate 50 milliGRAM(s) Oral every 6 hours  metoprolol tartrate Injectable 5 milliGRAM(s) IV Push every 6 hours PRN  ondansetron Injectable 4 milliGRAM(s) IV Push every 8 hours PRN  piperacillin/tazobactam IVPB.. 3.375 Gram(s) IV Intermittent every 8 hours  polyethylene glycol 3350 17 Gram(s) Oral daily  senna 2 Tablet(s) Oral at bedtime  MEDICATIONS  (PRN):  acetaminophen     Tablet .. 650 milliGRAM(s) Oral every 6 hours PRN Temp greater or equal to 38C (100.4F), Mild Pain (1 - 3)  aluminum hydroxide/magnesium hydroxide/simethicone Suspension 30 milliLiter(s) Oral every 4 hours PRN Dyspepsia  bisacodyl Suppository 10 milliGRAM(s) Rectal daily PRN Constipation  heparin   Injectable 42278 Unit(s) IV Push every 6 hours PRN For aPTT less than 40  heparin   Injectable 5000 Unit(s) IV Push every 6 hours PRN For aPTT between 40 - 57  melatonin 3 milliGRAM(s) Oral at bedtime PRN Insomnia  metoprolol tartrate Injectable 5 milliGRAM(s) IV Push every 6 hours PRN RVR > 130  ondansetron Injectable 4 milliGRAM(s) IV Push every 8 hours PRN Nausea and/or Vomiting      Daily                                 14.2   18.95 )-----------( 268      ( 24 Nov 2023 06:33 )             46.5   11-24    137  |  93<L>  |  85.2<H>  ----------------------------<  206<H>  3.4<L>   |  25.0  |  2.15<H>    Ca    8.8      24 Nov 2023 06:33  Mg     2.2     11-24    TPro  7.4  /  Alb  2.8<L>  /  TBili  1.7  /  DBili  x   /  AST  131<H>  /  ALT  135<H>  /  AlkPhos  222<H>  11-24      PT/INR - ( 24 Nov 2023 01:25 )   PT: 16.2 sec;   INR: 1.48 ratio         PTT - ( 24 Nov 2023 06:33 )  PTT:65.6 sec      Objective:  T(C): 36.5 (11-24-23 @ 09:48), Max: 37.9 (11-24-23 @ 04:37)  HR: 123 (11-24-23 @ 14:00) (123 - 135)  BP: 98/60 (11-24-23 @ 14:00) (82/52 - 116/78)  RR: 16 (11-24-23 @ 14:00) (16 - 37)  SpO2: 95% (11-24-23 @ 14:00) (94% - 98%)  Wt(kg): --CAPILLARY BLOOD GLUCOSE      POCT Blood Glucose.: 150 mg/dL (24 Nov 2023 00:49)  I&O's Summary    23 Nov 2023 07:01  -  24 Nov 2023 07:00  --------------------------------------------------------  IN: 483 mL / OUT: 916 mL / NET: -433 mL    24 Nov 2023 07:01  -  24 Nov 2023 15:48  --------------------------------------------------------  IN: 384 mL / OUT: 700 mL / NET: -316 mL        Physical Exam  Neuro: A+O x 3, non-focal, speech clear and intact  HEENT:  NCAT, PERRL, EOMI. No conjuctival edema or icterus, no thrush.  Neck: supple, trachea midline  Pulm: Left diminished and right CTA, no rales/rhonchi/wheezing, no accessory muscle use noted  CV: irregular irregular, +S1S2  Abd: soft, NT, ND, + BS  Ext: LEE x 4, no edema, no cyanosis or clubbing, 2+ DP b/l, distal motor/neuro/circ intact.   Skin: warm, dry, well perfused      Imaging:  CXR:  < from: Xray Chest 1 View- PORTABLE-Routine (Xray Chest 1 View- PORTABLE-Routine in AM.) (11.22.23 @ 05:26) >    ACC: [id number] EXAM:  XR CHEST PORTABLE ROUTINE 1V   ORDERED BY: ELLE ESCOBAR     ACC: [id number] EXAM:  XR CHEST PORTABLE ROUTINE 1V   ORDERED BY: AMALIA MENDOZA     PROCEDURE DATE:  11/21/2023          INTERPRETATION:  Chest one view 11/21/2023 5:04 AM    HISTORY: Follow-up pleural effusion    COMPARISON STUDY: 11/20/2023    Frontal expiratory view of the chest shows the heart to be probably   similar in size. Left base pigtail catheter is again noted. The lungs   show less congestion of the right lung with similar left chest whiteout   and there is no evidence of pneumothorax nor right pleural effusion.    Chest one view 11/22/2023 5:14 AM  Compared to the prior study, there is slight clearing of the left base.    IMPRESSION:  Leftchest whiteout as noted.        Thank you for the courtesy of this referral.    --- End of Report ---            BENNIE GREEN MD; Attending Interventional Radiologist  This document has been electronically signed. Nov 22 2023  3:05PM    < end of copied text >      ECG:    < from: 12 Lead ECG (11.18.23 @ 08:21) >    Ventricular Rate 145 BPM    Atrial Rate 145 BPM    P-R Interval 132 ms    QRS Duration 122 ms    Q-T Interval 278 ms    QTC Calculation(Bazett) 431 ms    P Axis 50 degrees    R Axis -29 degrees    T Axis 154 degrees    Diagnosis Line r/o at flutterwith variable block  Anteroseptal infarct , age undetermined  ST & T wave abnormality, consider lateral ischemia  Abnormal ECG    Confirmed by CARIE MAJANO ([id number]) on 11/18/2023 1:41:55 PM    < end of copied text >

## 2023-11-24 NOTE — PROGRESS NOTE ADULT - NS ATTEND AMEND GEN_ALL_CORE FT
Agree with above.     From the cardiovascular perspective, I've discussed with the patient we need to accept the risks of a non-cardiac procedure as necessary with best rate control we can achieve with non-modifiable cardiac risks, given code sepsis. He agrees.     Agree with EP, needs IV infusion for rate control.

## 2023-11-24 NOTE — PROGRESS NOTE ADULT - SUBJECTIVE AND OBJECTIVE BOX
[facility] Division of Hospital Medicine    Chief Complaint:      SUBJECTIVE / OVERNIGHT EVENTS:    Patient had fever and code sepsis overnight. now his BP is running low      MEDICATIONS  (STANDING):  aspirin  chewable 81 milliGRAM(s) Oral daily  atorvastatin 20 milliGRAM(s) Oral at bedtime  heparin  Infusion. 2100 Unit(s)/Hr (21 mL/Hr) IV Continuous <Continuous>  influenza  Vaccine (HIGH DOSE) 0.7 milliLiter(s) IntraMuscular once  metoprolol tartrate 50 milliGRAM(s) Oral every 6 hours  piperacillin/tazobactam IVPB.. 3.375 Gram(s) IV Intermittent every 8 hours  polyethylene glycol 3350 17 Gram(s) Oral daily  senna 2 Tablet(s) Oral at bedtime    MEDICATIONS  (PRN):  acetaminophen     Tablet .. 650 milliGRAM(s) Oral every 6 hours PRN Temp greater or equal to 38C (100.4F), Mild Pain (1 - 3)  aluminum hydroxide/magnesium hydroxide/simethicone Suspension 30 milliLiter(s) Oral every 4 hours PRN Dyspepsia  bisacodyl Suppository 10 milliGRAM(s) Rectal daily PRN Constipation  heparin   Injectable 66848 Unit(s) IV Push every 6 hours PRN For aPTT less than 40  heparin   Injectable 5000 Unit(s) IV Push every 6 hours PRN For aPTT between 40 - 57  melatonin 3 milliGRAM(s) Oral at bedtime PRN Insomnia  metoprolol tartrate Injectable 5 milliGRAM(s) IV Push every 6 hours PRN RVR > 130  ondansetron Injectable 4 milliGRAM(s) IV Push every 8 hours PRN Nausea and/or Vomiting        I&O's Summary    2023 07:  -  2023 07:00  --------------------------------------------------------  IN: 483 mL / OUT: 916 mL / NET: -433 mL    2023 07:01  -  2023 14:28  --------------------------------------------------------  IN: 384 mL / OUT: 700 mL / NET: -316 mL        PHYSICAL EXAM:  Vital Signs Last 24 Hrs  T(C): 36.5 (2023 09:48), Max: 37.9 (2023 04:37)  T(F): 97.7 (2023 09:48), Max: 100.3 (2023 04:37)  HR: 123 (2023 14:00) (123 - 135)  BP: 82/67 (2023 14:00) (82/52 - 116/78)  BP(mean): 72 (2023 14:00) (58 - 94)  RR: 16 (:00) (16 - 37)  SpO2: 95% (2023 14:00) (94% - 98%)    Parameters below as of 2023 14:00  Patient On (Oxygen Delivery Method): nasal cannula  O2 Flow (L/min): 2          CONSTITUTIONAL: appears lethargic   ENMT: normocephalic, atraumatic  RESPIRATORY: clear to auscultation right side. decreased breath sounds left lower chest. chest tube in place.  CARDIOVASCULAR: Regular rhythm and tachycardia, normal S1 and S2, no murmur/rub/gallop  ABDOMEN: Nontender to palpation, no rebound/guarding; No hepatosplenomegaly  MUSCLOSKELETAL:  b/l LE edema much improved  PSYCH: A+O to person, place, and time; affect appropriate  NEUROLOGY: CN 2-12 are intact and symmetric; no gross deficits;       LABS:                        14.2   18.95 )-----------( 268      ( 2023 06:33 )             46.5     11-24    137  |  93<L>  |  85.2<H>  ----------------------------<  206<H>  3.4<L>   |  25.0  |  2.15<H>    Ca    8.8      2023 06:33  Mg     2.2     11-24    TPro  7.4  /  Alb  2.8<L>  /  TBili  1.7  /  DBili  x   /  AST  131<H>  /  ALT  135<H>  /  AlkPhos  222<H>  11-24    PT/INR - ( 2023 01:25 )   PT: 16.2 sec;   INR: 1.48 ratio         PTT - ( 2023 06:33 )  PTT:65.6 sec      Urinalysis Basic - ( 2023 09:00 )    Color: Dark Yellow / Appearance: Cloudy / S.015 / pH: x  Gluc: x / Ketone: Negative mg/dL  / Bili: Negative / Urobili: 1.0 mg/dL   Blood: x / Protein: Negative mg/dL / Nitrite: Negative   Leuk Esterase: Negative / RBC: 1 /HPF / WBC 0 /HPF   Sq Epi: x / Non Sq Epi: 1 /HPF / Bacteria: Negative /HPF        CAPILLARY BLOOD GLUCOSE      POCT Blood Glucose.: 150 mg/dL (2023 00:49)        RADIOLOGY & ADDITIONAL TESTS:  Results Reviewed:   Imaging Personally Reviewed:  Electrocardiogram Personally Reviewed:

## 2023-11-24 NOTE — PROGRESS NOTE ADULT - ASSESSMENT
73 y/o male with PMH of CAD Cagb, CHF  (unknown EF) HTN, HLD presents with sob and CT revealed  a moderate to large loculated left pleural effusion.    Echo revealed echo was done which revealed an LVEF of 20-25 % and severely reduced RVSF. found to be in AFL with RVR,  b/p on soft side so difficult to treat.    Scheduled for QUIN cardioversion LHC  RHC today however worsening opacification of left lung and increasing o2 requirements      Problem/Plan - 1:  ·  Problem: HFrEF (heart failure with reduced ejection fraction). 20-25%  · SP RRT / code sepsis with fever and hypotension last night / cooling blanket / fevers resolved     -Remains on oxygen  -BNP 5.6K  -DEVON noted, US renal noted. Holding ACE/ARB at this time  -C/w IV diuretics  -LHC/  Ischemic eval  when acute issues are resolve   -consider ICU eval /     Problem/Plan - 2:  ·  Problem: Atrial flutter. / difficult to control   ·  EP recs/ digoxin added / esmolol drip being considered   -Rates not controlled  -continue IV Heparin for AC   -For rhythm control once LHC is performed.    Left loculated  pleural effusion   -Thoracic surg following   -PTC placed Left 11/18/23  Vats procedure on hold for HR control / sepsis mangement     D/W Dr Mujica  73 y/o male with PMH of CAD Cagb, CHF  (unknown EF) HTN, HLD presents with sob and CT revealed  a moderate to large loculated left pleural effusion.    Echo revealed echo was done which revealed an LVEF of 20-25 % and severely reduced RVSF. found to be in AFL with RVR,  b/p on soft side so difficult to treat.    Scheduled for QUIN cardioversion LHC  RHC however worsening opacification of left lung and increasing o2 requirements      Problem/Plan - 1:  ·  Problem: HFrEF (heart failure with reduced ejection fraction). 20-25%  · SP RRT / code sepsis with fever and hypotension last night / cooling blanket / fevers resolved     -Remains on oxygen  -BNP 5.6K  -DEVON noted, US renal noted. Holding ACE/ARB at this time  -C/w IV diuretics  -LHC/  Ischemic eval  when acute issues are resolve   -consider ICU eval /     Problem/Plan - 2:  ·  Problem: Atrial flutter. / difficult to control   ·  EP recs/ digoxin added / esmolol drip being considered   -Rates not controlled  -continue IV Heparin for AC   -For rhythm control once LHC is performed.    Left loculated  pleural effusion   -Thoracic surg following   -PTC placed Left 11/18/23  Vats procedure on hold for HR control / sepsis mangement     D/W Dr Mujica

## 2023-11-24 NOTE — PROGRESS NOTE ADULT - SUBJECTIVE AND OBJECTIVE BOX
[facility]                                                         CARDIOLOGY AT [facility]                                                                  [address], [place] [address]                                                         Telephone: [phone]. Fax:[phone]                                                                             PROGRESS NOTE    Reason for follow up: Aflutter   Update: "feels ok"   fever  101.3 / hypotension and code sepsis overnight   cooling blanket in use / tolerating diet     Review of symptoms:   Cardiac:  No chest pain. +dyspnea. No palpitations.  Respiratory: no cough. No dyspnea  Gastrointestinal: No diarrhea. No abdominal pain. No bleeding.   Neuro: No focal neuro complaints.    Vitals:  T(C): 37.9 (11-24-23 @ 04:37), Max: 37.9 (11-24-23 @ 04:37)  HR: 135 (11-24-23 @ 08:00) (128 - 135)  BP: 95/46 (11-24-23 @ 08:00) (82/52 - 116/78)  RR: 18 (11-24-23 @ 08:00) (18 - 37)  SpO2: 96% (11-24-23 @ 08:00) (94% - 98%)  Wt(kg): --  I&O's Summary    23 Nov 2023 07:01  -  24 Nov 2023 07:00  --------------------------------------------------------  IN: 483 mL / OUT: 916 mL / NET: -433 mL    24 Nov 2023 07:01  -  24 Nov 2023 09:18  --------------------------------------------------------  IN: 263 mL / OUT: 300 mL / NET: -37 mL          PHYSICAL EXAM:  Appearance: + dyspnea   HEENT:  Atraumatic. Normocephalic.  Normal oral mucosa  Neurologic: A & O x 3, no gross focal deficits.  Cardiovascular: RRR S1 S2, 130  No murmur, no rubs/gallops. No JVD  Respiratory:  diminished bs/ left chest tube in place   Gastrointestinal:  Soft, Non-tender, + BS  Lower Extremities: 2+ Peripheral Pulses, No clubbing, cyanosis, trace  edema bilat   Psychiatry: Patient is calm. No agitation.   Skin: warm and dry.    CURRENT CARDIAC MEDICATIONS:  digoxin     Tablet 250 MICROGram(s) Oral daily  furosemide   Injectable 40 milliGRAM(s) IV Push every 12 hours  metolazone 5 milliGRAM(s) Oral daily  metoprolol tartrate 50 milliGRAM(s) Oral every 6 hours  metoprolol tartrate Injectable 5 milliGRAM(s) IV Push every 6 hours PRN      CURRENT OTHER MEDICATIONS:  piperacillin/tazobactam IVPB.. 3.375 Gram(s) IV Intermittent every 8 hours  acetaminophen     Tablet .. 650 milliGRAM(s) Oral every 6 hours PRN Temp greater or equal to 38C (100.4F), Mild Pain (1 - 3)  melatonin 3 milliGRAM(s) Oral at bedtime PRN Insomnia  ondansetron Injectable 4 milliGRAM(s) IV Push every 8 hours PRN Nausea and/or Vomiting  aluminum hydroxide/magnesium hydroxide/simethicone Suspension 30 milliLiter(s) Oral every 4 hours PRN Dyspepsia  bisacodyl Suppository 10 milliGRAM(s) Rectal daily PRN Constipation  polyethylene glycol 3350 17 Gram(s) Oral daily  senna 2 Tablet(s) Oral at bedtime  atorvastatin 20 milliGRAM(s) Oral at bedtime  aspirin  chewable 81 milliGRAM(s) Oral daily  heparin   Injectable 89522 Unit(s) IV Push every 6 hours PRN For aPTT less than 40  heparin   Injectable 5000 Unit(s) IV Push every 6 hours PRN For aPTT between 40 - 57  heparin  Infusion. 2100 Unit(s)/Hr (21 mL/Hr) IV Continuous <Continuous>  influenza  Vaccine (HIGH DOSE) 0.7 milliLiter(s) IntraMuscular once      LABS:	 	  ( 18 Nov 2023 09:02 )  Troponin T  X    ,  CPK  43   , CKMB  X    , BNP X                                  14.2   18.95 )-----------( 268      ( 24 Nov 2023 06:33 )             46.5     11-24    137  |  93<L>  |  85.2<H>  ----------------------------<  206<H>  3.4<L>   |  25.0  |  2.15<H>    Ca    8.8      24 Nov 2023 06:33  Mg     2.2     11-24    TPro  7.4  /  Alb  2.8<L>  /  TBili  1.7  /  DBili  x   /  AST  131<H>  /  ALT  135<H>  /  AlkPhos  222<H>  11-24    PT/INR/PTT ( 24 Nov 2023 06:33 )                       :                       :      X            :       65.6                  .        .                   .              .           .       X           .                                       Lipid Profile:   HgA1c:   TSH:     TELEMETRY: AFlutter 130' s    DIAGNOSTIC TESTING:  < from: TTE Echo Complete w/o Contrast w/ Doppler (11.18.23 @ 12:34) >  PHYSICIAN INTERPRETATION:  Left Ventricle: The left ventricular internal cavity size is moderate to   severely increased.  Global LV systolic function was severely decreased. Left ventricular   ejection fraction, by visual estimation, is 20 to 25%. The mitral in-flow   pattern reveals no discernable A-wave, therefore no comment on diastolic   function can be made.  Right Ventricle: The right ventricular size is mildly enlarged. RV   systolic function is severely reduced.  Left Atrium: Mildly enlarged left atrium.  Right Atrium: Normal right atrial size.  Pericardium: There is no evidence of pericardial effusion.  MitralValve: Mild thickening and calcification of the anterior and   posterior mitral valve leaflets. There is mild mitral annular   calcification. Mild mitral valve regurgitation is seen.  Tricuspid Valve: Adequate TR velocity was not obtained to accurately   assess RVSP.  Aortic Valve: The aortic valve is trileaflet. Sclerotic aortic valve with   normal opening.  Pulmonic Valve: Trace pulmonic valve regurgitation.  Aorta: The aortic root is normal in size and structure.  Pulmonary Artery: The main pulmonary artery is normal in size.  Venous: The inferior vena cava was normal sized, with respiratory size   variation greater than 50%.     from: CT Chest No Cont (11.20.23 @ 09:09) >  IMPRESSION: Large loculated lefthydropneumothorax containing a pigtail   catheter.    Mucus/secretions in the left lower lobe bronchus.    1.4 cm ill-defined opacity is noted in the right lower lobe. It is   indeterminate based on this exam. Follow-up CT scan is recommended in 3   months to ensure resolution.

## 2023-11-25 LAB
ALBUMIN SERPL ELPH-MCNC: 2.5 G/DL — LOW (ref 3.3–5.2)
ALBUMIN SERPL ELPH-MCNC: 2.5 G/DL — LOW (ref 3.3–5.2)
ALP SERPL-CCNC: 175 U/L — HIGH (ref 40–120)
ALP SERPL-CCNC: 175 U/L — HIGH (ref 40–120)
ALT FLD-CCNC: 108 U/L — HIGH
ALT FLD-CCNC: 108 U/L — HIGH
ANION GAP SERPL CALC-SCNC: 16 MMOL/L — SIGNIFICANT CHANGE UP (ref 5–17)
ANION GAP SERPL CALC-SCNC: 16 MMOL/L — SIGNIFICANT CHANGE UP (ref 5–17)
AST SERPL-CCNC: 139 U/L — HIGH
AST SERPL-CCNC: 139 U/L — HIGH
BILIRUB SERPL-MCNC: 1.6 MG/DL — SIGNIFICANT CHANGE UP (ref 0.4–2)
BILIRUB SERPL-MCNC: 1.6 MG/DL — SIGNIFICANT CHANGE UP (ref 0.4–2)
BUN SERPL-MCNC: 91.1 MG/DL — HIGH (ref 8–20)
BUN SERPL-MCNC: 91.1 MG/DL — HIGH (ref 8–20)
CALCIUM SERPL-MCNC: 8.7 MG/DL — SIGNIFICANT CHANGE UP (ref 8.4–10.5)
CALCIUM SERPL-MCNC: 8.7 MG/DL — SIGNIFICANT CHANGE UP (ref 8.4–10.5)
CHLORIDE SERPL-SCNC: 95 MMOL/L — LOW (ref 96–108)
CHLORIDE SERPL-SCNC: 95 MMOL/L — LOW (ref 96–108)
CO2 SERPL-SCNC: 25 MMOL/L — SIGNIFICANT CHANGE UP (ref 22–29)
CO2 SERPL-SCNC: 25 MMOL/L — SIGNIFICANT CHANGE UP (ref 22–29)
CREAT SERPL-MCNC: 2.15 MG/DL — HIGH (ref 0.5–1.3)
CREAT SERPL-MCNC: 2.15 MG/DL — HIGH (ref 0.5–1.3)
DIGOXIN SERPL-MCNC: 0.6 NG/ML — LOW (ref 0.8–2)
DIGOXIN SERPL-MCNC: 0.6 NG/ML — LOW (ref 0.8–2)
EGFR: 32 ML/MIN/1.73M2 — LOW
EGFR: 32 ML/MIN/1.73M2 — LOW
GLUCOSE BLDC GLUCOMTR-MCNC: 170 MG/DL — HIGH (ref 70–99)
GLUCOSE BLDC GLUCOMTR-MCNC: 170 MG/DL — HIGH (ref 70–99)
GLUCOSE SERPL-MCNC: 175 MG/DL — HIGH (ref 70–99)
GLUCOSE SERPL-MCNC: 175 MG/DL — HIGH (ref 70–99)
HCT VFR BLD CALC: 40.9 % — SIGNIFICANT CHANGE UP (ref 39–50)
HCT VFR BLD CALC: 40.9 % — SIGNIFICANT CHANGE UP (ref 39–50)
HGB BLD-MCNC: 12.9 G/DL — LOW (ref 13–17)
HGB BLD-MCNC: 12.9 G/DL — LOW (ref 13–17)
MCHC RBC-ENTMCNC: 27.4 PG — SIGNIFICANT CHANGE UP (ref 27–34)
MCHC RBC-ENTMCNC: 27.4 PG — SIGNIFICANT CHANGE UP (ref 27–34)
MCHC RBC-ENTMCNC: 31.5 GM/DL — LOW (ref 32–36)
MCHC RBC-ENTMCNC: 31.5 GM/DL — LOW (ref 32–36)
MCV RBC AUTO: 86.8 FL — SIGNIFICANT CHANGE UP (ref 80–100)
MCV RBC AUTO: 86.8 FL — SIGNIFICANT CHANGE UP (ref 80–100)
PLATELET # BLD AUTO: 314 K/UL — SIGNIFICANT CHANGE UP (ref 150–400)
PLATELET # BLD AUTO: 314 K/UL — SIGNIFICANT CHANGE UP (ref 150–400)
POTASSIUM SERPL-MCNC: 3.5 MMOL/L — SIGNIFICANT CHANGE UP (ref 3.5–5.3)
POTASSIUM SERPL-MCNC: 3.5 MMOL/L — SIGNIFICANT CHANGE UP (ref 3.5–5.3)
POTASSIUM SERPL-SCNC: 3.5 MMOL/L — SIGNIFICANT CHANGE UP (ref 3.5–5.3)
POTASSIUM SERPL-SCNC: 3.5 MMOL/L — SIGNIFICANT CHANGE UP (ref 3.5–5.3)
PROT SERPL-MCNC: 6.5 G/DL — LOW (ref 6.6–8.7)
PROT SERPL-MCNC: 6.5 G/DL — LOW (ref 6.6–8.7)
RBC # BLD: 4.71 M/UL — SIGNIFICANT CHANGE UP (ref 4.2–5.8)
RBC # BLD: 4.71 M/UL — SIGNIFICANT CHANGE UP (ref 4.2–5.8)
RBC # FLD: 15.7 % — HIGH (ref 10.3–14.5)
RBC # FLD: 15.7 % — HIGH (ref 10.3–14.5)
SODIUM SERPL-SCNC: 136 MMOL/L — SIGNIFICANT CHANGE UP (ref 135–145)
SODIUM SERPL-SCNC: 136 MMOL/L — SIGNIFICANT CHANGE UP (ref 135–145)
VANCOMYCIN TROUGH SERPL-MCNC: 8.3 UG/ML — LOW (ref 10–20)
VANCOMYCIN TROUGH SERPL-MCNC: 8.3 UG/ML — LOW (ref 10–20)
WBC # BLD: 15.88 K/UL — HIGH (ref 3.8–10.5)
WBC # BLD: 15.88 K/UL — HIGH (ref 3.8–10.5)
WBC # FLD AUTO: 15.88 K/UL — HIGH (ref 3.8–10.5)
WBC # FLD AUTO: 15.88 K/UL — HIGH (ref 3.8–10.5)

## 2023-11-25 PROCEDURE — 99233 SBSQ HOSP IP/OBS HIGH 50: CPT

## 2023-11-25 PROCEDURE — 99223 1ST HOSP IP/OBS HIGH 75: CPT

## 2023-11-25 PROCEDURE — 71045 X-RAY EXAM CHEST 1 VIEW: CPT | Mod: 26

## 2023-11-25 PROCEDURE — 99231 SBSQ HOSP IP/OBS SF/LOW 25: CPT

## 2023-11-25 RX ORDER — DIGOXIN 250 MCG
125 TABLET ORAL ONCE
Refills: 0 | Status: DISCONTINUED | OUTPATIENT
Start: 2023-11-25 | End: 2023-11-25

## 2023-11-25 RX ORDER — TRAMADOL HYDROCHLORIDE 50 MG/1
25 TABLET ORAL THREE TIMES A DAY
Refills: 0 | Status: DISCONTINUED | OUTPATIENT
Start: 2023-11-25 | End: 2023-11-27

## 2023-11-25 RX ORDER — METOPROLOL TARTRATE 50 MG
75 TABLET ORAL EVERY 6 HOURS
Refills: 0 | Status: DISCONTINUED | OUTPATIENT
Start: 2023-11-25 | End: 2023-11-27

## 2023-11-25 RX ORDER — DIGOXIN 250 MCG
250 TABLET ORAL DAILY
Refills: 0 | Status: DISCONTINUED | OUTPATIENT
Start: 2023-11-25 | End: 2023-11-27

## 2023-11-25 RX ORDER — POTASSIUM CHLORIDE 20 MEQ
20 PACKET (EA) ORAL ONCE
Refills: 0 | Status: DISCONTINUED | OUTPATIENT
Start: 2023-11-25 | End: 2023-11-26

## 2023-11-25 RX ADMIN — HEPARIN SODIUM 2100 UNIT(S)/HR: 5000 INJECTION INTRAVENOUS; SUBCUTANEOUS at 07:42

## 2023-11-25 RX ADMIN — PIPERACILLIN AND TAZOBACTAM 25 GRAM(S): 4; .5 INJECTION, POWDER, LYOPHILIZED, FOR SOLUTION INTRAVENOUS at 14:59

## 2023-11-25 RX ADMIN — HEPARIN SODIUM 2100 UNIT(S)/HR: 5000 INJECTION INTRAVENOUS; SUBCUTANEOUS at 19:29

## 2023-11-25 RX ADMIN — HEPARIN SODIUM 2100 UNIT(S)/HR: 5000 INJECTION INTRAVENOUS; SUBCUTANEOUS at 08:39

## 2023-11-25 RX ADMIN — Medication 75 MILLIGRAM(S): at 11:35

## 2023-11-25 RX ADMIN — Medication 250 MILLIGRAM(S): at 11:41

## 2023-11-25 RX ADMIN — Medication 75 MILLIGRAM(S): at 18:30

## 2023-11-25 RX ADMIN — PIPERACILLIN AND TAZOBACTAM 25 GRAM(S): 4; .5 INJECTION, POWDER, LYOPHILIZED, FOR SOLUTION INTRAVENOUS at 05:37

## 2023-11-25 RX ADMIN — SENNA PLUS 2 TABLET(S): 8.6 TABLET ORAL at 22:36

## 2023-11-25 RX ADMIN — Medication 650 MILLIGRAM(S): at 06:00

## 2023-11-25 RX ADMIN — ATORVASTATIN CALCIUM 20 MILLIGRAM(S): 80 TABLET, FILM COATED ORAL at 22:36

## 2023-11-25 RX ADMIN — HEPARIN SODIUM 2100 UNIT(S)/HR: 5000 INJECTION INTRAVENOUS; SUBCUTANEOUS at 22:39

## 2023-11-25 RX ADMIN — Medication 650 MILLIGRAM(S): at 05:36

## 2023-11-25 RX ADMIN — Medication 125 MICROGRAM(S): at 05:37

## 2023-11-25 RX ADMIN — PIPERACILLIN AND TAZOBACTAM 25 GRAM(S): 4; .5 INJECTION, POWDER, LYOPHILIZED, FOR SOLUTION INTRAVENOUS at 22:38

## 2023-11-25 RX ADMIN — Medication 81 MILLIGRAM(S): at 11:35

## 2023-11-25 RX ADMIN — Medication 50 MILLIGRAM(S): at 05:37

## 2023-11-25 RX ADMIN — POLYETHYLENE GLYCOL 3350 17 GRAM(S): 17 POWDER, FOR SOLUTION ORAL at 11:35

## 2023-11-25 NOTE — PROGRESS NOTE ADULT - SUBJECTIVE AND OBJECTIVE BOX
seen for empyema    no acute complaints  mild sob/ no chest pain/palps  ros negative      MEDICATIONS  (STANDING):  aspirin  chewable 81 milliGRAM(s) Oral daily  atorvastatin 20 milliGRAM(s) Oral at bedtime  digoxin     Tablet 250 MICROGram(s) Oral daily  heparin  Infusion. 2100 Unit(s)/Hr (21 mL/Hr) IV Continuous <Continuous>  influenza  Vaccine (HIGH DOSE) 0.7 milliLiter(s) IntraMuscular once  metoprolol tartrate 75 milliGRAM(s) Oral every 6 hours  piperacillin/tazobactam IVPB.. 3.375 Gram(s) IV Intermittent every 8 hours  polyethylene glycol 3350 17 Gram(s) Oral daily  potassium chloride    Tablet ER 20 milliEquivalent(s) Oral once  senna 2 Tablet(s) Oral at bedtime  vancomycin  IVPB 1000 milliGRAM(s) IV Intermittent <User Schedule>    MEDICATIONS  (PRN):  acetaminophen     Tablet .. 650 milliGRAM(s) Oral every 6 hours PRN Temp greater or equal to 38C (100.4F), Mild Pain (1 - 3)  aluminum hydroxide/magnesium hydroxide/simethicone Suspension 30 milliLiter(s) Oral every 4 hours PRN Dyspepsia  bisacodyl Suppository 10 milliGRAM(s) Rectal daily PRN Constipation  heparin   Injectable 47104 Unit(s) IV Push every 6 hours PRN For aPTT less than 40  heparin   Injectable 5000 Unit(s) IV Push every 6 hours PRN For aPTT between 40 - 57  melatonin 3 milliGRAM(s) Oral at bedtime PRN Insomnia  metoprolol tartrate Injectable 5 milliGRAM(s) IV Push every 6 hours PRN RVR > 130  ondansetron Injectable 4 milliGRAM(s) IV Push every 8 hours PRN Nausea and/or Vomiting      Allergies    No Known Allergies      Vital Signs Last 24 Hrs  T(C): 36.4 (25 Nov 2023 04:07), Max: 36.9 (24 Nov 2023 20:00)  T(F): 97.5 (25 Nov 2023 04:07), Max: 98.5 (24 Nov 2023 20:00)  HR: 125 (25 Nov 2023 12:00) (123 - 132)  BP: 118/85 (25 Nov 2023 12:00) (85/53 - 132/71)  BP(mean): 98 (25 Nov 2023 12:00) (64 - 98)  RR: 24 (25 Nov 2023 12:00) (16 - 34)  SpO2: 95% (25 Nov 2023 06:00) (95% - 99%)    Parameters below as of 25 Nov 2023 12:00  Patient On (Oxygen Delivery Method): nasal cannula  O2 Flow (L/min): 2      PHYSICAL EXAM:    GENERAL: NAD  CHEST/LUNG: dec bs left base, left chest tube   HEART: Regular rate and rhythm; S1 S2  ABDOMEN: Soft,  Bowel sounds present  EXTREMITIES:  no edema   NERVOUS SYSTEM:  Alert & Oriented X3, Motor Strength 5/5 B/L upper and lower extremities  PSYCH: normal mood, appropriate response.    LABS:                        12.9   15.88 )-----------( 314      ( 25 Nov 2023 04:28 )             40.9     11-25    136  |  95<L>  |  91.1<H>  ----------------------------<  175<H>  3.5   |  25.0  |  2.15<H>    Ca    8.7      25 Nov 2023 04:28  Mg     2.2     11-24    TPro  6.5<L>  /  Alb  2.5<L>  /  TBili  1.6  /  DBili  x   /  AST  139<H>  /  ALT  108<H>  /  AlkPhos  175<H>  11-25    PT/INR - ( 24 Nov 2023 01:25 )   PT: 16.2 sec;   INR: 1.48 ratio         PTT - ( 24 Nov 2023 06:33 )  PTT:65.6 sec  Urinalysis Basic - ( 25 Nov 2023 04:28 )    Color: x / Appearance: x / SG: x / pH: x  Gluc: 175 mg/dL / Ketone: x  / Bili: x / Urobili: x   Blood: x / Protein: x / Nitrite: x   Leuk Esterase: x / RBC: x / WBC x   Sq Epi: x / Non Sq Epi: x / Bacteria: x        CAPILLARY BLOOD GLUCOSE      POCT Blood Glucose.: 170 mg/dL (25 Nov 2023 11:16)        RADIOLOGY & ADDITIONAL TESTS:

## 2023-11-25 NOTE — PROGRESS NOTE ADULT - SUBJECTIVE AND OBJECTIVE BOX
Patient seen today in bed with family at bedside. Patient and family quite frustrated by prolonged hospitalization.     TELE: Aflutter with rates in the 130s.     MEDICATIONS  (STANDING):  aspirin  chewable 81 milliGRAM(s) Oral daily  atorvastatin 20 milliGRAM(s) Oral at bedtime  digoxin     Tablet 125 MICROGram(s) Oral daily  heparin  Infusion. 2100 Unit(s)/Hr (21 mL/Hr) IV Continuous <Continuous>  influenza  Vaccine (HIGH DOSE) 0.7 milliLiter(s) IntraMuscular once  metoprolol tartrate 75 milliGRAM(s) Oral every 6 hours  piperacillin/tazobactam IVPB.. 3.375 Gram(s) IV Intermittent every 8 hours  polyethylene glycol 3350 17 Gram(s) Oral daily  senna 2 Tablet(s) Oral at bedtime  vancomycin  IVPB 1000 milliGRAM(s) IV Intermittent <User Schedule>    MEDICATIONS  (PRN):  acetaminophen     Tablet .. 650 milliGRAM(s) Oral every 6 hours PRN Temp greater or equal to 38C (100.4F), Mild Pain (1 - 3)  aluminum hydroxide/magnesium hydroxide/simethicone Suspension 30 milliLiter(s) Oral every 4 hours PRN Dyspepsia  bisacodyl Suppository 10 milliGRAM(s) Rectal daily PRN Constipation  heparin   Injectable 17817 Unit(s) IV Push every 6 hours PRN For aPTT less than 40  heparin   Injectable 5000 Unit(s) IV Push every 6 hours PRN For aPTT between 40 - 57  melatonin 3 milliGRAM(s) Oral at bedtime PRN Insomnia  metoprolol tartrate Injectable 5 milliGRAM(s) IV Push every 6 hours PRN RVR > 130  ondansetron Injectable 4 milliGRAM(s) IV Push every 8 hours PRN Nausea and/or Vomiting    Allergies  No Known Allergies    PAST MEDICAL & SURGICAL HISTORY:  Coronary artery disease involving native coronary artery of native heart, unspecified whether angina  CHF with unknown LVEF  Primary hypertension  Hyperlipidemia, unspecified hyperlipidemia type  S/P CABG x 5  History of cholecystectomy    Vital Signs Last 24 Hrs  T(C): 36.4 (25 Nov 2023 04:07), Max: 36.9 (24 Nov 2023 20:00)  T(F): 97.5 (25 Nov 2023 04:07), Max: 98.5 (24 Nov 2023 20:00)  HR: 130 (25 Nov 2023 10:00) (123 - 132)  BP: 96/75 (25 Nov 2023 10:00) (82/67 - 132/71)  BP(mean): 83 (25 Nov 2023 10:00) (64 - 93)  RR: 34 (25 Nov 2023 10:00) (16 - 34)  SpO2: 95% (25 Nov 2023 06:00) (94% - 99%)    Parameters below as of 25 Nov 2023 10:00  Patient On (Oxygen Delivery Method): nasal cannula  O2 Flow (L/min): 2    Physical Exam:  Constitutional: awake, alert, mild work of breathing  Cardiovascular: +S1S2 tachycardic; IR AFlutter at time of exam.   Pulmonary: decreased breath sounds on L>R   GI: soft NTND +BS  Extremities: 1+ pedal edema, venous stasis changes   Neuro: non focal, LEE x4    LABS:                        12.9   15.88 )-----------( 314      ( 25 Nov 2023 04:28 )             40.9     136  |  95<L>  |  91.1<H>  ----------------------------<  175<H>  3.5   |  25.0  |  2.15<H>  Ca    8.7      25 Nov 2023 04:28  Mg     2.2     11-24  TPro  6.5<L>  /  Alb  2.5<L>  /  TBili  1.6  /  DBili  x   /  AST  139<H>  /  ALT  108<H>  /  AlkPhos  175<H>  11-25  PT/INR - ( 24 Nov 2023 01:25 )   PT: 16.2 sec;   INR: 1.48 ratio    PTT - ( 24 Nov 2023 06:33 )  PTT:65.6 sec    A/P  72 year old male patient with HTN, hyperlipidemia, obesity BMI 41.3, CAD s/p CABG x4 (2020 [facility] at [facility]), HFrEF, EF 20-25%, remote AF (transient in the postop setting of CABG with no reoccurrence- not on AC). He presented to [facility] ED on 11/18 with several day history of dyspnea and lower extremity swelling. Admitted with acute respiratory failure due to large pleural effusion, acute HFrEF exacerbation with reduced RV function, and atrial flutter (likely typical) with RVR.     CHADSVASC: 4  He remains in atrial flutter with difficult to control rates. Digoxin was added. No real improvement.  VATS planned as add on for Monday 11/27    - Rate control strategy for now. Digoxin added with little of no effect  - Level at 0.6  - Monitor electrolytes. Keep K > 4, Mg > 2   - GDMT and diuresis as per general cardiology   - Thoracic surgery following. VATS and washout on 11/27   - Can be supported with Esmolol gtt and vasopressors intra-op  - Ischemic evaluation and restoration of SR with DCCV vs ablation once anticoagulation can be restarted.   - Discussed with Thoracic NP, family, and Dr. Bolivar.      Patient seen today in bed with family at bedside. Patient and family quite frustrated by prolonged hospitalization.     TELE: Aflutter with rates in the 130s.     MEDICATIONS  (STANDING):  aspirin  chewable 81 milliGRAM(s) Oral daily  atorvastatin 20 milliGRAM(s) Oral at bedtime  digoxin     Tablet 125 MICROGram(s) Oral daily  heparin  Infusion. 2100 Unit(s)/Hr (21 mL/Hr) IV Continuous <Continuous>  influenza  Vaccine (HIGH DOSE) 0.7 milliLiter(s) IntraMuscular once  metoprolol tartrate 75 milliGRAM(s) Oral every 6 hours  piperacillin/tazobactam IVPB.. 3.375 Gram(s) IV Intermittent every 8 hours  polyethylene glycol 3350 17 Gram(s) Oral daily  senna 2 Tablet(s) Oral at bedtime  vancomycin  IVPB 1000 milliGRAM(s) IV Intermittent <User Schedule>    MEDICATIONS  (PRN):  acetaminophen     Tablet .. 650 milliGRAM(s) Oral every 6 hours PRN Temp greater or equal to 38C (100.4F), Mild Pain (1 - 3)  aluminum hydroxide/magnesium hydroxide/simethicone Suspension 30 milliLiter(s) Oral every 4 hours PRN Dyspepsia  bisacodyl Suppository 10 milliGRAM(s) Rectal daily PRN Constipation  heparin   Injectable 60261 Unit(s) IV Push every 6 hours PRN For aPTT less than 40  heparin   Injectable 5000 Unit(s) IV Push every 6 hours PRN For aPTT between 40 - 57  melatonin 3 milliGRAM(s) Oral at bedtime PRN Insomnia  metoprolol tartrate Injectable 5 milliGRAM(s) IV Push every 6 hours PRN RVR > 130  ondansetron Injectable 4 milliGRAM(s) IV Push every 8 hours PRN Nausea and/or Vomiting    Allergies  No Known Allergies    PAST MEDICAL & SURGICAL HISTORY:  Coronary artery disease involving native coronary artery of native heart, unspecified whether angina  CHF with unknown LVEF  Primary hypertension  Hyperlipidemia, unspecified hyperlipidemia type  S/P CABG x 5  History of cholecystectomy    Vital Signs Last 24 Hrs  T(C): 36.4 (25 Nov 2023 04:07), Max: 36.9 (24 Nov 2023 20:00)  T(F): 97.5 (25 Nov 2023 04:07), Max: 98.5 (24 Nov 2023 20:00)  HR: 130 (25 Nov 2023 10:00) (123 - 132)  BP: 96/75 (25 Nov 2023 10:00) (82/67 - 132/71)  BP(mean): 83 (25 Nov 2023 10:00) (64 - 93)  RR: 34 (25 Nov 2023 10:00) (16 - 34)  SpO2: 95% (25 Nov 2023 06:00) (94% - 99%)    Parameters below as of 25 Nov 2023 10:00  Patient On (Oxygen Delivery Method): nasal cannula  O2 Flow (L/min): 2    Physical Exam:  Constitutional: awake, alert, mild work of breathing  Cardiovascular: +S1S2 tachycardic; IR AFlutter at time of exam.   Pulmonary: decreased breath sounds on L>R   GI: soft NTND +BS  Extremities: 1+ pedal edema, venous stasis changes   Neuro: non focal, LEE x4    LABS:                        12.9   15.88 )-----------( 314      ( 25 Nov 2023 04:28 )             40.9     136  |  95<L>  |  91.1<H>  ----------------------------<  175<H>  3.5   |  25.0  |  2.15<H>  Ca    8.7      25 Nov 2023 04:28  Mg     2.2     11-24  TPro  6.5<L>  /  Alb  2.5<L>  /  TBili  1.6  /  DBili  x   /  AST  139<H>  /  ALT  108<H>  /  AlkPhos  175<H>  11-25  PT/INR - ( 24 Nov 2023 01:25 )   PT: 16.2 sec;   INR: 1.48 ratio    PTT - ( 24 Nov 2023 06:33 )  PTT:65.6 sec    A/P  72 year old male patient with HTN, hyperlipidemia, obesity BMI 41.3, CAD s/p CABG x4 (2020 [facility] at [facility]), HFrEF, EF 20-25%, remote AF (transient in the postop setting of CABG with no reoccurrence- not on AC). He presented to [facility] ED on 11/18 with several day history of dyspnea and lower extremity swelling. Admitted with acute respiratory failure due to large pleural effusion, acute HFrEF exacerbation with reduced RV function, and atrial flutter (likely typical) with RVR.     CHADSVASC: 4  He remains in atrial flutter with difficult to control rates. Digoxin was added. No real improvement.  VATS planned as add on for Monday 11/27    - Rate control strategy for now. Digoxin added with little of no effect - increase to 0.25 mg daily for 2 days then back to 0.125mg daily  - Level at 0.6  - Inc Metoprolol to 75mg q6h  - Monitor electrolytes. Keep K > 4, Mg > 2   - GDMT and diuresis as per general cardiology   - Thoracic surgery following. VATS and washout on 11/27   - Can be supported with Esmolol gtt and vasopressors intra-op  - Ischemic evaluation and restoration of SR with DCCV vs ablation once anticoagulation can be restarted.   - Discussed with Thoracic NP, family, and Dr. Bolivar.

## 2023-11-25 NOTE — CONSULT NOTE ADULT - SUBJECTIVE AND OBJECTIVE BOX
[facility]  INFECTIOUS DISEASES at [place] and [place]  =====================================================       Luis Ivey MD                                                        Diplomates American Board of Internal Medicine & Infectious Diseases                * [place] Office - Appt - Tel  [phone] Fax [phone]                * [place] Office - Appt - Tel [phone] Fax [phone]                                  Hospital Consult line:  [phone]  =====================================================      [id number]  GRETA AGUAYO        CC: Patient is a 72y old  Male who presents with a chief complaint of SOB (24 Nov 2023 15:47)      HPI: Patient is a 73 yo M w/ PMH of CAD s/p CABG, CHF, HTN, HLD, Afib (not on AC due to hx of GIB) presenting for chief complaint of SOB. Patient states he started experiencing SOB and LE swelling for the past few days He states he was seen by his Cardiologist on, Dr. Milian, on Tuesday and his Metoprolol was increased from 50mg to 100mg. He states his symptoms worsened yesterday therefore he came to the ED today. He states he has been unable to walk around without getting short of breath. He reports compliance with medications and diet. Patient denies fever, chills, chest pain, palpitations, cough, wheezing, abdominal pain, nausea, vomiting, diarrhea, constipation, bloody bowel movements, melena, hematemesis, urinary complaints, syncope, calf tenderness, paresthesias.  (18 Nov 2023 14:38)    ______________________________________________________  PAST MEDICAL & SURGICAL HISTORY:  Coronary artery disease involving native coronary artery of native heart, unspecified whether angina  CHF   Primary hypertension  Hyperlipidemia  S/P CABG x 5  History of cholecystectomy    Social history:      FAMILY HISTORY:  Family history of coronary artery bypass surgery (Father)    ______________________________________________________  Allergies    No Known Allergies    Intolerances        ______________________________________________________  MEDICATIONS:  Antibiotics:  piperacillin/tazobactam IVPB.. 3.375 Gram(s) IV Intermittent every 8 hours  vancomycin  IVPB 1000 milliGRAM(s) IV Intermittent <User Schedule>    Other medications:  aspirin  chewable 81 milliGRAM(s) Oral daily  atorvastatin 20 milliGRAM(s) Oral at bedtime  digoxin     Tablet 125 MICROGram(s) Oral daily  heparin  Infusion. 2100 Unit(s)/Hr IV Continuous <Continuous>  influenza  Vaccine (HIGH DOSE) 0.7 milliLiter(s) IntraMuscular once  metoprolol tartrate 75 milliGRAM(s) Oral every 6 hours  polyethylene glycol 3350 17 Gram(s) Oral daily  senna 2 Tablet(s) Oral at bedtime    ______________________________________________________  REVIEW OF SYSTEMS:  CONSTITUTIONAL:  as per HPI   HEENT:  No diplopia or blurred vision.  No earache, sore throat or runny nose.  CARDIOVASCULAR:  No chest pain. Improving LE edema   RESPIRATORY:  as per HPI   GASTROINTESTINAL:  No nausea, vomiting, abdominal pain or diarrhea.  GENITOURINARY:  No dysuria, frequency or urgency. No blood in urine  MUSCULOSKELETAL:  no joint aches, no muscle pain  SKIN:  No change in skin, hair or nails.  NEUROLOGIC:  No headaches, seizures  PSYCHIATRIC:  No disorder of thought or mood.  ENDOCRINE:  No heat or cold intolerance  HEMATOLOGICAL:  No easy bruising or bleeding.     _____________________________________________________  PHYSICAL EXAM:  GEN: AAOx4, in NAD. Obese   HEENT: normocephalic and atraumatic. PERRL.  Anicteric sclerae. Moist mucous membranes. No mucosal lesions. No nasal discharge.   NECK: Supple. No palpable neck masses or LN  LUNGS: unlabored breathing, but mild dyspnea during conversation. Decreased BS in left lung. Left CT in place  HEART: IRR, tachycardic   ABDOMEN: Soft, NT, ND, obese, no palpable masses.  +BS.    : no Burger catheter. PureWick  EXTREMITIES: ed  MSK: No joint deformity or swelling  LYMPH: no palpable cervical, supraclavicular, axillary or inguinal lymph nodes  NEUROLOGIC: Grossly no motor focal deficits   PSYCHIATRIC: Appropriate affect and mood.  SKIN: No rash or jaundice  LINES: PIV     ______________________________________________________      Vitals:  T(F): 97.5 (25 Nov 2023 04:07), Max: 98.5 (24 Nov 2023 20:00)  HR: 129 (25 Nov 2023 06:00)  BP: 126/58 (25 Nov 2023 06:00)  RR: 18 (25 Nov 2023 06:00)  SpO2: 95% (25 Nov 2023 06:00) (94% - 99%)  temp max in last 48H T(F): , Max: 101 (11-24-23 @ 00:50)    Current Antibiotics:  piperacillin/tazobactam IVPB.. 3.375 Gram(s) IV Intermittent every 8 hours  vancomycin  IVPB 1000 milliGRAM(s) IV Intermittent <User Schedule>    Other medications:  aspirin  chewable 81 milliGRAM(s) Oral daily  atorvastatin 20 milliGRAM(s) Oral at bedtime  digoxin     Tablet 125 MICROGram(s) Oral daily  heparin  Infusion. 2100 Unit(s)/Hr IV Continuous <Continuous>  influenza  Vaccine (HIGH DOSE) 0.7 milliLiter(s) IntraMuscular once  metoprolol tartrate 75 milliGRAM(s) Oral every 6 hours  polyethylene glycol 3350 17 Gram(s) Oral daily  senna 2 Tablet(s) Oral at bedtime                            12.9   15.88 )-----------( 314      ( 25 Nov 2023 04:28 )             40.9     11-25    136  |  95<L>  |  91.1<H>  ----------------------------<  175<H>  3.5   |  25.0  |  2.15<H>    Ca    8.7      25 Nov 2023 04:28  Mg     2.2     11-24    TPro  6.5<L>  /  Alb  2.5<L>  /  TBili  1.6  /  DBili  x   /  AST  139<H>  /  ALT  108<H>  /  AlkPhos  175<H>  11-25    RECENT CULTURES:  11-24 @ 09:00    RVP with SARS-CoV-2   NotDetec      11-24 @ 01:25 .Blood Blood-Peripheral     No growth at 24 hours        11-23 @ 12:42 .Blood Blood-Peripheral     No growth at 24 hours        11-23 @ 12:35 .Blood Blood-Peripheral     No growth at 24 hours        11-18 @ 14:00 Pleural Fl Pleural Fluid     Few Streptococcus intermedius    polymorphonuclear leukocytes seen  No organisms seen  by cytocentrifuge      11-18 @ 08:30   RVP with SARS-CoV-2   NotDetec      WBC Count: 15.88 K/uL (11-25-23 @ 04:28)  WBC Count: 18.95 K/uL (11-24-23 @ 06:33)  WBC Count: 8.97 K/uL (11-24-23 @ 01:25)  WBC Count: 19.09 K/uL (11-23-23 @ 11:22)  WBC Count: 18.58 K/uL (11-23-23 @ 02:32)  WBC Count: 16.94 K/uL (11-22-23 @ 23:47)  WBC Count: 17.10 K/uL (11-22-23 @ 07:28)  WBC Count: 14.11 K/uL (11-21-23 @ 05:50)    Creatinine: 2.15 mg/dL (11-25-23 @ 04:28)  Creatinine: 2.15 mg/dL (11-24-23 @ 06:33)  Creatinine: 1.96 mg/dL (11-24-23 @ 01:25)  Creatinine: 1.79 mg/dL (11-23-23 @ 02:32)  Creatinine: 1.83 mg/dL (11-22-23 @ 07:28)  Creatinine: 1.67 mg/dL (11-21-23 @ 05:50)    Procalcitonin, Serum: 0.74 ng/mL (11-24-23 @ 01:25)  Procalcitonin, Serum: 2.59 ng/mL (11-19-23 @ 01:29)     SARS-CoV-2: NotDetec (11-24-23 @ 09:00)  SARS-CoV-2: NotDetec (11-18-23 @ 08:30)    Vancomycin Level, Trough: 8.3 ug/mL (11-25-23 @ 04:28)  ______________________________________________________  CARDIOLOGY  < from: TTE Echo Complete w/o Contrast w/ Doppler (11.18.23 @ 12:34) >  PHYSICIAN INTERPRETATION:  Left Ventricle: The left ventricular internal cavity size is moderate to   severely increased.  Global LV systolic function was severely decreased. Left ventricular   ejection fraction, by visual estimation, is 20 to 25%. The mitral in-flow   pattern reveals no discernable A-wave, therefore no comment on diastolic   function can be made.  Right Ventricle: The right ventricular size is mildly enlarged. RV   systolic function is severely reduced.  Left Atrium: Mildly enlarged left atrium.  Right Atrium: Normal right atrial size.  Pericardium: There is no evidence of pericardial effusion.  MitralValve: Mild thickening and calcification of the anterior and   posterior mitral valve leaflets. There is mild mitral annular   calcification. Mild mitral valve regurgitation is seen.  Tricuspid Valve: Adequate TR velocity was not obtained to accurately   assess RVSP.  Aortic Valve: The aortic valve is trileaflet. Sclerotic aortic valve with   normal opening.  Pulmonic Valve: Trace pulmonic valve regurgitation.  Aorta: The aortic root is normal in size and structure.  Pulmonary Artery: The main pulmonary artery is normal in size.  Venous: The inferior vena cava was normal sized, with respiratory size   variation greater than 50%.      Summary:   1. There is mild concentric left ventricular hypertrophy.   2. Moderate to severely increased left ventricular internal cavity size.   3. Left ventricular ejection fraction, by visual estimation, is 20 to   25%.   4. Severely decreased global left ventricular systolic function.   5. The mitral in-flow pattern reveals no discernable A-wave, therefore   no comment on diastolic function can be made.   6. Mildly enlarged right ventricle.   7. Severely reduced RV systolic function.   8. Mildly enlarged left atrium.   9. Normal right atrial size.  10. Sclerotic aortic valve with normal opening.  11. Mild thickening and calcification of the anterior and posterior   mitral valve leaflets.  12. Mild mitral annular calcification.  13. Mild mitral valve regurgitation.  14. The main pulmonary artery is normal in size.  15. There is no evidence of pericardial effusion.      ______________________________________________________  RADIOLOGY    < end of copied text >  < from: Xray Chest 1 View AP/PA. (11.24.23 @ 02:31) >  FINDINGS:  11/23/2023 5:54 AM:  The patient is status post sternotomy.  The heart is not well assessed on an AP film.  The right lung is clear.  There is complete opacification of the left hemithorax without volume   loss suggesting a large left pleural effusion. A pigtail catheter is seen   at the left lung base.  There is no pneumothorax.    11/24/2023 2:02 AM:  There is improved aeration of the left upper lung with decreased left   pleural effusion. Moderate loculated left pleural effusion and   atelectasis remains.    11/25/2023 6:33 AM:  There is no significant change.    IMPRESSION:    Some decrease in moderate to large loculated left pleural effusion and   atelectasis from the initial film. A left pigtail catheter is in place.    < end of copied text >  < from: CT Chest No Cont (11.20.23 @ 09:09) >  INTERPRETATION:  Clinical information: Follow-up examination. Exam is   compared to previous study of 11/18/2023.    CT scan of the chest was obtained without administration of intravenous   contrast.    Several lymph nodes are present in the pretracheal space, AP window,   anterior mediastinum and the subcarinal region.    Heart is enlarged in size. Patient is status post CABG. No pericardial  effusion is noted.    Mucous/secretions are noted within the left lower lobe bronchus. 1.4 cm   ill-defined opacity is noted in the right lower lobe. Near complete   atelectasis of the left lung is noted. Large loculated left pleural   effusion is noted. A left pigtail catheter and droplets of air are   present within the left pleural effusion.    Below the diaphragm, visualized portions of the abdomen demonstrate   patient to be status post cholecystectomy. Extensive thickening of both   adrenal glands is noted. Low-attenuation lesion in the right kidney is   incompletely imaged on this exam.    Degenerative changes of the spine are noted. Subcutaneous emphysema is   noted in the left lateral chest wall.    IMPRESSION: Large loculated lefthydropneumothorax containing a pigtail   catheter.    Mucus/secretions in the left lower lobe bronchus.    1.4 cm ill-defined opacity is noted in the right lower lobe. It is   indeterminate based on this exam. Follow-up CT scan is recommended in 3   months to ensure resolution.    < end of copied text >     [facility]  INFECTIOUS DISEASES at [place] and [place]  =====================================================       Luis Ivey MD                                                        Diplomates American Board of Internal Medicine & Infectious Diseases                * [place] Office - Appt - Tel  [phone] Fax [phone]                * [place] Office - Appt - Tel [phone] Fax [phone]                                  Hospital Consult line:  [phone]  =====================================================      [id number]  GRETA DILANIQRA        CC: Patient is a 72y old  Male who presents with a chief complaint of SOB (24 Nov 2023 15:47)      HPI: Patient is a 71 yo M w/ PMH of CAD s/p CABG, CHF, HTN, HLD, Afib (not on AC due to hx of GIB) presenting for chief complaint of SOB. Patient states he started experiencing SOB and LE swelling for the past few days He states he was seen by his Cardiologist on, Dr. Milian, on Tuesday and his Metoprolol was increased from 50mg to 100mg. He states his symptoms worsened yesterday therefore he came to the ED today. He states he has been unable to walk around without getting short of breath. He reports compliance with medications and diet. Patient denies fever, chills, chest pain, palpitations, cough, wheezing, abdominal pain, nausea, vomiting, diarrhea, constipation, bloody bowel movements, melena, hematemesis, urinary complaints, syncope, calf tenderness, paresthesias.  (18 Nov 2023 14:38)    ID input requested for management of empyema and fever.     On my exam - denies CP. Feeling dyspneic at rest and with minimal movements. No nausea, vomiting, diarrhea. No fever today.   ______________________________________________________  PAST MEDICAL & SURGICAL HISTORY:  Coronary artery disease involving native coronary artery of native heart, unspecified whether angina  CHF   Primary hypertension  Hyperlipidemia  S/P CABG x 5  History of cholecystectomy    Social history:  Former smoker - quit 2021  No alcohol or drug abuse     FAMILY HISTORY:  Family history of coronary artery bypass surgery (Father)    ______________________________________________________  Allergies    No Known Allergies    Intolerances        ______________________________________________________  MEDICATIONS:  Antibiotics:  piperacillin/tazobactam IVPB.. 3.375 Gram(s) IV Intermittent every 8 hours  vancomycin  IVPB 1000 milliGRAM(s) IV Intermittent <User Schedule>    Other medications:  aspirin  chewable 81 milliGRAM(s) Oral daily  atorvastatin 20 milliGRAM(s) Oral at bedtime  digoxin     Tablet 125 MICROGram(s) Oral daily  heparin  Infusion. 2100 Unit(s)/Hr IV Continuous <Continuous>  influenza  Vaccine (HIGH DOSE) 0.7 milliLiter(s) IntraMuscular once  metoprolol tartrate 75 milliGRAM(s) Oral every 6 hours  polyethylene glycol 3350 17 Gram(s) Oral daily  senna 2 Tablet(s) Oral at bedtime    ______________________________________________________  REVIEW OF SYSTEMS:  CONSTITUTIONAL:  as per HPI   HEENT:  No diplopia or blurred vision.  No earache, sore throat or runny nose.  CARDIOVASCULAR:  No chest pain. Improving LE edema   RESPIRATORY:  as per HPI   GASTROINTESTINAL:  No nausea, vomiting, abdominal pain or diarrhea.  GENITOURINARY:  No dysuria, frequency or urgency. No blood in urine  MUSCULOSKELETAL:  no joint aches, no muscle pain  SKIN:  No change in skin, hair or nails.  NEUROLOGIC:  No headaches, seizures  PSYCHIATRIC:  No disorder of thought or mood.  ENDOCRINE:  No heat or cold intolerance  HEMATOLOGICAL:  No easy bruising or bleeding.     _____________________________________________________  PHYSICAL EXAM:  GEN: AAOx4, in NAD. Obese   HEENT: normocephalic and atraumatic. PERRL.  Anicteric sclerae. Moist mucous membranes. No mucosal lesions. No nasal discharge.   NECK: Supple. No palpable neck masses or LN  LUNGS: unlabored breathing, but mild dyspnea during conversation. Decreased BS in left lung. Left CT in place  HEART: IRR, tachycardic   ABDOMEN: Soft, NT, ND, obese, no palpable masses.  +BS.    : no Burger catheter. PureWick  EXTREMITIES: ed  MSK: No joint deformity or swelling  LYMPH: no palpable cervical, supraclavicular, axillary or inguinal lymph nodes  NEUROLOGIC: Grossly no motor focal deficits   PSYCHIATRIC: Appropriate affect and mood.  SKIN: No rash or jaundice  LINES: PIV     ______________________________________________________      Vitals:  T(F): 97.5 (25 Nov 2023 04:07), Max: 98.5 (24 Nov 2023 20:00)  HR: 129 (25 Nov 2023 06:00)  BP: 126/58 (25 Nov 2023 06:00)  RR: 18 (25 Nov 2023 06:00)  SpO2: 95% (25 Nov 2023 06:00) (94% - 99%)  temp max in last 48H T(F): , Max: 101 (11-24-23 @ 00:50)    Current Antibiotics:  piperacillin/tazobactam IVPB.. 3.375 Gram(s) IV Intermittent every 8 hours  vancomycin  IVPB 1000 milliGRAM(s) IV Intermittent <User Schedule>    Other medications:  aspirin  chewable 81 milliGRAM(s) Oral daily  atorvastatin 20 milliGRAM(s) Oral at bedtime  digoxin     Tablet 125 MICROGram(s) Oral daily  heparin  Infusion. 2100 Unit(s)/Hr IV Continuous <Continuous>  influenza  Vaccine (HIGH DOSE) 0.7 milliLiter(s) IntraMuscular once  metoprolol tartrate 75 milliGRAM(s) Oral every 6 hours  polyethylene glycol 3350 17 Gram(s) Oral daily  senna 2 Tablet(s) Oral at bedtime                            12.9   15.88 )-----------( 314      ( 25 Nov 2023 04:28 )             40.9     11-25    136  |  95<L>  |  91.1<H>  ----------------------------<  175<H>  3.5   |  25.0  |  2.15<H>    Ca    8.7      25 Nov 2023 04:28  Mg     2.2     11-24    TPro  6.5<L>  /  Alb  2.5<L>  /  TBili  1.6  /  DBili  x   /  AST  139<H>  /  ALT  108<H>  /  AlkPhos  175<H>  11-25    RECENT CULTURES:  11-24 @ 09:00    RVP with SARS-CoV-2   NotDetec      11-24 @ 01:25 .Blood Blood-Peripheral     No growth at 24 hours        11-23 @ 12:42 .Blood Blood-Peripheral     No growth at 24 hours        11-23 @ 12:35 .Blood Blood-Peripheral     No growth at 24 hours        11-18 @ 14:00 Pleural Fl Pleural Fluid     Few Streptococcus intermedius    polymorphonuclear leukocytes seen  No organisms seen  by cytocentrifuge      11-18 @ 08:30   RVP with SARS-CoV-2   NotDetec      WBC Count: 15.88 K/uL (11-25-23 @ 04:28)  WBC Count: 18.95 K/uL (11-24-23 @ 06:33)  WBC Count: 8.97 K/uL (11-24-23 @ 01:25)  WBC Count: 19.09 K/uL (11-23-23 @ 11:22)  WBC Count: 18.58 K/uL (11-23-23 @ 02:32)  WBC Count: 16.94 K/uL (11-22-23 @ 23:47)  WBC Count: 17.10 K/uL (11-22-23 @ 07:28)  WBC Count: 14.11 K/uL (11-21-23 @ 05:50)    Creatinine: 2.15 mg/dL (11-25-23 @ 04:28)  Creatinine: 2.15 mg/dL (11-24-23 @ 06:33)  Creatinine: 1.96 mg/dL (11-24-23 @ 01:25)  Creatinine: 1.79 mg/dL (11-23-23 @ 02:32)  Creatinine: 1.83 mg/dL (11-22-23 @ 07:28)  Creatinine: 1.67 mg/dL (11-21-23 @ 05:50)    Procalcitonin, Serum: 0.74 ng/mL (11-24-23 @ 01:25)  Procalcitonin, Serum: 2.59 ng/mL (11-19-23 @ 01:29)     SARS-CoV-2: NotDetec (11-24-23 @ 09:00)  SARS-CoV-2: NotDetec (11-18-23 @ 08:30)    Vancomycin Level, Trough: 8.3 ug/mL (11-25-23 @ 04:28)  ______________________________________________________  CARDIOLOGY  < from: TTE Echo Complete w/o Contrast w/ Doppler (11.18.23 @ 12:34) >  PHYSICIAN INTERPRETATION:  Left Ventricle: The left ventricular internal cavity size is moderate to   severely increased.  Global LV systolic function was severely decreased. Left ventricular   ejection fraction, by visual estimation, is 20 to 25%. The mitral in-flow   pattern reveals no discernable A-wave, therefore no comment on diastolic   function can be made.  Right Ventricle: The right ventricular size is mildly enlarged. RV   systolic function is severely reduced.  Left Atrium: Mildly enlarged left atrium.  Right Atrium: Normal right atrial size.  Pericardium: There is no evidence of pericardial effusion.  MitralValve: Mild thickening and calcification of the anterior and   posterior mitral valve leaflets. There is mild mitral annular   calcification. Mild mitral valve regurgitation is seen.  Tricuspid Valve: Adequate TR velocity was not obtained to accurately   assess RVSP.  Aortic Valve: The aortic valve is trileaflet. Sclerotic aortic valve with   normal opening.  Pulmonic Valve: Trace pulmonic valve regurgitation.  Aorta: The aortic root is normal in size and structure.  Pulmonary Artery: The main pulmonary artery is normal in size.  Venous: The inferior vena cava was normal sized, with respiratory size   variation greater than 50%.      Summary:   1. There is mild concentric left ventricular hypertrophy.   2. Moderate to severely increased left ventricular internal cavity size.   3. Left ventricular ejection fraction, by visual estimation, is 20 to   25%.   4. Severely decreased global left ventricular systolic function.   5. The mitral in-flow pattern reveals no discernable A-wave, therefore   no comment on diastolic function can be made.   6. Mildly enlarged right ventricle.   7. Severely reduced RV systolic function.   8. Mildly enlarged left atrium.   9. Normal right atrial size.  10. Sclerotic aortic valve with normal opening.  11. Mild thickening and calcification of the anterior and posterior   mitral valve leaflets.  12. Mild mitral annular calcification.  13. Mild mitral valve regurgitation.  14. The main pulmonary artery is normal in size.  15. There is no evidence of pericardial effusion.      ______________________________________________________  RADIOLOGY    < end of copied text >  < from: Xray Chest 1 View AP/PA. (11.24.23 @ 02:31) >  FINDINGS:  11/23/2023 5:54 AM:  The patient is status post sternotomy.  The heart is not well assessed on an AP film.  The right lung is clear.  There is complete opacification of the left hemithorax without volume   loss suggesting a large left pleural effusion. A pigtail catheter is seen   at the left lung base.  There is no pneumothorax.    11/24/2023 2:02 AM:  There is improved aeration of the left upper lung with decreased left   pleural effusion. Moderate loculated left pleural effusion and   atelectasis remains.    11/25/2023 6:33 AM:  There is no significant change.    IMPRESSION:    Some decrease in moderate to large loculated left pleural effusion and   atelectasis from the initial film. A left pigtail catheter is in place.    < end of copied text >  < from: CT Chest No Cont (11.20.23 @ 09:09) >  INTERPRETATION:  Clinical information: Follow-up examination. Exam is   compared to previous study of 11/18/2023.    CT scan of the chest was obtained without administration of intravenous   contrast.    Several lymph nodes are present in the pretracheal space, AP window,   anterior mediastinum and the subcarinal region.    Heart is enlarged in size. Patient is status post CABG. No pericardial  effusion is noted.    Mucous/secretions are noted within the left lower lobe bronchus. 1.4 cm   ill-defined opacity is noted in the right lower lobe. Near complete   atelectasis of the left lung is noted. Large loculated left pleural   effusion is noted. A left pigtail catheter and droplets of air are   present within the left pleural effusion.    Below the diaphragm, visualized portions of the abdomen demonstrate   patient to be status post cholecystectomy. Extensive thickening of both   adrenal glands is noted. Low-attenuation lesion in the right kidney is   incompletely imaged on this exam.    Degenerative changes of the spine are noted. Subcutaneous emphysema is   noted in the left lateral chest wall.    IMPRESSION: Large loculated lefthydropneumothorax containing a pigtail   catheter.    Mucus/secretions in the left lower lobe bronchus.    1.4 cm ill-defined opacity is noted in the right lower lobe. It is   indeterminate based on this exam. Follow-up CT scan is recommended in 3   months to ensure resolution.    < end of copied text >

## 2023-11-25 NOTE — PROGRESS NOTE ADULT - NS ATTEND AMEND GEN_ALL_CORE FT
.  .  AFL rates better today, but still mostly tachycardic. No longer having fevers. Plan for VATS on Monday for loculated left pleural effusion. After VATS when AC can be resumed, plan for cardiac catheterization and subsequent QUIN/CV vs ablation. Continue anticoagulation for AFL.    Gaurav Bolivar MD  Clinical Cardiac Electrophysiology

## 2023-11-25 NOTE — PROGRESS NOTE ADULT - SUBJECTIVE AND OBJECTIVE BOX
Brief summary:  Pt being evaluated for left loculated pleural effusions    SUBJECTIVE:  Pt in bed alert and oriented, pt states, " Why am I an Add on case"  Patient denies acute pain with radiating or aggravating factors.  He denies chest pain, shortness of breath, palpitations, headache, dizziness, nausea, or vomiting.      Overnight events:  no acute overnight events     PAST MEDICAL & SURGICAL HISTORY:  Coronary artery disease involving native coronary artery of native heart, unspecified whether angina      CHF with unknown LVEF      Primary hypertension      Hyperlipidemia, unspecified hyperlipidemia type      S/P CABG x 5      History of cholecystectomy          MEDICATIONS   acetaminophen     Tablet .. 650 milliGRAM(s) Oral every 6 hours PRN  aluminum hydroxide/magnesium hydroxide/simethicone Suspension 30 milliLiter(s) Oral every 4 hours PRN  aspirin  chewable 81 milliGRAM(s) Oral daily  atorvastatin 20 milliGRAM(s) Oral at bedtime  bisacodyl Suppository 10 milliGRAM(s) Rectal daily PRN  digoxin     Tablet 250 MICROGram(s) Oral daily  heparin   Injectable 76140 Unit(s) IV Push every 6 hours PRN  heparin   Injectable 5000 Unit(s) IV Push every 6 hours PRN  heparin  Infusion. 2100 Unit(s)/Hr IV Continuous <Continuous>  influenza  Vaccine (HIGH DOSE) 0.7 milliLiter(s) IntraMuscular once  melatonin 3 milliGRAM(s) Oral at bedtime PRN  metoprolol tartrate 75 milliGRAM(s) Oral every 6 hours  metoprolol tartrate Injectable 5 milliGRAM(s) IV Push every 6 hours PRN  ondansetron Injectable 4 milliGRAM(s) IV Push every 8 hours PRN  piperacillin/tazobactam IVPB.. 3.375 Gram(s) IV Intermittent every 8 hours  polyethylene glycol 3350 17 Gram(s) Oral daily  potassium chloride    Tablet ER 20 milliEquivalent(s) Oral once  senna 2 Tablet(s) Oral at bedtime  traMADol 25 milliGRAM(s) Oral three times a day PRN  vancomycin  IVPB 1000 milliGRAM(s) IV Intermittent <User Schedule>  MEDICATIONS  (PRN):  acetaminophen     Tablet .. 650 milliGRAM(s) Oral every 6 hours PRN Temp greater or equal to 38C (100.4F), Mild Pain (1 - 3)  aluminum hydroxide/magnesium hydroxide/simethicone Suspension 30 milliLiter(s) Oral every 4 hours PRN Dyspepsia  bisacodyl Suppository 10 milliGRAM(s) Rectal daily PRN Constipation  heparin   Injectable 65156 Unit(s) IV Push every 6 hours PRN For aPTT less than 40  heparin   Injectable 5000 Unit(s) IV Push every 6 hours PRN For aPTT between 40 - 57  melatonin 3 milliGRAM(s) Oral at bedtime PRN Insomnia  metoprolol tartrate Injectable 5 milliGRAM(s) IV Push every 6 hours PRN RVR > 130  ondansetron Injectable 4 milliGRAM(s) IV Push every 8 hours PRN Nausea and/or Vomiting  traMADol 25 milliGRAM(s) Oral three times a day PRN Severe Pain (7 - 10)      Daily                                 12.9   15.88 )-----------( 314      ( 25 Nov 2023 04:28 )             40.9   11-25    136  |  95<L>  |  91.1<H>  ----------------------------<  175<H>  3.5   |  25.0  |  2.15<H>    Ca    8.7      25 Nov 2023 04:28  Mg     2.2     11-24    TPro  6.5<L>  /  Alb  2.5<L>  /  TBili  1.6  /  DBili  x   /  AST  139<H>  /  ALT  108<H>  /  AlkPhos  175<H>  11-25      PT/INR - ( 24 Nov 2023 01:25 )   PT: 16.2 sec;   INR: 1.48 ratio         PTT - ( 24 Nov 2023 06:33 )  PTT:65.6 sec      Objective:  T(C): 36.4 (11-25-23 @ 04:07), Max: 36.9 (11-24-23 @ 20:00)  HR: 125 (11-25-23 @ 12:00) (125 - 132)  BP: 118/85 (11-25-23 @ 12:00) (92/55 - 132/71)  RR: 24 (11-25-23 @ 12:00) (18 - 34)  SpO2: 95% (11-25-23 @ 06:00) (95% - 99%)  Wt(kg): --CAPILLARY BLOOD GLUCOSE      POCT Blood Glucose.: 170 mg/dL (25 Nov 2023 11:16)  I&O's Summary    24 Nov 2023 07:01 - 25 Nov 2023 07:00  --------------------------------------------------------  IN: 1254 mL / OUT: 1410 mL / NET: -156 mL    25 Nov 2023 07:01  -  25 Nov 2023 17:31  --------------------------------------------------------  IN: 518 mL / OUT: 0 mL / NET: 518 mL        Physical Exam  General: Obesef, NAD  Neuro: AxO x3, non-focal, LEE  Cardiac: Irregularly irregular, S1S2, no murmurs  Pulm: Expiratory wheeze and diminished at the bases b/l,   Abdomen: Soft, NT, ND, +BS  Peripheral: +DP pulses b/l, +1 b/l LE edema       Imaging:  CXR:    < from: Xray Chest 1 View- PORTABLE-Routine (Xray Chest 1 View- PORTABLE-Routine in AM.) (11.25.23 @ 07:00) >    ACC: [id number] EXAM:  XR CHEST AP OR PA 1V   ORDERED BY: PAULA HENRY     ACC: [id number] EXAM:  XR CHEST PORTABLE ROUTINE 1V   ORDERED BY: AMALIA MENDOZA     ACC: [id number] EXAM:  XR CHEST PORTABLE ROUTINE 1V   ORDERED BY: ELLE ESCOBAR     PROCEDURE DATE:  11/23/2023          INTERPRETATION:  TECHNIQUE: A series of portable chest x-rays was   obtained.    COMPARISON: 11/22/2023    CLINICAL INFORMATION: Pleural effusion. Codes sepsis    FINDINGS:  11/23/2023 5:54 AM:  The patient is status post sternotomy.  The heart is not well assessed on an AP film.  The right lung is clear.  There is complete opacification of the left hemithorax without volume   loss suggesting a large left pleural effusion. A pigtail catheter is seen   at the left lung base.  There is no pneumothorax.    11/24/2023 2:02 AM:  There is improved aeration of the left upper lung with decreased left   pleural effusion. Moderate loculated left pleural effusion and   atelectasis remains.    11/25/2023 6:33 AM:  There is no significant change.    IMPRESSION:    Some decrease in moderate to large loculated left pleural effusion and   atelectasis from the initial film. A left pigtail catheter is in place.    --- End of Report ---          DEBRA TINOCO MD; Attending Radiologist  This document has been electronically signed.  DEBRA TINOCO MD; Attending Radiologist  This document has been electronically signed. Nov 25 2023  8:33AM    < end of copied text >    ECG:    < from: 12 Lead ECG (11.18.23 @ 08:21) >    Ventricular Rate 145 BPM    Atrial Rate 145 BPM    P-R Interval 132 ms    QRS Duration 122 ms    Q-T Interval 278 ms    QTC Calculation(Bazett) 431 ms    P Axis 50 degrees    R Axis -29 degrees    T Axis 154 degrees    Diagnosis Line r/o at flutterwith variable block  Anteroseptal infarct , age undetermined  ST & T wave abnormality, consider lateral ischemia  Abnormal ECG    Confirmed by CARIE MAJANO ([id number]) on 11/18/2023 1:41:55 PM    < end of copied text >

## 2023-11-25 NOTE — CONSULT NOTE ADULT - ASSESSMENT
FULL CONSULT TO FOLLOW  72M with CAD s/p CABG, HFrEF, HTN, Afib admitted on 11/18 with dyspnea. Found to have large loculated left pleural effusion s/p CT on 11/18. Hospital course complicated by Afl/afib with RVR followed by EP     Left empyema   Aflutter with RVR   CKD     - 11/18 CT Chest with large loculated pleural effusion   - s/p left chest tube on 11/18   - Pleural fluid culture with Streptococcus intermedius   - Most recent CXR with some decrease in mod to large pleural effusion   - 11/23 and 11/24 BCx ngtd  - Febrile to 101 on 11/24  - Thoracic Sx following - agree with plan for VATS/decortication. Tentatively plan for Mon   - On vancomycin and piperacillin-tazobactam   - Discontinue vanco   - Can continue piperacillin-tazobactam for now, pending surgical intervention   - Monitor renal function and adjust antimicrobial dose accordingly.   - will probably narrow spectrum afterwards   - Difficulty achieving rate control for Aflutter; in part likely to uncontrolled infectious source. EP following.   - Leukocytosis fluctuating   - Monitor temp     D/w team

## 2023-11-25 NOTE — PROGRESS NOTE ADULT - ASSESSMENT
71 yo M w/ PMH of CAD s/p CABG, HFrEF, HTN, HLD presenting for chief complaint of SOB. Admitted for acute hypoxemic respiratory failure 2/2 CHF/Pleural effusion.     acute hypoxemic resp failure  left pleural loculated effusion  likely empyema       sepsis present on admission; left pleural fluid culture with streptococcus intermedius. blood cultures  negative      ID consulted     plan for VATS monday      ct surg following    hypotension--improved   aflutter--difficult to control     c/w lopressor 75mg QID, dig increased but monitor levels     EP following     planned for DCCV/ablation post vats     hold lasix, metolazone    heparin drip     DEVON vs CKD    unknown baseline    renal consulted    CAD s/p CABG   Continue ASA, Statin, Lopressor      monitor on stepdown given labile hemodynamics

## 2023-11-25 NOTE — PROGRESS NOTE ADULT - PROBLEM SELECTOR PLAN 1
Left PTC placed 11/18  Maintain to H2O seal  Pleural fluid exudative by light's criteria  Culture prelim streptococcus intermedius, ID following   cytology (-)  Record drainage q12 hours to facilitate timely removal  Daily CXR while PTC in place  Increasing left chest opacification  CT chest reviewed by Dr. Encarnacion, pt will need VATS decort.    Pt remains afebrile and leukocytosis trending downward    EP following will discuss with anesthesia on HR control     Plan for surgery on Monday 11/27 to follow Dr. Go cases   NPOMN on sunday @ 5945, type and screen and PRBC on hold for Monday   EP will plan for cardioversion after VATS once AC can be restarted.   Encourage incentive spirometry/Chest PT   Rest of care per primary team  Thoracic Surgery to follow  Plan discussed with Dr. Go

## 2023-11-25 NOTE — PROGRESS NOTE ADULT - ASSESSMENT
72-year-old male with history of hypertension CAD status post quintuple bypass November 2021 followed by cardiology/Dr. Milian in [place]  resents the ED via EMS complaining of worsening dyspnea on exertion since yesterday.  Thoracic surgery consulted treatment Left effusion. Left PTC placed 11/28. Patient now pending FB left robotic assisted decort

## 2023-11-25 NOTE — CONSULT NOTE ADULT - ASSESSMENT
71 yo M w/ PMH of CAD s/p CABG, HFrEF, HTN, HLD presenting for chief complaint of SOB. Admitted for acute hypoxemic respiratory failure 2/2 CHF/Pleural effusion.  Suspect CKD   Likely component of CRS   No gross hydro on renal sono  UA bland   Diuretics held due to hypotension - resume as needed when hemodynamics stable   Possible VATS Mon 11/27   Watch Vanco / Digoxin levels     Will follow   Discussed with sister at bedside

## 2023-11-25 NOTE — CONSULT NOTE ADULT - SUBJECTIVE AND OBJECTIVE BOX
Patient is a 72y old  Male who presents with a chief complaint of SOB (25 Nov 2023 13:05)      HPI:  Patient is a 71 yo M w/ PMH of CAD s/p CABG, CHF, HTN, HLD, Afib (not on AC due to hx of GIB) presenting for chief complaint of SOB. Patient states he started experiencing SOB and LE swelling for the past few days He states he was seen by his Cardiologist on, Dr. Milian, on Tuesday and his Metoprolol was increased from 50mg to 100mg. He states his symptoms worsened yesterday therefore he came to the ED today. He states he has been unable to walk around without getting short of breath. He reports compliance with medications and diet. Patient denies fever, chills, chest pain, palpitations, cough, wheezing, abdominal pain, nausea, vomiting, diarrhea, constipation, bloody bowel movements, melena, hematemesis, urinary complaints, syncope, calf tenderness, paresthesias.  (18 Nov 2023 14:38)    Above noted   L lung being drained   possible VATS Mon 11/27  Cytology negative for malignancy   Abx as per ID , Vanco levels OK  UA bland   No gross hydro on renal sonogram   Pt unaware of any prior renal disease --> Last creat here in computer was 2.1   ECHO noted - severe depression of EF   Pt feels better   Was on diuretics , but pressure trended low into thje 80's , doing better now . Off Lasix / Metolazone         PAST MEDICAL & SURGICAL HISTORY:  Coronary artery disease involving native coronary artery of native heart, unspecified whether angina      CHF with unknown LVEF      Primary hypertension      Hyperlipidemia, unspecified hyperlipidemia type      S/P CABG x 5      History of cholecystectomy          FAMILY HISTORY:  Family history of coronary artery bypass surgery (Father)    Family history of hypotension (Mother)        Social History:    MEDICATIONS  (STANDING):  aspirin  chewable 81 milliGRAM(s) Oral daily  atorvastatin 20 milliGRAM(s) Oral at bedtime  digoxin     Tablet 250 MICROGram(s) Oral daily  heparin  Infusion. 2100 Unit(s)/Hr (21 mL/Hr) IV Continuous <Continuous>  influenza  Vaccine (HIGH DOSE) 0.7 milliLiter(s) IntraMuscular once  metoprolol tartrate 75 milliGRAM(s) Oral every 6 hours  piperacillin/tazobactam IVPB.. 3.375 Gram(s) IV Intermittent every 8 hours  polyethylene glycol 3350 17 Gram(s) Oral daily  potassium chloride    Tablet ER 20 milliEquivalent(s) Oral once  senna 2 Tablet(s) Oral at bedtime  vancomycin  IVPB 1000 milliGRAM(s) IV Intermittent <User Schedule>    MEDICATIONS  (PRN):  acetaminophen     Tablet .. 650 milliGRAM(s) Oral every 6 hours PRN Temp greater or equal to 38C (100.4F), Mild Pain (1 - 3)  aluminum hydroxide/magnesium hydroxide/simethicone Suspension 30 milliLiter(s) Oral every 4 hours PRN Dyspepsia  bisacodyl Suppository 10 milliGRAM(s) Rectal daily PRN Constipation  heparin   Injectable 09694 Unit(s) IV Push every 6 hours PRN For aPTT less than 40  heparin   Injectable 5000 Unit(s) IV Push every 6 hours PRN For aPTT between 40 - 57  melatonin 3 milliGRAM(s) Oral at bedtime PRN Insomnia  metoprolol tartrate Injectable 5 milliGRAM(s) IV Push every 6 hours PRN RVR > 130  ondansetron Injectable 4 milliGRAM(s) IV Push every 8 hours PRN Nausea and/or Vomiting  traMADol 25 milliGRAM(s) Oral three times a day PRN Severe Pain (7 - 10)      Allergies    No Known Allergies    Intolerances          Vital Signs Last 24 Hrs  T(C): 36.4 (25 Nov 2023 04:07), Max: 36.9 (24 Nov 2023 20:00)  T(F): 97.5 (25 Nov 2023 04:07), Max: 98.5 (24 Nov 2023 20:00)  HR: 125 (25 Nov 2023 12:00) (125 - 132)  BP: 118/85 (25 Nov 2023 12:00) (85/53 - 132/71)  BP(mean): 98 (25 Nov 2023 12:00) (64 - 98)  RR: 24 (25 Nov 2023 12:00) (16 - 34)  SpO2: 95% (25 Nov 2023 06:00) (95% - 99%)    Parameters below as of 25 Nov 2023 12:00  Patient On (Oxygen Delivery Method): nasal cannula  O2 Flow (L/min): 2    Daily     Daily   I&O's Detail    24 Nov 2023 07:01  -  25 Nov 2023 07:00  --------------------------------------------------------  IN:    Heparin Infusion: 504 mL    IV PiggyBack: 100 mL    Oral Fluid: 650 mL  Total IN: 1254 mL    OUT:    Chest Tube (mL): 10 mL    Voided (mL): 1400 mL  Total OUT: 1410 mL    Total NET: -156 mL        I&O's Summary    24 Nov 2023 07:01  -  25 Nov 2023 07:00  --------------------------------------------------------  IN: 1254 mL / OUT: 1410 mL / NET: -156 mL        PHYSICAL EXAM:    GENERAL: NAD, nontoxic , feels better   HEENT - No facial edema   NECK: Supple, No JVD, Normal thyroid  NERVOUS SYSTEM:  Alert & Oriented X3, betsy  CHEST/LUNG: EAE , decreased BS L base , + Drain   CVS - Irregular , no rub  Abd obeses , nontender l sounds present  EXTREMITIES:  Edema much better       LABS:                        12.9   15.88 )-----------( 314      ( 25 Nov 2023 04:28 )             40.9     11-25    136  |  95<L>  |  91.1<H>  ----------------------------<  175<H>  3.5   |  25.0  |  2.15<H>    Ca    8.7      25 Nov 2023 04:28  Mg     2.2     11-24    TPro  6.5<L>  /  Alb  2.5<L>  /  TBili  1.6  /  DBili  x   /  AST  139<H>  /  ALT  108<H>  /  AlkPhos  175<H>  11-25    PT/INR - ( 24 Nov 2023 01:25 )   PT: 16.2 sec;   INR: 1.48 ratio         PTT - ( 24 Nov 2023 06:33 )  PTT:65.6 sec  Urinalysis Basic - ( 25 Nov 2023 04:28 )    Color: x / Appearance: x / SG: x / pH: x  Gluc: 175 mg/dL / Ketone: x  / Bili: x / Urobili: x   Blood: x / Protein: x / Nitrite: x   Leuk Esterase: x / RBC: x / WBC x   Sq Epi: x / Non Sq Epi: x / Bacteria: x    < from: CT Chest No Cont (11.20.23 @ 09:09) >    ACC: [id number] EXAM:  CT CHEST   ORDERED BY: AMALIA MENDOZA     PROCEDURE DATE:  11/20/2023          INTERPRETATION:  Clinical information: Follow-up examination. Exam is   compared to previous study of 11/18/2023.    CT scan of the chest was obtained without administration of intravenous   contrast.    Several lymph nodes are present in the pretracheal space, AP window,   anterior mediastinum and the subcarinal region.    Heart is enlarged in size. Patient is status post CABG. No pericardial  effusion is noted.    Mucous/secretions are noted within the left lower lobe bronchus. 1.4 cm   ill-defined opacity is noted in the right lower lobe. Near complete   atelectasis of the left lung is noted. Large loculated left pleural   effusion is noted. A left pigtail catheter and droplets of air are   present within the left pleural effusion.    Below the diaphragm, visualized portions of the abdomen demonstrate   patient to be status post cholecystectomy. Extensive thickening of both   adrenal glands is noted. Low-attenuation lesion in the right kidney is   incompletely imaged on this exam.    Degenerative changes of the spine are noted. Subcutaneous emphysema is   noted in the left lateral chest wall.    IMPRESSION: Large loculated lefthydropneumothorax containing a pigtail   catheter.    Mucus/secretions in the left lower lobe bronchus.    1.4 cm ill-defined opacity is noted in the right lower lobe. It is   indeterminate based on this exam. Follow-up CT scan is recommended in 3   months to ensure resolution.    --- End of Report ---            CARIE BENITEZ MD; Attending Radiologist  This document has been el    < end of copied text >      < from: US Renal (11.19.23 @ 09:27) >    ACC: [id number] EXAM:  US KIDNEY(S)   ORDERED BY: CAMERON DIEGO     PROCEDURE DATE:  11/19/2023          INTERPRETATION:  CLINICAL INFORMATION: Renal failure.  Acute kidney   injury versus chronic kidney disease.    COMPARISON: None available.    TECHNIQUE: Sonography of the kidneys and bladder.    FINDINGS:  Right kidney: 11.5 cm. No renal mass, hydronephrosis or calculi.  Cysts   measure 4.2 x 2.9 x 4 cm in the upper pole and 2.1 x 1.8 x 1.7 cm in the   midpole.    Left kidney: Poorly visualized.  10.6 cm. No renal mass, hydronephrosis   or calculi.  A cyst measures 4 x 4 0.1 x 3.8 cm in the midpole.    Urinary bladder: Underdistended.  No sonographic abnormality.    IMPRESSION:  The left kidney is poorly visualized.  No renal mass, hydronephrosis or   calculus is visualized sonographically.        --- End of Report ---            NAN ROWELL MD; Attending Radiologist  This document has been electronically signed. Nov 19 2023 10:05AM    < end of copied text >      < from: Xray Chest 1 View- PORTABLE-Routine (Xray Chest 1 View- PORTABLE-Routine in AM.) (11.25.23 @ 07:00) >  INTERPRETATION:  TECHNIQUE: A series of portable chest x-rays was   obtained.    COMPARISON: 11/22/2023    CLINICAL INFORMATION: Pleural effusion. Codes sepsis    FINDINGS:  11/23/2023 5:54 AM:  The patient is status post sternotomy.  The heart is not well assessed on an AP film.  The right lung is clear.  There is complete opacification of the left hemithorax without volume   loss suggesting a large left pleural effusion. A pigtail catheter is seen   at the left lung base.  There is no pneumothorax.    11/24/2023 2:02 AM:  There is improved aeration of the left upper lung with decreased left   pleural effusion. Moderate loculated left pleural effusion and   atelectasis remains.    11/25/2023 6:33 AM:  There is no significant change.    IMPRESSION:    Some decrease in moderate to large loculated left pleural effusion and   atelectasis from the initial film. A left pigtail catheter is in place.    --- End of Report ---          DEBRA TINOCO MD; Attending Radiologist  This document has been electronically signed.  DEBRA TINOCO MD; Attending Radiologist  This document has been electronically signed. Nov 25 2023  8:33AM    < from: TTE Echo Complete w/o Contrast w/ Doppler (11.18.23 @ 12:34) >    Summary:   1. There is mild concentric left ventricular hypertrophy.   2. Moderate to severely increased left ventricular internal cavity size.   3. Left ventricular ejection fraction, by visual estimation, is 20 to   25%.   4. Severely decreased global left ventricular systolic function.   5. The mitral in-flow pattern reveals no discernable A-wave, therefore   no comment on diastolic function can be made.   6. Mildly enlarged right ventricle.   7. Severely reduced RV systolic function.   8. Mildly enlarged left atrium.   9. Normal right atrial size.  10. Sclerotic aortic valve with normal opening.  11. Mild thickening and calcification of the anterior and posterior   mitral valve leaflets.  12. Mild mitral annular calcification.  13. Mild mitral valve regurgitation.  14. The main pulmonary artery is normal in size.  15. There is no evidence of pericardial effusion.    MD Nury Electronically signed on 11/18/2023 at 4:20:53 PM            *** Final ***    < end of copied text >  < end of copied text >    RADIOLOGY & ADDITIONAL TESTS:

## 2023-11-26 ENCOUNTER — TRANSCRIPTION ENCOUNTER (OUTPATIENT)
Age: 73
End: 2023-11-26

## 2023-11-26 ENCOUNTER — APPOINTMENT (OUTPATIENT)
Dept: THORACIC SURGERY | Facility: HOSPITAL | Age: 73
End: 2023-11-26

## 2023-11-26 DIAGNOSIS — J86.9 PYOTHORAX WITHOUT FISTULA: ICD-10-CM

## 2023-11-26 LAB
-  CEFTRIAXONE: SIGNIFICANT CHANGE UP
-  CEFTRIAXONE: SIGNIFICANT CHANGE UP
-  PENICILLIN: SIGNIFICANT CHANGE UP
-  PENICILLIN: SIGNIFICANT CHANGE UP
-  VANCOMYCIN: SIGNIFICANT CHANGE UP
-  VANCOMYCIN: SIGNIFICANT CHANGE UP
ALBUMIN SERPL ELPH-MCNC: 2.5 G/DL — LOW (ref 3.3–5.2)
ALBUMIN SERPL ELPH-MCNC: 2.5 G/DL — LOW (ref 3.3–5.2)
ALP SERPL-CCNC: 189 U/L — HIGH (ref 40–120)
ALP SERPL-CCNC: 189 U/L — HIGH (ref 40–120)
ALT FLD-CCNC: 79 U/L — HIGH
ALT FLD-CCNC: 79 U/L — HIGH
ANION GAP SERPL CALC-SCNC: 15 MMOL/L — SIGNIFICANT CHANGE UP (ref 5–17)
ANION GAP SERPL CALC-SCNC: 15 MMOL/L — SIGNIFICANT CHANGE UP (ref 5–17)
AST SERPL-CCNC: 68 U/L — HIGH
AST SERPL-CCNC: 68 U/L — HIGH
BILIRUB DIRECT SERPL-MCNC: 0.9 MG/DL — HIGH (ref 0–0.3)
BILIRUB DIRECT SERPL-MCNC: 0.9 MG/DL — HIGH (ref 0–0.3)
BILIRUB INDIRECT FLD-MCNC: 0.5 MG/DL — SIGNIFICANT CHANGE UP (ref 0.2–1)
BILIRUB INDIRECT FLD-MCNC: 0.5 MG/DL — SIGNIFICANT CHANGE UP (ref 0.2–1)
BILIRUB SERPL-MCNC: 1.4 MG/DL — SIGNIFICANT CHANGE UP (ref 0.4–2)
BILIRUB SERPL-MCNC: 1.4 MG/DL — SIGNIFICANT CHANGE UP (ref 0.4–2)
BLD GP AB SCN SERPL QL: SIGNIFICANT CHANGE UP
BLD GP AB SCN SERPL QL: SIGNIFICANT CHANGE UP
BUN SERPL-MCNC: 83 MG/DL — HIGH (ref 8–20)
BUN SERPL-MCNC: 83 MG/DL — HIGH (ref 8–20)
CALCIUM SERPL-MCNC: 9 MG/DL — SIGNIFICANT CHANGE UP (ref 8.4–10.5)
CALCIUM SERPL-MCNC: 9 MG/DL — SIGNIFICANT CHANGE UP (ref 8.4–10.5)
CHLORIDE SERPL-SCNC: 98 MMOL/L — SIGNIFICANT CHANGE UP (ref 96–108)
CHLORIDE SERPL-SCNC: 98 MMOL/L — SIGNIFICANT CHANGE UP (ref 96–108)
CO2 SERPL-SCNC: 27 MMOL/L — SIGNIFICANT CHANGE UP (ref 22–29)
CO2 SERPL-SCNC: 27 MMOL/L — SIGNIFICANT CHANGE UP (ref 22–29)
CREAT SERPL-MCNC: 2.06 MG/DL — HIGH (ref 0.5–1.3)
CREAT SERPL-MCNC: 2.06 MG/DL — HIGH (ref 0.5–1.3)
CULTURE RESULTS: ABNORMAL
CULTURE RESULTS: ABNORMAL
DIGOXIN SERPL-MCNC: 1.5 NG/ML — SIGNIFICANT CHANGE UP (ref 0.8–2)
DIGOXIN SERPL-MCNC: 1.5 NG/ML — SIGNIFICANT CHANGE UP (ref 0.8–2)
EGFR: 34 ML/MIN/1.73M2 — LOW
EGFR: 34 ML/MIN/1.73M2 — LOW
GLUCOSE SERPL-MCNC: 178 MG/DL — HIGH (ref 70–99)
GLUCOSE SERPL-MCNC: 178 MG/DL — HIGH (ref 70–99)
HCT VFR BLD CALC: 43.4 % — SIGNIFICANT CHANGE UP (ref 39–50)
HCT VFR BLD CALC: 43.4 % — SIGNIFICANT CHANGE UP (ref 39–50)
HGB BLD-MCNC: 13.7 G/DL — SIGNIFICANT CHANGE UP (ref 13–17)
HGB BLD-MCNC: 13.7 G/DL — SIGNIFICANT CHANGE UP (ref 13–17)
MAGNESIUM SERPL-MCNC: 2.8 MG/DL — HIGH (ref 1.6–2.6)
MAGNESIUM SERPL-MCNC: 2.8 MG/DL — HIGH (ref 1.6–2.6)
MCHC RBC-ENTMCNC: 27.8 PG — SIGNIFICANT CHANGE UP (ref 27–34)
MCHC RBC-ENTMCNC: 27.8 PG — SIGNIFICANT CHANGE UP (ref 27–34)
MCHC RBC-ENTMCNC: 31.6 GM/DL — LOW (ref 32–36)
MCHC RBC-ENTMCNC: 31.6 GM/DL — LOW (ref 32–36)
MCV RBC AUTO: 88 FL — SIGNIFICANT CHANGE UP (ref 80–100)
MCV RBC AUTO: 88 FL — SIGNIFICANT CHANGE UP (ref 80–100)
METHOD TYPE: SIGNIFICANT CHANGE UP
METHOD TYPE: SIGNIFICANT CHANGE UP
ORGANISM # SPEC MICROSCOPIC CNT: ABNORMAL
ORGANISM # SPEC MICROSCOPIC CNT: ABNORMAL
ORGANISM # SPEC MICROSCOPIC CNT: SIGNIFICANT CHANGE UP
ORGANISM # SPEC MICROSCOPIC CNT: SIGNIFICANT CHANGE UP
PHOSPHATE SERPL-MCNC: 3.6 MG/DL — SIGNIFICANT CHANGE UP (ref 2.4–4.7)
PHOSPHATE SERPL-MCNC: 3.6 MG/DL — SIGNIFICANT CHANGE UP (ref 2.4–4.7)
PLATELET # BLD AUTO: 379 K/UL — SIGNIFICANT CHANGE UP (ref 150–400)
PLATELET # BLD AUTO: 379 K/UL — SIGNIFICANT CHANGE UP (ref 150–400)
POTASSIUM SERPL-MCNC: 4 MMOL/L — SIGNIFICANT CHANGE UP (ref 3.5–5.3)
POTASSIUM SERPL-MCNC: 4 MMOL/L — SIGNIFICANT CHANGE UP (ref 3.5–5.3)
POTASSIUM SERPL-SCNC: 4 MMOL/L — SIGNIFICANT CHANGE UP (ref 3.5–5.3)
POTASSIUM SERPL-SCNC: 4 MMOL/L — SIGNIFICANT CHANGE UP (ref 3.5–5.3)
PROT SERPL-MCNC: 7.1 G/DL — SIGNIFICANT CHANGE UP (ref 6.6–8.7)
PROT SERPL-MCNC: 7.1 G/DL — SIGNIFICANT CHANGE UP (ref 6.6–8.7)
RBC # BLD: 4.93 M/UL — SIGNIFICANT CHANGE UP (ref 4.2–5.8)
RBC # BLD: 4.93 M/UL — SIGNIFICANT CHANGE UP (ref 4.2–5.8)
RBC # FLD: 15.7 % — HIGH (ref 10.3–14.5)
RBC # FLD: 15.7 % — HIGH (ref 10.3–14.5)
SODIUM SERPL-SCNC: 140 MMOL/L — SIGNIFICANT CHANGE UP (ref 135–145)
SODIUM SERPL-SCNC: 140 MMOL/L — SIGNIFICANT CHANGE UP (ref 135–145)
SPECIMEN SOURCE: SIGNIFICANT CHANGE UP
SPECIMEN SOURCE: SIGNIFICANT CHANGE UP
VANCOMYCIN TROUGH SERPL-MCNC: 11.4 UG/ML — SIGNIFICANT CHANGE UP (ref 10–20)
VANCOMYCIN TROUGH SERPL-MCNC: 11.4 UG/ML — SIGNIFICANT CHANGE UP (ref 10–20)
WBC # BLD: 18.34 K/UL — HIGH (ref 3.8–10.5)
WBC # BLD: 18.34 K/UL — HIGH (ref 3.8–10.5)
WBC # FLD AUTO: 18.34 K/UL — HIGH (ref 3.8–10.5)
WBC # FLD AUTO: 18.34 K/UL — HIGH (ref 3.8–10.5)

## 2023-11-26 PROCEDURE — 99233 SBSQ HOSP IP/OBS HIGH 50: CPT

## 2023-11-26 PROCEDURE — 71045 X-RAY EXAM CHEST 1 VIEW: CPT | Mod: 26

## 2023-11-26 PROCEDURE — 99232 SBSQ HOSP IP/OBS MODERATE 35: CPT

## 2023-11-26 PROCEDURE — 99231 SBSQ HOSP IP/OBS SF/LOW 25: CPT

## 2023-11-26 RX ADMIN — HEPARIN SODIUM 2100 UNIT(S)/HR: 5000 INJECTION INTRAVENOUS; SUBCUTANEOUS at 19:51

## 2023-11-26 RX ADMIN — Medication 75 MILLIGRAM(S): at 12:11

## 2023-11-26 RX ADMIN — PIPERACILLIN AND TAZOBACTAM 25 GRAM(S): 4; .5 INJECTION, POWDER, LYOPHILIZED, FOR SOLUTION INTRAVENOUS at 05:48

## 2023-11-26 RX ADMIN — HEPARIN SODIUM 2100 UNIT(S)/HR: 5000 INJECTION INTRAVENOUS; SUBCUTANEOUS at 07:11

## 2023-11-26 RX ADMIN — HEPARIN SODIUM 2100 UNIT(S)/HR: 5000 INJECTION INTRAVENOUS; SUBCUTANEOUS at 23:18

## 2023-11-26 RX ADMIN — Medication 81 MILLIGRAM(S): at 12:11

## 2023-11-26 RX ADMIN — PIPERACILLIN AND TAZOBACTAM 25 GRAM(S): 4; .5 INJECTION, POWDER, LYOPHILIZED, FOR SOLUTION INTRAVENOUS at 15:40

## 2023-11-26 RX ADMIN — PIPERACILLIN AND TAZOBACTAM 25 GRAM(S): 4; .5 INJECTION, POWDER, LYOPHILIZED, FOR SOLUTION INTRAVENOUS at 21:21

## 2023-11-26 RX ADMIN — ATORVASTATIN CALCIUM 20 MILLIGRAM(S): 80 TABLET, FILM COATED ORAL at 21:20

## 2023-11-26 RX ADMIN — Medication 75 MILLIGRAM(S): at 01:08

## 2023-11-26 RX ADMIN — Medication 75 MILLIGRAM(S): at 05:47

## 2023-11-26 RX ADMIN — HEPARIN SODIUM 2100 UNIT(S)/HR: 5000 INJECTION INTRAVENOUS; SUBCUTANEOUS at 09:40

## 2023-11-26 RX ADMIN — Medication 75 MILLIGRAM(S): at 23:25

## 2023-11-26 RX ADMIN — POLYETHYLENE GLYCOL 3350 17 GRAM(S): 17 POWDER, FOR SOLUTION ORAL at 12:11

## 2023-11-26 RX ADMIN — Medication 250 MICROGRAM(S): at 05:47

## 2023-11-26 RX ADMIN — Medication 75 MILLIGRAM(S): at 17:31

## 2023-11-26 NOTE — PROGRESS NOTE ADULT - PROBLEM SELECTOR PLAN 1
Left PTC placed 11/18  Maintain to H2O seal  Pleural fluid exudative by light's criteria  Culture prelim streptococcus intermedius, ID following   cytology (-)  Record drainage q12 hours to facilitate timely removal  Daily CXR while PTC in place  Increasing left chest opacification  CT chest reviewed by Dr. Encarnacion, pt will need VATS decort.    Pt remains afebrile and leukocytosis trending downward    EP following will discuss with anesthesia on HR control     Plan for surgery on Monday 11/27 to follow Dr. Go cases   NPOMN on sunday @ 7669, type and screen and PRBC on hold for Monday   EP will plan for cardioversion after VATS once AC can be restarted.   Heparin gtt to be d/c at 5 am 11/27 as per Dr. Go   Encourage incentive spirometry/Chest PT   Rest of care per primary team  Thoracic Surgery to follow  Plan discussed with Dr. Go

## 2023-11-26 NOTE — PROGRESS NOTE ADULT - SUBJECTIVE AND OBJECTIVE BOX
NEPHROLOGY INTERVAL HPI/OVERNIGHT EVENTS:    Feels OK   Laying flat   For OR tomorrow   Sister at bedside    ml     MEDICATIONS  (STANDING):  aspirin  chewable 81 milliGRAM(s) Oral daily  atorvastatin 20 milliGRAM(s) Oral at bedtime  digoxin     Tablet 250 MICROGram(s) Oral daily  heparin  Infusion. 2100 Unit(s)/Hr (21 mL/Hr) IV Continuous <Continuous>  influenza  Vaccine (HIGH DOSE) 0.7 milliLiter(s) IntraMuscular once  metoprolol tartrate 75 milliGRAM(s) Oral every 6 hours  piperacillin/tazobactam IVPB.. 3.375 Gram(s) IV Intermittent every 8 hours  polyethylene glycol 3350 17 Gram(s) Oral daily  senna 2 Tablet(s) Oral at bedtime    MEDICATIONS  (PRN):  acetaminophen     Tablet .. 650 milliGRAM(s) Oral every 6 hours PRN Temp greater or equal to 38C (100.4F), Mild Pain (1 - 3)  aluminum hydroxide/magnesium hydroxide/simethicone Suspension 30 milliLiter(s) Oral every 4 hours PRN Dyspepsia  bisacodyl Suppository 10 milliGRAM(s) Rectal daily PRN Constipation  heparin   Injectable 69913 Unit(s) IV Push every 6 hours PRN For aPTT less than 40  heparin   Injectable 5000 Unit(s) IV Push every 6 hours PRN For aPTT between 40 - 57  melatonin 3 milliGRAM(s) Oral at bedtime PRN Insomnia  metoprolol tartrate Injectable 5 milliGRAM(s) IV Push every 6 hours PRN RVR > 130  ondansetron Injectable 4 milliGRAM(s) IV Push every 8 hours PRN Nausea and/or Vomiting  traMADol 25 milliGRAM(s) Oral three times a day PRN Severe Pain (7 - 10)      Allergies    No Known Allergies    Intolerances          Vital Signs Last 24 Hrs  T(C): 36.2 (26 Nov 2023 16:00), Max: 37.4 (25 Nov 2023 19:30)  T(F): 97.1 (26 Nov 2023 16:00), Max: 99.4 (25 Nov 2023 19:30)  HR: 128 (26 Nov 2023 14:00) (89 - 132)  BP: 108/79 (26 Nov 2023 14:00) (92/71 - 134/86)  BP(mean): 90 (26 Nov 2023 14:00) (81 - 94)  RR: 17 (26 Nov 2023 14:00) (17 - 25)  SpO2: 97% (26 Nov 2023 14:00) (92% - 97%)    Parameters below as of 26 Nov 2023 14:00  Patient On (Oxygen Delivery Method): nasal cannula  O2 Flow (L/min): 3    Daily     Daily   I&O's Detail    25 Nov 2023 07:01  -  26 Nov 2023 07:00  --------------------------------------------------------  IN:    Heparin Infusion: 252 mL    IV PiggyBack: 350 mL  Total IN: 602 mL    OUT:    Chest Tube (mL): 0 mL    Voided (mL): 700 mL  Total OUT: 700 mL    Total NET: -98 mL        I&O's Summary    25 Nov 2023 07:01  -  26 Nov 2023 07:00  --------------------------------------------------------  IN: 602 mL / OUT: 700 mL / NET: -98 mL        PHYSICAL EXAM:      GENERAL: NAD, nontoxic , feels better   HEENT - No facial edema   NECK: Supple, No JVD, Normal thyroid  NERVOUS SYSTEM:  Alert & Oriented X3,   CHEST/LUNG: EAE , decreased BS L base , + Drain   CVS - Irregular , no rub  Abd obeses , nontender l sounds present  EXTREMITIES:  Edema much better   LABS:                        13.7   18.34 )-----------( 379      ( 26 Nov 2023 07:20 )             43.4     11-26    140  |  98  |  83.0<H>  ----------------------------<  178<H>  4.0   |  27.0  |  2.06<H>    Ca    9.0      26 Nov 2023 07:20  Phos  3.6     11-26  Mg     2.8     11-26    TPro  7.1  /  Alb  2.5<L>  /  TBili  1.4  /  DBili  0.9<H>  /  AST  68<H>  /  ALT  79<H>  /  AlkPhos  189<H>  11-26      Urinalysis Basic - ( 26 Nov 2023 07:20 )    Color: x / Appearance: x / SG: x / pH: x  Gluc: 178 mg/dL / Ketone: x  / Bili: x / Urobili: x   Blood: x / Protein: x / Nitrite: x   Leuk Esterase: x / RBC: x / WBC x   Sq Epi: x / Non Sq Epi: x / Bacteria: x      Magnesium: 2.8 mg/dL (11-26 @ 07:20)  Phosphorus: 3.6 mg/dL (11-26 @ 07:20)          RADIOLOGY & ADDITIONAL TESTS:

## 2023-11-26 NOTE — PROGRESS NOTE ADULT - ASSESSMENT
72 year old male patient with HTN, hyperlipidemia, obesity BMI 41.3, CAD s/p CABG x4 (2020 [facility]ha Mignon at [facility]), HFrEF, EF 20-25%, remote AF (transient in the postop setting of CABG with no reoccurrence- not on AC) presented with acute HFrEF (LVEF: 20-25%) and new onset AFL with RVR along with likely left empyema.    # AFlutter with RVR   - HR ~ 130 bpm. Rates will be difficult to control while in atrial flutter.  - Continue Metoprolol 75mg q6h + Digoxin 0.25mg daily -- discontinue Digoxin after 11/27 dose; Dig level 0.6 on 11/25  - CHADSVASc = 4 (age, CHF, CAD, HTN). Maintain heparin gtt. Monitor PTT and maintain therapeutic levels.   - Continue telemetry monitoring.   - Monitor electrolytes. Keep K > 4, Mg > 2   - EP plan contingent upon ischemic workup, and thoracic plan.  - Can use Esmolol during surgery to help control rates    # HFrEF, EF 20-25%   - Daily weights.   - Strict I/Os   - GDMT and diuresis as per general cardiology   - [facility]/ischemic workup when medically optimized     #Loculated pleural effusion, likely Empyema  - s/p PTC 11/18   - Thoracic surgery following. Plan for VATS on 11/27  - Continue steven Bolivar MD  Clinical Cardiac Electrophysiology

## 2023-11-26 NOTE — PROGRESS NOTE ADULT - ASSESSMENT
71 yo M w/ PMH of CAD s/p CABG, HFrEF, HTN, HLD presenting for chief complaint of SOB. Admitted for acute hypoxemic respiratory failure 2/2 CHF/Pleural effusion.     acute hypoxemic resp failure  left pleural loculated effusion  likely empyema       sepsis present on admission; left pleural fluid culture with streptococcus intermedius. blood cultures  negative      ID following, likely transition to rocephin post OP     plan for VATS monday      ct surg following    hypotension--improved   aflutter--difficult to control     c/w lopressor 75mg QID, dig increased but monitor levels     EP following     planned for DCCV/ablation post vats     hold lasix, metolazone     heparin drip     DEVON vs CKD    unknown baseline    renal following     CAD s/p CABG   Continue ASA, Statin, Lopressor      monitor on stepdown given labile hemodynamics

## 2023-11-26 NOTE — PROGRESS NOTE ADULT - SUBJECTIVE AND OBJECTIVE BOX
Brief summary:  72yMale being evaluated for left robotic assisted decort     SUBJECTIVE:  Pt in bed alert and oriented, pt states, " I am having trouble breathing"  Pt c/o of SOB 2/2 to oxygen not being in his nose, Pt denies chest pain, palpitations, n/v/d    Overnight events:  no acute overnight events   HR remains Aflutter 120-130s     PAST MEDICAL & SURGICAL HISTORY:  Coronary artery disease involving native coronary artery of native heart, unspecified whether angina      CHF with unknown LVEF      Primary hypertension      Hyperlipidemia, unspecified hyperlipidemia type      S/P CABG x 5      History of cholecystectomy          MEDICATIONS  acetaminophen     Tablet .. 650 milliGRAM(s) Oral every 6 hours PRN  aluminum hydroxide/magnesium hydroxide/simethicone Suspension 30 milliLiter(s) Oral every 4 hours PRN  aspirin  chewable 81 milliGRAM(s) Oral daily  atorvastatin 20 milliGRAM(s) Oral at bedtime  bisacodyl Suppository 10 milliGRAM(s) Rectal daily PRN  digoxin     Tablet 250 MICROGram(s) Oral daily  heparin   Injectable 20112 Unit(s) IV Push every 6 hours PRN  heparin   Injectable 5000 Unit(s) IV Push every 6 hours PRN  heparin  Infusion. 2100 Unit(s)/Hr IV Continuous <Continuous>  influenza  Vaccine (HIGH DOSE) 0.7 milliLiter(s) IntraMuscular once  melatonin 3 milliGRAM(s) Oral at bedtime PRN  metoprolol tartrate 75 milliGRAM(s) Oral every 6 hours  metoprolol tartrate Injectable 5 milliGRAM(s) IV Push every 6 hours PRN  ondansetron Injectable 4 milliGRAM(s) IV Push every 8 hours PRN  piperacillin/tazobactam IVPB.. 3.375 Gram(s) IV Intermittent every 8 hours  polyethylene glycol 3350 17 Gram(s) Oral daily  senna 2 Tablet(s) Oral at bedtime  traMADol 25 milliGRAM(s) Oral three times a day PRN  MEDICATIONS  (PRN):  acetaminophen     Tablet .. 650 milliGRAM(s) Oral every 6 hours PRN Temp greater or equal to 38C (100.4F), Mild Pain (1 - 3)  aluminum hydroxide/magnesium hydroxide/simethicone Suspension 30 milliLiter(s) Oral every 4 hours PRN Dyspepsia  bisacodyl Suppository 10 milliGRAM(s) Rectal daily PRN Constipation  heparin   Injectable 78011 Unit(s) IV Push every 6 hours PRN For aPTT less than 40  heparin   Injectable 5000 Unit(s) IV Push every 6 hours PRN For aPTT between 40 - 57  melatonin 3 milliGRAM(s) Oral at bedtime PRN Insomnia  metoprolol tartrate Injectable 5 milliGRAM(s) IV Push every 6 hours PRN RVR > 130  ondansetron Injectable 4 milliGRAM(s) IV Push every 8 hours PRN Nausea and/or Vomiting  traMADol 25 milliGRAM(s) Oral three times a day PRN Severe Pain (7 - 10)      Daily     Daily                               13.7   18.34 )-----------( 379      ( 26 Nov 2023 07:20 )             43.4   11-26    140  |  98  |  83.0<H>  ----------------------------<  178<H>  4.0   |  27.0  |  2.06<H>    Ca    9.0      26 Nov 2023 07:20  Phos  3.6     11-26  Mg     2.8     11-26    TPro  7.1  /  Alb  2.5<L>  /  TBili  1.4  /  DBili  0.9<H>  /  AST  68<H>  /  ALT  79<H>  /  AlkPhos  189<H>  11-26            Objective:  T(C): 36.9 (11-26-23 @ 08:00), Max: 37.4 (11-25-23 @ 19:30)  HR: 99 (11-26-23 @ 09:00) (99 - 132)  BP: 114/73 (11-26-23 @ 09:00) (92/71 - 134/86)  RR: 18 (11-26-23 @ 09:00) (18 - 25)  SpO2: 92% (11-26-23 @ 09:00) (92% - 96%)  Wt(kg): --CAPILLARY BLOOD GLUCOSE      POCT Blood Glucose.: 170 mg/dL (25 Nov 2023 11:16)  I&O's Summary    25 Nov 2023 07:01  -  26 Nov 2023 07:00  --------------------------------------------------------  IN: 602 mL / OUT: 700 mL / NET: -98 mL        Physical Exam  General: Obesef, NAD  Neuro: AxO x3, non-focal, LEE  Cardiac: Irregularly irregular, S1S2, no murmurs  Pulm: Expiratory wheeze and diminished at the bases b/l,   Abdomen: Soft, NT, ND, +BS  Peripheral: +DP pulses b/l, +1 b/l LE edema   Chest tube: Left PTC WS       Imaging:  CXR:  < from: Xray Chest 1 View- PORTABLE-Routine (Xray Chest 1 View- PORTABLE-Routine in AM.) (11.25.23 @ 07:00) >    ACC: [id number] EXAM:  XR CHEST AP OR PA 1V   ORDERED BY: PAULA HENRY     ACC: [id number] EXAM:  XR CHEST PORTABLE ROUTINE 1V   ORDERED BY: AMALIA MENDOZA     ACC: [id number] EXAM:  XR CHEST PORTABLE ROUTINE 1V   ORDERED BY: ELLE ESCOBAR     PROCEDURE DATE:  11/23/2023          INTERPRETATION:  TECHNIQUE: A series of portable chest x-rays was   obtained.    COMPARISON: 11/22/2023    CLINICAL INFORMATION: Pleural effusion. Codes sepsis    FINDINGS:  11/23/2023 5:54 AM:  The patient is status post sternotomy.  The heart is not well assessed on an AP film.  The right lung is clear.  There is complete opacification of the left hemithorax without volume   loss suggesting a large left pleural effusion. A pigtail catheter is seen   at the left lung base.  There is no pneumothorax.    11/24/2023 2:02 AM:  There is improved aeration of the left upper lung with decreased left   pleural effusion. Moderate loculated left pleural effusion and   atelectasis remains.    11/25/2023 6:33 AM:  There is no significant change.    IMPRESSION:    Some decrease in moderate to large loculated left pleural effusion and   atelectasis from the initial film. A left pigtail catheter is in place.    --- End of Report ---          DEBRA TINOCO MD; Attending Radiologist  This document has been electronically signed.  DEBRA TINOCO MD; Attending Radiologist  This document has been electronically signed. Nov 25 2023  8:33AM    < end of copied text >      ECG:

## 2023-11-26 NOTE — PROGRESS NOTE ADULT - ASSESSMENT
73 yo M w/ PMH of CAD s/p CABG, HFrEF, HTN, HLD presenting for chief complaint of SOB. Admitted for acute hypoxemic respiratory failure 2/2 CHF/Pleural effusion.  Suspect CKD   Likely component of CRS   No gross hydro on renal sono  UA bland   Diuretics held due to hypotension - resume as needed when hemodynamics stable   Possible VATS Mon 11/27   Watch Vanco / Digoxin levels - if continued   + Renal dose Zosyn    Will follow   Discussed with sister at bedside

## 2023-11-26 NOTE — PROGRESS NOTE ADULT - SUBJECTIVE AND OBJECTIVE BOX
seen for pleurale ffusion, afib    no acute complaints  no chest pain/palps  +SOB  ros negative     MEDICATIONS  (STANDING):  aspirin  chewable 81 milliGRAM(s) Oral daily  atorvastatin 20 milliGRAM(s) Oral at bedtime  digoxin     Tablet 250 MICROGram(s) Oral daily  heparin  Infusion. 2100 Unit(s)/Hr (21 mL/Hr) IV Continuous <Continuous>  influenza  Vaccine (HIGH DOSE) 0.7 milliLiter(s) IntraMuscular once  metoprolol tartrate 75 milliGRAM(s) Oral every 6 hours  piperacillin/tazobactam IVPB.. 3.375 Gram(s) IV Intermittent every 8 hours  polyethylene glycol 3350 17 Gram(s) Oral daily  senna 2 Tablet(s) Oral at bedtime    MEDICATIONS  (PRN):  acetaminophen     Tablet .. 650 milliGRAM(s) Oral every 6 hours PRN Temp greater or equal to 38C (100.4F), Mild Pain (1 - 3)  aluminum hydroxide/magnesium hydroxide/simethicone Suspension 30 milliLiter(s) Oral every 4 hours PRN Dyspepsia  bisacodyl Suppository 10 milliGRAM(s) Rectal daily PRN Constipation  heparin   Injectable 52173 Unit(s) IV Push every 6 hours PRN For aPTT less than 40  heparin   Injectable 5000 Unit(s) IV Push every 6 hours PRN For aPTT between 40 - 57  melatonin 3 milliGRAM(s) Oral at bedtime PRN Insomnia  metoprolol tartrate Injectable 5 milliGRAM(s) IV Push every 6 hours PRN RVR > 130  ondansetron Injectable 4 milliGRAM(s) IV Push every 8 hours PRN Nausea and/or Vomiting  traMADol 25 milliGRAM(s) Oral three times a day PRN Severe Pain (7 - 10)      Allergies    No Known Allergies    Vital Signs Last 24 Hrs  T(C): 36.9 (26 Nov 2023 08:00), Max: 37.4 (25 Nov 2023 19:30)  T(F): 98.4 (26 Nov 2023 08:00), Max: 99.4 (25 Nov 2023 19:30)  HR: 99 (26 Nov 2023 09:00) (99 - 132)  BP: 114/73 (26 Nov 2023 09:00) (92/71 - 134/86)  BP(mean): 87 (26 Nov 2023 09:00) (81 - 98)  RR: 18 (26 Nov 2023 09:00) (18 - 25)  SpO2: 92% (26 Nov 2023 09:00) (92% - 96%)    Parameters below as of 26 Nov 2023 09:00  Patient On (Oxygen Delivery Method): nasal cannula  O2 Flow (L/min): 3      PHYSICAL EXAM:    GENERAL: NAD  CHEST/LUNG dec bs L>R, left pigtail   HEART: irreg irreg rate and rhythm; S1 S2  ABDOMEN: Soft,  Nondistended; Bowel sounds present  EXTREMITIES:  no edema   NERVOUS SYSTEM:  Alert & Oriented X3, gen weakness  PSYCH: normal mood, appropriate response.    LABS:                        13.7   18.34 )-----------( 379      ( 26 Nov 2023 07:20 )             43.4     11-26    140  |  98  |  83.0<H>  ----------------------------<  178<H>  4.0   |  27.0  |  2.06<H>    Ca    9.0      26 Nov 2023 07:20  Phos  3.6     11-26  Mg     2.8     11-26    TPro  7.1  /  Alb  2.5<L>  /  TBili  1.4  /  DBili  0.9<H>  /  AST  68<H>  /  ALT  79<H>  /  AlkPhos  189<H>  11-26      Urinalysis Basic - ( 26 Nov 2023 07:20 )    Color: x / Appearance: x / SG: x / pH: x  Gluc: 178 mg/dL / Ketone: x  / Bili: x / Urobili: x   Blood: x / Protein: x / Nitrite: x   Leuk Esterase: x / RBC: x / WBC x   Sq Epi: x / Non Sq Epi: x / Bacteria: x        CAPILLARY BLOOD GLUCOSE      POCT Blood Glucose.: 170 mg/dL (25 Nov 2023 11:16)        RADIOLOGY & ADDITIONAL TESTS:

## 2023-11-26 NOTE — PROGRESS NOTE ADULT - ASSESSMENT
72M with CAD s/p CABG, HFrEF, HTN, Afib admitted on 11/18 with dyspnea. Found to have large loculated left pleural effusion s/p CT on 11/18. Hospital course complicated by Afl/afib with RVR followed by EP     Left empyema   Aflutter with RVR   CKD     - 11/18 CT Chest with large loculated pleural effusion   - s/p left chest tube on 11/18   - Pleural fluid culture with Streptococcus intermedius   - Most recent CXR with some decrease in mod to large pleural effusion   - 11/23 and 11/24 BCx ngtd  - Thoracic Sx following - agree with plan for VATS/decortication. For OR tomorrow   - Can continue piperacillin-tazobactam for now, pending surgical intervention   - Monitor renal function and adjust antimicrobial dose accordingly.   - will probably narrow spectrum afterwards   - Difficulty achieving rate control for Aflutter; in part likely to uncontrolled infectious source. EP following.   - Leukocytosis fluctuating   - Monitor temp - last febrile on 11/24     D/w Dr. Castelan

## 2023-11-26 NOTE — PROGRESS NOTE ADULT - ASSESSMENT
72-year-old male with history of hypertension CAD status post quintuple bypass November 2021 followed by cardiology/Dr. Milian in [place]  resents the ED via EMS complaining of worsening dyspnea on exertion since yesterday.  Thoracic surgery consulted treatment Left effusion. Left PTC placed 11/28. Patient now pending FB left robotic assisted decort

## 2023-11-26 NOTE — PROGRESS NOTE ADULT - SUBJECTIVE AND OBJECTIVE BOX
Electrophysiology Attending Follow Up Note    Subjective: Feels breathing is a bit difficult, but not severely labored.    TELE: AFL with rates ~100-130s    MEDICATIONS  (STANDING):  aspirin  chewable 81 milliGRAM(s) Oral daily  atorvastatin 20 milliGRAM(s) Oral at bedtime  digoxin     Tablet 250 MICROGram(s) Oral daily  heparin  Infusion. 2100 Unit(s)/Hr (21 mL/Hr) IV Continuous <Continuous>  influenza  Vaccine (HIGH DOSE) 0.7 milliLiter(s) IntraMuscular once  metoprolol tartrate 75 milliGRAM(s) Oral every 6 hours  piperacillin/tazobactam IVPB.. 3.375 Gram(s) IV Intermittent every 8 hours  polyethylene glycol 3350 17 Gram(s) Oral daily  senna 2 Tablet(s) Oral at bedtime    MEDICATIONS  (PRN):  acetaminophen     Tablet .. 650 milliGRAM(s) Oral every 6 hours PRN Temp greater or equal to 38C (100.4F), Mild Pain (1 - 3)  aluminum hydroxide/magnesium hydroxide/simethicone Suspension 30 milliLiter(s) Oral every 4 hours PRN Dyspepsia  bisacodyl Suppository 10 milliGRAM(s) Rectal daily PRN Constipation  heparin   Injectable 69306 Unit(s) IV Push every 6 hours PRN For aPTT less than 40  heparin   Injectable 5000 Unit(s) IV Push every 6 hours PRN For aPTT between 40 - 57  melatonin 3 milliGRAM(s) Oral at bedtime PRN Insomnia  metoprolol tartrate Injectable 5 milliGRAM(s) IV Push every 6 hours PRN RVR > 130  ondansetron Injectable 4 milliGRAM(s) IV Push every 8 hours PRN Nausea and/or Vomiting  traMADol 25 milliGRAM(s) Oral three times a day PRN Severe Pain (7 - 10)    Allergies  No Known Allergies    Vital Signs Last 24 Hrs  T(C): 36.9 (26 Nov 2023 08:00), Max: 37.4 (25 Nov 2023 19:30)  T(F): 98.4 (26 Nov 2023 08:00), Max: 99.4 (25 Nov 2023 19:30)  HR: 128 (26 Nov 2023 14:00) (89 - 132)  BP: 108/79 (26 Nov 2023 14:00) (92/71 - 134/86)  BP(mean): 90 (26 Nov 2023 14:00) (81 - 94)  RR: 17 (26 Nov 2023 14:00) (17 - 25)  SpO2: 97% (26 Nov 2023 14:00) (92% - 97%)    Parameters below as of 26 Nov 2023 14:00  Patient On (Oxygen Delivery Method): nasal cannula  O2 Flow (L/min): 3      Physical Exam:  Constitutional: Well-developed, well nourished, in no acute distress, obese  Head: normocephalic atraumatic   Eyes:  extraocular movements intact, sclera anicteric  ENT: mucous membranes moist, pharynx no erythema or swelling  Chest wall: normal in appearance, nontender to palpation  Resp: effort normal, breath sounds clear to auscultation bilaterally  Cardiac: Heart regular tachycardic, regular, no murmurs, rubs, or gallops.  No edema.  Abdomen: soft, nondistended, bowel sounds active, nontender to palpation in all four quadrants    Musculoskeletal: full range of motion all extremities with no pain, tenderness, swelling, or erythema    Neuro: Alert and oriented x 3, motor & sensation grossly in tact  Skin: color normal, no rashes or injury  Psych: mood calm    LABS:                        13.7   18.34 )-----------( 379      ( 26 Nov 2023 07:20 )             43.4     11-26    140  |  98  |  83.0<H>  ----------------------------<  178<H>  4.0   |  27.0  |  2.06<H>    Ca    9.0      26 Nov 2023 07:20  Phos  3.6     11-26  Mg     2.8     11-26    TPro  7.1  /  Alb  2.5<L>  /  TBili  1.4  /  DBili  0.9<H>  /  AST  68<H>  /  ALT  79<H>  /  AlkPhos  189<H>  11-26      Urinalysis Basic - ( 26 Nov 2023 07:20 )    Color: x / Appearance: x / SG: x / pH: x  Gluc: 178 mg/dL / Ketone: x  / Bili: x / Urobili: x   Blood: x / Protein: x / Nitrite: x   Leuk Esterase: x / RBC: x / WBC x   Sq Epi: x / Non Sq Epi: x / Bacteria: x

## 2023-11-27 ENCOUNTER — TRANSCRIPTION ENCOUNTER (OUTPATIENT)
Age: 73
End: 2023-11-27

## 2023-11-27 ENCOUNTER — APPOINTMENT (OUTPATIENT)
Dept: THORACIC SURGERY | Facility: HOSPITAL | Age: 73
End: 2023-11-27

## 2023-11-27 LAB
ALBUMIN SERPL ELPH-MCNC: 0.8 G/DL — LOW (ref 3.3–5.2)
ALBUMIN SERPL ELPH-MCNC: 0.8 G/DL — LOW (ref 3.3–5.2)
ALBUMIN SERPL ELPH-MCNC: 2.2 G/DL — LOW (ref 3.3–5.2)
ALBUMIN SERPL ELPH-MCNC: 2.2 G/DL — LOW (ref 3.3–5.2)
ALBUMIN SERPL ELPH-MCNC: 2.4 G/DL — LOW (ref 3.3–5.2)
ALBUMIN SERPL ELPH-MCNC: 2.4 G/DL — LOW (ref 3.3–5.2)
ALP SERPL-CCNC: 158 U/L — HIGH (ref 40–120)
ALP SERPL-CCNC: 158 U/L — HIGH (ref 40–120)
ALP SERPL-CCNC: 182 U/L — HIGH (ref 40–120)
ALP SERPL-CCNC: 182 U/L — HIGH (ref 40–120)
ALP SERPL-CCNC: 69 U/L — SIGNIFICANT CHANGE UP (ref 40–120)
ALP SERPL-CCNC: 69 U/L — SIGNIFICANT CHANGE UP (ref 40–120)
ALT FLD-CCNC: 19 U/L — SIGNIFICANT CHANGE UP
ALT FLD-CCNC: 19 U/L — SIGNIFICANT CHANGE UP
ALT FLD-CCNC: 44 U/L — HIGH
ALT FLD-CCNC: 44 U/L — HIGH
ALT FLD-CCNC: 54 U/L — HIGH
ALT FLD-CCNC: 54 U/L — HIGH
ANION GAP SERPL CALC-SCNC: 14 MMOL/L — SIGNIFICANT CHANGE UP (ref 5–17)
ANION GAP SERPL CALC-SCNC: 14 MMOL/L — SIGNIFICANT CHANGE UP (ref 5–17)
ANION GAP SERPL CALC-SCNC: 15 MMOL/L — SIGNIFICANT CHANGE UP (ref 5–17)
ANION GAP SERPL CALC-SCNC: 15 MMOL/L — SIGNIFICANT CHANGE UP (ref 5–17)
ANION GAP SERPL CALC-SCNC: 9 MMOL/L — SIGNIFICANT CHANGE UP (ref 5–17)
ANION GAP SERPL CALC-SCNC: 9 MMOL/L — SIGNIFICANT CHANGE UP (ref 5–17)
AST SERPL-CCNC: 19 U/L — SIGNIFICANT CHANGE UP
AST SERPL-CCNC: 19 U/L — SIGNIFICANT CHANGE UP
AST SERPL-CCNC: 39 U/L — SIGNIFICANT CHANGE UP
AST SERPL-CCNC: 39 U/L — SIGNIFICANT CHANGE UP
AST SERPL-CCNC: 41 U/L — HIGH
AST SERPL-CCNC: 41 U/L — HIGH
BILIRUB SERPL-MCNC: 0.6 MG/DL — SIGNIFICANT CHANGE UP (ref 0.4–2)
BILIRUB SERPL-MCNC: 0.6 MG/DL — SIGNIFICANT CHANGE UP (ref 0.4–2)
BILIRUB SERPL-MCNC: 1.4 MG/DL — SIGNIFICANT CHANGE UP (ref 0.4–2)
BILIRUB SERPL-MCNC: 1.4 MG/DL — SIGNIFICANT CHANGE UP (ref 0.4–2)
BILIRUB SERPL-MCNC: 1.6 MG/DL — SIGNIFICANT CHANGE UP (ref 0.4–2)
BILIRUB SERPL-MCNC: 1.6 MG/DL — SIGNIFICANT CHANGE UP (ref 0.4–2)
BUN SERPL-MCNC: 49.6 MG/DL — HIGH (ref 8–20)
BUN SERPL-MCNC: 49.6 MG/DL — HIGH (ref 8–20)
BUN SERPL-MCNC: 89.2 MG/DL — HIGH (ref 8–20)
BUN SERPL-MCNC: 89.2 MG/DL — HIGH (ref 8–20)
BUN SERPL-MCNC: 90 MG/DL — HIGH (ref 8–20)
BUN SERPL-MCNC: 90 MG/DL — HIGH (ref 8–20)
CALCIUM SERPL-MCNC: 3.8 MG/DL — CRITICAL LOW (ref 8.4–10.5)
CALCIUM SERPL-MCNC: 3.8 MG/DL — CRITICAL LOW (ref 8.4–10.5)
CALCIUM SERPL-MCNC: 8.6 MG/DL — SIGNIFICANT CHANGE UP (ref 8.4–10.5)
CALCIUM SERPL-MCNC: 8.6 MG/DL — SIGNIFICANT CHANGE UP (ref 8.4–10.5)
CALCIUM SERPL-MCNC: 9.3 MG/DL — SIGNIFICANT CHANGE UP (ref 8.4–10.5)
CALCIUM SERPL-MCNC: 9.3 MG/DL — SIGNIFICANT CHANGE UP (ref 8.4–10.5)
CHLORIDE SERPL-SCNC: 103 MMOL/L — SIGNIFICANT CHANGE UP (ref 96–108)
CHLORIDE SERPL-SCNC: 103 MMOL/L — SIGNIFICANT CHANGE UP (ref 96–108)
CHLORIDE SERPL-SCNC: 123 MMOL/L — HIGH (ref 96–108)
CHLORIDE SERPL-SCNC: 123 MMOL/L — HIGH (ref 96–108)
CHLORIDE SERPL-SCNC: 98 MMOL/L — SIGNIFICANT CHANGE UP (ref 96–108)
CHLORIDE SERPL-SCNC: 98 MMOL/L — SIGNIFICANT CHANGE UP (ref 96–108)
CO2 SERPL-SCNC: 14 MMOL/L — LOW (ref 22–29)
CO2 SERPL-SCNC: 14 MMOL/L — LOW (ref 22–29)
CO2 SERPL-SCNC: 25 MMOL/L — SIGNIFICANT CHANGE UP (ref 22–29)
CO2 SERPL-SCNC: 25 MMOL/L — SIGNIFICANT CHANGE UP (ref 22–29)
CO2 SERPL-SCNC: 27 MMOL/L — SIGNIFICANT CHANGE UP (ref 22–29)
CO2 SERPL-SCNC: 27 MMOL/L — SIGNIFICANT CHANGE UP (ref 22–29)
CREAT SERPL-MCNC: 0.84 MG/DL — SIGNIFICANT CHANGE UP (ref 0.5–1.3)
CREAT SERPL-MCNC: 0.84 MG/DL — SIGNIFICANT CHANGE UP (ref 0.5–1.3)
CREAT SERPL-MCNC: 1.99 MG/DL — HIGH (ref 0.5–1.3)
CREAT SERPL-MCNC: 1.99 MG/DL — HIGH (ref 0.5–1.3)
CREAT SERPL-MCNC: 2.09 MG/DL — HIGH (ref 0.5–1.3)
CREAT SERPL-MCNC: 2.09 MG/DL — HIGH (ref 0.5–1.3)
EGFR: 33 ML/MIN/1.73M2 — LOW
EGFR: 33 ML/MIN/1.73M2 — LOW
EGFR: 35 ML/MIN/1.73M2 — LOW
EGFR: 35 ML/MIN/1.73M2 — LOW
EGFR: 94 ML/MIN/1.73M2 — SIGNIFICANT CHANGE UP
EGFR: 94 ML/MIN/1.73M2 — SIGNIFICANT CHANGE UP
GAS PNL BLDA: SIGNIFICANT CHANGE UP
GLUCOSE BLDC GLUCOMTR-MCNC: 134 MG/DL — HIGH (ref 70–99)
GLUCOSE BLDC GLUCOMTR-MCNC: 134 MG/DL — HIGH (ref 70–99)
GLUCOSE BLDC GLUCOMTR-MCNC: 197 MG/DL — HIGH (ref 70–99)
GLUCOSE BLDC GLUCOMTR-MCNC: 197 MG/DL — HIGH (ref 70–99)
GLUCOSE SERPL-MCNC: 116 MG/DL — HIGH (ref 70–99)
GLUCOSE SERPL-MCNC: 116 MG/DL — HIGH (ref 70–99)
GLUCOSE SERPL-MCNC: 166 MG/DL — HIGH (ref 70–99)
GLUCOSE SERPL-MCNC: 166 MG/DL — HIGH (ref 70–99)
GLUCOSE SERPL-MCNC: 206 MG/DL — HIGH (ref 70–99)
GLUCOSE SERPL-MCNC: 206 MG/DL — HIGH (ref 70–99)
HCT VFR BLD CALC: 38.8 % — LOW (ref 39–50)
HCT VFR BLD CALC: 38.8 % — LOW (ref 39–50)
HCT VFR BLD CALC: 44.2 % — SIGNIFICANT CHANGE UP (ref 39–50)
HCT VFR BLD CALC: 44.2 % — SIGNIFICANT CHANGE UP (ref 39–50)
HGB BLD-MCNC: 11.2 G/DL — LOW (ref 13–17)
HGB BLD-MCNC: 11.2 G/DL — LOW (ref 13–17)
HGB BLD-MCNC: 13.7 G/DL — SIGNIFICANT CHANGE UP (ref 13–17)
HGB BLD-MCNC: 13.7 G/DL — SIGNIFICANT CHANGE UP (ref 13–17)
MAGNESIUM SERPL-MCNC: 1.2 MG/DL — LOW (ref 1.6–2.6)
MAGNESIUM SERPL-MCNC: 1.2 MG/DL — LOW (ref 1.6–2.6)
MAGNESIUM SERPL-MCNC: 3.1 MG/DL — HIGH (ref 1.6–2.6)
MAGNESIUM SERPL-MCNC: 3.1 MG/DL — HIGH (ref 1.6–2.6)
MCHC RBC-ENTMCNC: 26.5 PG — LOW (ref 27–34)
MCHC RBC-ENTMCNC: 26.5 PG — LOW (ref 27–34)
MCHC RBC-ENTMCNC: 27.6 PG — SIGNIFICANT CHANGE UP (ref 27–34)
MCHC RBC-ENTMCNC: 27.6 PG — SIGNIFICANT CHANGE UP (ref 27–34)
MCHC RBC-ENTMCNC: 28.9 GM/DL — LOW (ref 32–36)
MCHC RBC-ENTMCNC: 28.9 GM/DL — LOW (ref 32–36)
MCHC RBC-ENTMCNC: 31 GM/DL — LOW (ref 32–36)
MCHC RBC-ENTMCNC: 31 GM/DL — LOW (ref 32–36)
MCV RBC AUTO: 89.1 FL — SIGNIFICANT CHANGE UP (ref 80–100)
MCV RBC AUTO: 89.1 FL — SIGNIFICANT CHANGE UP (ref 80–100)
MCV RBC AUTO: 91.9 FL — SIGNIFICANT CHANGE UP (ref 80–100)
MCV RBC AUTO: 91.9 FL — SIGNIFICANT CHANGE UP (ref 80–100)
PLATELET # BLD AUTO: 402 K/UL — HIGH (ref 150–400)
PLATELET # BLD AUTO: 402 K/UL — HIGH (ref 150–400)
PLATELET # BLD AUTO: 428 K/UL — HIGH (ref 150–400)
PLATELET # BLD AUTO: 428 K/UL — HIGH (ref 150–400)
POTASSIUM SERPL-MCNC: 2.4 MMOL/L — CRITICAL LOW (ref 3.5–5.3)
POTASSIUM SERPL-MCNC: 2.4 MMOL/L — CRITICAL LOW (ref 3.5–5.3)
POTASSIUM SERPL-MCNC: 4.2 MMOL/L — SIGNIFICANT CHANGE UP (ref 3.5–5.3)
POTASSIUM SERPL-MCNC: 4.2 MMOL/L — SIGNIFICANT CHANGE UP (ref 3.5–5.3)
POTASSIUM SERPL-MCNC: 4.9 MMOL/L — SIGNIFICANT CHANGE UP (ref 3.5–5.3)
POTASSIUM SERPL-MCNC: 4.9 MMOL/L — SIGNIFICANT CHANGE UP (ref 3.5–5.3)
POTASSIUM SERPL-SCNC: 2.4 MMOL/L — CRITICAL LOW (ref 3.5–5.3)
POTASSIUM SERPL-SCNC: 2.4 MMOL/L — CRITICAL LOW (ref 3.5–5.3)
POTASSIUM SERPL-SCNC: 4.2 MMOL/L — SIGNIFICANT CHANGE UP (ref 3.5–5.3)
POTASSIUM SERPL-SCNC: 4.2 MMOL/L — SIGNIFICANT CHANGE UP (ref 3.5–5.3)
POTASSIUM SERPL-SCNC: 4.9 MMOL/L — SIGNIFICANT CHANGE UP (ref 3.5–5.3)
POTASSIUM SERPL-SCNC: 4.9 MMOL/L — SIGNIFICANT CHANGE UP (ref 3.5–5.3)
PROT SERPL-MCNC: 2.8 G/DL — LOW (ref 6.6–8.7)
PROT SERPL-MCNC: 2.8 G/DL — LOW (ref 6.6–8.7)
PROT SERPL-MCNC: 6.5 G/DL — LOW (ref 6.6–8.7)
PROT SERPL-MCNC: 6.5 G/DL — LOW (ref 6.6–8.7)
PROT SERPL-MCNC: 7.2 G/DL — SIGNIFICANT CHANGE UP (ref 6.6–8.7)
PROT SERPL-MCNC: 7.2 G/DL — SIGNIFICANT CHANGE UP (ref 6.6–8.7)
RBC # BLD: 4.22 M/UL — SIGNIFICANT CHANGE UP (ref 4.2–5.8)
RBC # BLD: 4.22 M/UL — SIGNIFICANT CHANGE UP (ref 4.2–5.8)
RBC # BLD: 4.96 M/UL — SIGNIFICANT CHANGE UP (ref 4.2–5.8)
RBC # BLD: 4.96 M/UL — SIGNIFICANT CHANGE UP (ref 4.2–5.8)
RBC # FLD: 15.8 % — HIGH (ref 10.3–14.5)
SODIUM SERPL-SCNC: 140 MMOL/L — SIGNIFICANT CHANGE UP (ref 135–145)
SODIUM SERPL-SCNC: 140 MMOL/L — SIGNIFICANT CHANGE UP (ref 135–145)
SODIUM SERPL-SCNC: 142 MMOL/L — SIGNIFICANT CHANGE UP (ref 135–145)
SODIUM SERPL-SCNC: 142 MMOL/L — SIGNIFICANT CHANGE UP (ref 135–145)
SODIUM SERPL-SCNC: 146 MMOL/L — HIGH (ref 135–145)
SODIUM SERPL-SCNC: 146 MMOL/L — HIGH (ref 135–145)
WBC # BLD: 17.64 K/UL — HIGH (ref 3.8–10.5)
WBC # BLD: 17.64 K/UL — HIGH (ref 3.8–10.5)
WBC # BLD: 24.62 K/UL — HIGH (ref 3.8–10.5)
WBC # BLD: 24.62 K/UL — HIGH (ref 3.8–10.5)
WBC # FLD AUTO: 17.64 K/UL — HIGH (ref 3.8–10.5)
WBC # FLD AUTO: 17.64 K/UL — HIGH (ref 3.8–10.5)
WBC # FLD AUTO: 24.62 K/UL — HIGH (ref 3.8–10.5)
WBC # FLD AUTO: 24.62 K/UL — HIGH (ref 3.8–10.5)

## 2023-11-27 PROCEDURE — 99232 SBSQ HOSP IP/OBS MODERATE 35: CPT

## 2023-11-27 PROCEDURE — 99233 SBSQ HOSP IP/OBS HIGH 50: CPT

## 2023-11-27 PROCEDURE — 71045 X-RAY EXAM CHEST 1 VIEW: CPT | Mod: 26,77

## 2023-11-27 PROCEDURE — 71045 X-RAY EXAM CHEST 1 VIEW: CPT | Mod: 26

## 2023-11-27 PROCEDURE — 32652 THORACOSCOPY REM TOTL CORTEX: CPT | Mod: LT

## 2023-11-27 PROCEDURE — 32652 THORACOSCOPY REM TOTL CORTEX: CPT | Mod: AS,LT

## 2023-11-27 DEVICE — SURGICEL FIBRILLAR 4 X 4": Type: IMPLANTABLE DEVICE | Site: LEFT | Status: FUNCTIONAL

## 2023-11-27 DEVICE — CHEST DRAIN THORACIC ARGYLE PVC 28FR RIGHT ANGLE: Type: IMPLANTABLE DEVICE | Site: LEFT | Status: FUNCTIONAL

## 2023-11-27 DEVICE — CHEST DRAIN THORACIC ARGYLE PVC 28FR STRAIGHT: Type: IMPLANTABLE DEVICE | Site: LEFT | Status: FUNCTIONAL

## 2023-11-27 DEVICE — KIT A-LINE 1LUM 20G X 12CM SAFE KIT: Type: IMPLANTABLE DEVICE | Site: LEFT | Status: FUNCTIONAL

## 2023-11-27 RX ORDER — PANTOPRAZOLE SODIUM 20 MG/1
40 TABLET, DELAYED RELEASE ORAL DAILY
Refills: 0 | Status: DISCONTINUED | OUTPATIENT
Start: 2023-11-27 | End: 2023-11-30

## 2023-11-27 RX ORDER — PROPOFOL 10 MG/ML
10 INJECTION, EMULSION INTRAVENOUS
Qty: 1000 | Refills: 0 | Status: DISCONTINUED | OUTPATIENT
Start: 2023-11-27 | End: 2023-11-28

## 2023-11-27 RX ORDER — CALCIUM GLUCONATE 100 MG/ML
2 VIAL (ML) INTRAVENOUS ONCE
Refills: 0 | Status: COMPLETED | OUTPATIENT
Start: 2023-11-27 | End: 2023-11-27

## 2023-11-27 RX ORDER — MAGNESIUM SULFATE 500 MG/ML
2 VIAL (ML) INJECTION
Refills: 0 | Status: COMPLETED | OUTPATIENT
Start: 2023-11-27 | End: 2023-11-27

## 2023-11-27 RX ORDER — CHLORHEXIDINE GLUCONATE 213 G/1000ML
15 SOLUTION TOPICAL EVERY 12 HOURS
Refills: 0 | Status: DISCONTINUED | OUTPATIENT
Start: 2023-11-27 | End: 2023-11-28

## 2023-11-27 RX ORDER — CHLORHEXIDINE GLUCONATE 213 G/1000ML
1 SOLUTION TOPICAL
Refills: 0 | Status: DISCONTINUED | OUTPATIENT
Start: 2023-11-27 | End: 2023-12-06

## 2023-11-27 RX ORDER — SODIUM CHLORIDE 9 MG/ML
10 INJECTION INTRAMUSCULAR; INTRAVENOUS; SUBCUTANEOUS
Refills: 0 | Status: DISCONTINUED | OUTPATIENT
Start: 2023-11-27 | End: 2023-12-06

## 2023-11-27 RX ORDER — ATORVASTATIN CALCIUM 80 MG/1
20 TABLET, FILM COATED ORAL AT BEDTIME
Refills: 0 | Status: DISCONTINUED | OUTPATIENT
Start: 2023-11-27 | End: 2023-11-30

## 2023-11-27 RX ORDER — INSULIN LISPRO 100/ML
VIAL (ML) SUBCUTANEOUS EVERY 6 HOURS
Refills: 0 | Status: DISCONTINUED | OUTPATIENT
Start: 2023-11-27 | End: 2023-11-28

## 2023-11-27 RX ORDER — NOREPINEPHRINE BITARTRATE/D5W 8 MG/250ML
0.05 PLASTIC BAG, INJECTION (ML) INTRAVENOUS
Qty: 8 | Refills: 0 | Status: DISCONTINUED | OUTPATIENT
Start: 2023-11-27 | End: 2023-11-28

## 2023-11-27 RX ADMIN — PIPERACILLIN AND TAZOBACTAM 25 GRAM(S): 4; .5 INJECTION, POWDER, LYOPHILIZED, FOR SOLUTION INTRAVENOUS at 22:30

## 2023-11-27 RX ADMIN — Medication 81 MILLIGRAM(S): at 12:12

## 2023-11-27 RX ADMIN — Medication 50 GRAM(S): at 22:14

## 2023-11-27 RX ADMIN — Medication 11.9 MICROGRAM(S)/KG/MIN: at 21:00

## 2023-11-27 RX ADMIN — PIPERACILLIN AND TAZOBACTAM 25 GRAM(S): 4; .5 INJECTION, POWDER, LYOPHILIZED, FOR SOLUTION INTRAVENOUS at 13:26

## 2023-11-27 RX ADMIN — PROPOFOL 7.62 MICROGRAM(S)/KG/MIN: 10 INJECTION, EMULSION INTRAVENOUS at 21:00

## 2023-11-27 RX ADMIN — Medication 75 MILLIGRAM(S): at 12:12

## 2023-11-27 RX ADMIN — Medication 200 GRAM(S): at 22:18

## 2023-11-27 RX ADMIN — Medication 250 MICROGRAM(S): at 05:13

## 2023-11-27 RX ADMIN — Medication 50 GRAM(S): at 23:07

## 2023-11-27 RX ADMIN — Medication 75 MILLIGRAM(S): at 05:14

## 2023-11-27 RX ADMIN — PIPERACILLIN AND TAZOBACTAM 25 GRAM(S): 4; .5 INJECTION, POWDER, LYOPHILIZED, FOR SOLUTION INTRAVENOUS at 05:14

## 2023-11-27 RX ADMIN — PANTOPRAZOLE SODIUM 40 MILLIGRAM(S): 20 TABLET, DELAYED RELEASE ORAL at 23:32

## 2023-11-27 NOTE — PROGRESS NOTE ADULT - ASSESSMENT
71 yo M w/ PMH of CAD s/p CABG, HFrEF, HTN, HLD presenting for chief complaint of SOB. Admitted for acute hypoxemic respiratory failure 2/2 CHF/Pleural effusion.     acute hypoxemic resp failure  left pleural loculated effusion  likely empyema       sepsis present on admission; left pleural fluid culture with streptococcus intermedius. blood cultures negative      ID following, likely transition to rocephin post OP     plan for VATS monday      ct surg following    hypotension--improved   aflutter--difficult to control     c/w lopressor 75mg QID     s/p dig, stopped per EP     EP following     planned for DCCV/ablation post vats     hold lasix, metolazone     heparin drip     DEVON vs CKD    unknown baseline    renal following     CAD s/p CABG  chronic systolic HF    diuretics on hold     Continue ASA, Statin, Lopressor      monitor on stepdown given labile hemodynamics

## 2023-11-27 NOTE — PROVIDER CONTACT NOTE (CRITICAL VALUE NOTIFICATION) - SITUATION
Body fluid culture on 11/18. Showed few streptocci intermediate
APTT greater than 200
APTT greater 200. Pt APTT was 25.5 at 2205 and was treated per full AC protocol

## 2023-11-27 NOTE — ASU PREOP CHECKLIST - ADVANCE DIRECTIVE ADDRESSED/READDRESSED
Left message for Mom to call back regarding this, Dr. Keyes wants to know a little more information regarding this.   done

## 2023-11-27 NOTE — PROGRESS NOTE ADULT - SUBJECTIVE AND OBJECTIVE BOX
seen for afib, effusion    no acute complaints  awaiting OR  ros negative     MEDICATIONS  (STANDING):  aspirin  chewable 81 milliGRAM(s) Oral daily  atorvastatin 20 milliGRAM(s) Oral at bedtime  heparin  Infusion. 2100 Unit(s)/Hr (21 mL/Hr) IV Continuous <Continuous>  influenza  Vaccine (HIGH DOSE) 0.7 milliLiter(s) IntraMuscular once  metoprolol tartrate 75 milliGRAM(s) Oral every 6 hours  piperacillin/tazobactam IVPB.. 3.375 Gram(s) IV Intermittent every 8 hours  polyethylene glycol 3350 17 Gram(s) Oral daily  senna 2 Tablet(s) Oral at bedtime    MEDICATIONS  (PRN):  acetaminophen     Tablet .. 650 milliGRAM(s) Oral every 6 hours PRN Temp greater or equal to 38C (100.4F), Mild Pain (1 - 3)  aluminum hydroxide/magnesium hydroxide/simethicone Suspension 30 milliLiter(s) Oral every 4 hours PRN Dyspepsia  bisacodyl Suppository 10 milliGRAM(s) Rectal daily PRN Constipation  heparin   Injectable 72437 Unit(s) IV Push every 6 hours PRN For aPTT less than 40  heparin   Injectable 5000 Unit(s) IV Push every 6 hours PRN For aPTT between 40 - 57  melatonin 3 milliGRAM(s) Oral at bedtime PRN Insomnia  metoprolol tartrate Injectable 5 milliGRAM(s) IV Push every 6 hours PRN RVR > 130  ondansetron Injectable 4 milliGRAM(s) IV Push every 8 hours PRN Nausea and/or Vomiting  traMADol 25 milliGRAM(s) Oral three times a day PRN Severe Pain (7 - 10)      Allergies    No Known Allergies      Vital Signs Last 24 Hrs  T(C): 36.4 (27 Nov 2023 07:57), Max: 36.6 (26 Nov 2023 16:30)  T(F): 97.6 (27 Nov 2023 07:57), Max: 97.8 (26 Nov 2023 16:30)  HR: 131 (27 Nov 2023 08:00) (89 - 135)  BP: 111/78 (27 Nov 2023 08:00) (108/79 - 138/68)  BP(mean): 86 (27 Nov 2023 08:00) (84 - 109)  RR: 20 (27 Nov 2023 08:00) (17 - 23)  SpO2: 95% (27 Nov 2023 08:00) (95% - 97%)    Parameters below as of 27 Nov 2023 08:00  Patient On (Oxygen Delivery Method): nasal cannula  O2 Flow (L/min): 3      PHYSICAL EXAM:    GENERAL: NAD  CHEST/LUNG: dec bs L>R, left pigtail   HEART: irreg rate and rhythm; S1 S2  ABDOMEN: Soft,  Bowel sounds present  EXTREMITIES: trace edema   NERVOUS SYSTEM:  Alert & Oriented X3, gen weakness    LABS:                        13.7   17.64 )-----------( 402      ( 27 Nov 2023 06:34 )             44.2     11-27    140  |  98  |  90.0<H>  ----------------------------<  166<H>  4.2   |  27.0  |  1.99<H>    Ca    9.3      27 Nov 2023 06:34  Phos  3.6     11-26  Mg     3.1     11-27    TPro  7.2  /  Alb  2.4<L>  /  TBili  1.4  /  DBili  x   /  AST  39  /  ALT  54<H>  /  AlkPhos  182<H>  11-27      Urinalysis Basic - ( 27 Nov 2023 06:34 )    Color: x / Appearance: x / SG: x / pH: x  Gluc: 166 mg/dL / Ketone: x  / Bili: x / Urobili: x   Blood: x / Protein: x / Nitrite: x   Leuk Esterase: x / RBC: x / WBC x   Sq Epi: x / Non Sq Epi: x / Bacteria: x        CAPILLARY BLOOD GLUCOSE            RADIOLOGY & ADDITIONAL TESTS:

## 2023-11-27 NOTE — BRIEF OPERATIVE NOTE - NSICDXBRIEFPROCEDURE_GEN_ALL_CORE_FT
PROCEDURES:  Decortication, lung, total, using VATS 27-Nov-2023 21:24:54 LEFT Megan Gould  Flexible bronchoscopy 27-Nov-2023 21:25:11  Megan Gould

## 2023-11-27 NOTE — PROGRESS NOTE ADULT - ASSESSMENT
71 yo M w/ PMH of CAD s/p CABG, HFrEF, HTN, HLD presenting for chief complaint of SOB. Admitted for acute hypoxemic respiratory failure 2/2 CHF/Pleural effusion.  Suspect CKD   Likely component of CRS   No gross hydro on renal sono  UA bland   Diuretics held due to hypotension - volume status seems to be OK   Possible VATS later today   Watch Vanco / Digoxin levels - if continued   + Renal dose Zosyn    Will follow   Discussed with sister at bedside

## 2023-11-27 NOTE — BRIEF OPERATIVE NOTE - ESTIMATED BLOOD LOSS
Occupational Therapy Evaluation    Visit Type: Initial Evaluation  Visit: 1  Referring Provider: Edmundo Remy MD  Medical Diagnosis (from order): Diagnosis Information    Diagnosis  719.41, 338.29 (ICD-9-CM) - M25.512, G89.29 (ICD-10-CM) - Chronic left shoulder pain       Treatment Diagnosis: left shoulder with increased pain/symptoms, impaired range of motion, impaired muscle length/flexibility, impaired joint play/mobility, impaired strength, impaired scapulohumeral rhythm, impaired tissue mobility, impaired activity tolerance and impaired body mechanics  Onset  - Date of onset: 9/2/2022  Chart reviewed at time of initial evaluation (relevant co-morbidities, allergies, tests and medications listed):  - Diagnostic tests reviewed: X-Ray and MRI studies  unremarkable  MRI Impression from 10/13/22:  1. Full-thickness, fullwidth retracted tears of the supraspinatus and  infraspinatus tendons, 2. High-grade partial-thickness articular sided tearing of the subscapularis tendon with associated medial subluxation of the biceps tendon, 3. High-grade partial tearing of the intra-articular biceps tendon, 4. Extensive, circumferential degenerative tearing of the labrum, 5. Mild to moderate osteophytic changes of the AC joint. Mild subacromial/subdeltoid bursitis, 6. Mild glenohumeral joint osteoarthritis. Small glenohumeral joint effusion with evidence of synovitis.      SUBJECTIVE                                                                                                               The patient reports that over labor day weekend he had a fall and landed on his shoulder. He was seen by Dr. Remy and was recommended he undergo a rotator cuff repair after reviewing MRI results. His wife will be having cervical surgery in December, so he was hoping to hold off on surgery. He notes pain and difficulty with lifting his arm away from his body. He does feel like his shoulder is ~30% improved from the initial injury. 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     Pain / Symptoms  - Pain rating (out of 10): Current: 0 ; Best: 0; Worst: 8  - Location: Left shoulder  - Quality / Description: ache, sharp, shooting, popping / clicking  - Alleviating Factors: over-the-counter medication, heat, avoiding movement in involved area     - Ibuprofen and tylenol    Function:   Limitations / Exacerbation Factors:   - Patient reports difficulty, pain and increased time with function reported below.  - sleep disturbed, upper body dressing, grocery shopping, work - limited or modified, standing tasks, sitting tasks, house/yard work, lifting/carrying, pushing/pulling and reaching  Prior Level of Function: pain free ADLs and IADLs,  Personal Occupations Profile Affected: bathing/showering, upper body dressing, lower body dressing, personal hygiene/grooming, driving, home establishment/managements, sleep participation, job performance, social participation family, social participation friend     Patient Goals: decreased pain, increased motion, increased strength and return to sport/leisure activities. Avoiding surgery if possible, golfing, bow-hunting    Prior treatment  - no therapies  - Discharged from hospital, home health, or skilled nursing facility in last 30 days: no  Home Environment   - Patient lives with: significant other  - Assistance available: as needed  - Denies 2 or more falls or an unexplained fall with injury in the last year.  - Feel safe at home / work / school: yes     OBJECTIVE                                                                                                                     Range of Motion (ROM)   (degrees unless noted; active unless noted; norms in ( ); negative=lacking to 0, positive=beyond 0)  Shoulder:    - Flexion (180):        • Left:55  Pain         • Right: 165    - Abduction (180):        • Left: 60 pain         • Right: 180    - Internal Rotation:         • Left: pain        - Behind Back:           • Left: PSIS           • Right: Low-back    - External Rotation:        • Left: pain       - at 0°:            • Left: 70           • Right: 85    Strength  (out of 5 unless noted, standard test position unless noted)   Comments / Details: Strength testing not completed due to increased pain.                    Outcome/Assessments  Outcome Measures:   Quick Disabilities of the Arm, Shoulder and Hand: QuickDash Total Score (Score will not calculate if more then 2 questions are left blank): 47.73  (scored 0-100; a higher score indicates greater disability) see flowsheet for additional documentation        Treatment    Dry Needling:  - Consent signed: yes  - Dry needling used to/for: pain relief and reduction of muscle spasm  - Education about indications, contraindications and potential side effects completed with patient.  Screen Completed    - Precautions: local skin lesions, lyme disease, local lymphedema, severe hyperalgesia/allodynia, metal allergies: nickel and chromium, abnormal bleeding tendency, immunodeficiency and/or compromised immune system, second or third trimester of pregnancy, vascular disease, history of spontaneous pneumothorax   - Contraindications: local or systemic infections including the flu, over implants, active cancer, area of lymphatic compromise, area of lumpectomy/mastectomy, first trimester of pregnancy      - Location: Infraspinatus  Needle size: 50mm Quantity: 1   - Location: Levator scapulae Needle size: 50mm Quantity: 1   - Location: Supraspinatus muscle belly Needle size: 50mm Quantity: 1    Total Needles Inserted: 3 Removed: 3    Results: decreased pain and improved circulation  Reaction: no adverse reaction to treatment      Therapeutic Exercise  Supine Shoulder Flexion AAROM with Hands Clasped - 3-5 x daily - 7 x weekly - 1 sets - 5-6 reps - 10-15s hold  Standing Single Arm Shoulder  Flexion Towel Slide at Table Top - 3-5 x daily - 7 x weekly - 1 sets - 5-6 reps - 10-15s hold  Standing Backward Shoulder Rolls - 3-5 x daily - 7 x weekly - 1 sets - 15 reps  Seated Scapular Retraction - 3-5 x daily - 7 x weekly - 1 sets - 15 reps      Skilled input: verbal instruction/cues    Writer verbally educated and received verbal consent for hand placement, positioning of patient, and techniques to be performed today from patient for clothing adjustments for techniques, hand placement and palpation for techniques, therapist position for techniques and modality application as described above and how they are pertinent to the patient's plan of care.    Home Exercise Program  *above indicates provided as part of home exercise program  Complete the above listed exercises as instructed  Utilize heat or ice as needed for discomfort  Avoid painful motions or activities      ASSESSMENT                                                                                                          61 year old patient has reported functional limitations listed above impacted by signs and symptoms consistent with treatment diagnosis below.  Treatment Diagnosis:   - Involved: left shoulder  - Symptoms/impairments: increased pain/symptoms, impaired range of motion, impaired muscle length/flexibility, impaired joint play/mobility, impaired strength, impaired scapulohumeral rhythm, impaired tissue mobility, impaired activity tolerance and impaired body mechanics  Pain/symptoms after session (out of 10): 2    Prognosis: Patient will benefit from skilled therapy.  Rehabilitative potential is: good.  Clinical decision making: Low - Patient has few limitations (1-3), comorbidities and/or complexities, as noted in problem focused assessment noted above, that impact their occupational profile.  Resulting in few treatment options and no task modification consistent with low clinical decision making complexity.      Patient Education:    Who will be receiving education: patient  Are they ready to learn: yes  Preferred learning style: verbal, demonstration and written  Barriers to learning: no barriers apparent at this time  Results of above outlined education: Verbalizes understanding and Demonstrates understanding    PLAN                                                                                                                         The following skilled interventions to be implemented to achieve goals listed below:  Dry Needling  Manual Therapy (22975)  Therapeutic Activity (19437)  Electrical Stimulation Attended (27317)  Electrical Stimulation Unattended (77704 or )  Vasopneumatic Device (40134)  Ultrasound/Phonophoresis (80935)  Therapeutic Exercise (63482)  Heat/Cold (61248)  Laser  Iontophoresis (74966):  dexamethasone sodium phosphate, 4mg/ml and up to 6 applications    Frequency / Duration  2 times per week tapering as patient progresses for 8 weeks for an estimated total of 10 visits    Patient involved in and agreed to plan of care and goals.  Patient given attendance policy at time of initial evaluation.    Suggestions for next session as indicated: Progress per plan of care, check on benefit of dry needling, possible ultrasound, supine PROM, review/complete HEP, possible kinesiotaping    Goals  Decrease pain/symptoms to 0/10.  The above improvements in impairments to assist in obtaining goals listed below  Long Term Goals: to be met by end of plan of care   1. Patient will complete modified independent upper body dressing and modified independent lower body dressing with reported manageable/tolerable pain.  2. Patient will sleep 6 hours without disruption from pain/difficulty with positional changes.   3. Quick DASH: Patient will complete form to reflect an improved calculated score to less than or equal to 30 to indicate patient reported improvement in function/disability/impairment. (minimal clinically important  difference = 15.91)   4. Patient will be independent with progressed and modified home exercise program.      Therapy procedure time and total treatment time can be found documented on the Time Entry flowsheet   20

## 2023-11-27 NOTE — PROGRESS NOTE ADULT - SUBJECTIVE AND OBJECTIVE BOX
NEPHROLOGY INTERVAL HPI/OVERNIGHT EVENTS:    Feels the same   Awaits OR later today   No SOB or CP    ml +    MEDICATIONS  (STANDING):  aspirin  chewable 81 milliGRAM(s) Oral daily  atorvastatin 20 milliGRAM(s) Oral at bedtime  heparin  Infusion. 2100 Unit(s)/Hr (21 mL/Hr) IV Continuous <Continuous>  influenza  Vaccine (HIGH DOSE) 0.7 milliLiter(s) IntraMuscular once  metoprolol tartrate 75 milliGRAM(s) Oral every 6 hours  piperacillin/tazobactam IVPB.. 3.375 Gram(s) IV Intermittent every 8 hours  polyethylene glycol 3350 17 Gram(s) Oral daily  senna 2 Tablet(s) Oral at bedtime    MEDICATIONS  (PRN):  acetaminophen     Tablet .. 650 milliGRAM(s) Oral every 6 hours PRN Temp greater or equal to 38C (100.4F), Mild Pain (1 - 3)  aluminum hydroxide/magnesium hydroxide/simethicone Suspension 30 milliLiter(s) Oral every 4 hours PRN Dyspepsia  bisacodyl Suppository 10 milliGRAM(s) Rectal daily PRN Constipation  heparin   Injectable 78775 Unit(s) IV Push every 6 hours PRN For aPTT less than 40  heparin   Injectable 5000 Unit(s) IV Push every 6 hours PRN For aPTT between 40 - 57  melatonin 3 milliGRAM(s) Oral at bedtime PRN Insomnia  metoprolol tartrate Injectable 5 milliGRAM(s) IV Push every 6 hours PRN RVR > 130  ondansetron Injectable 4 milliGRAM(s) IV Push every 8 hours PRN Nausea and/or Vomiting  traMADol 25 milliGRAM(s) Oral three times a day PRN Severe Pain (7 - 10)      Allergies    No Known Allergies    Intolerances              Vital Signs Last 24 Hrs  T(C): 36.6 (27 Nov 2023 11:52), Max: 36.6 (26 Nov 2023 16:30)  T(F): 97.9 (27 Nov 2023 11:52), Max: 97.9 (27 Nov 2023 11:52)  HR: 130 (27 Nov 2023 12:00) (108 - 135)  BP: 108/80 (27 Nov 2023 12:00) (108/80 - 138/68)  BP(mean): 89 (27 Nov 2023 12:00) (84 - 109)  RR: 21 (27 Nov 2023 12:00) (18 - 23)  SpO2: 97% (27 Nov 2023 12:00) (95% - 97%)    Parameters below as of 27 Nov 2023 12:00  Patient On (Oxygen Delivery Method): nasal cannula  O2 Flow (L/min): 3    Daily     Daily   I&O's Detail    26 Nov 2023 07:01  -  27 Nov 2023 07:00  --------------------------------------------------------  IN:    Heparin Infusion: 441 mL  Total IN: 441 mL    OUT:    Chest Tube (mL): 10 mL    Voided (mL): 800 mL  Total OUT: 810 mL    Total NET: -369 mL        I&O's Summary    26 Nov 2023 07:01  -  27 Nov 2023 07:00  --------------------------------------------------------  IN: 441 mL / OUT: 810 mL / NET: -369 mL        PHYSICAL EXAM:        GENERAL: NAD, nontoxic , feels better   HEENT - No facial edema   NECK: Supple, No JVD, Normal thyroid  NERVOUS SYSTEM:  Alert & Oriented X3,   CHEST/LUNG: EAE , decreased BS L base , + Drain   CVS - Irregular , no rub  Abd obeses , nontender l sounds present  EXTREMITIES:  Edema much better         LABS:                        13.7   17.64 )-----------( 402      ( 27 Nov 2023 06:34 )             44.2     11-27    140  |  98  |  90.0<H>  ----------------------------<  166<H>  4.2   |  27.0  |  1.99<H>    Ca    9.3      27 Nov 2023 06:34  Phos  3.6     11-26  Mg     3.1     11-27    TPro  7.2  /  Alb  2.4<L>  /  TBili  1.4  /  DBili  x   /  AST  39  /  ALT  54<H>  /  AlkPhos  182<H>  11-27      Urinalysis Basic - ( 27 Nov 2023 06:34 )    Color: x / Appearance: x / SG: x / pH: x  Gluc: 166 mg/dL / Ketone: x  / Bili: x / Urobili: x   Blood: x / Protein: x / Nitrite: x   Leuk Esterase: x / RBC: x / WBC x   Sq Epi: x / Non Sq Epi: x / Bacteria: x      Magnesium: 3.1 mg/dL (11-27 @ 06:34)          RADIOLOGY & ADDITIONAL TESTS:

## 2023-11-27 NOTE — PROGRESS NOTE ADULT - SUBJECTIVE AND OBJECTIVE BOX
Patient seen today in bed in fasting state awaiting VATS. No overnight complaints.     TELE:     MEDICATIONS  (STANDING):  aspirin  chewable 81 milliGRAM(s) Oral daily  atorvastatin 20 milliGRAM(s) Oral at bedtime  heparin  Infusion. 2100 Unit(s)/Hr (21 mL/Hr) IV Continuous <Continuous>  influenza  Vaccine (HIGH DOSE) 0.7 milliLiter(s) IntraMuscular once  metoprolol tartrate 75 milliGRAM(s) Oral every 6 hours  piperacillin/tazobactam IVPB.. 3.375 Gram(s) IV Intermittent every 8 hours  polyethylene glycol 3350 17 Gram(s) Oral daily  senna 2 Tablet(s) Oral at bedtime    MEDICATIONS  (PRN):  acetaminophen     Tablet .. 650 milliGRAM(s) Oral every 6 hours PRN Temp greater or equal to 38C (100.4F), Mild Pain (1 - 3)  aluminum hydroxide/magnesium hydroxide/simethicone Suspension 30 milliLiter(s) Oral every 4 hours PRN Dyspepsia  bisacodyl Suppository 10 milliGRAM(s) Rectal daily PRN Constipation  heparin   Injectable 18105 Unit(s) IV Push every 6 hours PRN For aPTT less than 40  heparin   Injectable 5000 Unit(s) IV Push every 6 hours PRN For aPTT between 40 - 57  melatonin 3 milliGRAM(s) Oral at bedtime PRN Insomnia  metoprolol tartrate Injectable 5 milliGRAM(s) IV Push every 6 hours PRN RVR > 130  ondansetron Injectable 4 milliGRAM(s) IV Push every 8 hours PRN Nausea and/or Vomiting  traMADol 25 milliGRAM(s) Oral three times a day PRN Severe Pain (7 - 10)    Allergies  No Known Allergies    PAST MEDICAL & SURGICAL HISTORY:  Coronary artery disease involving native coronary artery of native heart, unspecified whether angina  CHF with unknown LVEF  Primary hypertension  Hyperlipidemia, unspecified hyperlipidemia type  S/P CABG x 5  History of cholecystectomy    Vital Signs Last 24 Hrs  T(C): 36.6 (27 Nov 2023 11:52), Max: 36.6 (26 Nov 2023 16:30)  T(F): 97.9 (27 Nov 2023 11:52), Max: 97.9 (27 Nov 2023 11:52)  HR: 131 (27 Nov 2023 08:00) (108 - 135)  BP: 111/78 (27 Nov 2023 08:00) (108/79 - 138/68)  BP(mean): 86 (27 Nov 2023 08:00) (84 - 109)  RR: 20 (27 Nov 2023 08:00) (17 - 23)  SpO2: 95% (27 Nov 2023 08:00) (95% - 97%)    Parameters below as of 27 Nov 2023 08:00  Patient On (Oxygen Delivery Method): nasal cannula  O2 Flow (L/min): 3    Physical Exam:  Constitutional: awake, alert, mild work of breathing  Cardiovascular: +S1S2 tachycardic; IR AFlutter at time of exam.   Pulmonary: decreased breath sounds on L>R   GI: soft NTND +BS  Extremities: 1+ pedal edema, venous stasis changes   Neuro: non focal, LEE x4    LABS:                        13.7   17.64 )-----------( 402      ( 27 Nov 2023 06:34 )             44.2     140  |  98  |  90.0<H>  ----------------------------<  166<H>  4.2   |  27.0  |  1.99<H>  Ca    9.3      27 Nov 2023 06:34  Phos  3.6     11-26  Mg     3.1     11-27  TPro  7.2  /  Alb  2.4<L>  /  TBili  1.4  /  DBili  x   /  AST  39  /  ALT  54<H>  /  AlkPhos  182<H>  11-27    A/P  72 year old male patient with HTN, hyperlipidemia, obesity BMI 41.3, CAD s/p CABG x4 (2020 [facility] at [facility]), HFrEF, EF 20-25%, remote AF (transient in the postop setting of CABG with no reoccurrence- not on AC). He presented to [facility] ED on 11/18 with several day history of dyspnea and lower extremity swelling. Admitted with acute respiratory failure due to large pleural effusion, acute HFrEF exacerbation with reduced RV function, and atrial flutter (likely typical) with RVR.     CHADSVASC: 4  Still no real improvement in HRs.   VATS planned today    - Continue Lopressor and Digoxin. Last day of Digoxin today  - Monitor electrolytes. Keep K > 4, Mg > 2   - Maintain anticoagulation in Heparin and attempt to minimize interruptions.   - Ischemic evaluation and restoration of SR with DCCV vs ablation once anticoagulation can be restarted.

## 2023-11-27 NOTE — PROGRESS NOTE ADULT - NS ATTEND AMEND GEN_ALL_CORE FT
For VATS today. Continue rate control for now. Eventual plan will be QUIN/DCCV to restore sinus rhythm once all interventional procedures requiring interruption of anticoagulation have been completed.

## 2023-11-28 LAB
A1C WITH ESTIMATED AVERAGE GLUCOSE RESULT: 7.3 % — HIGH (ref 4–5.6)
A1C WITH ESTIMATED AVERAGE GLUCOSE RESULT: 7.3 % — HIGH (ref 4–5.6)
ALBUMIN SERPL ELPH-MCNC: 2.3 G/DL — LOW (ref 3.3–5.2)
ALBUMIN SERPL ELPH-MCNC: 2.3 G/DL — LOW (ref 3.3–5.2)
ALP SERPL-CCNC: 152 U/L — HIGH (ref 40–120)
ALP SERPL-CCNC: 152 U/L — HIGH (ref 40–120)
ALT FLD-CCNC: 39 U/L — SIGNIFICANT CHANGE UP
ALT FLD-CCNC: 39 U/L — SIGNIFICANT CHANGE UP
ANION GAP SERPL CALC-SCNC: 15 MMOL/L — SIGNIFICANT CHANGE UP (ref 5–17)
AST SERPL-CCNC: 35 U/L — SIGNIFICANT CHANGE UP
AST SERPL-CCNC: 35 U/L — SIGNIFICANT CHANGE UP
BASOPHILS # BLD AUTO: 0.26 K/UL — HIGH (ref 0–0.2)
BASOPHILS # BLD AUTO: 0.26 K/UL — HIGH (ref 0–0.2)
BASOPHILS NFR BLD AUTO: 0.9 % — SIGNIFICANT CHANGE UP (ref 0–2)
BASOPHILS NFR BLD AUTO: 0.9 % — SIGNIFICANT CHANGE UP (ref 0–2)
BILIRUB SERPL-MCNC: 1.6 MG/DL — SIGNIFICANT CHANGE UP (ref 0.4–2)
BILIRUB SERPL-MCNC: 1.6 MG/DL — SIGNIFICANT CHANGE UP (ref 0.4–2)
BUN SERPL-MCNC: 90.5 MG/DL — HIGH (ref 8–20)
BUN SERPL-MCNC: 90.5 MG/DL — HIGH (ref 8–20)
BUN SERPL-MCNC: 90.8 MG/DL — HIGH (ref 8–20)
BUN SERPL-MCNC: 90.8 MG/DL — HIGH (ref 8–20)
CALCIUM SERPL-MCNC: 8.6 MG/DL — SIGNIFICANT CHANGE UP (ref 8.4–10.5)
CALCIUM SERPL-MCNC: 8.6 MG/DL — SIGNIFICANT CHANGE UP (ref 8.4–10.5)
CALCIUM SERPL-MCNC: 9 MG/DL — SIGNIFICANT CHANGE UP (ref 8.4–10.5)
CALCIUM SERPL-MCNC: 9 MG/DL — SIGNIFICANT CHANGE UP (ref 8.4–10.5)
CHLORIDE SERPL-SCNC: 101 MMOL/L — SIGNIFICANT CHANGE UP (ref 96–108)
CHLORIDE SERPL-SCNC: 101 MMOL/L — SIGNIFICANT CHANGE UP (ref 96–108)
CHLORIDE SERPL-SCNC: 104 MMOL/L — SIGNIFICANT CHANGE UP (ref 96–108)
CHLORIDE SERPL-SCNC: 104 MMOL/L — SIGNIFICANT CHANGE UP (ref 96–108)
CO2 SERPL-SCNC: 23 MMOL/L — SIGNIFICANT CHANGE UP (ref 22–29)
CO2 SERPL-SCNC: 23 MMOL/L — SIGNIFICANT CHANGE UP (ref 22–29)
CO2 SERPL-SCNC: 24 MMOL/L — SIGNIFICANT CHANGE UP (ref 22–29)
CO2 SERPL-SCNC: 24 MMOL/L — SIGNIFICANT CHANGE UP (ref 22–29)
CREAT SERPL-MCNC: 2.1 MG/DL — HIGH (ref 0.5–1.3)
CREAT SERPL-MCNC: 2.1 MG/DL — HIGH (ref 0.5–1.3)
CREAT SERPL-MCNC: 2.15 MG/DL — HIGH (ref 0.5–1.3)
CREAT SERPL-MCNC: 2.15 MG/DL — HIGH (ref 0.5–1.3)
CULTURE RESULTS: SIGNIFICANT CHANGE UP
DIGOXIN SERPL-MCNC: 1.4 NG/ML — SIGNIFICANT CHANGE UP (ref 0.8–2)
DIGOXIN SERPL-MCNC: 1.4 NG/ML — SIGNIFICANT CHANGE UP (ref 0.8–2)
EGFR: 32 ML/MIN/1.73M2 — LOW
EGFR: 32 ML/MIN/1.73M2 — LOW
EGFR: 33 ML/MIN/1.73M2 — LOW
EGFR: 33 ML/MIN/1.73M2 — LOW
EOSINOPHIL # BLD AUTO: 0 K/UL — SIGNIFICANT CHANGE UP (ref 0–0.5)
EOSINOPHIL # BLD AUTO: 0 K/UL — SIGNIFICANT CHANGE UP (ref 0–0.5)
EOSINOPHIL NFR BLD AUTO: 0 % — SIGNIFICANT CHANGE UP (ref 0–6)
EOSINOPHIL NFR BLD AUTO: 0 % — SIGNIFICANT CHANGE UP (ref 0–6)
ESTIMATED AVERAGE GLUCOSE: 163 MG/DL — HIGH (ref 68–114)
ESTIMATED AVERAGE GLUCOSE: 163 MG/DL — HIGH (ref 68–114)
GAS PNL BLDA: SIGNIFICANT CHANGE UP
GIANT PLATELETS BLD QL SMEAR: PRESENT — SIGNIFICANT CHANGE UP
GIANT PLATELETS BLD QL SMEAR: PRESENT — SIGNIFICANT CHANGE UP
GLUCOSE BLDC GLUCOMTR-MCNC: 126 MG/DL — HIGH (ref 70–99)
GLUCOSE BLDC GLUCOMTR-MCNC: 126 MG/DL — HIGH (ref 70–99)
GLUCOSE BLDC GLUCOMTR-MCNC: 132 MG/DL — HIGH (ref 70–99)
GLUCOSE BLDC GLUCOMTR-MCNC: 132 MG/DL — HIGH (ref 70–99)
GLUCOSE BLDC GLUCOMTR-MCNC: 142 MG/DL — HIGH (ref 70–99)
GLUCOSE BLDC GLUCOMTR-MCNC: 142 MG/DL — HIGH (ref 70–99)
GLUCOSE BLDC GLUCOMTR-MCNC: 148 MG/DL — HIGH (ref 70–99)
GLUCOSE BLDC GLUCOMTR-MCNC: 148 MG/DL — HIGH (ref 70–99)
GLUCOSE BLDC GLUCOMTR-MCNC: 155 MG/DL — HIGH (ref 70–99)
GLUCOSE BLDC GLUCOMTR-MCNC: 155 MG/DL — HIGH (ref 70–99)
GLUCOSE BLDC GLUCOMTR-MCNC: 164 MG/DL — HIGH (ref 70–99)
GLUCOSE BLDC GLUCOMTR-MCNC: 164 MG/DL — HIGH (ref 70–99)
GLUCOSE BLDC GLUCOMTR-MCNC: 169 MG/DL — HIGH (ref 70–99)
GLUCOSE BLDC GLUCOMTR-MCNC: 169 MG/DL — HIGH (ref 70–99)
GLUCOSE BLDC GLUCOMTR-MCNC: 184 MG/DL — HIGH (ref 70–99)
GLUCOSE BLDC GLUCOMTR-MCNC: 184 MG/DL — HIGH (ref 70–99)
GLUCOSE BLDC GLUCOMTR-MCNC: 185 MG/DL — HIGH (ref 70–99)
GLUCOSE BLDC GLUCOMTR-MCNC: 185 MG/DL — HIGH (ref 70–99)
GLUCOSE BLDC GLUCOMTR-MCNC: 196 MG/DL — HIGH (ref 70–99)
GLUCOSE BLDC GLUCOMTR-MCNC: 196 MG/DL — HIGH (ref 70–99)
GLUCOSE BLDC GLUCOMTR-MCNC: 235 MG/DL — HIGH (ref 70–99)
GLUCOSE BLDC GLUCOMTR-MCNC: 235 MG/DL — HIGH (ref 70–99)
GLUCOSE SERPL-MCNC: 134 MG/DL — HIGH (ref 70–99)
GLUCOSE SERPL-MCNC: 134 MG/DL — HIGH (ref 70–99)
GLUCOSE SERPL-MCNC: 217 MG/DL — HIGH (ref 70–99)
GLUCOSE SERPL-MCNC: 217 MG/DL — HIGH (ref 70–99)
GRAM STN FLD: ABNORMAL
GRAM STN FLD: ABNORMAL
GRAM STN FLD: SIGNIFICANT CHANGE UP
GRAM STN FLD: SIGNIFICANT CHANGE UP
HCT VFR BLD CALC: 33.5 % — LOW (ref 39–50)
HCT VFR BLD CALC: 33.5 % — LOW (ref 39–50)
HCT VFR BLD CALC: 41.2 % — SIGNIFICANT CHANGE UP (ref 39–50)
HCT VFR BLD CALC: 41.2 % — SIGNIFICANT CHANGE UP (ref 39–50)
HGB BLD-MCNC: 10.3 G/DL — LOW (ref 13–17)
HGB BLD-MCNC: 10.3 G/DL — LOW (ref 13–17)
HGB BLD-MCNC: 12.7 G/DL — LOW (ref 13–17)
HGB BLD-MCNC: 12.7 G/DL — LOW (ref 13–17)
LYMPHOCYTES # BLD AUTO: 0.5 K/UL — LOW (ref 1–3.3)
LYMPHOCYTES # BLD AUTO: 0.5 K/UL — LOW (ref 1–3.3)
LYMPHOCYTES # BLD AUTO: 1.7 % — LOW (ref 13–44)
LYMPHOCYTES # BLD AUTO: 1.7 % — LOW (ref 13–44)
MAGNESIUM SERPL-MCNC: 3.5 MG/DL — HIGH (ref 1.8–2.6)
MAGNESIUM SERPL-MCNC: 3.5 MG/DL — HIGH (ref 1.8–2.6)
MAGNESIUM SERPL-MCNC: 3.7 MG/DL — HIGH (ref 1.6–2.6)
MAGNESIUM SERPL-MCNC: 3.7 MG/DL — HIGH (ref 1.6–2.6)
MANUAL SMEAR VERIFICATION: SIGNIFICANT CHANGE UP
MANUAL SMEAR VERIFICATION: SIGNIFICANT CHANGE UP
MCHC RBC-ENTMCNC: 27.1 PG — SIGNIFICANT CHANGE UP (ref 27–34)
MCHC RBC-ENTMCNC: 27.1 PG — SIGNIFICANT CHANGE UP (ref 27–34)
MCHC RBC-ENTMCNC: 27.7 PG — SIGNIFICANT CHANGE UP (ref 27–34)
MCHC RBC-ENTMCNC: 27.7 PG — SIGNIFICANT CHANGE UP (ref 27–34)
MCHC RBC-ENTMCNC: 30.7 GM/DL — LOW (ref 32–36)
MCHC RBC-ENTMCNC: 30.7 GM/DL — LOW (ref 32–36)
MCHC RBC-ENTMCNC: 30.8 GM/DL — LOW (ref 32–36)
MCHC RBC-ENTMCNC: 30.8 GM/DL — LOW (ref 32–36)
MCV RBC AUTO: 88.2 FL — SIGNIFICANT CHANGE UP (ref 80–100)
MCV RBC AUTO: 88.2 FL — SIGNIFICANT CHANGE UP (ref 80–100)
MCV RBC AUTO: 89.8 FL — SIGNIFICANT CHANGE UP (ref 80–100)
MCV RBC AUTO: 89.8 FL — SIGNIFICANT CHANGE UP (ref 80–100)
MONOCYTES # BLD AUTO: 0.26 K/UL — SIGNIFICANT CHANGE UP (ref 0–0.9)
MONOCYTES # BLD AUTO: 0.26 K/UL — SIGNIFICANT CHANGE UP (ref 0–0.9)
MONOCYTES NFR BLD AUTO: 0.9 % — LOW (ref 2–14)
MONOCYTES NFR BLD AUTO: 0.9 % — LOW (ref 2–14)
MYELOCYTES NFR BLD: 0.9 % — HIGH (ref 0–0)
MYELOCYTES NFR BLD: 0.9 % — HIGH (ref 0–0)
NEUTROPHILS # BLD AUTO: 28.06 K/UL — HIGH (ref 1.8–7.4)
NEUTROPHILS # BLD AUTO: 28.06 K/UL — HIGH (ref 1.8–7.4)
NEUTROPHILS NFR BLD AUTO: 94.7 % — HIGH (ref 43–77)
NEUTROPHILS NFR BLD AUTO: 94.7 % — HIGH (ref 43–77)
NEUTS BAND # BLD: 0.9 % — SIGNIFICANT CHANGE UP (ref 0–8)
NEUTS BAND # BLD: 0.9 % — SIGNIFICANT CHANGE UP (ref 0–8)
PLAT MORPH BLD: NORMAL — SIGNIFICANT CHANGE UP
PLAT MORPH BLD: NORMAL — SIGNIFICANT CHANGE UP
PLATELET # BLD AUTO: 315 K/UL — SIGNIFICANT CHANGE UP (ref 150–400)
PLATELET # BLD AUTO: 315 K/UL — SIGNIFICANT CHANGE UP (ref 150–400)
PLATELET # BLD AUTO: 509 K/UL — HIGH (ref 150–400)
PLATELET # BLD AUTO: 509 K/UL — HIGH (ref 150–400)
POTASSIUM SERPL-MCNC: 4.4 MMOL/L — SIGNIFICANT CHANGE UP (ref 3.5–5.3)
POTASSIUM SERPL-MCNC: 4.4 MMOL/L — SIGNIFICANT CHANGE UP (ref 3.5–5.3)
POTASSIUM SERPL-MCNC: 5.1 MMOL/L — SIGNIFICANT CHANGE UP (ref 3.5–5.3)
POTASSIUM SERPL-MCNC: 5.1 MMOL/L — SIGNIFICANT CHANGE UP (ref 3.5–5.3)
POTASSIUM SERPL-SCNC: 4.4 MMOL/L — SIGNIFICANT CHANGE UP (ref 3.5–5.3)
POTASSIUM SERPL-SCNC: 4.4 MMOL/L — SIGNIFICANT CHANGE UP (ref 3.5–5.3)
POTASSIUM SERPL-SCNC: 5.1 MMOL/L — SIGNIFICANT CHANGE UP (ref 3.5–5.3)
POTASSIUM SERPL-SCNC: 5.1 MMOL/L — SIGNIFICANT CHANGE UP (ref 3.5–5.3)
PROT SERPL-MCNC: 6.4 G/DL — LOW (ref 6.6–8.7)
PROT SERPL-MCNC: 6.4 G/DL — LOW (ref 6.6–8.7)
RBC # BLD: 3.8 M/UL — LOW (ref 4.2–5.8)
RBC # BLD: 3.8 M/UL — LOW (ref 4.2–5.8)
RBC # BLD: 4.59 M/UL — SIGNIFICANT CHANGE UP (ref 4.2–5.8)
RBC # BLD: 4.59 M/UL — SIGNIFICANT CHANGE UP (ref 4.2–5.8)
RBC # FLD: 15.7 % — HIGH (ref 10.3–14.5)
RBC # FLD: 15.7 % — HIGH (ref 10.3–14.5)
RBC # FLD: 15.8 % — HIGH (ref 10.3–14.5)
RBC # FLD: 15.8 % — HIGH (ref 10.3–14.5)
RBC BLD AUTO: NORMAL — SIGNIFICANT CHANGE UP
RBC BLD AUTO: NORMAL — SIGNIFICANT CHANGE UP
SODIUM SERPL-SCNC: 139 MMOL/L — SIGNIFICANT CHANGE UP (ref 135–145)
SODIUM SERPL-SCNC: 139 MMOL/L — SIGNIFICANT CHANGE UP (ref 135–145)
SODIUM SERPL-SCNC: 143 MMOL/L — SIGNIFICANT CHANGE UP (ref 135–145)
SODIUM SERPL-SCNC: 143 MMOL/L — SIGNIFICANT CHANGE UP (ref 135–145)
SPECIMEN SOURCE: SIGNIFICANT CHANGE UP
VANCOMYCIN TROUGH SERPL-MCNC: <4 UG/ML — LOW (ref 10–20)
VANCOMYCIN TROUGH SERPL-MCNC: <4 UG/ML — LOW (ref 10–20)
WBC # BLD: 14.54 K/UL — HIGH (ref 3.8–10.5)
WBC # BLD: 14.54 K/UL — HIGH (ref 3.8–10.5)
WBC # BLD: 29.35 K/UL — HIGH (ref 3.8–10.5)
WBC # BLD: 29.35 K/UL — HIGH (ref 3.8–10.5)
WBC # FLD AUTO: 14.54 K/UL — HIGH (ref 3.8–10.5)
WBC # FLD AUTO: 14.54 K/UL — HIGH (ref 3.8–10.5)
WBC # FLD AUTO: 29.35 K/UL — HIGH (ref 3.8–10.5)
WBC # FLD AUTO: 29.35 K/UL — HIGH (ref 3.8–10.5)

## 2023-11-28 PROCEDURE — 99024 POSTOP FOLLOW-UP VISIT: CPT

## 2023-11-28 PROCEDURE — 71045 X-RAY EXAM CHEST 1 VIEW: CPT | Mod: 26

## 2023-11-28 PROCEDURE — 99291 CRITICAL CARE FIRST HOUR: CPT

## 2023-11-28 PROCEDURE — 99233 SBSQ HOSP IP/OBS HIGH 50: CPT

## 2023-11-28 PROCEDURE — 99232 SBSQ HOSP IP/OBS MODERATE 35: CPT

## 2023-11-28 PROCEDURE — 93010 ELECTROCARDIOGRAM REPORT: CPT

## 2023-11-28 RX ORDER — OXYCODONE HYDROCHLORIDE 5 MG/1
5 TABLET ORAL EVERY 4 HOURS
Refills: 0 | Status: DISCONTINUED | OUTPATIENT
Start: 2023-11-28 | End: 2023-11-30

## 2023-11-28 RX ORDER — INSULIN HUMAN 100 [IU]/ML
2 INJECTION, SOLUTION SUBCUTANEOUS
Qty: 50 | Refills: 0 | Status: DISCONTINUED | OUTPATIENT
Start: 2023-11-28 | End: 2023-11-29

## 2023-11-28 RX ORDER — HEPARIN SODIUM 5000 [USP'U]/ML
5000 INJECTION INTRAVENOUS; SUBCUTANEOUS EVERY 8 HOURS
Refills: 0 | Status: DISCONTINUED | OUTPATIENT
Start: 2023-11-28 | End: 2023-11-29

## 2023-11-28 RX ORDER — ACETAMINOPHEN 500 MG
1000 TABLET ORAL ONCE
Refills: 0 | Status: COMPLETED | OUTPATIENT
Start: 2023-11-28 | End: 2023-11-28

## 2023-11-28 RX ORDER — SODIUM CHLORIDE 9 MG/ML
1000 INJECTION, SOLUTION INTRAVENOUS
Refills: 0 | Status: DISCONTINUED | OUTPATIENT
Start: 2023-11-28 | End: 2023-11-29

## 2023-11-28 RX ORDER — ALBUMIN HUMAN 25 %
250 VIAL (ML) INTRAVENOUS ONCE
Refills: 0 | Status: COMPLETED | OUTPATIENT
Start: 2023-11-28 | End: 2023-11-28

## 2023-11-28 RX ORDER — DEXMEDETOMIDINE HYDROCHLORIDE IN 0.9% SODIUM CHLORIDE 4 UG/ML
0.2 INJECTION INTRAVENOUS
Qty: 200 | Refills: 0 | Status: DISCONTINUED | OUTPATIENT
Start: 2023-11-28 | End: 2023-11-28

## 2023-11-28 RX ORDER — SENNA PLUS 8.6 MG/1
2 TABLET ORAL AT BEDTIME
Refills: 0 | Status: DISCONTINUED | OUTPATIENT
Start: 2023-11-28 | End: 2023-12-02

## 2023-11-28 RX ORDER — DEXTROSE 50 % IN WATER 50 %
50 SYRINGE (ML) INTRAVENOUS
Refills: 0 | Status: DISCONTINUED | OUTPATIENT
Start: 2023-11-28 | End: 2023-12-03

## 2023-11-28 RX ORDER — OXYCODONE HYDROCHLORIDE 5 MG/1
10 TABLET ORAL EVERY 4 HOURS
Refills: 0 | Status: DISCONTINUED | OUTPATIENT
Start: 2023-11-28 | End: 2023-11-30

## 2023-11-28 RX ORDER — POLYETHYLENE GLYCOL 3350 17 G/17G
17 POWDER, FOR SOLUTION ORAL
Refills: 0 | Status: DISCONTINUED | OUTPATIENT
Start: 2023-11-28 | End: 2023-12-02

## 2023-11-28 RX ORDER — ASPIRIN/CALCIUM CARB/MAGNESIUM 324 MG
81 TABLET ORAL DAILY
Refills: 0 | Status: DISCONTINUED | OUTPATIENT
Start: 2023-11-29 | End: 2023-12-06

## 2023-11-28 RX ORDER — ACETAMINOPHEN 500 MG
975 TABLET ORAL EVERY 6 HOURS
Refills: 0 | Status: DISCONTINUED | OUTPATIENT
Start: 2023-11-29 | End: 2023-12-02

## 2023-11-28 RX ADMIN — CHLORHEXIDINE GLUCONATE 1 APPLICATION(S): 213 SOLUTION TOPICAL at 05:21

## 2023-11-28 RX ADMIN — CHLORHEXIDINE GLUCONATE 15 MILLILITER(S): 213 SOLUTION TOPICAL at 05:21

## 2023-11-28 RX ADMIN — Medication 500 MILLILITER(S): at 09:11

## 2023-11-28 RX ADMIN — CHLORHEXIDINE GLUCONATE 15 MILLILITER(S): 213 SOLUTION TOPICAL at 17:00

## 2023-11-28 RX ADMIN — PROPOFOL 7.62 MICROGRAM(S)/KG/MIN: 10 INJECTION, EMULSION INTRAVENOUS at 02:11

## 2023-11-28 RX ADMIN — SENNA PLUS 2 TABLET(S): 8.6 TABLET ORAL at 21:17

## 2023-11-28 RX ADMIN — OXYCODONE HYDROCHLORIDE 10 MILLIGRAM(S): 5 TABLET ORAL at 23:55

## 2023-11-28 RX ADMIN — Medication 11.9 MICROGRAM(S)/KG/MIN: at 02:11

## 2023-11-28 RX ADMIN — ATORVASTATIN CALCIUM 20 MILLIGRAM(S): 80 TABLET, FILM COATED ORAL at 21:18

## 2023-11-28 RX ADMIN — PIPERACILLIN AND TAZOBACTAM 25 GRAM(S): 4; .5 INJECTION, POWDER, LYOPHILIZED, FOR SOLUTION INTRAVENOUS at 21:17

## 2023-11-28 RX ADMIN — PIPERACILLIN AND TAZOBACTAM 25 GRAM(S): 4; .5 INJECTION, POWDER, LYOPHILIZED, FOR SOLUTION INTRAVENOUS at 15:14

## 2023-11-28 RX ADMIN — Medication 400 MILLIGRAM(S): at 21:17

## 2023-11-28 RX ADMIN — INSULIN HUMAN 2 UNIT(S)/HR: 100 INJECTION, SOLUTION SUBCUTANEOUS at 08:00

## 2023-11-28 RX ADMIN — PROPOFOL 7.62 MICROGRAM(S)/KG/MIN: 10 INJECTION, EMULSION INTRAVENOUS at 08:01

## 2023-11-28 RX ADMIN — PIPERACILLIN AND TAZOBACTAM 25 GRAM(S): 4; .5 INJECTION, POWDER, LYOPHILIZED, FOR SOLUTION INTRAVENOUS at 05:21

## 2023-11-28 RX ADMIN — INSULIN HUMAN 2 UNIT(S)/HR: 100 INJECTION, SOLUTION SUBCUTANEOUS at 21:17

## 2023-11-28 RX ADMIN — PANTOPRAZOLE SODIUM 40 MILLIGRAM(S): 20 TABLET, DELAYED RELEASE ORAL at 12:58

## 2023-11-28 RX ADMIN — Medication 250 MILLILITER(S): at 12:54

## 2023-11-28 RX ADMIN — SODIUM CHLORIDE 75 MILLILITER(S): 9 INJECTION, SOLUTION INTRAVENOUS at 03:25

## 2023-11-28 RX ADMIN — Medication 3: at 02:10

## 2023-11-28 RX ADMIN — Medication 11.9 MICROGRAM(S)/KG/MIN: at 08:00

## 2023-11-28 RX ADMIN — Medication 1000 MILLIGRAM(S): at 21:30

## 2023-11-28 RX ADMIN — SODIUM CHLORIDE 75 MILLILITER(S): 9 INJECTION, SOLUTION INTRAVENOUS at 08:01

## 2023-11-28 NOTE — PROGRESS NOTE ADULT - SUBJECTIVE AND OBJECTIVE BOX
Subjective - patient seen and evaluated bedside. Intubated and sedated unable to participate in HPI or ROS    Brief summary:  73yMale POD# 1 LT VATS, decortication for empyema    Significant/[id number] events: unable to be extubated in OR. brought to CTICU with pressor requirement, intubated.     PAST MEDICAL & SURGICAL HISTORY:  Coronary artery disease involving native coronary artery of native heart, unspecified whether angina      CHF with unknown LVEF      Primary hypertension      Hyperlipidemia, unspecified hyperlipidemia type      S/P CABG x 5      History of cholecystectomy            atorvastatin 20 milliGRAM(s) Oral at bedtime  chlorhexidine 0.12% Liquid 15 milliLiter(s) Oral Mucosa every 12 hours  chlorhexidine 2% Cloths 1 Application(s) Topical <User Schedule>  influenza  Vaccine (HIGH DOSE) 0.7 milliLiter(s) IntraMuscular once  insulin lispro (ADMELOG) corrective regimen sliding scale   SubCutaneous every 6 hours  lactated ringers. 1000 milliLiter(s) IV Continuous <Continuous>  norepinephrine Infusion 0.05 MICROgram(s)/kG/Min IV Continuous <Continuous>  ondansetron Injectable 4 milliGRAM(s) IV Push every 8 hours PRN  pantoprazole  Injectable 40 milliGRAM(s) IV Push daily  piperacillin/tazobactam IVPB.. 3.375 Gram(s) IV Intermittent every 8 hours  propofol Infusion 10 MICROgram(s)/kG/Min IV Continuous <Continuous>  sodium chloride 0.9% lock flush 10 milliLiter(s) IV Push every 1 hour PRN  MEDICATIONS  (PRN):  ondansetron Injectable 4 milliGRAM(s) IV Push every 8 hours PRN Nausea and/or Vomiting  sodium chloride 0.9% lock flush 10 milliLiter(s) IV Push every 1 hour PRN Pre/post blood products, medications, blood draw, and to maintain line patency    Height (cm): 175.3 (11-27 @ 16:59)  Weight (kg): 127 (11-27 @ 16:59)  BMI (kg/m2): 41.3 (11-27 @ 16:59)  BSA (m2): 2.38 (11-27 @ 16:59)Mode: AC/ CMV (Assist Control/ Continuous Mandatory Ventilation), RR (machine): 18, TV (machine): 550, FiO2: 40, PEEP: 6, ITime: 1, MAP: 13, PIP: 30  Daily Height in cm: 175.3 (27 Nov 2023 16:59)    Daily       ABG - ( 28 Nov 2023 00:24 )  pH, Arterial: 7.470 pH, Blood: x     /  pCO2: 33    /  pO2: 143   / HCO3: 24    / Base Excess: 0.3   /  SaO2: 99.8                            12.7   29.35 )-----------( 509      ( 28 Nov 2023 00:40 )             41.2   11-28    139  |  101  |  90.5<H>  ----------------------------<  217<H>  5.1   |  23.0  |  2.15<H>    Ca    9.0      28 Nov 2023 00:40  Phos  3.6     11-26  Mg     3.7     11-28    TPro  6.4<L>  /  Alb  2.3<L>  /  TBili  1.6  /  DBili  x   /  AST  35  /  ALT  39  /  AlkPhos  152<H>  11-28        Objective:  T(C): 36.7 (11-28-23 @ 03:00), Max: 36.7 (11-28-23 @ 03:00)  HR: 132 (11-28-23 @ 03:00) (73 - 133)  BP: 156/106 (11-28-23 @ 01:15) (107/94 - 156/106)  RR: 18 (11-28-23 @ 03:00) (10 - 34)  SpO2: 99% (11-28-23 @ 03:00) (95% - 100%)  Wt(kg): --CAPILLARY BLOOD GLUCOSE      POCT Blood Glucose.: 197 mg/dL (27 Nov 2023 21:00)  POCT Blood Glucose.: 134 mg/dL (27 Nov 2023 15:22)  I&O's Summary    26 Nov 2023 07:01  -  27 Nov 2023 07:00  --------------------------------------------------------  IN: 441 mL / OUT: 810 mL / NET: -369 mL    27 Nov 2023 07:01  -  28 Nov 2023 03:29  --------------------------------------------------------  IN: 1037.6 mL / OUT: 815 mL / NET: 222.6 mL        Physical Exam  General: NAD  Neuro: Intubated, sedated, unable to assess  Pulm: CTA, equal bilaterally  CV: RRR,  +S1S2  Abd: soft, NT, ND, +BS  Ext: +DP Pulses b/l, no edema  Skin: Warm, dry, intact  Inc: MSI C/D/I/stable w/ dressing, LE vein harvest site C/D/I with Ace wrap  Chest tubes: LT CTx2 to suction, draining appropriately, no AL         Imaging:    < from: Xray Chest 1 View- PORTABLE-Routine (Xray Chest 1 View- PORTABLE-Routine in AM.) (11.27.23 @ 04:41) >      INTERPRETATION:  Portable chest radiograph    CLINICAL INFORMATION: Pleural effusion. Follow-up    TECHNIQUE:  Portable  APchest radiograph.    COMPARISON: 11/26/2023 .    FINDINGS: No significant change.    CATHETERS AND TUBES: Vascular LEFT    PULMONARY: Opacified lower two thirds of LEFT hemithorax with aerated   LEFT apex and medial LEFT upper zone segment indicatingresidual   loculated pleural effusion and/or airspace consolidation. No pneumothorax.    HEART/VASCULAR: The  heart is mildly enlarged in transverse diameter.   Status post median sternotomy.    BONES: Visualized osseous thorax intact.    IMPRESSION:  No significant change..    --- End of Report ---        < end of copied text >

## 2023-11-28 NOTE — PROGRESS NOTE ADULT - ASSESSMENT
72M with CAD s/p CABG, HFrEF, HTN, Afib admitted on 11/18 with dyspnea. Found to have large loculated left pleural effusion s/p CT on 11/18. Hospital course complicated by Afl/afib with RVR followed by EP     Left empyema   Aflutter with RVR   CKD     - s/p VATS/decortication on 11/27 >> empyema   - Now intubated, sedated, on pressors   - Continue piperacillin-tazobactam  - Monitor renal function and adjust antimicrobial dose accordingly.   - will narrow to ceftriaxone once more clinically stable   - Follow surgical cultures     - Gram with GPC in pairs.     - 11/18 CT Chest with large loculated pleural effusion   - s/p left chest tube on 11/18   - Pleural fluid culture with Streptococcus intermedius   - 11/23, 11/24 and 11/26 BCx ngtd    - Difficulty achieving rate control for Aflutter; in part likely to uncontrolled infectious source. EP following.   - EF 20-25%  - worsen leukocytosis post-op   - Monitor temp     D/w ASP/clinical pharmacist

## 2023-11-28 NOTE — PROGRESS NOTE ADULT - NS ATTEND AMEND GEN_ALL_CORE FT
VATS for loculated pleural effusion yesterday. Remains in aflutter. Continue rate control for now. Resume AC with heparin when bleeding risk felt to be low. QUIN/DCCV once no further need for interventional procedures requiring interruption of anticoagulation.

## 2023-11-28 NOTE — PROGRESS NOTE ADULT - SUBJECTIVE AND OBJECTIVE BOX
Pt is intubated and sedated, unable to interview. He is s/p VATS on 11/27/23 with Dr. Barry, left sided chest tube x 2 present and draining.   Telemetry shows AFlutter 130s.     PAST MEDICAL & SURGICAL HISTORY:  Coronary artery disease involving native coronary artery of native heart, unspecified whether angina  CHF with unknown LVEF  Primary hypertension  Hyperlipidemia, unspecified hyperlipidemia type  S/P CABG x 5  History of cholecystectomy    MEDICATIONS  (STANDING):  atorvastatin 20 milliGRAM(s) Oral at bedtime  chlorhexidine 0.12% Liquid 15 milliLiter(s) Oral Mucosa every 12 hours  chlorhexidine 2% Cloths 1 Application(s) Topical <User Schedule>  dexMEDEtomidine Infusion 0.2 MICROgram(s)/kG/Hr (6.35 mL/Hr) IV Continuous <Continuous>  dextrose 50% Injectable 50 milliLiter(s) IV Push every 15 minutes  influenza  Vaccine (HIGH DOSE) 0.7 milliLiter(s) IntraMuscular once  insulin regular Infusion 2 Unit(s)/Hr (2 mL/Hr) IV Continuous <Continuous>  lactated ringers. 1000 milliLiter(s) (75 mL/Hr) IV Continuous <Continuous>  norepinephrine Infusion 0.05 MICROgram(s)/kG/Min (11.9 mL/Hr) IV Continuous <Continuous>  pantoprazole  Injectable 40 milliGRAM(s) IV Push daily  piperacillin/tazobactam IVPB.. 3.375 Gram(s) IV Intermittent every 8 hours  propofol Infusion 10 MICROgram(s)/kG/Min (7.62 mL/Hr) IV Continuous <Continuous>    MEDICATIONS  (PRN):  ondansetron Injectable 4 milliGRAM(s) IV Push every 8 hours PRN Nausea and/or Vomiting  sodium chloride 0.9% lock flush 10 milliLiter(s) IV Push every 1 hour PRN Pre/post blood products, medications, blood draw, and to maintain line patency    Allergies:  No Known Allergies    Vital Signs Last 24 Hrs  T(C): 37.2 (28 Nov 2023 08:00), Max: 37.2 (28 Nov 2023 08:00)  T(F): 99 (28 Nov 2023 08:00), Max: 99 (28 Nov 2023 08:00)  HR: 134 (28 Nov 2023 08:30) (73 - 134)  BP: 105/62 (28 Nov 2023 08:00) (104/63 - 156/106)  BP(mean): 76 (28 Nov 2023 08:00) (55 - 125)  RR: 18 (28 Nov 2023 08:30) (10 - 34)  SpO2: 97% (28 Nov 2023 08:30) (92% - 100%)    Parameters below as of 28 Nov 2023 08:00  Patient On (Oxygen Delivery Method): ventilator    O2 Concentration (%): 30    Physical Exam:  Constitutional: intubated, sedated   Cardiovascular: +S1S2 tachycardic   Pulmonary: intubated, equal chest rise   GI: soft NTND +BS  Extremities: no pedal edema, +distal pulses b/l  Neuro: unable to assess     LABS:                        12.7   29.35 )-----------( 509      ( 28 Nov 2023 00:40 )             41.2     11-28    139  |  101  |  90.5<H>  ----------------------------<  217<H>  5.1   |  23.0  |  2.15<H>    Ca    9.0      28 Nov 2023 00:40  Mg     3.7     11-28    TPro  6.4<L>  /  Alb  2.3<L>  /  TBili  1.6  /  DBili  x   /  AST  35  /  ALT  39  /  AlkPhos  152<H>  11-28    Urinalysis Basic - ( 28 Nov 2023 00:40 )    Color: x / Appearance: x / SG: x / pH: x  Gluc: 217 mg/dL / Ketone: x  / Bili: x / Urobili: x   Blood: x / Protein: x / Nitrite: x   Leuk Esterase: x / RBC: x / WBC x   Sq Epi: x / Non Sq Epi: x / Bacteria: x    RADIOLOGY & ADDITIONAL TESTS:  CXR: 11/27/23:   FINDINGS: No significant change.  CATHETERS AND TUBES: Vascular LEFT  PULMONARY: Opacified lower two thirds of LEFT hemithorax with aerated LEFT apex and medial LEFT upper zone segment indicatingresidual loculated pleural effusion and/or airspace consolidation. No pneumothorax.  HEART/VASCULAR: The  heart is mildly enlarged in transverse diameter. Status post median sternotomy.  BONES: Visualized osseous thorax intact.  IMPRESSION:  No significant change..  --- End of Report ---  TANNER MACHADO MD; Attending Radiologist  This document has been electronically signed. Nov 27 2023  3:07PM    Echo: 11/18/23:   Summary:   1. There is mild concentric left ventricular hypertrophy.   2. Moderate to severely increased left ventricular internal cavity size.   3. Left ventricular ejection fraction, by visual estimation, is 20 to 25%.   4. Severely decreased global left ventricular systolic function.   5. The mitral in-flow pattern reveals no discernable A-wave, therefore no comment on diastolic function can be made.   6. Mildly enlarged right ventricle.   7. Severely reduced RV systolic function.   8. Mildly enlarged left atrium.   9. Normal right atrial size.  10. Sclerotic aortic valve with normal opening.  11. Mild thickening and calcification of the anterior and posterior mitral valve leaflets.  12. Mild mitral annular calcification.  13. Mild mitral valve regurgitation.  14. The main pulmonary artery is normal in size.  15. There is no evidence of pericardial effusion.  Otf Frederick MD Electronically signed on 11/18/2023 at 4:20:53 PM       Pt is intubated and sedated, unable to interview. On pressors. He is s/p VATS on 11/27/23 with Dr. Barry, left sided chest tube x 2 present and draining.   Telemetry shows AFlutter 130s.     PAST MEDICAL & SURGICAL HISTORY:  Coronary artery disease involving native coronary artery of native heart, unspecified whether angina  CHF with unknown LVEF  Primary hypertension  Hyperlipidemia, unspecified hyperlipidemia type  S/P CABG x 5  History of cholecystectomy    MEDICATIONS  (STANDING):  atorvastatin 20 milliGRAM(s) Oral at bedtime  chlorhexidine 0.12% Liquid 15 milliLiter(s) Oral Mucosa every 12 hours  chlorhexidine 2% Cloths 1 Application(s) Topical <User Schedule>  dexMEDEtomidine Infusion 0.2 MICROgram(s)/kG/Hr (6.35 mL/Hr) IV Continuous <Continuous>  dextrose 50% Injectable 50 milliLiter(s) IV Push every 15 minutes  influenza  Vaccine (HIGH DOSE) 0.7 milliLiter(s) IntraMuscular once  insulin regular Infusion 2 Unit(s)/Hr (2 mL/Hr) IV Continuous <Continuous>  lactated ringers. 1000 milliLiter(s) (75 mL/Hr) IV Continuous <Continuous>  norepinephrine Infusion 0.05 MICROgram(s)/kG/Min (11.9 mL/Hr) IV Continuous <Continuous>  pantoprazole  Injectable 40 milliGRAM(s) IV Push daily  piperacillin/tazobactam IVPB.. 3.375 Gram(s) IV Intermittent every 8 hours  propofol Infusion 10 MICROgram(s)/kG/Min (7.62 mL/Hr) IV Continuous <Continuous>    MEDICATIONS  (PRN):  ondansetron Injectable 4 milliGRAM(s) IV Push every 8 hours PRN Nausea and/or Vomiting  sodium chloride 0.9% lock flush 10 milliLiter(s) IV Push every 1 hour PRN Pre/post blood products, medications, blood draw, and to maintain line patency    Allergies:  No Known Allergies    Vital Signs Last 24 Hrs  T(C): 37.2 (28 Nov 2023 08:00), Max: 37.2 (28 Nov 2023 08:00)  T(F): 99 (28 Nov 2023 08:00), Max: 99 (28 Nov 2023 08:00)  HR: 134 (28 Nov 2023 08:30) (73 - 134)  BP: 105/62 (28 Nov 2023 08:00) (104/63 - 156/106)  BP(mean): 76 (28 Nov 2023 08:00) (55 - 125)  RR: 18 (28 Nov 2023 08:30) (10 - 34)  SpO2: 97% (28 Nov 2023 08:30) (92% - 100%)    Parameters below as of 28 Nov 2023 08:00  Patient On (Oxygen Delivery Method): ventilator    O2 Concentration (%): 30    Physical Exam:  Constitutional: intubated, sedated   Cardiovascular: +S1S2 tachycardic   Pulmonary: intubated, equal chest rise   GI: soft NTND +BS  Extremities: no pedal edema, +distal pulses b/l  Neuro: unable to assess     LABS:                        12.7   29.35 )-----------( 509      ( 28 Nov 2023 00:40 )             41.2     11-28    139  |  101  |  90.5<H>  ----------------------------<  217<H>  5.1   |  23.0  |  2.15<H>    Ca    9.0      28 Nov 2023 00:40  Mg     3.7     11-28    TPro  6.4<L>  /  Alb  2.3<L>  /  TBili  1.6  /  DBili  x   /  AST  35  /  ALT  39  /  AlkPhos  152<H>  11-28    Urinalysis Basic - ( 28 Nov 2023 00:40 )    Color: x / Appearance: x / SG: x / pH: x  Gluc: 217 mg/dL / Ketone: x  / Bili: x / Urobili: x   Blood: x / Protein: x / Nitrite: x   Leuk Esterase: x / RBC: x / WBC x   Sq Epi: x / Non Sq Epi: x / Bacteria: x    RADIOLOGY & ADDITIONAL TESTS:  CXR: 11/27/23:   FINDINGS: No significant change.  CATHETERS AND TUBES: Vascular LEFT  PULMONARY: Opacified lower two thirds of LEFT hemithorax with aerated LEFT apex and medial LEFT upper zone segment indicatingresidual loculated pleural effusion and/or airspace consolidation. No pneumothorax.  HEART/VASCULAR: The  heart is mildly enlarged in transverse diameter. Status post median sternotomy.  BONES: Visualized osseous thorax intact.  IMPRESSION:  No significant change..  --- End of Report ---  TANNER MACHADO MD; Attending Radiologist  This document has been electronically signed. Nov 27 2023  3:07PM    Echo: 11/18/23:   Summary:   1. There is mild concentric left ventricular hypertrophy.   2. Moderate to severely increased left ventricular internal cavity size.   3. Left ventricular ejection fraction, by visual estimation, is 20 to 25%.   4. Severely decreased global left ventricular systolic function.   5. The mitral in-flow pattern reveals no discernable A-wave, therefore no comment on diastolic function can be made.   6. Mildly enlarged right ventricle.   7. Severely reduced RV systolic function.   8. Mildly enlarged left atrium.   9. Normal right atrial size.  10. Sclerotic aortic valve with normal opening.  11. Mild thickening and calcification of the anterior and posterior mitral valve leaflets.  12. Mild mitral annular calcification.  13. Mild mitral valve regurgitation.  14. The main pulmonary artery is normal in size.  15. There is no evidence of pericardial effusion.  Otf Frederick MD Electronically signed on 11/18/2023 at 4:20:53 PM

## 2023-11-28 NOTE — PROGRESS NOTE ADULT - ASSESSMENT
72-year-old male with history of hypertension CAD status post quintuple bypass November 2021 followed by cardiology/Dr. Milian in [place]  resents the ED via EMS complaining of worsening dyspnea on exertion since yesterday.  Thoracic surgery consulted treatment Left effusion. Left PTC placed 11/28. Patient now pending FB left robotic assisted decort

## 2023-11-28 NOTE — PROGRESS NOTE ADULT - ASSESSMENT
71 yo M w/ PMH of CAD s/p CABG, HFrEF, HTN, HLD presenting for chief complaint of SOB. Admitted for acute hypoxemic respiratory failure 2/2 CHF/Pleural effusion.  Suspect CKD   Likely component of CRS   No gross hydro on renal sono  UA bland   Diuretics held due to hypotension -   S/P  VATS / decortication Mon 11/27   Abx as per ID   Will follow

## 2023-11-28 NOTE — PROGRESS NOTE ADULT - SUBJECTIVE AND OBJECTIVE BOX
GRETA AGUAYO  MRN-[id number]    HPI:  Patient is a 71 yo M w/ PMH of CAD s/p CABG, CHF, HTN, HLD, Afib (not on AC due to hx of GIB) presenting for chief complaint of SOB. Patient states he started experiencing SOB and LE swelling for the past few days He states he was seen by his Cardiologist on, Dr. Milian, on Tuesday and his Metoprolol was increased from 50mg to 100mg. He states his symptoms worsened yesterday therefore he came to the ED today. He states he has been unable to walk around without getting short of breath. He reports compliance with medications and diet. Patient denies fever, chills, chest pain, palpitations, cough, wheezing, abdominal pain, nausea, vomiting, diarrhea, constipation, bloody bowel movements, melena, hematemesis, urinary complaints, syncope, calf tenderness, paresthesias.  (18 Nov 2023 14:38)      Surgery/Hospital Course:  ·  PRE-OP DIAGNOSIS:  Empyema of left pleural space  ·  POST-OP DIAGNOSIS:  Empyema of left pleural space   ·  PROCEDURES:  Decortication, lung, total, using VATS 27-Nov-2023   Flexible bronchoscopy   Today:  No acute events     ICU Vital Signs Last 24 Hrs  T(C): 37.3 (28 Nov 2023 11:00), Max: 37.3 (28 Nov 2023 11:00)  T(F): 99.1 (28 Nov 2023 11:00), Max: 99.1 (28 Nov 2023 11:00)  HR: 86 (28 Nov 2023 13:15) (73 - 134)  BP: 110/79 (28 Nov 2023 13:00) (100/56 - 156/106)  BP(mean): 90 (28 Nov 2023 13:00) (55 - 125)  ABP: 102/45 (28 Nov 2023 13:15) (74/47 - 156/81)  ABP(mean): 61 (28 Nov 2023 13:15) (55 - 127)  RR: 18 (28 Nov 2023 13:15) (10 - 34)  SpO2: 96% (28 Nov 2023 13:15) (92% - 100%)    O2 Parameters below as of 28 Nov 2023 13:00  Patient On (Oxygen Delivery Method): ventilator    O2 Concentration (%): 30        Physical Exam:  Gen:  Sedated   CNS: non focal 	  Neck: no JVD  RES : clear , no wheezing              CVS: Regular  rhythm. Normal S1/S2  Abd: Soft, non-distended. Bowel sounds present.  Skin: No rash.  Ext:  no edema    ============================I/O===========================   I&O's Detail    27 Nov 2023 07:01 - 28 Nov 2023 07:00  --------------------------------------------------------  IN:    Albumin 5%  - 250 mL: 250 mL    IV PiggyBack: 50 mL    IV PiggyBack: 100 mL    IV PiggyBack: 225 mL    IV PiggyBack: 125 mL    Lactated Ringers: 300 mL    Norepinephrine: 235.6 mL    Propofol: 251.9 mL  Total IN: 1537.5 mL    OUT:    Chest Tube (mL): 420 mL    Chest Tube (mL): 65 mL    Indwelling Catheter - Urethral (mL): 710 mL    Nasogastric/Oral tube (mL): 0 mL  Total OUT: 1195 mL    Total NET: 342.5 mL      28 Nov 2023 07:01  -  28 Nov 2023 13:50  --------------------------------------------------------  IN:    Albumin 5%  - 250 mL: 500 mL    Dexmedetomidine: 38 mL    Insulin: 13.5 mL    IV PiggyBack: 25 mL    Lactated Ringers: 375 mL    Norepinephrine: 19.2 mL    Propofol: 103 mL  Total IN: 1073.7 mL    OUT:    Chest Tube (mL): 0 mL    Chest Tube (mL): 30 mL    Indwelling Catheter - Urethral (mL): 350 mL    Nasogastric/Oral tube (mL): 50 mL  Total OUT: 430 mL    Total NET: 643.7 mL        ============================ LABS =========================                        12.7   29.35 )-----------( 509      ( 28 Nov 2023 00:40 )             41.2     11-28    139  |  101  |  90.5<H>  ----------------------------<  217<H>  5.1   |  23.0  |  2.15<H>    Ca    9.0      28 Nov 2023 00:40  Mg     3.7     11-28    TPro  6.4<L>  /  Alb  2.3<L>  /  TBili  1.6  /  DBili  x   /  AST  35  /  ALT  39  /  AlkPhos  152<H>  11-28    LIVER FUNCTIONS - ( 28 Nov 2023 00:40 )  Alb: 2.3 g/dL / Pro: 6.4 g/dL / ALK PHOS: 152 U/L / ALT: 39 U/L / AST: 35 U/L / GGT: x             ABG - ( 28 Nov 2023 04:58 )  pH, Arterial: 7.460 pH, Blood: x     /  pCO2: 35    /  pO2: 128   / HCO3: 25    / Base Excess: 1.1   /  SaO2: 99.6              Urinalysis Basic - ( 28 Nov 2023 00:40 )    Color: x / Appearance: x / SG: x / pH: x  Gluc: 217 mg/dL / Ketone: x  / Bili: x / Urobili: x   Blood: x / Protein: x / Nitrite: x   Leuk Esterase: x / RBC: x / WBC x   Sq Epi: x / Non Sq Epi: x / Bacteria: x      ======================Micro/Rad/Cardio=================  Culture: Reviewed   CXR: Reviewed  Echo:Reviewed  ======================================================  PAST MEDICAL & SURGICAL HISTORY:  Coronary artery disease involving native coronary artery of native heart, unspecified whether angina      CHF with unknown LVEF      Primary hypertension      Hyperlipidemia, unspecified hyperlipidemia type      S/P CABG x 5      History of cholecystectomy        ====================ASSESSMENT ==============  72-year-old male with history of hypertension CAD status post quintuple bypass November 2021 followed by cardiology/Dr. Milian in [place]  resents the ED via EMS complaining of worsening dyspnea on exertion since yesterday.  Thoracic surgery consulted treatment Left effusion. Left PTC placed 11/28. Patient now pending FB left robotic assisted decort     --- Pleural effusion.   --- Left PTC placed 11/18  ---Acute respiratory failure with hypoxia.   ---Atrial flutter.   ---Coronary artery disease involving native coronary artery of native heart, unspecified whether angina.   --- Acute kidney injury.   ---Post op Hypovolemia  ---Post op respiratory insufficiency       Plan:  -Maintain CT  to H2O seal  -Continue zosyn  -Continue levophed for BP support  -EP will plan for cardioversion after VATS once AC can be restarted.  - Continue lipitor  -ASA when appropriate.  -Renal following  -Monitor vanco/dig level.-  ====================== NEUROLOGY=====================  dexMEDEtomidine Infusion 0.2 MICROgram(s)/kG/Hr (6.35 mL/Hr) IV Continuous <Continuous>  ondansetron Injectable 4 milliGRAM(s) IV Push every 8 hours PRN Nausea and/or Vomiting  propofol Infusion 10 MICROgram(s)/kG/Min (7.62 mL/Hr) IV Continuous <Continuous>    ==================== RESPIRATORY======================  Post op respiratory insufficiency  Mechanical Ventilation:  Mode: AC/ CMV (Assist Control/ Continuous Mandatory Ventilation)  RR (machine): 18  TV (machine): 550  FiO2: 30  PEEP: 6  ITime: 1  MAP: 11  PIP: 26      ====================CARDIOVASCULAR==================  Post op Hypovolemia  norepinephrine Infusion 0.05 MICROgram(s)/kG/Min (11.9 mL/Hr) IV Continuous <Continuous>    ===================HEMATOLOGIC/ONC ===================  Monitor H&H/Plts      ===================== RENAL =========================  Continue monitoring urine output, I&OS, BUN/Cr     ==================== GASTROINTESTINAL===================  lactated ringers. 1000 milliLiter(s) (75 mL/Hr) IV Continuous <Continuous>  pantoprazole  Injectable 40 milliGRAM(s) IV Push daily  sodium chloride 0.9% lock flush 10 milliLiter(s) IV Push every 1 hour PRN Pre/post blood products, medications, blood draw, and to maintain line patency    =======================    ENDOCRINE  =====================  atorvastatin 20 milliGRAM(s) Oral at bedtime  dextrose 50% Injectable 50 milliLiter(s) IV Push every 15 minutes  insulin regular Infusion 2 Unit(s)/Hr (2 mL/Hr) IV Continuous <Continuous>    ========================INFECTIOUS DISEASE================  piperacillin/tazobactam IVPB.. 3.375 Gram(s) IV Intermittent every 8 hours      -Close hemodynamic , ventilatory and drain monitoring and management per post op routine .  -Monitor Neurologic status ,   -Head of the bed should remain elevated to 45 degrees,  -Monitor adequacy of oxygenation and ventilation and attempt to wean oxygen ,  -Monitor for arrhythmias and monitor parameters for organ perfusion,  -Glycemic control is satisfactory,  -Nutritional goals will be met using po eventually , insure adequate caloric intake and monitor the same ,  -Electrolytes have been repleted as necessary , pain control has been achieved  and wound care has been carried out ,  -Stress ulcer and VTE prophylaxis will be achieved,  -Agressive PT and early mobility and ambulation goals will be met,    I have spent 35 minutes providing acute care for this critically ill patient     Patient requires continuous monitoring with bedside rhythm monitoring, pulse ox monitoring, and intermittent blood gas analysis. Care plan discussed with ICU care team. Patient remained critical and at risk for life threatening decompensation.

## 2023-11-28 NOTE — PROGRESS NOTE ADULT - PROBLEM SELECTOR PLAN 1
Left PTC placed 11/18  Maintain to H2O seal  Pleural fluid exudative by light's criteria  Culture prelim streptococcus intermedius, +empyema ID following   Continue zosyn  cytology (-)  Underwent VATS/decort on 11/27 - unable to be extubated.   Brought to CTICU, intubated, sedated  Continue levophed for BP support  Trend ABG    EP will plan for cardioversion after VATS once AC can be restarted.

## 2023-11-28 NOTE — PROGRESS NOTE ADULT - ASSESSMENT
Assessment/Recommendations:   73 year old male patient with HTN, hyperlipidemia, obesity BMI 41.3, CAD s/p CABG x4 (2020 [place] at [facility]), HFrEF, EF 20-25%, remote AF (transient in the postop setting of CABG with no reoccurrence- not on AC). He presented to [facility] ED on 11/18 with several day history of dyspnea and lower extremity swelling. Admitted with acute respiratory failure due to large pleural effusion, acute HFrEF exacerbation with reduced RV function, and atrial flutter (likely typical) with RVR.   CT chest showed large loculated pleural effusion and he had chest tube placement on 11/18. He has worsening left chest opacification with loculation despite PTC. Now s/p VATS on 11/27 with 2 left sided chest tubes draining. He remains in atrial flutter with difficult to control rates.     # AFlutter with RVR   - HR ~ 130 bpm. Rates will be difficult to control while in atrial flutter.  - Resume Metoprolol 75mg q6h  - Digoxin discontinued. Dig level 1.4 on 11/28  - CHADSVASc = 4 (age, CHF, CAD, HTN). Resume heparin gtt when cleared by thoracic surgery.   - Continue telemetry monitoring.   - Monitor electrolytes. Keep K > 4, Mg > 2   - EP plan contingent upon ischemic workup. Likely QUIN/DCCV prior to d/c when on therapeutic anticoagulation.     # HFrEF, EF 20-25%   - Daily weights.   - Strict I/Os   - GDMT and diuresis as per general cardiology   - C/ischemic workup when medically optimized     #Loculated pleural effusion, likely Empyema  - s/p PTC 11/18   - s/p VATS on 11/27   - Continue abx  - thoracic following     Will discuss with Dr. Ceballos

## 2023-11-28 NOTE — PROGRESS NOTE ADULT - SUBJECTIVE AND OBJECTIVE BOX
[facility]  INFECTIOUS DISEASES at [place] and [place]  ===============================================================                  Luis Ivey MD               Diplomates American Board of Internal Medicine & Infectious Diseases                * [place] Office - Appt - Tel  [phone] Fax [phone]                * [place] Office - Appt - Tel [phone] Fax [phone]                                  Hospital Consult line:  [phone]  ==============================================================    GRETA AGUAYO [id number]    Follow up: empyema     Seen in the CTICU   s/p VATS/decortication   Unable to extubate post-op   Remains intubated, sedated on pressors   Coffee ground output from OGT   On levo + propofol   A/C FiO2 30% PEEP 6     I have personally reviewed the labs and data; pertinent labs and data are listed in this note; please see below.     _______________________________________________________________  REVIEW OF SYSTEMS  Unable to obtain due to medical condition - intubated/sedated   ________________________________________________________________  Allergies:  No Known Allergies    ________________________________________________________________  PHYSICAL EXAM  GEN: critically ill, intubated, sedated, obese   HEENT: Anicteric sclerae. PERRL - sluggish ~2 mm. ETT and OGT in place. OGT with coffee ground output    NECK: Supple. Mid-line trachea. No palpable masses or LN  LUNGS: on vent. Decreased BS in left lung. CT x 2 with SS output   HEART: tachycardic, unable to assess for m   ABDOMEN: Soft, ND, appears NT, +BS  :  Burger catheter  EXTREMITIES: trace LE edema   NEUROLOGIC: sedated  SKIN: chronic skin change sin LE   LINES: right ax a-line, RIJ CVC. PIV   ________________________________________________________________  Vitals:  T(F): 99 (28 Nov 2023 08:00), Max: 99 (28 Nov 2023 08:00)  HR: 134 (28 Nov 2023 08:30)  BP: 105/62 (28 Nov 2023 08:00)  RR: 18 (28 Nov 2023 08:30)  SpO2: 97% (28 Nov 2023 08:30) (92% - 100%)  temp max in last 48H T(F): , Max: 99 (11-28-23 @ 08:00)    Current Antibiotics:  piperacillin/tazobactam IVPB.. 3.375 Gram(s) IV Intermittent every 8 hours    Other medications:  atorvastatin 20 milliGRAM(s) Oral at bedtime  chlorhexidine 0.12% Liquid 15 milliLiter(s) Oral Mucosa every 12 hours  chlorhexidine 2% Cloths 1 Application(s) Topical <User Schedule>  dexMEDEtomidine Infusion 0.2 MICROgram(s)/kG/Hr IV Continuous <Continuous>  dextrose 50% Injectable 50 milliLiter(s) IV Push every 15 minutes  influenza  Vaccine (HIGH DOSE) 0.7 milliLiter(s) IntraMuscular once  insulin regular Infusion 2 Unit(s)/Hr IV Continuous <Continuous>  lactated ringers. 1000 milliLiter(s) IV Continuous <Continuous>  norepinephrine Infusion 0.05 MICROgram(s)/kG/Min IV Continuous <Continuous>  pantoprazole  Injectable 40 milliGRAM(s) IV Push daily  propofol Infusion 10 MICROgram(s)/kG/Min IV Continuous <Continuous>                            12.7   29.35 )-----------( 509      ( 28 Nov 2023 00:40 )             41.2     11-28    139  |  101  |  90.5<H>  ----------------------------<  217<H>  5.1   |  23.0  |  2.15<H>    Ca    9.0      28 Nov 2023 00:40  Mg     3.7     11-28    TPro  6.4<L>  /  Alb  2.3<L>  /  TBili  1.6  /  DBili  x   /  AST  35  /  ALT  39  /  AlkPhos  152<H>  11-28    RECENT CULTURES:  11-27 @ 18:56 .Body Fluid left pleural fluid       Few polymorphonuclear leukocytes seen per low power field  Moderate Gram positive cocci in pairs seen per oil power field      11-27 @ 18:30 .Tissue left pleural peel       Few polymorphonuclear leukocytes seen per low power field  No organisms seen per oil power field      11-26 @ 07:20 .Blood Blood-Peripheral     No growth at 24 hours        11-26 @ 07:15 .Blood Blood-Peripheral     No growth at 24 hours        11-24 @ 09:00  RVP with SARS-CoV-2   NotDetec      11-24 @ 01:25 .Blood Blood-Peripheral     No growth at 4 days        11-23 @ 12:42 .Blood Blood-Peripheral     No growth at 4 days        11-23 @ 12:35 .Blood Blood-Peripheral     No growth at 4 days        11-18 @ 14:00 Pleural Fl Pleural Fluid Streptococcus intermedius    Few Streptococcus intermedius    polymorphonuclear leukocytes seen  No organisms seen  by cytocentrifuge      11-18 @ 08:30    RVP with SARS-CoV-2   NotDetec      WBC Count: 29.35 K/uL (11-28-23 @ 00:40)  WBC Count: 24.62 K/uL (11-27-23 @ 21:05)  WBC Count: 17.64 K/uL (11-27-23 @ 06:34)  WBC Count: 18.34 K/uL (11-26-23 @ 07:20)  WBC Count: 15.88 K/uL (11-25-23 @ 04:28)  WBC Count: 18.95 K/uL (11-24-23 @ 06:33)  WBC Count: 8.97 K/uL (11-24-23 @ 01:25)  WBC Count: 19.09 K/uL (11-23-23 @ 11:22)    Creatinine: 2.15 mg/dL (11-28-23 @ 00:40)  Creatinine: 2.09 mg/dL (11-27-23 @ 22:00)  Creatinine: 0.84 mg/dL (11-27-23 @ 21:05)  Creatinine: 1.99 mg/dL (11-27-23 @ 06:34)  Creatinine: 2.06 mg/dL (11-26-23 @ 07:20)  Creatinine: 2.15 mg/dL (11-25-23 @ 04:28)  Creatinine: 2.15 mg/dL (11-24-23 @ 06:33)  Creatinine: 1.96 mg/dL (11-24-23 @ 01:25)      Procalcitonin, Serum: 0.74 ng/mL (11-24-23 @ 01:25)  Procalcitonin, Serum: 2.59 ng/mL (11-19-23 @ 01:29)     SARS-CoV-2: NotDetec (11-24-23 @ 09:00)  SARS-CoV-2: NotDetec (11-18-23 @ 08:30)    Vancomycin Level, Trough: <4.0 ug/mL (11-28-23 @ 04:00)  Vancomycin Level, Trough: 11.4 ug/mL (11-26-23 @ 07:20)    ________________________________________________________________  CARDIOLOGY   < from: TTE Echo Complete w/o Contrast w/ Doppler (11.18.23 @ 12:34) >  Summary:   1. There is mild concentric left ventricular hypertrophy.   2. Moderate to severely increased left ventricular internal cavity size.   3. Left ventricular ejection fraction, by visual estimation, is 20 to   25%.   4. Severely decreased global left ventricular systolic function.   5. The mitral in-flow pattern reveals no discernable A-wave, therefore   no comment on diastolic function can be made.   6. Mildly enlarged right ventricle.   7. Severely reduced RV systolic function.   8. Mildly enlarged left atrium.   9. Normal right atrial size.  10. Sclerotic aortic valve with normal opening.  11. Mild thickening and calcification of the anterior and posterior   mitral valve leaflets.  12. Mild mitral annular calcification.  13. Mild mitral valve regurgitation.  14. The main pulmonary artery is normal in size.  15. There is no evidence of pericardial effusion.    < end of copied text >    ________________________________________________________________  RADIOLOGY  < from: Xray Chest 1 View- PORTABLE-Routine (Xray Chest 1 View- PORTABLE-Routine in AM.) (11.27.23 @ 04:41) >  FINDINGS: No significant change.    CATHETERS AND TUBES: Vascular LEFT    PULMONARY: Opacified lower two thirds of LEFT hemithorax with aerated   LEFT apex and medial LEFT upper zone segment indicatingresidual   loculated pleural effusion and/or airspace consolidation. No pneumothorax.    HEART/VASCULAR: The  heart is mildly enlarged in transverse diameter.   Status post median sternotomy.    BONES: Visualized osseous thorax intact.    IMPRESSION:  No significant change..    < end of copied text >  < from: CT Chest No Cont (11.20.23 @ 09:09) >  IMPRESSION: Large loculated lefthydropneumothorax containing a pigtail   catheter.    Mucus/secretions in the left lower lobe bronchus.    1.4 cm ill-defined opacity is noted in the right lower lobe. It is   indeterminate based on this exam. Follow-up CT scan is recommended in 3   months to ensure resolution.    < end of copied text >

## 2023-11-28 NOTE — PROGRESS NOTE ADULT - SUBJECTIVE AND OBJECTIVE BOX
NEPHROLOGY INTERVAL HPI/OVERNIGHT EVENTS:    Seen earlier   CTICU   Intubated   Pressors / IVF   Sedated  Remains on Zosyn    ml         Surgery/Hospital Course:  ·  PRE-OP DIAGNOSIS:  Empyema of left pleural space  ·  POST-OP DIAGNOSIS:  Empyema of left pleural space   ·  PROCEDURES:  Decortication, lung, total, using VATS 27-Nov-2023   Flexible bronchoscopy    MEDICATIONS  (STANDING):  atorvastatin 20 milliGRAM(s) Oral at bedtime  chlorhexidine 0.12% Liquid 15 milliLiter(s) Oral Mucosa every 12 hours  chlorhexidine 2% Cloths 1 Application(s) Topical <User Schedule>  dexMEDEtomidine Infusion 0.2 MICROgram(s)/kG/Hr (6.35 mL/Hr) IV Continuous <Continuous>  dextrose 50% Injectable 50 milliLiter(s) IV Push every 15 minutes  influenza  Vaccine (HIGH DOSE) 0.7 milliLiter(s) IntraMuscular once  insulin regular Infusion 2 Unit(s)/Hr (2 mL/Hr) IV Continuous <Continuous>  lactated ringers. 1000 milliLiter(s) (75 mL/Hr) IV Continuous <Continuous>  norepinephrine Infusion 0.05 MICROgram(s)/kG/Min (11.9 mL/Hr) IV Continuous <Continuous>  pantoprazole  Injectable 40 milliGRAM(s) IV Push daily  piperacillin/tazobactam IVPB.. 3.375 Gram(s) IV Intermittent every 8 hours  propofol Infusion 10 MICROgram(s)/kG/Min (7.62 mL/Hr) IV Continuous <Continuous>    MEDICATIONS  (PRN):  ondansetron Injectable 4 milliGRAM(s) IV Push every 8 hours PRN Nausea and/or Vomiting  sodium chloride 0.9% lock flush 10 milliLiter(s) IV Push every 1 hour PRN Pre/post blood products, medications, blood draw, and to maintain line patency      Allergies    No Known Allergies    Intolerances      Vital Signs Last 24 Hrs  T(C): 37.3 (28 Nov 2023 11:00), Max: 37.3 (28 Nov 2023 11:00)  T(F): 99.1 (28 Nov 2023 11:00), Max: 99.1 (28 Nov 2023 11:00)  HR: 120 (28 Nov 2023 14:00) (73 - 134)  BP: 146/64 (28 Nov 2023 14:00) (100/56 - 156/106)  BP(mean): 92 (28 Nov 2023 14:00) (55 - 125)  RR: 18 (28 Nov 2023 14:00) (10 - 34)  SpO2: 98% (28 Nov 2023 14:00) (92% - 100%)    Parameters below as of 28 Nov 2023 14:00  Patient On (Oxygen Delivery Method): ventilator    O2 Concentration (%): 30  Daily Height in cm: 175.3 (28 Nov 2023 03:29)    Daily   I&O's Detail    27 Nov 2023 07:01  -  28 Nov 2023 07:00  --------------------------------------------------------  IN:    Albumin 5%  - 250 mL: 250 mL    IV PiggyBack: 50 mL    IV PiggyBack: 100 mL    IV PiggyBack: 225 mL    IV PiggyBack: 125 mL    Lactated Ringers: 300 mL    Norepinephrine: 235.6 mL    Propofol: 251.9 mL  Total IN: 1537.5 mL    OUT:    Chest Tube (mL): 420 mL    Chest Tube (mL): 65 mL    Indwelling Catheter - Urethral (mL): 710 mL    Nasogastric/Oral tube (mL): 0 mL  Total OUT: 1195 mL    Total NET: 342.5 mL      28 Nov 2023 07:01  -  28 Nov 2023 15:50  --------------------------------------------------------  IN:    Albumin 5%  - 250 mL: 500 mL    Dexmedetomidine: 85.6 mL    Insulin: 19.5 mL    IV PiggyBack: 25 mL    Lactated Ringers: 525 mL    Norepinephrine: 24 mL    Propofol: 106.8 mL  Total IN: 1285.9 mL    OUT:    Chest Tube (mL): 0 mL    Chest Tube (mL): 35 mL    Indwelling Catheter - Urethral (mL): 460 mL    Nasogastric/Oral tube (mL): 50 mL  Total OUT: 545 mL    Total NET: 740.9 mL        I&O's Summary    27 Nov 2023 07:01  -  28 Nov 2023 07:00  --------------------------------------------------------  IN: 1537.5 mL / OUT: 1195 mL / NET: 342.5 mL    28 Nov 2023 07:01  -  28 Nov 2023 15:50  --------------------------------------------------------  IN: 1285.9 mL / OUT: 545 mL / NET: 740.9 mL        PHYSICAL EXAM:        GENERAL: sedated   HEENT - orally intubated   NECK: Supple, No JVD,   CHEST/LUNG: EAE , decreased BS L base , + Drain   CVS - Irregular , no rub  Abd obeses , nontender l sounds present  EXTREMITIES:  Edema better     LABS:                        12.7   29.35 )-----------( 509      ( 28 Nov 2023 00:40 )             41.2     11-28    139  |  101  |  90.5<H>  ----------------------------<  217<H>  5.1   |  23.0  |  2.15<H>    Ca    9.0      28 Nov 2023 00:40  Mg     3.7     11-28    TPro  6.4<L>  /  Alb  2.3<L>  /  TBili  1.6  /  DBili  x   /  AST  35  /  ALT  39  /  AlkPhos  152<H>  11-28      Urinalysis Basic - ( 28 Nov 2023 00:40 )    Color: x / Appearance: x / SG: x / pH: x  Gluc: 217 mg/dL / Ketone: x  / Bili: x / Urobili: x   Blood: x / Protein: x / Nitrite: x   Leuk Esterase: x / RBC: x / WBC x   Sq Epi: x / Non Sq Epi: x / Bacteria: x      Magnesium: 3.7 mg/dL (11-28 @ 00:40)  Magnesium: 1.2 mg/dL (11-27 @ 21:05)    ABG - ( 28 Nov 2023 04:58 )  pH, Arterial: 7.460 pH, Blood: x     /  pCO2: 35    /  pO2: 128   / HCO3: 25    / Base Excess: 1.1   /  SaO2: 99.6                  RADIOLOGY & ADDITIONAL TESTS:

## 2023-11-29 LAB
ALBUMIN SERPL ELPH-MCNC: 2.2 G/DL — LOW (ref 3.3–5.2)
ALBUMIN SERPL ELPH-MCNC: 2.2 G/DL — LOW (ref 3.3–5.2)
ALP SERPL-CCNC: 98 U/L — SIGNIFICANT CHANGE UP (ref 40–120)
ALP SERPL-CCNC: 98 U/L — SIGNIFICANT CHANGE UP (ref 40–120)
ALT FLD-CCNC: 26 U/L — SIGNIFICANT CHANGE UP
ALT FLD-CCNC: 26 U/L — SIGNIFICANT CHANGE UP
ANION GAP SERPL CALC-SCNC: 12 MMOL/L — SIGNIFICANT CHANGE UP (ref 5–17)
ANION GAP SERPL CALC-SCNC: 12 MMOL/L — SIGNIFICANT CHANGE UP (ref 5–17)
APTT BLD: 28.9 SEC — SIGNIFICANT CHANGE UP (ref 24.5–35.6)
APTT BLD: 28.9 SEC — SIGNIFICANT CHANGE UP (ref 24.5–35.6)
APTT BLD: 75.6 SEC — HIGH (ref 24.5–35.6)
APTT BLD: 75.6 SEC — HIGH (ref 24.5–35.6)
AST SERPL-CCNC: 30 U/L — SIGNIFICANT CHANGE UP
AST SERPL-CCNC: 30 U/L — SIGNIFICANT CHANGE UP
BASOPHILS # BLD AUTO: 0.04 K/UL — SIGNIFICANT CHANGE UP (ref 0–0.2)
BASOPHILS # BLD AUTO: 0.04 K/UL — SIGNIFICANT CHANGE UP (ref 0–0.2)
BASOPHILS NFR BLD AUTO: 0.3 % — SIGNIFICANT CHANGE UP (ref 0–2)
BASOPHILS NFR BLD AUTO: 0.3 % — SIGNIFICANT CHANGE UP (ref 0–2)
BILIRUB SERPL-MCNC: 1.1 MG/DL — SIGNIFICANT CHANGE UP (ref 0.4–2)
BILIRUB SERPL-MCNC: 1.1 MG/DL — SIGNIFICANT CHANGE UP (ref 0.4–2)
BUN SERPL-MCNC: 89.9 MG/DL — HIGH (ref 8–20)
BUN SERPL-MCNC: 89.9 MG/DL — HIGH (ref 8–20)
CALCIUM SERPL-MCNC: 8.2 MG/DL — LOW (ref 8.4–10.5)
CALCIUM SERPL-MCNC: 8.2 MG/DL — LOW (ref 8.4–10.5)
CHLORIDE SERPL-SCNC: 107 MMOL/L — SIGNIFICANT CHANGE UP (ref 96–108)
CHLORIDE SERPL-SCNC: 107 MMOL/L — SIGNIFICANT CHANGE UP (ref 96–108)
CO2 SERPL-SCNC: 24 MMOL/L — SIGNIFICANT CHANGE UP (ref 22–29)
CO2 SERPL-SCNC: 24 MMOL/L — SIGNIFICANT CHANGE UP (ref 22–29)
CREAT SERPL-MCNC: 2.17 MG/DL — HIGH (ref 0.5–1.3)
CREAT SERPL-MCNC: 2.17 MG/DL — HIGH (ref 0.5–1.3)
CULTURE RESULTS: SIGNIFICANT CHANGE UP
EGFR: 32 ML/MIN/1.73M2 — LOW
EGFR: 32 ML/MIN/1.73M2 — LOW
EOSINOPHIL # BLD AUTO: 0.04 K/UL — SIGNIFICANT CHANGE UP (ref 0–0.5)
EOSINOPHIL # BLD AUTO: 0.04 K/UL — SIGNIFICANT CHANGE UP (ref 0–0.5)
EOSINOPHIL NFR BLD AUTO: 0.3 % — SIGNIFICANT CHANGE UP (ref 0–6)
EOSINOPHIL NFR BLD AUTO: 0.3 % — SIGNIFICANT CHANGE UP (ref 0–6)
GAS PNL BLDA: SIGNIFICANT CHANGE UP
GAS PNL BLDA: SIGNIFICANT CHANGE UP
GLUCOSE BLDC GLUCOMTR-MCNC: 101 MG/DL — HIGH (ref 70–99)
GLUCOSE BLDC GLUCOMTR-MCNC: 101 MG/DL — HIGH (ref 70–99)
GLUCOSE BLDC GLUCOMTR-MCNC: 105 MG/DL — HIGH (ref 70–99)
GLUCOSE BLDC GLUCOMTR-MCNC: 105 MG/DL — HIGH (ref 70–99)
GLUCOSE BLDC GLUCOMTR-MCNC: 107 MG/DL — HIGH (ref 70–99)
GLUCOSE BLDC GLUCOMTR-MCNC: 107 MG/DL — HIGH (ref 70–99)
GLUCOSE BLDC GLUCOMTR-MCNC: 112 MG/DL — HIGH (ref 70–99)
GLUCOSE BLDC GLUCOMTR-MCNC: 112 MG/DL — HIGH (ref 70–99)
GLUCOSE BLDC GLUCOMTR-MCNC: 113 MG/DL — HIGH (ref 70–99)
GLUCOSE BLDC GLUCOMTR-MCNC: 113 MG/DL — HIGH (ref 70–99)
GLUCOSE BLDC GLUCOMTR-MCNC: 118 MG/DL — HIGH (ref 70–99)
GLUCOSE BLDC GLUCOMTR-MCNC: 118 MG/DL — HIGH (ref 70–99)
GLUCOSE BLDC GLUCOMTR-MCNC: 119 MG/DL — HIGH (ref 70–99)
GLUCOSE BLDC GLUCOMTR-MCNC: 119 MG/DL — HIGH (ref 70–99)
GLUCOSE BLDC GLUCOMTR-MCNC: 121 MG/DL — HIGH (ref 70–99)
GLUCOSE BLDC GLUCOMTR-MCNC: 121 MG/DL — HIGH (ref 70–99)
GLUCOSE BLDC GLUCOMTR-MCNC: 132 MG/DL — HIGH (ref 70–99)
GLUCOSE BLDC GLUCOMTR-MCNC: 132 MG/DL — HIGH (ref 70–99)
GLUCOSE BLDC GLUCOMTR-MCNC: 134 MG/DL — HIGH (ref 70–99)
GLUCOSE BLDC GLUCOMTR-MCNC: 134 MG/DL — HIGH (ref 70–99)
GLUCOSE BLDC GLUCOMTR-MCNC: 144 MG/DL — HIGH (ref 70–99)
GLUCOSE BLDC GLUCOMTR-MCNC: 144 MG/DL — HIGH (ref 70–99)
GLUCOSE BLDC GLUCOMTR-MCNC: 153 MG/DL — HIGH (ref 70–99)
GLUCOSE BLDC GLUCOMTR-MCNC: 153 MG/DL — HIGH (ref 70–99)
GLUCOSE BLDC GLUCOMTR-MCNC: 156 MG/DL — HIGH (ref 70–99)
GLUCOSE BLDC GLUCOMTR-MCNC: 156 MG/DL — HIGH (ref 70–99)
GLUCOSE BLDC GLUCOMTR-MCNC: 97 MG/DL — SIGNIFICANT CHANGE UP (ref 70–99)
GLUCOSE BLDC GLUCOMTR-MCNC: 97 MG/DL — SIGNIFICANT CHANGE UP (ref 70–99)
GLUCOSE SERPL-MCNC: 130 MG/DL — HIGH (ref 70–99)
GLUCOSE SERPL-MCNC: 130 MG/DL — HIGH (ref 70–99)
HCT VFR BLD CALC: 32.7 % — LOW (ref 39–50)
HCT VFR BLD CALC: 32.7 % — LOW (ref 39–50)
HGB BLD-MCNC: 9.7 G/DL — LOW (ref 13–17)
HGB BLD-MCNC: 9.7 G/DL — LOW (ref 13–17)
IMM GRANULOCYTES NFR BLD AUTO: 2.9 % — HIGH (ref 0–0.9)
IMM GRANULOCYTES NFR BLD AUTO: 2.9 % — HIGH (ref 0–0.9)
LYMPHOCYTES # BLD AUTO: 0.97 K/UL — LOW (ref 1–3.3)
LYMPHOCYTES # BLD AUTO: 0.97 K/UL — LOW (ref 1–3.3)
LYMPHOCYTES # BLD AUTO: 6.5 % — LOW (ref 13–44)
LYMPHOCYTES # BLD AUTO: 6.5 % — LOW (ref 13–44)
MAGNESIUM SERPL-MCNC: 3.1 MG/DL — HIGH (ref 1.6–2.6)
MAGNESIUM SERPL-MCNC: 3.1 MG/DL — HIGH (ref 1.6–2.6)
MCHC RBC-ENTMCNC: 26.5 PG — LOW (ref 27–34)
MCHC RBC-ENTMCNC: 26.5 PG — LOW (ref 27–34)
MCHC RBC-ENTMCNC: 29.7 GM/DL — LOW (ref 32–36)
MCHC RBC-ENTMCNC: 29.7 GM/DL — LOW (ref 32–36)
MCV RBC AUTO: 89.3 FL — SIGNIFICANT CHANGE UP (ref 80–100)
MCV RBC AUTO: 89.3 FL — SIGNIFICANT CHANGE UP (ref 80–100)
MONOCYTES # BLD AUTO: 0.98 K/UL — HIGH (ref 0–0.9)
MONOCYTES # BLD AUTO: 0.98 K/UL — HIGH (ref 0–0.9)
MONOCYTES NFR BLD AUTO: 6.6 % — SIGNIFICANT CHANGE UP (ref 2–14)
MONOCYTES NFR BLD AUTO: 6.6 % — SIGNIFICANT CHANGE UP (ref 2–14)
NEUTROPHILS # BLD AUTO: 12.41 K/UL — HIGH (ref 1.8–7.4)
NEUTROPHILS # BLD AUTO: 12.41 K/UL — HIGH (ref 1.8–7.4)
NEUTROPHILS NFR BLD AUTO: 83.4 % — HIGH (ref 43–77)
NEUTROPHILS NFR BLD AUTO: 83.4 % — HIGH (ref 43–77)
PLATELET # BLD AUTO: 307 K/UL — SIGNIFICANT CHANGE UP (ref 150–400)
PLATELET # BLD AUTO: 307 K/UL — SIGNIFICANT CHANGE UP (ref 150–400)
POTASSIUM SERPL-MCNC: 3.8 MMOL/L — SIGNIFICANT CHANGE UP (ref 3.5–5.3)
POTASSIUM SERPL-MCNC: 3.8 MMOL/L — SIGNIFICANT CHANGE UP (ref 3.5–5.3)
POTASSIUM SERPL-SCNC: 3.8 MMOL/L — SIGNIFICANT CHANGE UP (ref 3.5–5.3)
POTASSIUM SERPL-SCNC: 3.8 MMOL/L — SIGNIFICANT CHANGE UP (ref 3.5–5.3)
PROT SERPL-MCNC: 5.6 G/DL — LOW (ref 6.6–8.7)
PROT SERPL-MCNC: 5.6 G/DL — LOW (ref 6.6–8.7)
RBC # BLD: 3.66 M/UL — LOW (ref 4.2–5.8)
RBC # BLD: 3.66 M/UL — LOW (ref 4.2–5.8)
RBC # FLD: 15.9 % — HIGH (ref 10.3–14.5)
RBC # FLD: 15.9 % — HIGH (ref 10.3–14.5)
SODIUM SERPL-SCNC: 143 MMOL/L — SIGNIFICANT CHANGE UP (ref 135–145)
SODIUM SERPL-SCNC: 143 MMOL/L — SIGNIFICANT CHANGE UP (ref 135–145)
SPECIMEN SOURCE: SIGNIFICANT CHANGE UP
WBC # BLD: 14.87 K/UL — HIGH (ref 3.8–10.5)
WBC # BLD: 14.87 K/UL — HIGH (ref 3.8–10.5)
WBC # FLD AUTO: 14.87 K/UL — HIGH (ref 3.8–10.5)
WBC # FLD AUTO: 14.87 K/UL — HIGH (ref 3.8–10.5)

## 2023-11-29 PROCEDURE — 99024 POSTOP FOLLOW-UP VISIT: CPT

## 2023-11-29 PROCEDURE — 99291 CRITICAL CARE FIRST HOUR: CPT | Mod: 24

## 2023-11-29 PROCEDURE — 99232 SBSQ HOSP IP/OBS MODERATE 35: CPT

## 2023-11-29 PROCEDURE — 71045 X-RAY EXAM CHEST 1 VIEW: CPT | Mod: 26

## 2023-11-29 PROCEDURE — 99223 1ST HOSP IP/OBS HIGH 75: CPT

## 2023-11-29 PROCEDURE — 99233 SBSQ HOSP IP/OBS HIGH 50: CPT

## 2023-11-29 PROCEDURE — 93010 ELECTROCARDIOGRAM REPORT: CPT

## 2023-11-29 RX ORDER — HEPARIN SODIUM 5000 [USP'U]/ML
10000 INJECTION INTRAVENOUS; SUBCUTANEOUS EVERY 6 HOURS
Refills: 0 | Status: DISCONTINUED | OUTPATIENT
Start: 2023-11-29 | End: 2023-12-01

## 2023-11-29 RX ORDER — INSULIN LISPRO 100/ML
4 VIAL (ML) SUBCUTANEOUS
Refills: 0 | Status: DISCONTINUED | OUTPATIENT
Start: 2023-11-29 | End: 2023-12-03

## 2023-11-29 RX ORDER — HEPARIN SODIUM 5000 [USP'U]/ML
5000 INJECTION INTRAVENOUS; SUBCUTANEOUS EVERY 6 HOURS
Refills: 0 | Status: DISCONTINUED | OUTPATIENT
Start: 2023-11-29 | End: 2023-12-01

## 2023-11-29 RX ORDER — INSULIN GLARGINE 100 [IU]/ML
16 INJECTION, SOLUTION SUBCUTANEOUS AT BEDTIME
Refills: 0 | Status: DISCONTINUED | OUTPATIENT
Start: 2023-11-29 | End: 2023-12-03

## 2023-11-29 RX ORDER — HEPARIN SODIUM 5000 [USP'U]/ML
INJECTION INTRAVENOUS; SUBCUTANEOUS
Qty: 25000 | Refills: 0 | Status: DISCONTINUED | OUTPATIENT
Start: 2023-11-29 | End: 2023-11-30

## 2023-11-29 RX ORDER — DIGOXIN 250 MCG
125 TABLET ORAL DAILY
Refills: 0 | Status: DISCONTINUED | OUTPATIENT
Start: 2023-11-29 | End: 2023-11-30

## 2023-11-29 RX ORDER — INSULIN LISPRO 100/ML
VIAL (ML) SUBCUTANEOUS
Refills: 0 | Status: DISCONTINUED | OUTPATIENT
Start: 2023-11-30 | End: 2023-12-06

## 2023-11-29 RX ORDER — METOPROLOL TARTRATE 50 MG
2.5 TABLET ORAL ONCE
Refills: 0 | Status: COMPLETED | OUTPATIENT
Start: 2023-11-29 | End: 2023-11-29

## 2023-11-29 RX ORDER — POTASSIUM CHLORIDE 20 MEQ
10 PACKET (EA) ORAL
Refills: 0 | Status: COMPLETED | OUTPATIENT
Start: 2023-11-29 | End: 2023-11-29

## 2023-11-29 RX ADMIN — PANTOPRAZOLE SODIUM 40 MILLIGRAM(S): 20 TABLET, DELAYED RELEASE ORAL at 11:43

## 2023-11-29 RX ADMIN — OXYCODONE HYDROCHLORIDE 10 MILLIGRAM(S): 5 TABLET ORAL at 06:03

## 2023-11-29 RX ADMIN — POLYETHYLENE GLYCOL 3350 17 GRAM(S): 17 POWDER, FOR SOLUTION ORAL at 05:06

## 2023-11-29 RX ADMIN — HEPARIN SODIUM 2300 UNIT(S)/HR: 5000 INJECTION INTRAVENOUS; SUBCUTANEOUS at 23:20

## 2023-11-29 RX ADMIN — Medication 100 MILLIEQUIVALENT(S): at 05:46

## 2023-11-29 RX ADMIN — INSULIN GLARGINE 16 UNIT(S): 100 INJECTION, SOLUTION SUBCUTANEOUS at 21:26

## 2023-11-29 RX ADMIN — SENNA PLUS 2 TABLET(S): 8.6 TABLET ORAL at 21:03

## 2023-11-29 RX ADMIN — OXYCODONE HYDROCHLORIDE 10 MILLIGRAM(S): 5 TABLET ORAL at 05:04

## 2023-11-29 RX ADMIN — PIPERACILLIN AND TAZOBACTAM 25 GRAM(S): 4; .5 INJECTION, POWDER, LYOPHILIZED, FOR SOLUTION INTRAVENOUS at 13:33

## 2023-11-29 RX ADMIN — Medication 81 MILLIGRAM(S): at 11:43

## 2023-11-29 RX ADMIN — PIPERACILLIN AND TAZOBACTAM 25 GRAM(S): 4; .5 INJECTION, POWDER, LYOPHILIZED, FOR SOLUTION INTRAVENOUS at 21:02

## 2023-11-29 RX ADMIN — Medication 125 MICROGRAM(S): at 05:04

## 2023-11-29 RX ADMIN — ATORVASTATIN CALCIUM 20 MILLIGRAM(S): 80 TABLET, FILM COATED ORAL at 21:02

## 2023-11-29 RX ADMIN — HEPARIN SODIUM 2300 UNIT(S)/HR: 5000 INJECTION INTRAVENOUS; SUBCUTANEOUS at 16:00

## 2023-11-29 RX ADMIN — Medication 2.5 MILLIGRAM(S): at 05:12

## 2023-11-29 RX ADMIN — OXYCODONE HYDROCHLORIDE 10 MILLIGRAM(S): 5 TABLET ORAL at 00:55

## 2023-11-29 RX ADMIN — HEPARIN SODIUM 2300 UNIT(S)/HR: 5000 INJECTION INTRAVENOUS; SUBCUTANEOUS at 19:41

## 2023-11-29 RX ADMIN — Medication 100 MILLIEQUIVALENT(S): at 05:11

## 2023-11-29 RX ADMIN — PIPERACILLIN AND TAZOBACTAM 25 GRAM(S): 4; .5 INJECTION, POWDER, LYOPHILIZED, FOR SOLUTION INTRAVENOUS at 05:07

## 2023-11-29 RX ADMIN — HEPARIN SODIUM 5000 UNIT(S): 5000 INJECTION INTRAVENOUS; SUBCUTANEOUS at 05:05

## 2023-11-29 RX ADMIN — CHLORHEXIDINE GLUCONATE 1 APPLICATION(S): 213 SOLUTION TOPICAL at 05:46

## 2023-11-29 NOTE — PROGRESS NOTE ADULT - SUBJECTIVE AND OBJECTIVE BOX
Patient seen and examined.  Denies CP, SOB, N/V.  Extubated overnight. No acute issues overnight    T(C): 37.4 (11-29-23 @ 00:00)  T(F): 99.3 (11-29-23 @ 00:00)  HR: 125 (11-29-23 @ 01:00)  BP: 108/55 (11-29-23 @ 01:00)  BP(mean): 74 (11-29-23 @ 01:00)  ABP: 101/55 (11-29-23 @ 01:00)  ABP(mean): 68 (11-29-23 @ 01:00)  RR: 12 (11-29-23 @ 01:00)  SpO2: 94% (11-29-23 @ 01:00)    Mode: AC/ CMV (Assist Control/ Continuous Mandatory Ventilation), RR (machine): 18, TV (machine): 550, FiO2: 30, PEEP: 6, PS: 5, MAP: 10, PIP: 14    Physical Exam:  Gen: A&Ox3  Pulm:  Decreased left side  CV:  S1S2, tachycardic irregular  Abd: obese+BS, soft, NT, ND  Ext:  +DP b/l, no c/c/e  Incision:  c/d/i  no exudate    I&O's Detail    27 Nov 2023 07:01  -  28 Nov 2023 07:00  --------------------------------------------------------  IN:    Albumin 5%  - 250 mL: 250 mL    IV PiggyBack: 50 mL    IV PiggyBack: 100 mL    IV PiggyBack: 225 mL    IV PiggyBack: 125 mL    Lactated Ringers: 300 mL    Norepinephrine: 235.6 mL    Propofol: 251.9 mL  Total IN: 1537.5 mL    OUT:    Chest Tube (mL): 420 mL    Chest Tube (mL): 65 mL    Indwelling Catheter - Urethral (mL): 710 mL    Nasogastric/Oral tube (mL): 0 mL  Total OUT: 1195 mL    Total NET: 342.5 mL      28 Nov 2023 07:01  -  29 Nov 2023 01:14  --------------------------------------------------------  IN:    Albumin 5%  - 250 mL: 500 mL    Dexmedetomidine: 152.2 mL    Insulin: 52.5 mL    IV PiggyBack: 100 mL    IV PiggyBack: 225 mL    Lactated Ringers: 1350 mL    Norepinephrine: 33.6 mL    Oral Fluid: 300 mL    Propofol: 106.8 mL  Total IN: 2820.1 mL    OUT:    Chest Tube (mL): 120 mL    Chest Tube (mL): 55 mL    Indwelling Catheter - Urethral (mL): 1340 mL    Nasogastric/Oral tube (mL): 100 mL  Total OUT: 1615 mL    Total NET: 1205.1 mL                              10.3   14.54 )-----------( 315      ( 28 Nov 2023 20:10 )             33.5   11-28    143  |  104  |  90.8<H>  ----------------------------<  134<H>  4.4   |  24.0  |  2.10<H>    Ca    8.6      28 Nov 2023 20:10  Mg     3.5     11-28    TPro  6.4<L>  /  Alb  2.3<L>  /  TBili  1.6  /  DBili  x   /  AST  35  /  ALT  39  /  AlkPhos  152<H>  11-28    ABG - ( 28 Nov 2023 20:03 )  pH: 7.490 /  pCO2: 35    /  pO2: 110   / HCO3: 27    / Base Excess: 3.4   /  SaO2: 99.9 /  Lactate: x        CAPILLARY BLOOD GLUCOSE      POCT Blood Glucose.: 156 mg/dL (29 Nov 2023 01:06)        Medications:  acetaminophen     Tablet .. 975 milliGRAM(s) Oral every 6 hours  aspirin enteric coated 81 milliGRAM(s) Oral daily  atorvastatin 20 milliGRAM(s) Oral at bedtime  chlorhexidine 2% Cloths 1 Application(s) Topical <User Schedule>  dextrose 50% Injectable 50 milliLiter(s) IV Push every 15 minutes  digoxin     Tablet 125 MICROGram(s) Oral daily  heparin   Injectable 5000 Unit(s) SubCutaneous every 8 hours  insulin regular Infusion 2 Unit(s)/Hr IV Continuous <Continuous>  lactated ringers. 1000 milliLiter(s) IV Continuous <Continuous>  ondansetron Injectable 4 milliGRAM(s) IV Push every 8 hours PRN  oxyCODONE    IR 10 milliGRAM(s) Oral every 4 hours PRN  oxyCODONE    IR 5 milliGRAM(s) Oral every 4 hours PRN  pantoprazole  Injectable 40 milliGRAM(s) IV Push daily  piperacillin/tazobactam IVPB.. 3.375 Gram(s) IV Intermittent every 8 hours  polyethylene glycol 3350 17 Gram(s) Oral two times a day  senna 2 Tablet(s) Oral at bedtime  sodium chloride 0.9% lock flush 10 milliLiter(s) IV Push every 1 hour PRN

## 2023-11-29 NOTE — PROGRESS NOTE ADULT - ASSESSMENT
73 yo M w/ PMH of CAD s/p CABG, HFrEF, HTN, HLD presenting for chief complaint of SOB. Admitted for acute hypoxemic respiratory failure 2/2 CHF/Pleural effusion.    DEVON suspect on CKD likely stage III  Likely component of CRS   No gross hydro on renal sono  UA bland   Diuretics held due to hypotension -   No need fro RRT   S/P  VATS / decortication Mon 11/27   Abx as per ID     AM labs will follow

## 2023-11-29 NOTE — PROGRESS NOTE ADULT - SUBJECTIVE AND OBJECTIVE BOX
NEPHROLOGY INTERVAL HPI/OVERNIGHT EVENTS:    Examined earlier  Extubated o/n    MEDICATIONS  (STANDING):  acetaminophen     Tablet .. 975 milliGRAM(s) Oral every 6 hours  aspirin enteric coated 81 milliGRAM(s) Oral daily  atorvastatin 20 milliGRAM(s) Oral at bedtime  chlorhexidine 2% Cloths 1 Application(s) Topical <User Schedule>  dextrose 50% Injectable 50 milliLiter(s) IV Push every 15 minutes  digoxin     Tablet 125 MICROGram(s) Oral daily  heparin  Infusion.  Unit(s)/Hr (23 mL/Hr) IV Continuous <Continuous>  insulin regular Infusion 2 Unit(s)/Hr (2 mL/Hr) IV Continuous <Continuous>  pantoprazole  Injectable 40 milliGRAM(s) IV Push daily  piperacillin/tazobactam IVPB.. 3.375 Gram(s) IV Intermittent every 8 hours  polyethylene glycol 3350 17 Gram(s) Oral two times a day  senna 2 Tablet(s) Oral at bedtime    MEDICATIONS  (PRN):  heparin   Injectable 72240 Unit(s) IV Push every 6 hours PRN For aPTT less than 40  heparin   Injectable 5000 Unit(s) IV Push every 6 hours PRN For aPTT between 40 - 57  ondansetron Injectable 4 milliGRAM(s) IV Push every 8 hours PRN Nausea and/or Vomiting  oxyCODONE    IR 5 milliGRAM(s) Oral every 4 hours PRN Moderate Pain (4 - 6)  oxyCODONE    IR 10 milliGRAM(s) Oral every 4 hours PRN Severe Pain (7 - 10)  sodium chloride 0.9% lock flush 10 milliLiter(s) IV Push every 1 hour PRN Pre/post blood products, medications, blood draw, and to maintain line patency      Allergies    No Known Allergies    Intolerances        Vital Signs Last 24 Hrs  T(C): 36.4 (29 Nov 2023 11:15), Max: 37.7 (28 Nov 2023 16:05)  T(F): 97.6 (29 Nov 2023 11:15), Max: 99.9 (28 Nov 2023 16:05)  HR: 128 (29 Nov 2023 13:00) (63 - 130)  BP: 108/75 (29 Nov 2023 04:00) (100/57 - 147/65)  BP(mean): 89 (29 Nov 2023 04:00) (73 - 93)  RR: 33 (29 Nov 2023 13:00) (12 - 33)  SpO2: 97% (29 Nov 2023 13:00) (94% - 99%)    Parameters below as of 29 Nov 2023 08:00  Patient On (Oxygen Delivery Method): room air      PHYSICAL EXAM:    GENERAL: Extubated awake  HEENT - NCAT  NECK: Supple, No JVD,   CHEST/LUNG: EAE , decreased BS L base , + Drain   CVS - Irregular , no rub  Abd obeses , nontender l sounds present  EXTREMITIES:  Edema better     LABS:                        9.7    14.87 )-----------( 307      ( 29 Nov 2023 03:00 )             32.7     11-29    143  |  107  |  89.9<H>  ----------------------------<  130<H>  3.8   |  24.0  |  2.17<H>    Ca    8.2<L>      29 Nov 2023 03:00  Mg     3.1     11-29    TPro  5.6<L>  /  Alb  2.2<L>  /  TBili  1.1  /  DBili  x   /  AST  30  /  ALT  26  /  AlkPhos  98  11-29    PTT - ( 29 Nov 2023 13:30 )  PTT:28.9 sec  Urinalysis Basic - ( 29 Nov 2023 03:00 )    Color: x / Appearance: x / SG: x / pH: x  Gluc: 130 mg/dL / Ketone: x  / Bili: x / Urobili: x   Blood: x / Protein: x / Nitrite: x   Leuk Esterase: x / RBC: x / WBC x   Sq Epi: x / Non Sq Epi: x / Bacteria: x      Magnesium: 3.1 mg/dL (11-29 @ 03:00)  Magnesium: 3.5 mg/dL (11-28 @ 20:10)    ABG - ( 29 Nov 2023 02:00 )  pH, Arterial: 7.470 pH, Blood: x     /  pCO2: 39    /  pO2: 151   / HCO3: 28    / Base Excess: 4.7   /  SaO2: 99.8                  RADIOLOGY & ADDITIONAL TESTS:

## 2023-11-29 NOTE — CONSULT NOTE ADULT - ASSESSMENT
-FS okay, can leave off standing insulin  -warned might need standing insulin once eating  -continue with standing insulin 72M w/ PMH of T2DM, CAD s/p CABG, CHF, HTN, HLD, Afib (not on AC due to hx of GIB) presents with a few days worsening of dyspnea and LE edema. found to have large loculated pleural effusion. 11/28 s/p L lung decortication.  Consult for diabetes mgt. a1c 7.3    T2DM- FS okay but not eating much  -A1c7.3  -check sugars AC and bedtime  -ensure diabetic diet  -FS okay, can leave off standing insulin  -warned might need standing insulin once eating  -on heparin with dextrose  -continue with insulin sliding scale    Loculated pleural effusion- s/p decortication, as per primary team    HLD- continue statin

## 2023-11-29 NOTE — PROGRESS NOTE ADULT - ASSESSMENT
Assessment/Recommendations:   73 year old male patient with HTN, hyperlipidemia, obesity BMI 41.3, CAD s/p CABG x4 (2020 [facility] at [facility]), HFrEF, EF 20-25%, remote AF (transient in the postop setting of CABG with no reoccurrence- not on AC). He presented to [facility] ED on 11/18 with several day history of dyspnea and lower extremity swelling. Admitted with acute respiratory failure due to large pleural effusion, acute HFrEF exacerbation with reduced RV function, and atrial flutter (likely typical) with RVR.   CT chest showed large loculated pleural effusion and he had chest tube placement on 11/18. He has worsening left chest opacification with loculation despite PTC. Now s/p VATS on 11/27 with 2 left sided chest tubes draining. He remains in atrial flutter with difficult to control rates.     # AFlutter with RVR   - HR ~ 130 bpm. Rates will be difficult to control while in atrial flutter.  - Resume Metoprolol 75mg q6h  - Discontinue digoxin.   - CHADSVASc = 4 (age, CHF, CAD, HTN). Resume heparin gtt when cleared by thoracic surgery.   - Continue telemetry monitoring.   - Monitor electrolytes. Keep K > 4, Mg > 2   - EP plan contingent upon ischemic workup. Likely QUIN/DCCV prior to d/c when on therapeutic anticoagulation.     # HFrEF, EF 20-25%   - Daily weights.   - Strict I/Os   - GDMT and diuresis as per general cardiology   - LHC/ischemic workup when medically optimized     #Loculated pleural effusion, likely Empyema  - s/p PTC 11/18   - s/p VATS on 11/27   - ID and thoracic following   - abx as per ID     Discussed with Dr. Bolivar

## 2023-11-29 NOTE — PROGRESS NOTE ADULT - SUBJECTIVE AND OBJECTIVE BOX
GRETA AGUAYO  [id number]    HPI:  Patient is a 73 yo M w/ PMH of CAD s/p CABG, CHF, HTN, HLD, Afib (not on AC due to hx of GIB) presenting for chief complaint of SOB. Patient states he started experiencing SOB and LE swelling for the past few days He states he was seen by his Cardiologist on, Dr. Milian, on Tuesday and his Metoprolol was increased from 50mg to 100mg. He states his symptoms worsened yesterday therefore he came to the ED today. He states he has been unable to walk around without getting short of breath. He reports compliance with medications and diet. Patient denies fever, chills, chest pain, palpitations, cough, wheezing, abdominal pain, nausea, vomiting, diarrhea, constipation, bloody bowel movements, melena, hematemesis, urinary complaints, syncope, calf tenderness, paresthesias.  (18 Nov 2023 14:38)      Surgery/Hospital Course:  ·  PRE-OP DIAGNOSIS:  Empyema of left pleural space  ·  POST-OP DIAGNOSIS:  Empyema of left pleural space   ·  PROCEDURES:  Decortication, lung, total, using VATS 27-Nov-2023   Flexible bronchoscopy   Today:  No acute events -    ICU Vital Signs Last 24 Hrs  T(C): 36.4 (29 Nov 2023 11:15), Max: 37.7 (28 Nov 2023 16:05)  T(F): 97.6 (29 Nov 2023 11:15), Max: 99.9 (28 Nov 2023 16:05)  HR: 128 (29 Nov 2023 11:00) (63 - 130)  BP: 108/75 (29 Nov 2023 04:00) (100/57 - 147/65)  BP(mean): 89 (29 Nov 2023 04:00) (73 - 93)  ABP: 112/63 (29 Nov 2023 11:00) (93/51 - 156/81)  ABP(mean): 77 (29 Nov 2023 11:00) (61 - 102)  RR: 22 (29 Nov 2023 11:00) (12 - 33)  SpO2: 98% (29 Nov 2023 11:00) (94% - 99%)    O2 Parameters below as of 29 Nov 2023 08:00  Patient On (Oxygen Delivery Method): room air            Physical Exam:  Gen: A&O   CNS: non focal 	  Neck: no JVD  RES : clear , no wheezing              CVS: Regular  rhythm. Normal S1/S2  Abd: Soft, non-distended. Bowel sounds present.  Skin: No rash.  Ext:  no edema    ============================I/O===========================   I&O's Detail    28 Nov 2023 07:01  -  29 Nov 2023 07:00  --------------------------------------------------------  IN:    Albumin 5%  - 250 mL: 500 mL    Dexmedetomidine: 152.2 mL    Insulin: 67.5 mL    IV PiggyBack: 100 mL    IV PiggyBack: 200 mL    IV PiggyBack: 300 mL    Lactated Ringers: 1800 mL    Norepinephrine: 33.6 mL    Oral Fluid: 540 mL    Propofol: 106.8 mL  Total IN: 3800.1 mL    OUT:    Chest Tube (mL): 85 mL    Chest Tube (mL): 170 mL    Indwelling Catheter - Urethral (mL): 1880 mL    Nasogastric/Oral tube (mL): 100 mL  Total OUT: 2235 mL    Total NET: 1565.1 mL      29 Nov 2023 07:01  -  29 Nov 2023 12:13  --------------------------------------------------------  IN:    Insulin: 4 mL    IV PiggyBack: 25 mL    Lactated Ringers: 150 mL    Oral Fluid: 240 mL  Total IN: 419 mL    OUT:    Chest Tube (mL): 50 mL    Chest Tube (mL): 65 mL    Indwelling Catheter - Urethral (mL): 365 mL  Total OUT: 480 mL    Total NET: -61 mL        ============================ LABS =========================                        9.7    14.87 )-----------( 307      ( 29 Nov 2023 03:00 )             32.7     11-29    143  |  107  |  89.9<H>  ----------------------------<  130<H>  3.8   |  24.0  |  2.17<H>    Ca    8.2<L>      29 Nov 2023 03:00  Mg     3.1     11-29    TPro  5.6<L>  /  Alb  2.2<L>  /  TBili  1.1  /  DBili  x   /  AST  30  /  ALT  26  /  AlkPhos  98  11-29    LIVER FUNCTIONS - ( 29 Nov 2023 03:00 )  Alb: 2.2 g/dL / Pro: 5.6 g/dL / ALK PHOS: 98 U/L / ALT: 26 U/L / AST: 30 U/L / GGT: x             ABG - ( 29 Nov 2023 02:00 )  pH, Arterial: 7.470 pH, Blood: x     /  pCO2: 39    /  pO2: 151   / HCO3: 28    / Base Excess: 4.7   /  SaO2: 99.8              Urinalysis Basic - ( 29 Nov 2023 03:00 )    Color: x / Appearance: x / SG: x / pH: x  Gluc: 130 mg/dL / Ketone: x  / Bili: x / Urobili: x   Blood: x / Protein: x / Nitrite: x   Leuk Esterase: x / RBC: x / WBC x   Sq Epi: x / Non Sq Epi: x / Bacteria: x      ======================Micro/Rad/Cardio=================  Culture: Reviewed   CXR: Reviewed  Echo:Reviewed  ======================================================  PAST MEDICAL & SURGICAL HISTORY:  Coronary artery disease involving native coronary artery of native heart, unspecified whether angina      CHF with unknown LVEF      Primary hypertension      Hyperlipidemia, unspecified hyperlipidemia type      S/P CABG x 5      History of cholecystectomy        ====================ASSESSMENT ==============  72-year-old male with history of hypertension CAD status post quintuple bypass November 2021 followed by cardiology/Dr. Milian in [place]  resents the ED via EMS complaining of worsening dyspnea on exertion since yesterday.  Thoracic surgery consulted treatment Left effusion. Left PTC placed 11/28. Patient now pending FB left robotic assisted decort     --- Pleural effusion.   --- Left PTC placed 11/18  ---Acute respiratory failure with hypoxia.   ---Atrial flutter.   ---Coronary artery disease involving native coronary artery of native heart, unspecified whether angina.   --- Acute kidney injury.   ---Post op Hypovolemia  ---Post op respiratory insufficiency       Plan:  -Maintain CT  to H2O seal  -DC  zosyn ,change to ceftriaxone 2g IV daily  -  -Continue levophed for BP support  -EP will plan for cardioversion tomorrow  - Continue lipitor  -ASA when appropriate.  -Renal following  -Monitor vanco/dig level.--  ====================== NEUROLOGY=====================  acetaminophen     Tablet .. 975 milliGRAM(s) Oral every 6 hours  ondansetron Injectable 4 milliGRAM(s) IV Push every 8 hours PRN Nausea and/or Vomiting  oxyCODONE    IR 5 milliGRAM(s) Oral every 4 hours PRN Moderate Pain (4 - 6)  oxyCODONE    IR 10 milliGRAM(s) Oral every 4 hours PRN Severe Pain (7 - 10)    ==================== RESPIRATORY======================  Post op respiratory insufficiency  ====================CARDIOVASCULAR==================  Post op Hypovolemia  digoxin     Tablet 125 MICROGram(s) Oral daily    ===================HEMATOLOGIC/ONC ===================  Monitor H&H/Plts    aspirin enteric coated 81 milliGRAM(s) Oral daily  heparin   Injectable 5000 Unit(s) SubCutaneous every 8 hours    ===================== RENAL =========================  Continue monitoring urine output, I&OS, BUN/Cr     ==================== GASTROINTESTINAL===================  pantoprazole  Injectable 40 milliGRAM(s) IV Push daily  polyethylene glycol 3350 17 Gram(s) Oral two times a day  senna 2 Tablet(s) Oral at bedtime  sodium chloride 0.9% lock flush 10 milliLiter(s) IV Push every 1 hour PRN Pre/post blood products, medications, blood draw, and to maintain line patency    =======================    ENDOCRINE  =====================  atorvastatin 20 milliGRAM(s) Oral at bedtime  dextrose 50% Injectable 50 milliLiter(s) IV Push every 15 minutes  insulin regular Infusion 2 Unit(s)/Hr (2 mL/Hr) IV Continuous <Continuous>    ========================INFECTIOUS DISEASE================  piperacillin/tazobactam IVPB.. 3.375 Gram(s) IV Intermittent every 8 hours      -Monitor Neurologic status ,   -Head of the bed should remain elevated to 45 degrees,  -Monitor for arrhythmias and monitor parameters for organ perfusion,  -Glycemic control is satisfactory,  -Nutritional goals will be met using po eventually , insure adequate caloric intake and monitor the same ,  -Electrolytes have been repleted as necessary , pain control has been achieved  and wound care has been carried out ,  -Stress ulcer and VTE prophylaxis will be achieved,  -Agressive PT and early mobility and ambulation goals will be met,    I have spent 35 minutes providing acute care for this critically ill patient     Patient requires continuous monitoring with bedside rhythm monitoring, pulse ox monitoring, and intermittent blood gas analysis. Care plan discussed with ICU care team. Patient remained critical and at risk for life threatening decompensation.

## 2023-11-29 NOTE — PROGRESS NOTE ADULT - NS ATTEND AMEND GEN_ALL_CORE FT
.  .  Still with AFL with RVR. Rates will be very difficult to control therefore must restore normal rhythm. He will need LHC to rule out ischemic disease and QUIN/CV once anticoagulation can be restarted.    Gaurav Bolivar MD  Clinical Cardiac Electrophysiology

## 2023-11-29 NOTE — PROGRESS NOTE ADULT - SUBJECTIVE AND OBJECTIVE BOX
[facility]  INFECTIOUS DISEASES at [place] and [place]  ===============================================================                  Luis Ivey MD               Diplomates American Board of Internal Medicine & Infectious Diseases                * [place] Office - Appt - Tel  [phone] Fax [phone]                * [place] Office - Appt - Tel [phone] Fax [phone]                                  Hospital Consult line:  [phone]  ==============================================================    GRETA AGUAYO [id number]    Follow up:    Extubated overnight - stable on RA   Continues with AfL with RVR  Off pressors     I have personally reviewed the labs and data; pertinent labs and data are listed in this note; please see below.     _______________________________________________________________  REVIEW OF SYSTEMS  Feeling OK. Denies pain. No nausea, vomiting, diarrhea. Has pain in his buttocks from sores   ________________________________________________________________  Allergies:  No Known Allergies    ________________________________________________________________  PHYSICAL EXAM  GEN: in NAD, obese, sitting in chair.   HEENT: Anicteric sclerae. Moist mucous membranes. No mucosal lesions.   NECK: Supple.   LUNGS: unlabored breathing. Decreased BS in Left lung. CT x 2 with SS output   HEART: tachycardic, regular  ABDOMEN: Soft, NT, ND,.  +BS.    EXTREMITIES:   edema.  NEUROLOGIC: Grossly no focal deficits   PSYCHIATRIC: Appropriate affect and mood  SKIN: papular rash in back   LINES: RIJ CVC, PIV   ________________________________________________________________  Vitals:  T(F): 97.6 (29 Nov 2023 04:00), Max: 99.9 (28 Nov 2023 16:05)  HR: 129 (29 Nov 2023 10:00)  BP: 108/75 (29 Nov 2023 04:00)  RR: 18 (29 Nov 2023 10:00)  SpO2: 96% (29 Nov 2023 10:00) (94% - 99%)  temp max in last 48H T(F): , Max: 99.9 (11-28-23 @ 16:05)    Current Antibiotics:  piperacillin/tazobactam IVPB.. 3.375 Gram(s) IV Intermittent every 8 hours    Other medications:  acetaminophen     Tablet .. 975 milliGRAM(s) Oral every 6 hours  aspirin enteric coated 81 milliGRAM(s) Oral daily  atorvastatin 20 milliGRAM(s) Oral at bedtime  chlorhexidine 2% Cloths 1 Application(s) Topical <User Schedule>  dextrose 50% Injectable 50 milliLiter(s) IV Push every 15 minutes  digoxin     Tablet 125 MICROGram(s) Oral daily  heparin   Injectable 5000 Unit(s) SubCutaneous every 8 hours  insulin regular Infusion 2 Unit(s)/Hr IV Continuous <Continuous>  pantoprazole  Injectable 40 milliGRAM(s) IV Push daily  polyethylene glycol 3350 17 Gram(s) Oral two times a day  senna 2 Tablet(s) Oral at bedtime                            9.7    14.87 )-----------( 307      ( 29 Nov 2023 03:00 )             32.7     11-29    143  |  107  |  89.9<H>  ----------------------------<  130<H>  3.8   |  24.0  |  2.17<H>    Ca    8.2<L>      29 Nov 2023 03:00  Mg     3.1     11-29    TPro  5.6<L>  /  Alb  2.2<L>  /  TBili  1.1  /  DBili  x   /  AST  30  /  ALT  26  /  AlkPhos  98  11-29    RECENT CULTURES:  11-27 @ 18:56 .Body Fluid left pleural fluid     No growth to date.    Few polymorphonuclear leukocytes seen per low power field  Moderate Gram positive cocci in pairs seen per oil power field      11-27 @ 18:30 .Tissue left pleural peel     No growth    Few polymorphonuclear leukocytes seen per low power field  No organisms seen per oil power field      11-26 @ 07:20 .Blood Blood-Peripheral     No growth at 48 Hours        11-26 @ 07:15 .Blood Blood-Peripheral     No growth at 48 Hours        11-24 @ 09:00    RVP with SARS-CoV-2   NotDetec      11-24 @ 01:25 .Blood Blood-Peripheral     No growth at 5 days        11-23 @ 12:42 .Blood Blood-Peripheral     No growth at 5 days        11-23 @ 12:35 .Blood Blood-Peripheral     No growth at 5 days        11-18 @ 14:00 Pleural Fl Pleural Fluid Streptococcus intermedius    Few Streptococcus intermedius    polymorphonuclear leukocytes seen  No organisms seen  by cytocentrifuge      11-18 @ 08:30   RVP with SARS-CoV-2   NotDetec      WBC Count: 14.87 K/uL (11-29-23 @ 03:00)  WBC Count: 14.54 K/uL (11-28-23 @ 20:10)  WBC Count: 29.35 K/uL (11-28-23 @ 00:40)  WBC Count: 24.62 K/uL (11-27-23 @ 21:05)  WBC Count: 17.64 K/uL (11-27-23 @ 06:34)  WBC Count: 18.34 K/uL (11-26-23 @ 07:20)  WBC Count: 15.88 K/uL (11-25-23 @ 04:28)    Creatinine: 2.17 mg/dL (11-29-23 @ 03:00)  Creatinine: 2.10 mg/dL (11-28-23 @ 20:10)  Creatinine: 2.15 mg/dL (11-28-23 @ 00:40)  Creatinine: 2.09 mg/dL (11-27-23 @ 22:00)  Creatinine: 0.84 mg/dL (11-27-23 @ 21:05)  Creatinine: 1.99 mg/dL (11-27-23 @ 06:34)  Creatinine: 2.06 mg/dL (11-26-23 @ 07:20)  Creatinine: 2.15 mg/dL (11-25-23 @ 04:28)      Procalcitonin, Serum: 0.74 ng/mL (11-24-23 @ 01:25)  Procalcitonin, Serum: 2.59 ng/mL (11-19-23 @ 01:29)     SARS-CoV-2: NotDetec (11-24-23 @ 09:00)  SARS-CoV-2: NotDetec (11-18-23 @ 08:30)    Vancomycin Level, Trough: <4.0 ug/mL (11-28-23 @ 04:00)    ________________________________________________________________  CARDIOLOGY   < from: TTE Echo Complete w/o Contrast w/ Doppler (11.18.23 @ 12:34) >  Summary:   1. There is mild concentric left ventricular hypertrophy.   2. Moderate to severely increased left ventricular internal cavity size.   3. Left ventricular ejection fraction, by visual estimation, is 20 to   25%.   4. Severely decreased global left ventricular systolic function.   5. The mitral in-flow pattern reveals no discernable A-wave, therefore   no comment on diastolic function can be made.   6. Mildly enlarged right ventricle.   7. Severely reduced RV systolic function.   8. Mildly enlarged left atrium.   9. Normal right atrial size.  10. Sclerotic aortic valve with normal opening.  11. Mild thickening and calcification of the anterior and posterior   mitral valve leaflets.  12. Mild mitral annular calcification.  13. Mild mitral valve regurgitation.  14. The main pulmonary artery is normal in size.  15. There is no evidence of pericardial effusion.    < end of copied text >    ________________________________________________________________  RADIOLOGY  < from: Xray Chest 1 View- PORTABLE-Urgent (Xray Chest 1 View- PORTABLE-Urgent .) (11.28.23 @ 06:31) >    IMPRESSION: Significant pleural drainage after insertion of 2 left chest   tubes. Mild to moderate residual remains. NG tube inserted. Endotracheal   tube and right jugular sleeve remain.    < end of copied text >

## 2023-11-29 NOTE — PROGRESS NOTE ADULT - ASSESSMENT
72-year-old male with history of hypertension CAD status post quintuple bypass November 2021 followed by cardiology/Dr. Milian in [place]  resents the ED via EMS complaining of worsening dyspnea on exertion since yesterday.  Thoracic surgery consulted treatment Left effusion. Left PTC placed 11/28. 11/27 Left VATS decortication.  Extubated POD 1.

## 2023-11-29 NOTE — CONSULT NOTE ADULT - SUBJECTIVE AND OBJECTIVE BOX
Patient is a 73y old  Male who presents with a chief complaint of SOB (29 Nov 2023 14:43)    HPI:  72M w/ PMH of CAD s/p CABG, CHF, HTN, HLD, Afib (not on AC due to hx of GIB) presenting for chief complaint of SOB. Patient states he started experiencing SOB and LE swelling for the past few days He states he was seen by his Cardiologist on, Dr. Milian, on Tuesday and his Metoprolol was increased from 50mg to 100mg. He states his symptoms worsened yesterday therefore he came to the ED today. He states he has been unable to walk around without getting short of breath. He reports compliance with medications and diet. Patient denies fever, chills, chest pain, palpitations, cough, wheezing, abdominal pain, nausea, vomiting, diarrhea, constipation, bloody bowel movements, melena, hematemesis, urinary complaints, syncope, calf tenderness, paresthesias.  (18 Nov 2023 14:38)      PAST MEDICAL & SURGICAL HISTORY:  Coronary artery disease involving native coronary artery of native heart, unspecified whether angina    CHF with unknown LVEF    Primary hypertension    Hyperlipidemia, unspecified hyperlipidemia type    S/P CABG x 5    History of cholecystectomy        Social History:  PMH: CAD, HTN, HLD, CHF, GIB  PSHx: CABG, Cholecystectomy  PFHx: non-contributory (18 Nov 2023 14:38)      FAMILY HISTORY:  Family history of coronary artery bypass surgery (Father)    Family history of hypotension (Mother)          Allergies    No Known Allergies    Intolerances        ROS as noted in the HPI    MEDICATIONS  (STANDING):  acetaminophen     Tablet .. 975 milliGRAM(s) Oral every 6 hours  aspirin enteric coated 81 milliGRAM(s) Oral daily  atorvastatin 20 milliGRAM(s) Oral at bedtime  chlorhexidine 2% Cloths 1 Application(s) Topical <User Schedule>  dextrose 50% Injectable 50 milliLiter(s) IV Push every 15 minutes  digoxin     Tablet 125 MICROGram(s) Oral daily  heparin  Infusion.  Unit(s)/Hr (23 mL/Hr) IV Continuous <Continuous>  insulin regular Infusion 2 Unit(s)/Hr (2 mL/Hr) IV Continuous <Continuous>  pantoprazole  Injectable 40 milliGRAM(s) IV Push daily  piperacillin/tazobactam IVPB.. 3.375 Gram(s) IV Intermittent every 8 hours  polyethylene glycol 3350 17 Gram(s) Oral two times a day  senna 2 Tablet(s) Oral at bedtime    MEDICATIONS  (PRN):  heparin   Injectable 90388 Unit(s) IV Push every 6 hours PRN For aPTT less than 40  heparin   Injectable 5000 Unit(s) IV Push every 6 hours PRN For aPTT between 40 - 57  ondansetron Injectable 4 milliGRAM(s) IV Push every 8 hours PRN Nausea and/or Vomiting  oxyCODONE    IR 5 milliGRAM(s) Oral every 4 hours PRN Moderate Pain (4 - 6)  oxyCODONE    IR 10 milliGRAM(s) Oral every 4 hours PRN Severe Pain (7 - 10)  sodium chloride 0.9% lock flush 10 milliLiter(s) IV Push every 1 hour PRN Pre/post blood products, medications, blood draw, and to maintain line patency      Vital Signs Last 24 Hrs  T(C): 36.4 (29 Nov 2023 11:15), Max: 37.7 (28 Nov 2023 20:00)  T(F): 97.6 (29 Nov 2023 11:15), Max: 99.9 (28 Nov 2023 20:00)  HR: 128 (29 Nov 2023 13:00) (63 - 130)  BP: 108/75 (29 Nov 2023 04:00) (100/57 - 123/58)  BP(mean): 89 (29 Nov 2023 04:00) (73 - 89)  RR: 33 (29 Nov 2023 13:00) (12 - 33)  SpO2: 97% (29 Nov 2023 13:00) (94% - 99%)    Parameters below as of 29 Nov 2023 08:00  Patient On (Oxygen Delivery Method): room air          Physical Exam:    Constitutional: NAD, well-developed  HEENT: EOMI, no exophalmos  Neck: trachea midline, no thyroid enlargement  Respiratory: CTAB, normal respirations  Cardiovascular: S1 and S2, RRR  Gastrointestinal: BS+, soft, ntnd  Extremities: No peripheral edema  Neurological: AOx3, no focal deficits  Psychiatric: Normal mood and normal affect  Skin: no rashes, no acanthosis    LABS  11-29    143  |  107  |  89.9<H>  ----------------------------<  130<H>  3.8   |  24.0  |  2.17<H>    Ca    8.2<L>      29 Nov 2023 03:00  Mg     3.1     11-29    TPro  5.6<L>  /  Alb  2.2<L>  /  TBili  1.1  /  DBili  x   /  AST  30  /  ALT  26  /  AlkPhos  98  11-29                          9.7    14.87 )-----------( 307      ( 29 Nov 2023 03:00 )             32.7       A1C with Estimated Average Glucose Result: 7.3 % (11-28-23 @ 00:40)          Alanine Aminotransferase (ALT/SGPT): 26 U/L (11-29-23 @ 03:00)  Alkaline Phosphatase: 98 U/L (11-29-23 @ 03:00)  Albumin: 2.2 g/dL (11-29-23 @ 03:00)  Aspartate Aminotransferase (AST/SGOT): 30 U/L (11-29-23 @ 03:00)  Albumin: 2.3 g/dL (11-28-23 @ 00:40)  Aspartate Aminotransferase (AST/SGOT): 35 U/L (11-28-23 @ 00:40)  Alanine Aminotransferase (ALT/SGPT): 39 U/L (11-28-23 @ 00:40)  Alkaline Phosphatase: 152 U/L (11-28-23 @ 00:40)  Alanine Aminotransferase (ALT/SGPT): 44 U/L (11-27-23 @ 22:00)  Albumin: 2.2 g/dL (11-27-23 @ 22:00)  Aspartate Aminotransferase (AST/SGOT): 41 U/L (11-27-23 @ 22:00)  Alkaline Phosphatase: 158 U/L (11-27-23 @ 22:00)  Alanine Aminotransferase (ALT/SGPT): 19 U/L (11-27-23 @ 21:05)  Alkaline Phosphatase: 69 U/L (11-27-23 @ 21:05)  Aspartate Aminotransferase (AST/SGOT): 19 U/L (11-27-23 @ 21:05)  Albumin: 0.8 g/dL (11-27-23 @ 21:05)          CAPILLARY BLOOD GLUCOSE      POCT Blood Glucose.: 144 mg/dL (29 Nov 2023 13:39)  POCT Blood Glucose.: 119 mg/dL (29 Nov 2023 11:51)  POCT Blood Glucose.: 118 mg/dL (29 Nov 2023 11:03)  POCT Blood Glucose.: 113 mg/dL (29 Nov 2023 09:58)  POCT Blood Glucose.: 97 mg/dL (29 Nov 2023 09:04)  POCT Blood Glucose.: 105 mg/dL (29 Nov 2023 07:53)  POCT Blood Glucose.: 107 mg/dL (29 Nov 2023 06:59)  POCT Blood Glucose.: 101 mg/dL (29 Nov 2023 05:59)  POCT Blood Glucose.: 112 mg/dL (29 Nov 2023 04:06)  POCT Blood Glucose.: 156 mg/dL (29 Nov 2023 01:06)  POCT Blood Glucose.: 126 mg/dL (28 Nov 2023 23:30)  POCT Blood Glucose.: 132 mg/dL (28 Nov 2023 18:38)  POCT Blood Glucose.: 148 mg/dL (28 Nov 2023 16:25)      Imaging     Patient is a 73y old  Male who presents with a chief complaint of SOB (29 Nov 2023 14:43)    HPI:  72M w/ PMH of T2DM, CAD s/p CABG, CHF, HTN, HLD, Afib (not on AC due to hx of GIB) presents with a few days worsening of dyspnea and LE edema. found to have large loculated pleural effusion. 11/28 s/p L lung decortication.  Consult for diabetes mgt. a1c 7.3    sister, wan at bedside  was told in the past that he had diabetes and used to take januvia  had low carb diet and a1c improved so januvia dced (januvia was also expensive)  does not want to do any injections  does any fhx of diabetes or thyroid issues    PAST MEDICAL & SURGICAL HISTORY:  Coronary artery disease involving native coronary artery of native heart, unspecified whether angina    CHF with unknown LVEF    Primary hypertension    Hyperlipidemia, unspecified hyperlipidemia type    S/P CABG x 5    History of cholecystectomy        Social History:  see above    FAMILY HISTORY:  Family history of coronary artery bypass surgery (Father)    Family history of hypotension (Mother)          Allergies    No Known Allergies    Intolerances        ROS as noted in the HPI    MEDICATIONS  (STANDING):  acetaminophen     Tablet .. 975 milliGRAM(s) Oral every 6 hours  aspirin enteric coated 81 milliGRAM(s) Oral daily  atorvastatin 20 milliGRAM(s) Oral at bedtime  chlorhexidine 2% Cloths 1 Application(s) Topical <User Schedule>  dextrose 50% Injectable 50 milliLiter(s) IV Push every 15 minutes  digoxin     Tablet 125 MICROGram(s) Oral daily  heparin  Infusion.  Unit(s)/Hr (23 mL/Hr) IV Continuous <Continuous>  insulin regular Infusion 2 Unit(s)/Hr (2 mL/Hr) IV Continuous <Continuous>  pantoprazole  Injectable 40 milliGRAM(s) IV Push daily  piperacillin/tazobactam IVPB.. 3.375 Gram(s) IV Intermittent every 8 hours  polyethylene glycol 3350 17 Gram(s) Oral two times a day  senna 2 Tablet(s) Oral at bedtime    MEDICATIONS  (PRN):  heparin   Injectable 77340 Unit(s) IV Push every 6 hours PRN For aPTT less than 40  heparin   Injectable 5000 Unit(s) IV Push every 6 hours PRN For aPTT between 40 - 57  ondansetron Injectable 4 milliGRAM(s) IV Push every 8 hours PRN Nausea and/or Vomiting  oxyCODONE    IR 5 milliGRAM(s) Oral every 4 hours PRN Moderate Pain (4 - 6)  oxyCODONE    IR 10 milliGRAM(s) Oral every 4 hours PRN Severe Pain (7 - 10)  sodium chloride 0.9% lock flush 10 milliLiter(s) IV Push every 1 hour PRN Pre/post blood products, medications, blood draw, and to maintain line patency      Vital Signs Last 24 Hrs  T(C): 36.4 (29 Nov 2023 11:15), Max: 37.7 (28 Nov 2023 20:00)  T(F): 97.6 (29 Nov 2023 11:15), Max: 99.9 (28 Nov 2023 20:00)  HR: 128 (29 Nov 2023 13:00) (63 - 130)  BP: 108/75 (29 Nov 2023 04:00) (100/57 - 123/58)  BP(mean): 89 (29 Nov 2023 04:00) (73 - 89)  RR: 33 (29 Nov 2023 13:00) (12 - 33)  SpO2: 97% (29 Nov 2023 13:00) (94% - 99%)    Parameters below as of 29 Nov 2023 08:00  Patient On (Oxygen Delivery Method): room air          Physical Exam:    Constitutional: NAD, obese  Neck: trachea midline, no thyroid enlargement  Respiratory: CTAB, normal respirations  Cardiovascular: S1 and S2, RRR  Gastrointestinal: BS+, soft, ntnd  Neurological: AOx3, no focal deficits  Psychiatric: Normal mood and normal affect  Skin: no rashes, no acanthosis    LABS  11-29    143  |  107  |  89.9<H>  ----------------------------<  130<H>  3.8   |  24.0  |  2.17<H>    Ca    8.2<L>      29 Nov 2023 03:00  Mg     3.1     11-29    TPro  5.6<L>  /  Alb  2.2<L>  /  TBili  1.1  /  DBili  x   /  AST  30  /  ALT  26  /  AlkPhos  98  11-29                          9.7    14.87 )-----------( 307      ( 29 Nov 2023 03:00 )             32.7       A1C with Estimated Average Glucose Result: 7.3 % (11-28-23 @ 00:40)          Alanine Aminotransferase (ALT/SGPT): 26 U/L (11-29-23 @ 03:00)  Alkaline Phosphatase: 98 U/L (11-29-23 @ 03:00)  Albumin: 2.2 g/dL (11-29-23 @ 03:00)  Aspartate Aminotransferase (AST/SGOT): 30 U/L (11-29-23 @ 03:00)  Albumin: 2.3 g/dL (11-28-23 @ 00:40)  Aspartate Aminotransferase (AST/SGOT): 35 U/L (11-28-23 @ 00:40)  Alanine Aminotransferase (ALT/SGPT): 39 U/L (11-28-23 @ 00:40)  Alkaline Phosphatase: 152 U/L (11-28-23 @ 00:40)  Alanine Aminotransferase (ALT/SGPT): 44 U/L (11-27-23 @ 22:00)  Albumin: 2.2 g/dL (11-27-23 @ 22:00)  Aspartate Aminotransferase (AST/SGOT): 41 U/L (11-27-23 @ 22:00)  Alkaline Phosphatase: 158 U/L (11-27-23 @ 22:00)  Alanine Aminotransferase (ALT/SGPT): 19 U/L (11-27-23 @ 21:05)  Alkaline Phosphatase: 69 U/L (11-27-23 @ 21:05)  Aspartate Aminotransferase (AST/SGOT): 19 U/L (11-27-23 @ 21:05)  Albumin: 0.8 g/dL (11-27-23 @ 21:05)          CAPILLARY BLOOD GLUCOSE      POCT Blood Glucose.: 144 mg/dL (29 Nov 2023 13:39)  POCT Blood Glucose.: 119 mg/dL (29 Nov 2023 11:51)  POCT Blood Glucose.: 118 mg/dL (29 Nov 2023 11:03)  POCT Blood Glucose.: 113 mg/dL (29 Nov 2023 09:58)  POCT Blood Glucose.: 97 mg/dL (29 Nov 2023 09:04)  POCT Blood Glucose.: 105 mg/dL (29 Nov 2023 07:53)  POCT Blood Glucose.: 107 mg/dL (29 Nov 2023 06:59)  POCT Blood Glucose.: 101 mg/dL (29 Nov 2023 05:59)  POCT Blood Glucose.: 112 mg/dL (29 Nov 2023 04:06)  POCT Blood Glucose.: 156 mg/dL (29 Nov 2023 01:06)  POCT Blood Glucose.: 126 mg/dL (28 Nov 2023 23:30)  POCT Blood Glucose.: 132 mg/dL (28 Nov 2023 18:38)  POCT Blood Glucose.: 148 mg/dL (28 Nov 2023 16:25)      Imaging

## 2023-11-29 NOTE — PROGRESS NOTE ADULT - ASSESSMENT
72M with CAD s/p CABG, HFrEF, HTN, Afib admitted on 11/18 with dyspnea. Found to have large loculated left pleural effusion s/p CT on 11/18. Hospital course complicated by Afl/afib with RVR followed by EP     Left empyema   Aflutter with RVR   CKD     - s/p VATS/decortication on 11/27 >> empyema   - Extubated on 11/28   - On piperacillin-tazobactam  - OK to change to ceftriaxone 2g IV daily   - Follow surgical cultures     - Gram with GPC in pairs. Cultures ngtd   - 11/18 CT Chest with large loculated pleural effusion   - s/p left chest tube on 11/18   - Pleural fluid culture with Streptococcus intermedius   - 11/23, 11/24 and 11/26 BCx ngtd  - Difficulty achieving rate control for Aflutter - will need eventual cardioversion.   - EF 20-25%  - Leukocytosis better but likely to persists for a while   - Remainder of care as per CTICU     D/w ASP/clinical pharmacist and CTICU team

## 2023-11-29 NOTE — PROGRESS NOTE ADULT - SUBJECTIVE AND OBJECTIVE BOX
Pt seen sitting in bedside chair, c/o dry mouth. Denies complaint otherwise. Successfully extubated yesterday afternoon, off pressors.   Telemetry shows atrial flutter @ 130 bpm       PAST MEDICAL & SURGICAL HISTORY:  Coronary artery disease involving native coronary artery of native heart, unspecified whether angina  CHF with unknown LVEF  Primary hypertension  Hyperlipidemia, unspecified hyperlipidemia type  S/P CABG x 5  History of cholecystectomy    MEDICATIONS  (STANDING):  acetaminophen     Tablet .. 975 milliGRAM(s) Oral every 6 hours  aspirin enteric coated 81 milliGRAM(s) Oral daily  atorvastatin 20 milliGRAM(s) Oral at bedtime  chlorhexidine 2% Cloths 1 Application(s) Topical <User Schedule>  dextrose 50% Injectable 50 milliLiter(s) IV Push every 15 minutes  digoxin     Tablet 125 MICROGram(s) Oral daily  heparin   Injectable 5000 Unit(s) SubCutaneous every 8 hours  insulin regular Infusion 2 Unit(s)/Hr (2 mL/Hr) IV Continuous <Continuous>  pantoprazole  Injectable 40 milliGRAM(s) IV Push daily  piperacillin/tazobactam IVPB.. 3.375 Gram(s) IV Intermittent every 8 hours  polyethylene glycol 3350 17 Gram(s) Oral two times a day  senna 2 Tablet(s) Oral at bedtime    MEDICATIONS  (PRN):  ondansetron Injectable 4 milliGRAM(s) IV Push every 8 hours PRN Nausea and/or Vomiting  oxyCODONE    IR 10 milliGRAM(s) Oral every 4 hours PRN Severe Pain (7 - 10)  oxyCODONE    IR 5 milliGRAM(s) Oral every 4 hours PRN Moderate Pain (4 - 6)  sodium chloride 0.9% lock flush 10 milliLiter(s) IV Push every 1 hour PRN Pre/post blood products, medications, blood draw, and to maintain line patency    Allergies:  No Known Allergies    Vital Signs Last 24 Hrs  T(C): 36.4 (29 Nov 2023 04:00), Max: 37.7 (28 Nov 2023 16:05)  T(F): 97.6 (29 Nov 2023 04:00), Max: 99.9 (28 Nov 2023 16:05)  HR: 129 (29 Nov 2023 10:00) (63 - 134)  BP: 108/75 (29 Nov 2023 04:00) (100/56 - 147/65)  BP(mean): 89 (29 Nov 2023 04:00) (73 - 93)  RR: 18 (29 Nov 2023 10:00) (12 - 33)  SpO2: 96% (29 Nov 2023 10:00) (94% - 99%)    Parameters below as of 29 Nov 2023 08:00  Patient On (Oxygen Delivery Method): room air    Physical Exam:  Constitutional: awake, alert, no obvious distress   Cardiovascular: +S1S2 tachycardic  Chest: chest tube x 2 with blood tinged drainage   Pulmonary: equal chest rise, non-labored   GI: soft NTND +BS  Extremities: no pedal edema, +distal pulses b/l  Neuro: non focal, LEE x4    LABS:                        9.7    14.87 )-----------( 307      ( 29 Nov 2023 03:00 )             32.7     11-29    143  |  107  |  89.9<H>  ----------------------------<  130<H>  3.8   |  24.0  |  2.17<H>    Ca    8.2<L>      29 Nov 2023 03:00  Mg     3.1     11-29    TPro  5.6<L>  /  Alb  2.2<L>  /  TBili  1.1  /  DBili  x   /  AST  30  /  ALT  26  /  AlkPhos  98  11-29    Urinalysis Basic - ( 29 Nov 2023 03:00 )    Color: x / Appearance: x / SG: x / pH: x  Gluc: 130 mg/dL / Ketone: x  / Bili: x / Urobili: x   Blood: x / Protein: x / Nitrite: x   Leuk Esterase: x / RBC: x / WBC x   Sq Epi: x / Non Sq Epi: x / Bacteria: x    RADIOLOGY & ADDITIONAL TESTS:  CXR: 11/27/23:   FINDINGS: No significant change.  CATHETERS AND TUBES: Vascular LEFT  PULMONARY: Opacified lower two thirds of LEFT hemithorax with aerated LEFT apex and medial LEFT upper zone segment indicatingresidual loculated pleural effusion and/or airspace consolidation. No pneumothorax.  HEART/VASCULAR: The  heart is mildly enlarged in transverse diameter. Status post median sternotomy.  BONES: Visualized osseous thorax intact.  IMPRESSION:  No significant change..  --- End of Report ---  TANNER MACHADO MD; Attending Radiologist  This document has been electronically signed. Nov 27 2023  3:07PM    Echo: 11/18/23:   Summary:   1. There is mild concentric left ventricular hypertrophy.   2. Moderate to severely increased left ventricular internal cavity size.   3. Left ventricular ejection fraction, by visual estimation, is 20 to 25%.   4. Severely decreased global left ventricular systolic function.   5. The mitral in-flow pattern reveals no discernable A-wave, therefore no comment on diastolic function can be made.   6. Mildly enlarged right ventricle.   7. Severely reduced RV systolic function.   8. Mildly enlarged left atrium.   9. Normal right atrial size.  10. Sclerotic aortic valve with normal opening.  11. Mild thickening and calcification of the anterior and posterior mitral valve leaflets.  12. Mild mitral annular calcification.  13. Mild mitral valve regurgitation.  14. The main pulmonary artery is normal in size.  15. There is no evidence of pericardial effusion.  Otf Frederick MD Electronically signed on 11/18/2023 at 4:20:53 PM     Pt seen sitting in bedside chair, c/o dry mouth. Denies complaint otherwise. Successfully extubated yesterday afternoon, off pressors.   Telemetry shows atrial flutter @ 130 bpm     PAST MEDICAL & SURGICAL HISTORY:  Coronary artery disease involving native coronary artery of native heart, unspecified whether angina  CHF with unknown LVEF  Primary hypertension  Hyperlipidemia, unspecified hyperlipidemia type  S/P CABG x 5  History of cholecystectomy    MEDICATIONS  (STANDING):  acetaminophen     Tablet .. 975 milliGRAM(s) Oral every 6 hours  aspirin enteric coated 81 milliGRAM(s) Oral daily  atorvastatin 20 milliGRAM(s) Oral at bedtime  chlorhexidine 2% Cloths 1 Application(s) Topical <User Schedule>  dextrose 50% Injectable 50 milliLiter(s) IV Push every 15 minutes  digoxin     Tablet 125 MICROGram(s) Oral daily  heparin   Injectable 5000 Unit(s) SubCutaneous every 8 hours  insulin regular Infusion 2 Unit(s)/Hr (2 mL/Hr) IV Continuous <Continuous>  pantoprazole  Injectable 40 milliGRAM(s) IV Push daily  piperacillin/tazobactam IVPB.. 3.375 Gram(s) IV Intermittent every 8 hours  polyethylene glycol 3350 17 Gram(s) Oral two times a day  senna 2 Tablet(s) Oral at bedtime    MEDICATIONS  (PRN):  ondansetron Injectable 4 milliGRAM(s) IV Push every 8 hours PRN Nausea and/or Vomiting  oxyCODONE    IR 10 milliGRAM(s) Oral every 4 hours PRN Severe Pain (7 - 10)  oxyCODONE    IR 5 milliGRAM(s) Oral every 4 hours PRN Moderate Pain (4 - 6)  sodium chloride 0.9% lock flush 10 milliLiter(s) IV Push every 1 hour PRN Pre/post blood products, medications, blood draw, and to maintain line patency    Allergies:  No Known Allergies    Vital Signs Last 24 Hrs  T(C): 36.4 (29 Nov 2023 04:00), Max: 37.7 (28 Nov 2023 16:05)  T(F): 97.6 (29 Nov 2023 04:00), Max: 99.9 (28 Nov 2023 16:05)  HR: 129 (29 Nov 2023 10:00) (63 - 134)  BP: 108/75 (29 Nov 2023 04:00) (100/56 - 147/65)  BP(mean): 89 (29 Nov 2023 04:00) (73 - 93)  RR: 18 (29 Nov 2023 10:00) (12 - 33)  SpO2: 96% (29 Nov 2023 10:00) (94% - 99%)    Parameters below as of 29 Nov 2023 08:00  Patient On (Oxygen Delivery Method): room air    Physical Exam:  Constitutional: awake, alert, no obvious distress   Cardiovascular: +S1S2 tachycardic  Chest: chest tube x 2 with blood tinged drainage   Pulmonary: equal chest rise, non-labored   GI: soft NTND +BS  Extremities: no pedal edema, +distal pulses b/l  Neuro: non focal, LEE x4    LABS:                        9.7    14.87 )-----------( 307      ( 29 Nov 2023 03:00 )             32.7     11-29    143  |  107  |  89.9<H>  ----------------------------<  130<H>  3.8   |  24.0  |  2.17<H>    Ca    8.2<L>      29 Nov 2023 03:00  Mg     3.1     11-29    TPro  5.6<L>  /  Alb  2.2<L>  /  TBili  1.1  /  DBili  x   /  AST  30  /  ALT  26  /  AlkPhos  98  11-29    Urinalysis Basic - ( 29 Nov 2023 03:00 )    Color: x / Appearance: x / SG: x / pH: x  Gluc: 130 mg/dL / Ketone: x  / Bili: x / Urobili: x   Blood: x / Protein: x / Nitrite: x   Leuk Esterase: x / RBC: x / WBC x   Sq Epi: x / Non Sq Epi: x / Bacteria: x    RADIOLOGY & ADDITIONAL TESTS:  CXR: 11/27/23:   FINDINGS: No significant change.  CATHETERS AND TUBES: Vascular LEFT  PULMONARY: Opacified lower two thirds of LEFT hemithorax with aerated LEFT apex and medial LEFT upper zone segment indicatingresidual loculated pleural effusion and/or airspace consolidation. No pneumothorax.  HEART/VASCULAR: The  heart is mildly enlarged in transverse diameter. Status post median sternotomy.  BONES: Visualized osseous thorax intact.  IMPRESSION:  No significant change..  --- End of Report ---  TANNER MACHADO MD; Attending Radiologist  This document has been electronically signed. Nov 27 2023  3:07PM    Echo: 11/18/23:   Summary:   1. There is mild concentric left ventricular hypertrophy.   2. Moderate to severely increased left ventricular internal cavity size.   3. Left ventricular ejection fraction, by visual estimation, is 20 to 25%.   4. Severely decreased global left ventricular systolic function.   5. The mitral in-flow pattern reveals no discernable A-wave, therefore no comment on diastolic function can be made.   6. Mildly enlarged right ventricle.   7. Severely reduced RV systolic function.   8. Mildly enlarged left atrium.   9. Normal right atrial size.  10. Sclerotic aortic valve with normal opening.  11. Mild thickening and calcification of the anterior and posterior mitral valve leaflets.  12. Mild mitral annular calcification.  13. Mild mitral valve regurgitation.  14. The main pulmonary artery is normal in size.  15. There is no evidence of pericardial effusion.  Otf Frederick MD Electronically signed on 11/18/2023 at 4:20:53 PM

## 2023-11-29 NOTE — PROGRESS NOTE ADULT - PROBLEM SELECTOR PLAN 1
Pleural fluid exudative by light's criteria  Culture prelim streptococcus intermedius, +empyema ID following   Continue zosyn  cytology (-)  Underwent VATS/decort on 11/27 - extubated 11/28  Brought to CTICU, intubated, sedated      EP will plan for cardioversion after VATS once AC can be restarted.  To discuss with attending on AM rounds

## 2023-11-30 LAB
ALBUMIN SERPL ELPH-MCNC: 2.5 G/DL — LOW (ref 3.3–5.2)
ALBUMIN SERPL ELPH-MCNC: 2.5 G/DL — LOW (ref 3.3–5.2)
ALP SERPL-CCNC: 100 U/L — SIGNIFICANT CHANGE UP (ref 40–120)
ALP SERPL-CCNC: 100 U/L — SIGNIFICANT CHANGE UP (ref 40–120)
ALT FLD-CCNC: 26 U/L — SIGNIFICANT CHANGE UP
ALT FLD-CCNC: 26 U/L — SIGNIFICANT CHANGE UP
ANION GAP SERPL CALC-SCNC: 13 MMOL/L — SIGNIFICANT CHANGE UP (ref 5–17)
ANION GAP SERPL CALC-SCNC: 13 MMOL/L — SIGNIFICANT CHANGE UP (ref 5–17)
APTT BLD: 113 SEC — HIGH (ref 24.5–35.6)
APTT BLD: 113 SEC — HIGH (ref 24.5–35.6)
APTT BLD: 116 SEC — HIGH (ref 24.5–35.6)
APTT BLD: 116 SEC — HIGH (ref 24.5–35.6)
APTT BLD: 120.3 SEC — CRITICAL HIGH (ref 24.5–35.6)
APTT BLD: 120.3 SEC — CRITICAL HIGH (ref 24.5–35.6)
APTT BLD: 66 SEC — HIGH (ref 24.5–35.6)
APTT BLD: 66 SEC — HIGH (ref 24.5–35.6)
AST SERPL-CCNC: 34 U/L — SIGNIFICANT CHANGE UP
AST SERPL-CCNC: 34 U/L — SIGNIFICANT CHANGE UP
BASOPHILS # BLD AUTO: 0.1 K/UL — SIGNIFICANT CHANGE UP (ref 0–0.2)
BASOPHILS # BLD AUTO: 0.1 K/UL — SIGNIFICANT CHANGE UP (ref 0–0.2)
BASOPHILS NFR BLD AUTO: 0.8 % — SIGNIFICANT CHANGE UP (ref 0–2)
BASOPHILS NFR BLD AUTO: 0.8 % — SIGNIFICANT CHANGE UP (ref 0–2)
BILIRUB SERPL-MCNC: 1 MG/DL — SIGNIFICANT CHANGE UP (ref 0.4–2)
BILIRUB SERPL-MCNC: 1 MG/DL — SIGNIFICANT CHANGE UP (ref 0.4–2)
BUN SERPL-MCNC: 72.5 MG/DL — HIGH (ref 8–20)
BUN SERPL-MCNC: 72.5 MG/DL — HIGH (ref 8–20)
CALCIUM SERPL-MCNC: 7.9 MG/DL — LOW (ref 8.4–10.5)
CALCIUM SERPL-MCNC: 7.9 MG/DL — LOW (ref 8.4–10.5)
CHLORIDE SERPL-SCNC: 102 MMOL/L — SIGNIFICANT CHANGE UP (ref 96–108)
CHLORIDE SERPL-SCNC: 102 MMOL/L — SIGNIFICANT CHANGE UP (ref 96–108)
CO2 SERPL-SCNC: 24 MMOL/L — SIGNIFICANT CHANGE UP (ref 22–29)
CO2 SERPL-SCNC: 24 MMOL/L — SIGNIFICANT CHANGE UP (ref 22–29)
CREAT SERPL-MCNC: 1.85 MG/DL — HIGH (ref 0.5–1.3)
CREAT SERPL-MCNC: 1.85 MG/DL — HIGH (ref 0.5–1.3)
EGFR: 38 ML/MIN/1.73M2 — LOW
EGFR: 38 ML/MIN/1.73M2 — LOW
EOSINOPHIL # BLD AUTO: 0.15 K/UL — SIGNIFICANT CHANGE UP (ref 0–0.5)
EOSINOPHIL # BLD AUTO: 0.15 K/UL — SIGNIFICANT CHANGE UP (ref 0–0.5)
EOSINOPHIL NFR BLD AUTO: 1.3 % — SIGNIFICANT CHANGE UP (ref 0–6)
EOSINOPHIL NFR BLD AUTO: 1.3 % — SIGNIFICANT CHANGE UP (ref 0–6)
GLUCOSE BLDC GLUCOMTR-MCNC: 127 MG/DL — HIGH (ref 70–99)
GLUCOSE BLDC GLUCOMTR-MCNC: 127 MG/DL — HIGH (ref 70–99)
GLUCOSE BLDC GLUCOMTR-MCNC: 141 MG/DL — HIGH (ref 70–99)
GLUCOSE BLDC GLUCOMTR-MCNC: 141 MG/DL — HIGH (ref 70–99)
GLUCOSE BLDC GLUCOMTR-MCNC: 143 MG/DL — HIGH (ref 70–99)
GLUCOSE BLDC GLUCOMTR-MCNC: 143 MG/DL — HIGH (ref 70–99)
GLUCOSE SERPL-MCNC: 127 MG/DL — HIGH (ref 70–99)
GLUCOSE SERPL-MCNC: 127 MG/DL — HIGH (ref 70–99)
HCT VFR BLD CALC: 30.6 % — LOW (ref 39–50)
HCT VFR BLD CALC: 30.6 % — LOW (ref 39–50)
HGB BLD-MCNC: 9.6 G/DL — LOW (ref 13–17)
HGB BLD-MCNC: 9.6 G/DL — LOW (ref 13–17)
IMM GRANULOCYTES NFR BLD AUTO: 5.9 % — HIGH (ref 0–0.9)
IMM GRANULOCYTES NFR BLD AUTO: 5.9 % — HIGH (ref 0–0.9)
LYMPHOCYTES # BLD AUTO: 0.86 K/UL — LOW (ref 1–3.3)
LYMPHOCYTES # BLD AUTO: 0.86 K/UL — LOW (ref 1–3.3)
LYMPHOCYTES # BLD AUTO: 7.2 % — LOW (ref 13–44)
LYMPHOCYTES # BLD AUTO: 7.2 % — LOW (ref 13–44)
MAGNESIUM SERPL-MCNC: 2.6 MG/DL — SIGNIFICANT CHANGE UP (ref 1.6–2.6)
MAGNESIUM SERPL-MCNC: 2.6 MG/DL — SIGNIFICANT CHANGE UP (ref 1.6–2.6)
MCHC RBC-ENTMCNC: 27.7 PG — SIGNIFICANT CHANGE UP (ref 27–34)
MCHC RBC-ENTMCNC: 27.7 PG — SIGNIFICANT CHANGE UP (ref 27–34)
MCHC RBC-ENTMCNC: 31.4 GM/DL — LOW (ref 32–36)
MCHC RBC-ENTMCNC: 31.4 GM/DL — LOW (ref 32–36)
MCV RBC AUTO: 88.4 FL — SIGNIFICANT CHANGE UP (ref 80–100)
MCV RBC AUTO: 88.4 FL — SIGNIFICANT CHANGE UP (ref 80–100)
MONOCYTES # BLD AUTO: 0.86 K/UL — SIGNIFICANT CHANGE UP (ref 0–0.9)
MONOCYTES # BLD AUTO: 0.86 K/UL — SIGNIFICANT CHANGE UP (ref 0–0.9)
MONOCYTES NFR BLD AUTO: 7.2 % — SIGNIFICANT CHANGE UP (ref 2–14)
MONOCYTES NFR BLD AUTO: 7.2 % — SIGNIFICANT CHANGE UP (ref 2–14)
NEUTROPHILS # BLD AUTO: 9.21 K/UL — HIGH (ref 1.8–7.4)
NEUTROPHILS # BLD AUTO: 9.21 K/UL — HIGH (ref 1.8–7.4)
NEUTROPHILS NFR BLD AUTO: 77.6 % — HIGH (ref 43–77)
NEUTROPHILS NFR BLD AUTO: 77.6 % — HIGH (ref 43–77)
PLATELET # BLD AUTO: 272 K/UL — SIGNIFICANT CHANGE UP (ref 150–400)
PLATELET # BLD AUTO: 272 K/UL — SIGNIFICANT CHANGE UP (ref 150–400)
POTASSIUM SERPL-MCNC: 4.1 MMOL/L — SIGNIFICANT CHANGE UP (ref 3.5–5.3)
POTASSIUM SERPL-MCNC: 4.1 MMOL/L — SIGNIFICANT CHANGE UP (ref 3.5–5.3)
POTASSIUM SERPL-SCNC: 4.1 MMOL/L — SIGNIFICANT CHANGE UP (ref 3.5–5.3)
POTASSIUM SERPL-SCNC: 4.1 MMOL/L — SIGNIFICANT CHANGE UP (ref 3.5–5.3)
PROT SERPL-MCNC: 5.9 G/DL — LOW (ref 6.6–8.7)
PROT SERPL-MCNC: 5.9 G/DL — LOW (ref 6.6–8.7)
RBC # BLD: 3.46 M/UL — LOW (ref 4.2–5.8)
RBC # BLD: 3.46 M/UL — LOW (ref 4.2–5.8)
RBC # FLD: 15.9 % — HIGH (ref 10.3–14.5)
RBC # FLD: 15.9 % — HIGH (ref 10.3–14.5)
SODIUM SERPL-SCNC: 139 MMOL/L — SIGNIFICANT CHANGE UP (ref 135–145)
SODIUM SERPL-SCNC: 139 MMOL/L — SIGNIFICANT CHANGE UP (ref 135–145)
WBC # BLD: 11.88 K/UL — HIGH (ref 3.8–10.5)
WBC # BLD: 11.88 K/UL — HIGH (ref 3.8–10.5)
WBC # FLD AUTO: 11.88 K/UL — HIGH (ref 3.8–10.5)
WBC # FLD AUTO: 11.88 K/UL — HIGH (ref 3.8–10.5)

## 2023-11-30 PROCEDURE — 99232 SBSQ HOSP IP/OBS MODERATE 35: CPT

## 2023-11-30 PROCEDURE — 99223 1ST HOSP IP/OBS HIGH 75: CPT

## 2023-11-30 PROCEDURE — 99291 CRITICAL CARE FIRST HOUR: CPT | Mod: 24

## 2023-11-30 PROCEDURE — 99233 SBSQ HOSP IP/OBS HIGH 50: CPT

## 2023-11-30 PROCEDURE — 71045 X-RAY EXAM CHEST 1 VIEW: CPT | Mod: 26,76

## 2023-11-30 RX ORDER — ATORVASTATIN CALCIUM 80 MG/1
20 TABLET, FILM COATED ORAL AT BEDTIME
Refills: 0 | Status: DISCONTINUED | OUTPATIENT
Start: 2023-11-30 | End: 2023-12-06

## 2023-11-30 RX ORDER — METOPROLOL TARTRATE 50 MG
50 TABLET ORAL EVERY 6 HOURS
Refills: 0 | Status: DISCONTINUED | OUTPATIENT
Start: 2023-11-30 | End: 2023-12-06

## 2023-11-30 RX ORDER — CEFTRIAXONE 500 MG/1
INJECTION, POWDER, FOR SOLUTION INTRAMUSCULAR; INTRAVENOUS
Refills: 0 | Status: DISCONTINUED | OUTPATIENT
Start: 2023-11-30 | End: 2023-12-06

## 2023-11-30 RX ORDER — PANTOPRAZOLE SODIUM 20 MG/1
40 TABLET, DELAYED RELEASE ORAL
Refills: 0 | Status: DISCONTINUED | OUTPATIENT
Start: 2023-11-30 | End: 2023-12-06

## 2023-11-30 RX ORDER — CEFTRIAXONE 500 MG/1
2000 INJECTION, POWDER, FOR SOLUTION INTRAMUSCULAR; INTRAVENOUS EVERY 24 HOURS
Refills: 0 | Status: DISCONTINUED | OUTPATIENT
Start: 2023-12-01 | End: 2023-12-06

## 2023-11-30 RX ORDER — CEFTRIAXONE 500 MG/1
INJECTION, POWDER, FOR SOLUTION INTRAMUSCULAR; INTRAVENOUS
Refills: 0 | Status: DISCONTINUED | OUTPATIENT
Start: 2023-11-30 | End: 2023-11-30

## 2023-11-30 RX ORDER — CEFTRIAXONE 500 MG/1
2000 INJECTION, POWDER, FOR SOLUTION INTRAMUSCULAR; INTRAVENOUS ONCE
Refills: 0 | Status: COMPLETED | OUTPATIENT
Start: 2023-11-30 | End: 2023-11-30

## 2023-11-30 RX ORDER — HEPARIN SODIUM 5000 [USP'U]/ML
1700 INJECTION INTRAVENOUS; SUBCUTANEOUS
Qty: 25000 | Refills: 0 | Status: DISCONTINUED | OUTPATIENT
Start: 2023-11-30 | End: 2023-12-01

## 2023-11-30 RX ADMIN — Medication 50 MILLIGRAM(S): at 13:06

## 2023-11-30 RX ADMIN — Medication 50 MILLIGRAM(S): at 04:08

## 2023-11-30 RX ADMIN — POLYETHYLENE GLYCOL 3350 17 GRAM(S): 17 POWDER, FOR SOLUTION ORAL at 04:08

## 2023-11-30 RX ADMIN — INSULIN GLARGINE 16 UNIT(S): 100 INJECTION, SOLUTION SUBCUTANEOUS at 22:31

## 2023-11-30 RX ADMIN — ATORVASTATIN CALCIUM 20 MILLIGRAM(S): 80 TABLET, FILM COATED ORAL at 22:32

## 2023-11-30 RX ADMIN — CEFTRIAXONE 2000 MILLIGRAM(S): 500 INJECTION, POWDER, FOR SOLUTION INTRAMUSCULAR; INTRAVENOUS at 02:26

## 2023-11-30 RX ADMIN — HEPARIN SODIUM 2300 UNIT(S)/HR: 5000 INJECTION INTRAVENOUS; SUBCUTANEOUS at 01:17

## 2023-11-30 RX ADMIN — CHLORHEXIDINE GLUCONATE 1 APPLICATION(S): 213 SOLUTION TOPICAL at 04:08

## 2023-11-30 RX ADMIN — HEPARIN SODIUM 1700 UNIT(S)/HR: 5000 INJECTION INTRAVENOUS; SUBCUTANEOUS at 20:35

## 2023-11-30 RX ADMIN — PANTOPRAZOLE SODIUM 40 MILLIGRAM(S): 20 TABLET, DELAYED RELEASE ORAL at 13:06

## 2023-11-30 RX ADMIN — HEPARIN SODIUM 2000 UNIT(S)/HR: 5000 INJECTION INTRAVENOUS; SUBCUTANEOUS at 13:06

## 2023-11-30 RX ADMIN — HEPARIN SODIUM 2000 UNIT(S)/HR: 5000 INJECTION INTRAVENOUS; SUBCUTANEOUS at 05:50

## 2023-11-30 RX ADMIN — ONDANSETRON 4 MILLIGRAM(S): 8 TABLET, FILM COATED ORAL at 02:38

## 2023-11-30 RX ADMIN — Medication 81 MILLIGRAM(S): at 13:06

## 2023-11-30 RX ADMIN — Medication 50 MILLIGRAM(S): at 22:32

## 2023-11-30 RX ADMIN — Medication 50 MILLIGRAM(S): at 18:01

## 2023-11-30 NOTE — PROGRESS NOTE ADULT - SUBJECTIVE AND OBJECTIVE BOX
INTERVAL EVENTS:  Follow up diabetes management  Atrial flutter on monitor, HR in 130s    ROS: Denies chest pain, sob, abd pain.     MEDICATIONS  (STANDING):  acetaminophen     Tablet .. 975 milliGRAM(s) Oral every 6 hours  aspirin enteric coated 81 milliGRAM(s) Oral daily  atorvastatin 20 milliGRAM(s) Oral at bedtime  cefTRIAXone Injectable.      chlorhexidine 2% Cloths 1 Application(s) Topical <User Schedule>  dextrose 50% Injectable 50 milliLiter(s) IV Push every 15 minutes  heparin  Infusion.  Unit(s)/Hr (23 mL/Hr) IV Continuous <Continuous>  insulin glargine Injectable (LANTUS) 16 Unit(s) SubCutaneous at bedtime  insulin lispro (ADMELOG) corrective regimen sliding scale   SubCutaneous Before meals and at bedtime  insulin lispro Injectable (ADMELOG) 4 Unit(s) SubCutaneous three times a day before meals  metoprolol tartrate 50 milliGRAM(s) Oral every 6 hours  pantoprazole  Injectable 40 milliGRAM(s) IV Push daily  polyethylene glycol 3350 17 Gram(s) Oral two times a day  senna 2 Tablet(s) Oral at bedtime    MEDICATIONS  (PRN):  heparin   Injectable 34848 Unit(s) IV Push every 6 hours PRN For aPTT less than 40  heparin   Injectable 5000 Unit(s) IV Push every 6 hours PRN For aPTT between 40 - 57  ondansetron Injectable 4 milliGRAM(s) IV Push every 8 hours PRN Nausea and/or Vomiting  oxyCODONE    IR 10 milliGRAM(s) Oral every 4 hours PRN Severe Pain (7 - 10)  oxyCODONE    IR 5 milliGRAM(s) Oral every 4 hours PRN Moderate Pain (4 - 6)  sodium chloride 0.9% lock flush 10 milliLiter(s) IV Push every 1 hour PRN Pre/post blood products, medications, blood draw, and to maintain line patency    Allergies  No Known Allergies    Vital Signs Last 24 Hrs  T(C): 36.6 (30 Nov 2023 08:00), Max: 36.6 (30 Nov 2023 08:00)  T(F): 97.8 (30 Nov 2023 08:00), Max: 97.8 (30 Nov 2023 08:00)  HR: 127 (30 Nov 2023 08:00) (126 - 133)  BP: 140/70 (30 Nov 2023 03:00) (140/70 - 140/70)  BP(mean): 99 (30 Nov 2023 03:00) (99 - 99)  RR: 28 (30 Nov 2023 08:00) (17 - 38)  SpO2: 93% (30 Nov 2023 08:00) (93% - 99%)    Parameters below as of 29 Nov 2023 20:00  Patient On (Oxygen Delivery Method): room air    PHYSICAL EXAM:  General: No apparent distress  Neck: Supple, trachea midline, no thyromegaly  Respiratory: Diminished bs, +chest tubes   Cardiac: +S1, S2, irregular  GI: +BS, soft, non tender, non distended  Extremities: No peripheral edema, no pedal lesions  Neuro: A+O X3, no tremor      LABS:                        9.6    11.88 )-----------( 272      ( 30 Nov 2023 01:40 )             30.6     11-30    139  |  102  |  72.5<H>  ----------------------------<  127<H>  4.1   |  24.0  |  1.85<H>    Ca    7.9<L>      30 Nov 2023 01:40  Mg     2.6     11-30    TPro  5.9<L>  /  Alb  2.5<L>  /  TBili  1.0  /  DBili  x   /  AST  34  /  ALT  26  /  AlkPhos  100  11-30    Urinalysis Basic - ( 30 Nov 2023 01:40 )    Color: x / Appearance: x / SG: x / pH: x  Gluc: 127 mg/dL / Ketone: x  / Bili: x / Urobili: x   Blood: x / Protein: x / Nitrite: x   Leuk Esterase: x / RBC: x / WBC x   Sq Epi: x / Non Sq Epi: x / Bacteria: x    POCT Blood Glucose.: 143 mg/dL (11-30-23 @ 07:38)  POCT Blood Glucose.: 153 mg/dL (11-29-23 @ 21:10)  POCT Blood Glucose.: 132 mg/dL (11-29-23 @ 18:47)  POCT Blood Glucose.: 121 mg/dL (11-29-23 @ 17:48)  POCT Blood Glucose.: 134 mg/dL (11-29-23 @ 16:35)  POCT Blood Glucose.: 144 mg/dL (11-29-23 @ 13:39)  POCT Blood Glucose.: 119 mg/dL (11-29-23 @ 11:51)

## 2023-11-30 NOTE — CONSULT NOTE ADULT - NS ATTEND AMEND GEN_ALL_CORE FT
I agree with note above which I have editted. risks>benefits of procedure and I believe more valuable information will be obtained after repeating echo post DCCV with EF in sinus rhythm vs more controlled rate. would also allow kidney rest. I would recommend angiography to be done outpatient at least 30 days after DCCV if clinically indicated (to allow uninterrupted DAPT).

## 2023-11-30 NOTE — PROGRESS NOTE ADULT - NS ATTEND AMEND GEN_ALL_CORE FT
plan discussed with NP and changes incorporated in the note.    agree with the plan above  patient seen and examined  sister at bedside, doing better than yesterday

## 2023-11-30 NOTE — PROGRESS NOTE ADULT - SUBJECTIVE AND OBJECTIVE BOX
[facility]  INFECTIOUS DISEASES at [place] and [place]  ===============================================================                  Luis Ivey MD               Diplomates American Board of Internal Medicine & Infectious Diseases                * [place] Office - Appt - Tel  [phone] Fax [phone]                * [place] Office - Appt - Tel [phone] Fax [phone]                                  Hospital Consult line:  [phone]  ==============================================================    GRETA AGUAYO [id number]    Follow up: empyema     No acute events  Remains in AFl with RVR  hemodynamically stable     I have personally reviewed the labs and data; pertinent labs and data are listed in this note; please see below.     _______________________________________________________________  REVIEW OF SYSTEMS  ROS limited - somnolent; falls asleep immediately  ________________________________________________________________  Allergies:  No Known Allergies        ________________________________________________________________  PHYSICAL EXAM  GEN: in NAD, lying in bed. Morbidly obese.   HEENT: Anicteric sclerae.   NECK: Supple.   LUNGS: unlabored breathing. Decreased BS in left lung. Left CT x2 with serous output   HEART: RRR, no m/r/g  ABDOMEN: Soft, NT, ND,  +BS.    : Burger catheter  EXTREMITIES: mild edema.  NEUROLOGIC: somnolent, wakes up briefly when called.   SKIN: No rash or jaundice  LINES: RIJ CVC, right arm a-line   ________________________________________________________________  Vitals:  T(F): 97.8 (30 Nov 2023 08:00), Max: 97.8 (30 Nov 2023 08:00)  HR: 127 (30 Nov 2023 08:00)  BP: 140/70 (30 Nov 2023 03:00)  RR: 28 (30 Nov 2023 08:00)  SpO2: 93% (30 Nov 2023 08:00) (93% - 99%)  temp max in last 48H T(F): , Max: 99.9 (11-28-23 @ 16:05)    Current Antibiotics:  cefTRIAXone Injectable.        Other medications:  acetaminophen     Tablet .. 975 milliGRAM(s) Oral every 6 hours  aspirin enteric coated 81 milliGRAM(s) Oral daily  atorvastatin 20 milliGRAM(s) Oral at bedtime  chlorhexidine 2% Cloths 1 Application(s) Topical <User Schedule>  dextrose 50% Injectable 50 milliLiter(s) IV Push every 15 minutes  heparin  Infusion.  Unit(s)/Hr IV Continuous <Continuous>  insulin glargine Injectable (LANTUS) 16 Unit(s) SubCutaneous at bedtime  insulin lispro (ADMELOG) corrective regimen sliding scale   SubCutaneous Before meals and at bedtime  insulin lispro Injectable (ADMELOG) 4 Unit(s) SubCutaneous three times a day before meals  metoprolol tartrate 50 milliGRAM(s) Oral every 6 hours  pantoprazole    Tablet 40 milliGRAM(s) Oral before breakfast  polyethylene glycol 3350 17 Gram(s) Oral two times a day  senna 2 Tablet(s) Oral at bedtime                            9.6    11.88 )-----------( 272      ( 30 Nov 2023 01:40 )             30.6     11-30    139  |  102  |  72.5<H>  ----------------------------<  127<H>  4.1   |  24.0  |  1.85<H>    Ca    7.9<L>      30 Nov 2023 01:40  Mg     2.6     11-30    TPro  5.9<L>  /  Alb  2.5<L>  /  TBili  1.0  /  DBili  x   /  AST  34  /  ALT  26  /  AlkPhos  100  11-30    RECENT CULTURES:  11-27 @ 18:56 .Body Fluid left pleural fluid     No growth to date.    Few polymorphonuclear leukocytes seen per low power field  Moderate Gram positive cocci in pairs seen per oil power field      11-27 @ 18:30 .Tissue left pleural peel     No growth    Few polymorphonuclear leukocytes seen per low power field  No organisms seen per oil power field      11-26 @ 07:20 .Blood Blood-Peripheral     No growth at 72 Hours      11-26 @ 07:15 .Blood Blood-Peripheral     No growth at 72 Hours      11-24 @ 09:00    RVP with SARS-CoV-2   NotDete      11-24 @ 01:25 .Blood Blood-Peripheral     No growth at 5 days      11-23 @ 12:42 .Blood Blood-Peripheral     No growth at 5 days      11-23 @ 12:35 .Blood Blood-Peripheral     No growth at 5 days      11-18 @ 14:00 Pleural Fl Pleural Fluid Streptococcus intermedius    Few Streptococcus intermedius    polymorphonuclear leukocytes seen  No organisms seen  by cytocentrifuge      11-18 @ 08:30   RVP with SARS-CoV-2   NotDetec      WBC Count: 11.88 K/uL (11-30-23 @ 01:40)  WBC Count: 14.87 K/uL (11-29-23 @ 03:00)  WBC Count: 14.54 K/uL (11-28-23 @ 20:10)  WBC Count: 29.35 K/uL (11-28-23 @ 00:40)  WBC Count: 24.62 K/uL (11-27-23 @ 21:05)  WBC Count: 17.64 K/uL (11-27-23 @ 06:34)  WBC Count: 18.34 K/uL (11-26-23 @ 07:20)    Creatinine: 1.85 mg/dL (11-30-23 @ 01:40)  Creatinine: 2.17 mg/dL (11-29-23 @ 03:00)  Creatinine: 2.10 mg/dL (11-28-23 @ 20:10)  Creatinine: 2.15 mg/dL (11-28-23 @ 00:40)  Creatinine: 2.09 mg/dL (11-27-23 @ 22:00)  Creatinine: 0.84 mg/dL (11-27-23 @ 21:05)  Creatinine: 1.99 mg/dL (11-27-23 @ 06:34)  Creatinine: 2.06 mg/dL (11-26-23 @ 07:20)        Procalcitonin, Serum: 0.74 ng/mL (11-24-23 @ 01:25)  Procalcitonin, Serum: 2.59 ng/mL (11-19-23 @ 01:29)     SARS-CoV-2: NotDetec (11-24-23 @ 09:00)  SARS-CoV-2: NotDetec (11-18-23 @ 08:30)    Vancomycin Level, Trough: <4.0 ug/mL (11-28-23 @ 04:00)    ________________________________________________________________  CARDIOLOGY   < from: TTE Echo Complete w/o Contrast w/ Doppler (11.18.23 @ 12:34) >    Summary:   1. There is mild concentric left ventricular hypertrophy.   2. Moderate to severely increased left ventricular internal cavity size.   3. Left ventricular ejection fraction, by visual estimation, is 20 to   25%.   4. Severely decreased global left ventricular systolic function.   5. The mitral in-flow pattern reveals no discernable A-wave, therefore   no comment on diastolic function can be made.   6. Mildly enlarged right ventricle.   7. Severely reduced RV systolic function.   8. Mildly enlarged left atrium.   9. Normal right atrial size.  10. Sclerotic aortic valve with normal opening.  11. Mild thickening and calcification of the anterior and posterior   mitral valve leaflets.  12. Mild mitral annular calcification.  13. Mild mitral valve regurgitation.  14. The main pulmonary artery is normal in size.  15. There is no evidence of pericardial effusion.    < end of copied text >    ________________________________________________________________  RADIOLOGY  < from: Xray Chest 1 View- PORTABLE-Urgent (Xray Chest 1 View- PORTABLE-Urgent .) (11.28.23 @ 06:31) >  IMPRESSION: Significant pleural drainage after insertion of 2 left chest   tubes. Mild to moderate residual remains. NG tube inserted. Endotracheal   tube and right jugular sleeve remain.    < end of copied text >

## 2023-11-30 NOTE — PROGRESS NOTE ADULT - SUBJECTIVE AND OBJECTIVE BOX
Pt seen lying in bed, denies complaint. States his breathing has improved.   Telemetry shows atrial flutter in the 120s.     PAST MEDICAL & SURGICAL HISTORY:  Coronary artery disease involving native coronary artery of native heart, unspecified whether angina  CHF with unknown LVEF  Primary hypertension  Hyperlipidemia, unspecified hyperlipidemia type  S/P CABG x 5  History of cholecystectomy    MEDICATIONS  (STANDING):  acetaminophen     Tablet .. 975 milliGRAM(s) Oral every 6 hours  aspirin enteric coated 81 milliGRAM(s) Oral daily  atorvastatin 20 milliGRAM(s) Oral at bedtime  cefTRIAXone Injectable.      chlorhexidine 2% Cloths 1 Application(s) Topical <User Schedule>  dextrose 50% Injectable 50 milliLiter(s) IV Push every 15 minutes  heparin  Infusion.  Unit(s)/Hr (23 mL/Hr) IV Continuous <Continuous>  insulin glargine Injectable (LANTUS) 16 Unit(s) SubCutaneous at bedtime  insulin lispro (ADMELOG) corrective regimen sliding scale   SubCutaneous Before meals and at bedtime  insulin lispro Injectable (ADMELOG) 4 Unit(s) SubCutaneous three times a day before meals  metoprolol tartrate 50 milliGRAM(s) Oral every 6 hours  pantoprazole    Tablet 40 milliGRAM(s) Oral before breakfast  polyethylene glycol 3350 17 Gram(s) Oral two times a day  senna 2 Tablet(s) Oral at bedtime    MEDICATIONS  (PRN):  heparin   Injectable 26297 Unit(s) IV Push every 6 hours PRN For aPTT less than 40  heparin   Injectable 5000 Unit(s) IV Push every 6 hours PRN For aPTT between 40 - 57  ondansetron Injectable 4 milliGRAM(s) IV Push every 8 hours PRN Nausea and/or Vomiting  sodium chloride 0.9% lock flush 10 milliLiter(s) IV Push every 1 hour PRN Pre/post blood products, medications, blood draw, and to maintain line patency    Allergies:  No Known Allergies    Vital Signs Last 24 Hrs  T(C): 36.7 (30 Nov 2023 12:00), Max: 36.7 (30 Nov 2023 12:00)  T(F): 98 (30 Nov 2023 12:00), Max: 98 (30 Nov 2023 12:00)  HR: 128 (30 Nov 2023 14:00) (126 - 133)  BP: 140/70 (30 Nov 2023 03:00) (140/70 - 140/70)  BP(mean): 99 (30 Nov 2023 03:00) (99 - 99)  RR: 34 (30 Nov 2023 14:00) (17 - 38)  SpO2: 93% (30 Nov 2023 14:00) (88% - 97%)    Parameters below as of 29 Nov 2023 20:00  Patient On (Oxygen Delivery Method): room air    Physical Exam:  Constitutional: awake, alert, no obvious distress   Cardiovascular: +S1S2 tachycardic   Chest: left sided chest tube x 2   Pulmonary: CTA b/l, unlabored  GI: soft NTND +BS  Extremities: no pedal edema, +distal pulses b/l  Neuro: non focal, LEE x4    LABS:                        9.6    11.88 )-----------( 272      ( 30 Nov 2023 01:40 )             30.6     11-30    139  |  102  |  72.5<H>  ----------------------------<  127<H>  4.1   |  24.0  |  1.85<H>    Ca    7.9<L>      30 Nov 2023 01:40  Mg     2.6     11-30    TPro  5.9<L>  /  Alb  2.5<L>  /  TBili  1.0  /  DBili  x   /  AST  34  /  ALT  26  /  AlkPhos  100  11-30    PTT - ( 30 Nov 2023 11:37 )  PTT:113.0 sec  Urinalysis Basic - ( 30 Nov 2023 01:40 )    Color: x / Appearance: x / SG: x / pH: x  Gluc: 127 mg/dL / Ketone: x  / Bili: x / Urobili: x   Blood: x / Protein: x / Nitrite: x   Leuk Esterase: x / RBC: x / WBC x   Sq Epi: x / Non Sq Epi: x / Bacteria: x    RADIOLOGY & ADDITIONAL TESTS:  CXR: 11/27/23:   FINDINGS: No significant change.  CATHETERS AND TUBES: Vascular LEFT  PULMONARY: Opacified lower two thirds of LEFT hemithorax with aerated LEFT apex and medial LEFT upper zone segment indicatingresidual loculated pleural effusion and/or airspace consolidation. No pneumothorax.  HEART/VASCULAR: The  heart is mildly enlarged in transverse diameter. Status post median sternotomy.  BONES: Visualized osseous thorax intact.  IMPRESSION:  No significant change..  --- End of Report ---  TANNER MACHADO MD; Attending Radiologist  This document has been electronically signed. Nov 27 2023  3:07PM    Echo: 11/18/23:   Summary:   1. There is mild concentric left ventricular hypertrophy.   2. Moderate to severely increased left ventricular internal cavity size.   3. Left ventricular ejection fraction, by visual estimation, is 20 to 25%.   4. Severely decreased global left ventricular systolic function.   5. The mitral in-flow pattern reveals no discernable A-wave, therefore no comment on diastolic function can be made.   6. Mildly enlarged right ventricle.   7. Severely reduced RV systolic function.   8. Mildly enlarged left atrium.   9. Normal right atrial size.  10. Sclerotic aortic valve with normal opening.  11. Mild thickening and calcification of the anterior and posterior mitral valve leaflets.  12. Mild mitral annular calcification.  13. Mild mitral valve regurgitation.  14. The main pulmonary artery is normal in size.  15. There is no evidence of pericardial effusion.  Otf Frederick MD Electronically signed on 11/18/2023 at 4:20:53 PM

## 2023-11-30 NOTE — PROGRESS NOTE ADULT - ASSESSMENT
72M with CAD s/p CABG, HFrEF, HTN, Afib admitted on 11/18 with dyspnea. Found to have large loculated left pleural effusion s/p CT on 11/18. Hospital course complicated by Afl/afib with RVR followed by EP     Left empyema   Aflutter with RVR   CKD     - s/p VATS/decortication on 11/27 >> empyema   - Extubated on 11/28   - Continue ceftriaxone 2g IV daily   - Follow surgical cultures     - Gram with GPC in pairs. Cultures ngtd   - 11/18 CT Chest with large loculated pleural effusion   - s/p left chest tube on 11/18   - 11/18 Pleural fluid culture with Streptococcus intermedius   - 11/23, 11/24 and 11/26 BCx ngtd  - Difficulty achieving rate control for Aflutter - will need eventual cardioversion.   - EF 20-25%  - Leukocytosis improving  - Remainder of care as per CTICU     D/w ASP/clinical pharmacist and CTICU team

## 2023-11-30 NOTE — PROGRESS NOTE ADULT - SUBJECTIVE AND OBJECTIVE BOX
GRETA AGUAYO  MRN-[id number]    HPI:  Patient is a 71 yo M w/ PMH of CAD s/p CABG, CHF, HTN, HLD, Afib (not on AC due to hx of GIB) presenting for chief complaint of SOB. Patient states he started experiencing SOB and LE swelling for the past few days He states he was seen by his Cardiologist on, Dr. Milian, on Tuesday and his Metoprolol was increased from 50mg to 100mg. He states his symptoms worsened yesterday therefore he came to the ED today. He states he has been unable to walk around without getting short of breath. He reports compliance with medications and diet. Patient denies fever, chills, chest pain, palpitations, cough, wheezing, abdominal pain, nausea, vomiting, diarrhea, constipation, bloody bowel movements, melena, hematemesis, urinary complaints, syncope, calf tenderness, paresthesias.  (18 Nov 2023 14:38)    Surgery/Hospital Course:  ·  PRE-OP DIAGNOSIS:  Empyema of left pleural space  ·  POST-OP DIAGNOSIS:  Empyema of left pleural space   ·  PROCEDURES:  Decortication, lung, total, using VATS 27-Nov-2023   Flexible bronchoscopy   Today:  No acute events --    ICU Vital Signs Last 24 Hrs  T(C): 36.7 (30 Nov 2023 12:00), Max: 36.7 (30 Nov 2023 12:00)  T(F): 98 (30 Nov 2023 12:00), Max: 98 (30 Nov 2023 12:00)  HR: 127 (30 Nov 2023 12:00) (126 - 133)  BP: 140/70 (30 Nov 2023 03:00) (140/70 - 140/70)  BP(mean): 99 (30 Nov 2023 03:00) (99 - 99)  ABP: 110/70 (30 Nov 2023 12:00) (85/73 - 134/74)  ABP(mean): 81 (30 Nov 2023 11:00) (74 - 92)  RR: 29 (30 Nov 2023 12:00) (17 - 38)  SpO2: 93% (30 Nov 2023 12:00) (88% - 97%)    O2 Parameters below as of 29 Nov 2023 20:00  Patient On (Oxygen Delivery Method): room air            Physical Exam:  Gen: A&O   CNS: non focal 	  Neck: no JVD  RES : clear , no wheezing              CVS: Regular  rhythm. Normal S1/S2  Abd: Soft, non-distended. Bowel sounds present.  Skin: No rash.  Ext:  no edema    ============================I/O===========================   I&O's Detail    29 Nov 2023 07:01  -  30 Nov 2023 07:00  --------------------------------------------------------  IN:    Heparin Infusion: 368 mL    Insulin: 20 mL    IV PiggyBack: 125 mL    Lactated Ringers: 150 mL    Oral Fluid: 240 mL  Total IN: 903 mL    OUT:    Chest Tube (mL): 150 mL    Chest Tube (mL): 165 mL    Indwelling Catheter - Urethral (mL): 2915 mL  Total OUT: 3230 mL    Total NET: -2327 mL        ============================ LABS =========================                        9.6    11.88 )-----------( 272      ( 30 Nov 2023 01:40 )             30.6     11-30    139  |  102  |  72.5<H>  ----------------------------<  127<H>  4.1   |  24.0  |  1.85<H>    Ca    7.9<L>      30 Nov 2023 01:40  Mg     2.6     11-30    TPro  5.9<L>  /  Alb  2.5<L>  /  TBili  1.0  /  DBili  x   /  AST  34  /  ALT  26  /  AlkPhos  100  11-30    LIVER FUNCTIONS - ( 30 Nov 2023 01:40 )  Alb: 2.5 g/dL / Pro: 5.9 g/dL / ALK PHOS: 100 U/L / ALT: 26 U/L / AST: 34 U/L / GGT: x           PTT - ( 30 Nov 2023 11:37 )  PTT:113.0 sec  ABG - ( 29 Nov 2023 02:00 )  pH, Arterial: 7.470 pH, Blood: x     /  pCO2: 39    /  pO2: 151   / HCO3: 28    / Base Excess: 4.7   /  SaO2: 99.8              Urinalysis Basic - ( 30 Nov 2023 01:40 )    Color: x / Appearance: x / SG: x / pH: x  Gluc: 127 mg/dL / Ketone: x  / Bili: x / Urobili: x   Blood: x / Protein: x / Nitrite: x   Leuk Esterase: x / RBC: x / WBC x   Sq Epi: x / Non Sq Epi: x / Bacteria: x      ======================Micro/Rad/Cardio=================  Culture: Reviewed   CXR: Reviewed  Echo:Reviewed  ======================================================  PAST MEDICAL & SURGICAL HISTORY:  Coronary artery disease involving native coronary artery of native heart, unspecified whether angina      CHF with unknown LVEF      Primary hypertension      Hyperlipidemia, unspecified hyperlipidemia type      S/P CABG x 5      History of cholecystectomy        ====================ASSESSMENT ==============  72-year-old male with history of hypertension CAD status post quintuple bypass November 2021 followed by cardiology/Dr. Milian in [place]  resents the ED via EMS complaining of worsening dyspnea on exertion since yesterday.  Thoracic surgery consulted treatment Left effusion. Left PTC placed 11/28. Patient now pending FB left robotic assisted decort     --- Pleural effusion.   --- Left PTC placed 11/18  ---Acute respiratory failure with hypoxia.   ---Atrial flutter.   ---Coronary artery disease involving native coronary artery of native heart, unspecified whether angina.   --- Acute kidney injury.   ---Post op Hypovolemia  ---Post op respiratory insufficiency       Plan:  -Maintain CT  to H2O seal  -Continue ceftriaxone 2g IV daily  -  -EP will plan for cardioversion today  - Continue lipitor  -ASA when appropriate.  -Renal following  -Monitor vanco/dig level.---  ====================== NEUROLOGY=====================  acetaminophen     Tablet .. 975 milliGRAM(s) Oral every 6 hours  ondansetron Injectable 4 milliGRAM(s) IV Push every 8 hours PRN Nausea and/or Vomiting    ==================== RESPIRATORY======================  Post op respiratory insufficiency  ====================CARDIOVASCULAR==================  Post op Hypovolemia  metoprolol tartrate 50 milliGRAM(s) Oral every 6 hours    ===================HEMATOLOGIC/ONC ===================  Monitor H&H/Plts    aspirin enteric coated 81 milliGRAM(s) Oral daily  heparin   Injectable 44926 Unit(s) IV Push every 6 hours PRN For aPTT less than 40  heparin   Injectable 5000 Unit(s) IV Push every 6 hours PRN For aPTT between 40 - 57  heparin  Infusion.  Unit(s)/Hr (23 mL/Hr) IV Continuous <Continuous>    ===================== RENAL =========================  Continue monitoring urine output, I&OS, BUN/Cr     ==================== GASTROINTESTINAL===================  pantoprazole    Tablet 40 milliGRAM(s) Oral before breakfast  polyethylene glycol 3350 17 Gram(s) Oral two times a day  senna 2 Tablet(s) Oral at bedtime  sodium chloride 0.9% lock flush 10 milliLiter(s) IV Push every 1 hour PRN Pre/post blood products, medications, blood draw, and to maintain line patency    =======================    ENDOCRINE  =====================  atorvastatin 20 milliGRAM(s) Oral at bedtime  dextrose 50% Injectable 50 milliLiter(s) IV Push every 15 minutes  insulin glargine Injectable (LANTUS) 16 Unit(s) SubCutaneous at bedtime  insulin lispro (ADMELOG) corrective regimen sliding scale   SubCutaneous Before meals and at bedtime  insulin lispro Injectable (ADMELOG) 4 Unit(s) SubCutaneous three times a day before meals    ========================INFECTIOUS DISEASE================  cefTRIAXone Injectable.          -Monitor Neurologic status ,   -Head of the bed should remain elevated to 45 degrees,  -Monitor for arrhythmias and monitor parameters for organ perfusion,  -Glycemic control is satisfactory,  -Nutritional goals will be met using po eventually , insure adequate caloric intake and monitor the same ,  -Electrolytes have been repleted as necessary , pain control has been achieved  and wound care has been carried out ,  -Stress ulcer and VTE prophylaxis will be achieved,  -Agressive PT and early mobility and ambulation goals will be met,  -The family was updated about the course and plan .      I have spent 35 minutes providing acute care for this critically ill patient     Patient requires continuous monitoring with bedside rhythm monitoring, pulse ox monitoring, and intermittent blood gas analysis. Care plan discussed with ICU care team. Patient remained critical and at risk for life threatening decompensation.

## 2023-11-30 NOTE — CHART NOTE - NSCHARTNOTEFT_GEN_A_CORE
Source: Patient [ ]  Family [ ]   other [x] EMR    Current Diet: Diet, NPO after Midnight:      NPO Start Date: 29-Nov-2023,   NPO Start Time: 23:59 (11-29-23 @ 19:50)    Current Weight:   11/28 127 kg  11/18 127 kg  +2 generalized edema, continue to monitor weight trends    Pertinent Medications: MEDICATIONS  (STANDING):  atorvastatin 20 milliGRAM(s) Oral at bedtime  dextrose 50% Injectable 50 milliLiter(s) IV Push every 15 minutes  heparin  Infusion.  Unit(s)/Hr (23 mL/Hr) IV Continuous <Continuous>  insulin glargine Injectable (LANTUS) 16 Unit(s) SubCutaneous at bedtime  insulin lispro (ADMELOG) corrective regimen sliding scale   SubCutaneous Before meals and at bedtime  metoprolol tartrate 50 milliGRAM(s) Oral every 6 hours  pantoprazole    Tablet 40 milliGRAM(s) Oral before breakfast  polyethylene glycol 3350 17 Gram(s) Oral two times a day  senna 2 Tablet(s) Oral at bedtime    MEDICATIONS  (PRN):  heparin   Injectable 21232 Unit(s) IV Push every 6 hours PRN For aPTT less than 40  ondansetron Injectable 4 milliGRAM(s) IV Push every 8 hours PRN Nausea and/or Vomiting    Pertinent Labs: CBC Full  -  ( 30 Nov 2023 01:40 )  WBC Count : 11.88 K/uL  RBC Count : 3.46 M/uL  Hemoglobin : 9.6 g/dL  Hematocrit : 30.6 %  Platelet Count - Automated : 272 K/uL  Mean Cell Volume : 88.4 fl  Mean Cell Hemoglobin : 27.7 pg  Mean Cell Hemoglobin Concentration : 31.4 gm/dL    11-30 Na139 mmol/L Glu 127 mg/dL<H> K+ 4.1 mmol/L Cr  1.85 mg/dL<H> BUN 72.5 mg/dL<H> Phos n/a   Alb 2.5 g/dL<L>    Skin: IAD, stage 2 R buttock, stage 2 L buttock, L buttock DTI, R buttock DTI    Nutrition focused physical exam previously conducted - found signs of malnutrition [x]absent [ ]present    Subcutaneous fat loss: [ ] Orbital fat pads region, [ ]Buccal fat region, [ ]Triceps region,  [ ]Ribs region    Muscle wasting: [ ]Temples region, [ ]Clavicle region, [ ]Shoulder region, [ ]Scapula region, [ ]Interosseous region,  [ ]thigh region, [ ]Calf region    Estimated Needs:   [x] no change since previous assessment  [ ] recalculated:     Hospital Course: 73 yo male with pmhx of hypertension, CAD status post quintuple bypass November 2021 followed by cardiology, presents the ED via EMS complaining of worsening dyspnea on exertion. L pleural effusion, s/p left PTC 11/28. s/p VATS/decortication >> empyema. Extubated 11/28. Hospital course complicated by Afl/afib with RVR followed by EP.     Current Nutrition Diagnosis: Pt remains a high nutrition risk secondary to Increased Nutrient Needs (energy, protein, micronutrients) related to increased physiological demand in setting of poor skin integrity as evidenced by multiple pressure injuries (stage 2 R and L buttock, DTI R and L buttock).  Chart reviewed, pt is NPO today pending proceduer. Per last RD note, pt had good appetite/po intake prior to admission. A1C 7.5%. F/U for diet education when diet re-instated and pt more awake/alert. RD to follow. Recommendations below:    Recommendations:   1. Diet advancement when medically feasible  2. Rx: MVI, VIt C 500 mg daily to aid wound healing  3. Daily weights to trend    Monitoring and Evaluation:   [x] PO intake [x] Tolerance to diet prescription [X] Weights  [X] Follow up per protocol [X] Labs:

## 2023-11-30 NOTE — PROGRESS NOTE ADULT - ASSESSMENT
72M w/ PMH of T2DM, CAD s/p CABG, CHF, HTN, HLD, Afib (not on AC due to hx of GIB) presents with a few days worsening of dyspnea and LE edema. found to have large loculated pleural effusion. 11/28 s/p L lung decortication.    Consulted for diabetes management  Home diabetes meds: Was on Januvia previously, not recent  Current a1c: 7.3%    1. T2DM  - Transitioned off insulin gtt  - Lantus 16 units qhs  - Admelog 4 units tid and sliding scale coverage  - Will likely need oral meds upon discharge    2. Pleural effusion  - S/p decortication, care per primary team    3. HLD  - CAontinue statin   72M w/ PMH of T2DM, CAD s/p CABG, CHF, HTN, HLD, Afib (not on AC due to hx of GIB) presents with a few days worsening of dyspnea and LE edema. found to have large loculated pleural effusion. 11/28 s/p L lung decortication.    Consulted for diabetes management  Home diabetes meds: Was on Januvia previously, not recent  Current a1c: 7.3%    1. T2DM  - Transitioned off insulin gtt  - Lantus 16 units qhs  - Admelog 4 units tid and sliding scale coverage  - will consider adding other meds in addition    2. Pleural effusion  - S/p decortication, care per primary team    3. HLD  - CAontinue statin

## 2023-11-30 NOTE — CONSULT NOTE ADULT - SUBJECTIVE AND OBJECTIVE BOX
Department of Cardiology                                                                  [facility]/[place] [address] [place] [address]                                                            Telephone: [phone]. Fax:[phone]                                                                             Interventional Cardiology Consult Note      Interventional Cardiology consulting for Angiogram preceeding QUIN DCCV  Update: No acute events overnight, NAD and HDS, MP atrial Flutter 128.  HPI: 74 yo male with H/O 5VCABG [place] 2014,  admitted for acute Hypoxemia, HFrEF 30%, Left empyema, septic shock on - off pressors.  Consulted for possible Angiogram preceding QUIN DCCV.       Symptoms:        Angina (Class):        Ischemic Symptoms:     Heart Failure:        Systolic/Diastolic/Combined:        NYHA Class (within 2 weeks):       Assessment of LVEF:  Summary 11/18/23 4:20pm    1. There is mild concentric left ventricular hypertrophy.   2. Moderate to severely increased left ventricular internal cavity size.   3. Left ventricular ejection fraction, by visual estimation, is 20 to   25%.   4. Severely decreased global left ventricular systolic function.   5. The mitral in-flow pattern reveals no discernable A-wave, therefore   no comment on diastolic function can be made.   6. Mildly enlarged right ventricle.   7. Severely reduced RV systolic function.   8. Mildly enlarged left atrium.   9. Normal right atrial size.  10. Sclerotic aortic valve with normal opening.  11. Mild thickening and calcification of the anterior and posterior   mitral valve leaflets.  12. Mild mitral annular calcification.  13. Mild mitral valve regurgitation.  14. The main pulmonary artery is normal in size.  15. There is no evidence of pericardial effusion.    MD Nury Electronically signed on 11/18/2023 at 4:20:53 PM        Associated Risk Factors:        Frailty Assessment (none/mild/mod/severe): Moderate        Cerebrovascular Disease: N/A       Chronic Lung Disease: N/A       Peripheral Arterial Disease: N/A       Chronic Kidney Disease (if yes, what is GFR): N/A       Uncontrolled Diabetes (if yes, what is HgbA1C or FBS): Yes        Poorly Controlled Hypertension (if yes, what is SBP): N/A       Morbid Obesity (if yes, what is BMI):        History of Recent Ventricular Arrhythmia: N/A       Inability to Ambulate Safely: YES       Need for Therapeutic Anticoagulation: YEs       Antiplatelet or Contrast Allergy: N/A    Antianginal Therapies:        Beta Blockers:  Metoprolol        Calcium Channel Blockers:        Long Acting Nitrates:        Ranexa:     	  MEDICATIONS:  metoprolol tartrate 50 milliGRAM(s) Oral every 6 hours  cefTRIAXone Injectable.      acetaminophen     Tablet .. 975 milliGRAM(s) Oral every 6 hours  ondansetron Injectable 4 milliGRAM(s) IV Push every 8 hours PRN  pantoprazole    Tablet 40 milliGRAM(s) Oral before breakfast  polyethylene glycol 3350 17 Gram(s) Oral two times a day  senna 2 Tablet(s) Oral at bedtime  atorvastatin 20 milliGRAM(s) Oral at bedtime  dextrose 50% Injectable 50 milliLiter(s) IV Push every 15 minutes  insulin glargine Injectable (LANTUS) 16 Unit(s) SubCutaneous at bedtime  insulin lispro (ADMELOG) corrective regimen sliding scale   SubCutaneous Before meals and at bedtime  insulin lispro Injectable (ADMELOG) 4 Unit(s) SubCutaneous three times a day before meals  aspirin enteric coated 81 milliGRAM(s) Oral daily  chlorhexidine 2% Cloths 1 Application(s) Topical <User Schedule>  heparin   Injectable 53075 Unit(s) IV Push every 6 hours PRN  heparin   Injectable 5000 Unit(s) IV Push every 6 hours PRN  heparin  Infusion.  Unit(s)/Hr IV Continuous <Continuous>  sodium chloride 0.9% lock flush 10 milliLiter(s) IV Push every 1 hour PRN        ROS: as stated above, otherwise negative    PHYSICAL EXAM:  Constitutional: A & O x 3  HEENT:   Normal oral mucosa, PERRL, EOMI	  Cardiovascular: Atrial Flutter   Respiratory: L Lung field absent Chest tubes in place   Gastrointestinal:  Soft, Non-tender, + BS	  Burger in Place clear urine in Place   Extremities: Limited ROM , no edema   Vascular: Peripheral pulses palpable + bilaterally      T(C): 36.7 (11-30-23 @ 12:00), Max: 36.7 (11-30-23 @ 12:00)  HR: 128 (11-30-23 @ 13:00) (126 - 133)  BP: 140/70 (11-30-23 @ 03:00) (140/70 - 140/70)  RR: 30 (11-30-23 @ 13:00) (17 - 38)  SpO2: 91% (11-30-23 @ 13:00) (88% - 97%)  Wt(kg): --        ECG:  Atrial Flutter 	    LABS:	 	                          9.6    11.88 )-----------( 272      ( 30 Nov 2023 01:40 )             30.6     11-30    139  |  102  |  72.5<H>  ----------------------------<  127<H>  4.1   |  24.0  |  1.85<H>    Ca    7.9<L>      30 Nov 2023 01:40  Mg     2.6     11-30    TPro  5.9<L>  /  Alb  2.5<L>  /  TBili  1.0  /  DBili  x   /  AST  34  /  ALT  26  /  AlkPhos  100  11-30        A/P:   Patient is a 71 yo M w/ PMH of CAD s/p CABG, CHF, HTN, HLD, Afib (not on AC due to hx of GIB), admitted for Hypoxia  , Left loculated empyema, septic shock on pressors now off consulted for consideration of coronary angiography.    At this time, risks>benefits of procedure due to recent DEVON/septic shock, no evidence of ACS, and it is possible the depressed LVEF is in the setting of tachycardia. I believe the best route is to restore sinus rhythm and reassess symptoms and LVEF. This will also allow renal recovery. This can be done outpatient after 30 days to maintain anticoagulation post cardioversion uninterrupted.   Please call with any Clinical or Status change indicating interventional angiogram.

## 2023-12-01 LAB
ALBUMIN SERPL ELPH-MCNC: 2.6 G/DL — LOW (ref 3.3–5.2)
ALBUMIN SERPL ELPH-MCNC: 2.6 G/DL — LOW (ref 3.3–5.2)
ALP SERPL-CCNC: 97 U/L — SIGNIFICANT CHANGE UP (ref 40–120)
ALP SERPL-CCNC: 97 U/L — SIGNIFICANT CHANGE UP (ref 40–120)
ALT FLD-CCNC: 27 U/L — SIGNIFICANT CHANGE UP
ALT FLD-CCNC: 27 U/L — SIGNIFICANT CHANGE UP
ANION GAP SERPL CALC-SCNC: 11 MMOL/L — SIGNIFICANT CHANGE UP (ref 5–17)
ANION GAP SERPL CALC-SCNC: 11 MMOL/L — SIGNIFICANT CHANGE UP (ref 5–17)
ANISOCYTOSIS BLD QL: SLIGHT — SIGNIFICANT CHANGE UP
ANISOCYTOSIS BLD QL: SLIGHT — SIGNIFICANT CHANGE UP
APTT BLD: 45.8 SEC — HIGH (ref 24.5–35.6)
APTT BLD: 45.8 SEC — HIGH (ref 24.5–35.6)
APTT BLD: 75.2 SEC — HIGH (ref 24.5–35.6)
APTT BLD: 75.2 SEC — HIGH (ref 24.5–35.6)
APTT BLD: 84.3 SEC — HIGH (ref 24.5–35.6)
APTT BLD: 84.3 SEC — HIGH (ref 24.5–35.6)
AST SERPL-CCNC: 32 U/L — SIGNIFICANT CHANGE UP
AST SERPL-CCNC: 32 U/L — SIGNIFICANT CHANGE UP
BASO STIPL BLD QL SMEAR: PRESENT — SIGNIFICANT CHANGE UP
BASO STIPL BLD QL SMEAR: PRESENT — SIGNIFICANT CHANGE UP
BASOPHILS # BLD AUTO: 0.11 K/UL — SIGNIFICANT CHANGE UP (ref 0–0.2)
BASOPHILS # BLD AUTO: 0.11 K/UL — SIGNIFICANT CHANGE UP (ref 0–0.2)
BASOPHILS NFR BLD AUTO: 0.9 % — SIGNIFICANT CHANGE UP (ref 0–2)
BASOPHILS NFR BLD AUTO: 0.9 % — SIGNIFICANT CHANGE UP (ref 0–2)
BILIRUB SERPL-MCNC: 0.7 MG/DL — SIGNIFICANT CHANGE UP (ref 0.4–2)
BILIRUB SERPL-MCNC: 0.7 MG/DL — SIGNIFICANT CHANGE UP (ref 0.4–2)
BUN SERPL-MCNC: 60.3 MG/DL — HIGH (ref 8–20)
BUN SERPL-MCNC: 60.3 MG/DL — HIGH (ref 8–20)
BURR CELLS BLD QL SMEAR: PRESENT — SIGNIFICANT CHANGE UP
BURR CELLS BLD QL SMEAR: PRESENT — SIGNIFICANT CHANGE UP
CALCIUM SERPL-MCNC: 8 MG/DL — LOW (ref 8.4–10.5)
CALCIUM SERPL-MCNC: 8 MG/DL — LOW (ref 8.4–10.5)
CHLORIDE SERPL-SCNC: 103 MMOL/L — SIGNIFICANT CHANGE UP (ref 96–108)
CHLORIDE SERPL-SCNC: 103 MMOL/L — SIGNIFICANT CHANGE UP (ref 96–108)
CO2 SERPL-SCNC: 23 MMOL/L — SIGNIFICANT CHANGE UP (ref 22–29)
CO2 SERPL-SCNC: 23 MMOL/L — SIGNIFICANT CHANGE UP (ref 22–29)
CREAT SERPL-MCNC: 1.69 MG/DL — HIGH (ref 0.5–1.3)
CREAT SERPL-MCNC: 1.69 MG/DL — HIGH (ref 0.5–1.3)
CULTURE RESULTS: SIGNIFICANT CHANGE UP
EGFR: 43 ML/MIN/1.73M2 — LOW
EGFR: 43 ML/MIN/1.73M2 — LOW
ELLIPTOCYTES BLD QL SMEAR: SLIGHT — SIGNIFICANT CHANGE UP
ELLIPTOCYTES BLD QL SMEAR: SLIGHT — SIGNIFICANT CHANGE UP
EOSINOPHIL # BLD AUTO: 0.21 K/UL — SIGNIFICANT CHANGE UP (ref 0–0.5)
EOSINOPHIL # BLD AUTO: 0.21 K/UL — SIGNIFICANT CHANGE UP (ref 0–0.5)
EOSINOPHIL NFR BLD AUTO: 1.7 % — SIGNIFICANT CHANGE UP (ref 0–6)
EOSINOPHIL NFR BLD AUTO: 1.7 % — SIGNIFICANT CHANGE UP (ref 0–6)
GLUCOSE BLDC GLUCOMTR-MCNC: 127 MG/DL — HIGH (ref 70–99)
GLUCOSE BLDC GLUCOMTR-MCNC: 127 MG/DL — HIGH (ref 70–99)
GLUCOSE BLDC GLUCOMTR-MCNC: 135 MG/DL — HIGH (ref 70–99)
GLUCOSE BLDC GLUCOMTR-MCNC: 135 MG/DL — HIGH (ref 70–99)
GLUCOSE SERPL-MCNC: 141 MG/DL — HIGH (ref 70–99)
GLUCOSE SERPL-MCNC: 141 MG/DL — HIGH (ref 70–99)
HCT VFR BLD CALC: 33.4 % — LOW (ref 39–50)
HCT VFR BLD CALC: 33.4 % — LOW (ref 39–50)
HCT VFR BLD CALC: 33.6 % — LOW (ref 39–50)
HCT VFR BLD CALC: 33.6 % — LOW (ref 39–50)
HCT VFR BLD CALC: 33.9 % — LOW (ref 39–50)
HCT VFR BLD CALC: 33.9 % — LOW (ref 39–50)
HGB BLD-MCNC: 10.3 G/DL — LOW (ref 13–17)
HGB BLD-MCNC: 9.9 G/DL — LOW (ref 13–17)
HGB BLD-MCNC: 9.9 G/DL — LOW (ref 13–17)
HYPOCHROMIA BLD QL: SLIGHT — SIGNIFICANT CHANGE UP
HYPOCHROMIA BLD QL: SLIGHT — SIGNIFICANT CHANGE UP
LYMPHOCYTES # BLD AUTO: 0.32 K/UL — LOW (ref 1–3.3)
LYMPHOCYTES # BLD AUTO: 0.32 K/UL — LOW (ref 1–3.3)
LYMPHOCYTES # BLD AUTO: 2.6 % — LOW (ref 13–44)
LYMPHOCYTES # BLD AUTO: 2.6 % — LOW (ref 13–44)
MAGNESIUM SERPL-MCNC: 2.7 MG/DL — HIGH (ref 1.6–2.6)
MAGNESIUM SERPL-MCNC: 2.7 MG/DL — HIGH (ref 1.6–2.6)
MANUAL SMEAR VERIFICATION: SIGNIFICANT CHANGE UP
MANUAL SMEAR VERIFICATION: SIGNIFICANT CHANGE UP
MCHC RBC-ENTMCNC: 27.2 PG — SIGNIFICANT CHANGE UP (ref 27–34)
MCHC RBC-ENTMCNC: 27.2 PG — SIGNIFICANT CHANGE UP (ref 27–34)
MCHC RBC-ENTMCNC: 27.3 PG — SIGNIFICANT CHANGE UP (ref 27–34)
MCHC RBC-ENTMCNC: 27.3 PG — SIGNIFICANT CHANGE UP (ref 27–34)
MCHC RBC-ENTMCNC: 27.4 PG — SIGNIFICANT CHANGE UP (ref 27–34)
MCHC RBC-ENTMCNC: 27.4 PG — SIGNIFICANT CHANGE UP (ref 27–34)
MCHC RBC-ENTMCNC: 29.6 GM/DL — LOW (ref 32–36)
MCHC RBC-ENTMCNC: 29.6 GM/DL — LOW (ref 32–36)
MCHC RBC-ENTMCNC: 30.4 GM/DL — LOW (ref 32–36)
MCHC RBC-ENTMCNC: 30.4 GM/DL — LOW (ref 32–36)
MCHC RBC-ENTMCNC: 30.7 GM/DL — LOW (ref 32–36)
MCHC RBC-ENTMCNC: 30.7 GM/DL — LOW (ref 32–36)
MCV RBC AUTO: 89.4 FL — SIGNIFICANT CHANGE UP (ref 80–100)
MCV RBC AUTO: 92.3 FL — SIGNIFICANT CHANGE UP (ref 80–100)
MCV RBC AUTO: 92.3 FL — SIGNIFICANT CHANGE UP (ref 80–100)
METAMYELOCYTES # FLD: 0.9 % — HIGH (ref 0–0)
METAMYELOCYTES # FLD: 0.9 % — HIGH (ref 0–0)
MICROCYTES BLD QL: SLIGHT — SIGNIFICANT CHANGE UP
MICROCYTES BLD QL: SLIGHT — SIGNIFICANT CHANGE UP
MONOCYTES # BLD AUTO: 0.63 K/UL — SIGNIFICANT CHANGE UP (ref 0–0.9)
MONOCYTES # BLD AUTO: 0.63 K/UL — SIGNIFICANT CHANGE UP (ref 0–0.9)
MONOCYTES NFR BLD AUTO: 5.2 % — SIGNIFICANT CHANGE UP (ref 2–14)
MONOCYTES NFR BLD AUTO: 5.2 % — SIGNIFICANT CHANGE UP (ref 2–14)
NEUTROPHILS # BLD AUTO: 10.69 K/UL — HIGH (ref 1.8–7.4)
NEUTROPHILS # BLD AUTO: 10.69 K/UL — HIGH (ref 1.8–7.4)
NEUTROPHILS NFR BLD AUTO: 87.8 % — HIGH (ref 43–77)
NEUTROPHILS NFR BLD AUTO: 87.8 % — HIGH (ref 43–77)
NRBC # BLD: 3 /100 — HIGH (ref 0–0)
NRBC # BLD: 3 /100 — HIGH (ref 0–0)
OVALOCYTES BLD QL SMEAR: SLIGHT — SIGNIFICANT CHANGE UP
OVALOCYTES BLD QL SMEAR: SLIGHT — SIGNIFICANT CHANGE UP
PLAT MORPH BLD: NORMAL — SIGNIFICANT CHANGE UP
PLAT MORPH BLD: NORMAL — SIGNIFICANT CHANGE UP
PLATELET # BLD AUTO: 271 K/UL — SIGNIFICANT CHANGE UP (ref 150–400)
PLATELET # BLD AUTO: 271 K/UL — SIGNIFICANT CHANGE UP (ref 150–400)
PLATELET # BLD AUTO: 287 K/UL — SIGNIFICANT CHANGE UP (ref 150–400)
PLATELET # BLD AUTO: 287 K/UL — SIGNIFICANT CHANGE UP (ref 150–400)
PLATELET # BLD AUTO: 293 K/UL — SIGNIFICANT CHANGE UP (ref 150–400)
PLATELET # BLD AUTO: 293 K/UL — SIGNIFICANT CHANGE UP (ref 150–400)
POIKILOCYTOSIS BLD QL AUTO: SLIGHT — SIGNIFICANT CHANGE UP
POIKILOCYTOSIS BLD QL AUTO: SLIGHT — SIGNIFICANT CHANGE UP
POLYCHROMASIA BLD QL SMEAR: SIGNIFICANT CHANGE UP
POLYCHROMASIA BLD QL SMEAR: SIGNIFICANT CHANGE UP
POTASSIUM SERPL-MCNC: 4.4 MMOL/L — SIGNIFICANT CHANGE UP (ref 3.5–5.3)
POTASSIUM SERPL-MCNC: 4.4 MMOL/L — SIGNIFICANT CHANGE UP (ref 3.5–5.3)
POTASSIUM SERPL-SCNC: 4.4 MMOL/L — SIGNIFICANT CHANGE UP (ref 3.5–5.3)
POTASSIUM SERPL-SCNC: 4.4 MMOL/L — SIGNIFICANT CHANGE UP (ref 3.5–5.3)
PROT SERPL-MCNC: 6 G/DL — LOW (ref 6.6–8.7)
PROT SERPL-MCNC: 6 G/DL — LOW (ref 6.6–8.7)
RBC # BLD: 3.62 M/UL — LOW (ref 4.2–5.8)
RBC # BLD: 3.62 M/UL — LOW (ref 4.2–5.8)
RBC # BLD: 3.76 M/UL — LOW (ref 4.2–5.8)
RBC # BLD: 3.76 M/UL — LOW (ref 4.2–5.8)
RBC # BLD: 3.79 M/UL — LOW (ref 4.2–5.8)
RBC # BLD: 3.79 M/UL — LOW (ref 4.2–5.8)
RBC # FLD: 16 % — HIGH (ref 10.3–14.5)
RBC # FLD: 16.3 % — HIGH (ref 10.3–14.5)
RBC # FLD: 16.3 % — HIGH (ref 10.3–14.5)
RBC BLD AUTO: ABNORMAL
RBC BLD AUTO: ABNORMAL
SODIUM SERPL-SCNC: 137 MMOL/L — SIGNIFICANT CHANGE UP (ref 135–145)
SODIUM SERPL-SCNC: 137 MMOL/L — SIGNIFICANT CHANGE UP (ref 135–145)
SPECIMEN SOURCE: SIGNIFICANT CHANGE UP
VARIANT LYMPHS # BLD: 0.9 % — SIGNIFICANT CHANGE UP (ref 0–6)
VARIANT LYMPHS # BLD: 0.9 % — SIGNIFICANT CHANGE UP (ref 0–6)
WBC # BLD: 12.18 K/UL — HIGH (ref 3.8–10.5)
WBC # BLD: 12.18 K/UL — HIGH (ref 3.8–10.5)
WBC # BLD: 12.6 K/UL — HIGH (ref 3.8–10.5)
WBC # BLD: 12.6 K/UL — HIGH (ref 3.8–10.5)
WBC # BLD: 12.68 K/UL — HIGH (ref 3.8–10.5)
WBC # BLD: 12.68 K/UL — HIGH (ref 3.8–10.5)
WBC # FLD AUTO: 12.18 K/UL — HIGH (ref 3.8–10.5)
WBC # FLD AUTO: 12.18 K/UL — HIGH (ref 3.8–10.5)
WBC # FLD AUTO: 12.6 K/UL — HIGH (ref 3.8–10.5)
WBC # FLD AUTO: 12.6 K/UL — HIGH (ref 3.8–10.5)
WBC # FLD AUTO: 12.68 K/UL — HIGH (ref 3.8–10.5)
WBC # FLD AUTO: 12.68 K/UL — HIGH (ref 3.8–10.5)

## 2023-12-01 PROCEDURE — 93320 DOPPLER ECHO COMPLETE: CPT | Mod: 26

## 2023-12-01 PROCEDURE — 93010 ELECTROCARDIOGRAM REPORT: CPT

## 2023-12-01 PROCEDURE — 93325 DOPPLER ECHO COLOR FLOW MAPG: CPT | Mod: 26

## 2023-12-01 PROCEDURE — 99232 SBSQ HOSP IP/OBS MODERATE 35: CPT

## 2023-12-01 PROCEDURE — 93312 ECHO TRANSESOPHAGEAL: CPT | Mod: 26

## 2023-12-01 PROCEDURE — 71045 X-RAY EXAM CHEST 1 VIEW: CPT | Mod: 26,77

## 2023-12-01 PROCEDURE — 99233 SBSQ HOSP IP/OBS HIGH 50: CPT

## 2023-12-01 PROCEDURE — 71045 X-RAY EXAM CHEST 1 VIEW: CPT | Mod: 26

## 2023-12-01 PROCEDURE — 99291 CRITICAL CARE FIRST HOUR: CPT | Mod: 24

## 2023-12-01 RX ORDER — HEPARIN SODIUM 5000 [USP'U]/ML
5000 INJECTION INTRAVENOUS; SUBCUTANEOUS EVERY 6 HOURS
Refills: 0 | Status: DISCONTINUED | OUTPATIENT
Start: 2023-12-01 | End: 2023-12-02

## 2023-12-01 RX ORDER — HEPARIN SODIUM 5000 [USP'U]/ML
2000 INJECTION INTRAVENOUS; SUBCUTANEOUS
Qty: 25000 | Refills: 0 | Status: DISCONTINUED | OUTPATIENT
Start: 2023-12-01 | End: 2023-12-02

## 2023-12-01 RX ORDER — HEPARIN SODIUM 5000 [USP'U]/ML
10000 INJECTION INTRAVENOUS; SUBCUTANEOUS EVERY 6 HOURS
Refills: 0 | Status: DISCONTINUED | OUTPATIENT
Start: 2023-12-01 | End: 2023-12-02

## 2023-12-01 RX ADMIN — HEPARIN SODIUM 2000 UNIT(S)/HR: 5000 INJECTION INTRAVENOUS; SUBCUTANEOUS at 15:09

## 2023-12-01 RX ADMIN — Medication 50 MILLIGRAM(S): at 15:07

## 2023-12-01 RX ADMIN — Medication 50 MILLIGRAM(S): at 23:45

## 2023-12-01 RX ADMIN — HEPARIN SODIUM 2000 UNIT(S)/HR: 5000 INJECTION INTRAVENOUS; SUBCUTANEOUS at 23:10

## 2023-12-01 RX ADMIN — CEFTRIAXONE 2000 MILLIGRAM(S): 500 INJECTION, POWDER, FOR SOLUTION INTRAMUSCULAR; INTRAVENOUS at 02:32

## 2023-12-01 RX ADMIN — INSULIN GLARGINE 16 UNIT(S): 100 INJECTION, SOLUTION SUBCUTANEOUS at 21:36

## 2023-12-01 RX ADMIN — Medication 50 MILLIGRAM(S): at 18:07

## 2023-12-01 RX ADMIN — Medication 81 MILLIGRAM(S): at 15:07

## 2023-12-01 RX ADMIN — CHLORHEXIDINE GLUCONATE 1 APPLICATION(S): 213 SOLUTION TOPICAL at 06:59

## 2023-12-01 RX ADMIN — HEPARIN SODIUM 1700 UNIT(S)/HR: 5000 INJECTION INTRAVENOUS; SUBCUTANEOUS at 06:00

## 2023-12-01 RX ADMIN — Medication 50 MILLIGRAM(S): at 06:00

## 2023-12-01 RX ADMIN — ATORVASTATIN CALCIUM 20 MILLIGRAM(S): 80 TABLET, FILM COATED ORAL at 21:52

## 2023-12-01 NOTE — PROGRESS NOTE ADULT - ASSESSMENT
DEVON: renal hypoperfusion s/p VATS/decortication  No gross hydro on renal sono  Renal function with steady improvement  - avoid potential nephrotoxins  - diuretics remain on hold  - follow labs

## 2023-12-01 NOTE — PROGRESS NOTE ADULT - SUBJECTIVE AND OBJECTIVE BOX
NEPHROLOGY INTERVAL HPI/OVERNIGHT EVENTS:  pt comfortable when seen earlier  seated in chair at bedside  appetite poor    MEDICATIONS  (STANDING):  acetaminophen     Tablet .. 975 milliGRAM(s) Oral every 6 hours  aspirin enteric coated 81 milliGRAM(s) Oral daily  atorvastatin 20 milliGRAM(s) Oral at bedtime  cefTRIAXone Injectable.      cefTRIAXone Injectable. 2000 milliGRAM(s) IV Push every 24 hours  chlorhexidine 2% Cloths 1 Application(s) Topical <User Schedule>  dextrose 50% Injectable 50 milliLiter(s) IV Push every 15 minutes  heparin  Infusion. 1700 Unit(s)/Hr (17 mL/Hr) IV Continuous <Continuous>  insulin glargine Injectable (LANTUS) 16 Unit(s) SubCutaneous at bedtime  insulin lispro (ADMELOG) corrective regimen sliding scale   SubCutaneous Before meals and at bedtime  insulin lispro Injectable (ADMELOG) 4 Unit(s) SubCutaneous three times a day before meals  metoprolol tartrate 50 milliGRAM(s) Oral every 6 hours  pantoprazole    Tablet 40 milliGRAM(s) Oral before breakfast  polyethylene glycol 3350 17 Gram(s) Oral two times a day  senna 2 Tablet(s) Oral at bedtime    MEDICATIONS  (PRN):  heparin   Injectable 14165 Unit(s) IV Push every 6 hours PRN For aPTT less than 40  heparin   Injectable 5000 Unit(s) IV Push every 6 hours PRN For aPTT between 40 - 57  ondansetron Injectable 4 milliGRAM(s) IV Push every 8 hours PRN Nausea and/or Vomiting  sodium chloride 0.9% lock flush 10 milliLiter(s) IV Push every 1 hour PRN Pre/post blood products, medications, blood draw, and to maintain line patency      Allergies    No Known Allergies        Vital Signs Last 24 Hrs  T(C): 36.6 (01 Dec 2023 12:00), Max: 36.7 (01 Dec 2023 07:00)  T(F): 97.8 (01 Dec 2023 12:00), Max: 98 (01 Dec 2023 07:00)  HR: 79 (01 Dec 2023 14:00) (79 - 132)  BP: 147/75 (01 Dec 2023 14:00) (142/72 - 147/75)  BP(mean): 105 (01 Dec 2023 14:00) (100 - 105)  RR: 29 (01 Dec 2023 14:00) (24 - 41)  SpO2: 97% (01 Dec 2023 14:00) (90% - 98%)    Parameters below as of 01 Dec 2023 14:00  Patient On (Oxygen Delivery Method): nasal cannula  O2 Flow (L/min): 2      PHYSICAL EXAM:  GENERAL:  Weak, debilitated  HEENT: No periorbital edema  NECK: Supple, No JVD  CHEST/LUNG: Clear, diminished BS  CVS:  No rub  Abd:  Soft +BS  EXTREMITIES: + dependent edema    LABS:                        10.3   12.68 )-----------( 293      ( 01 Dec 2023 04:06 )             33.6     12-01    137  |  103  |  60.3<H>  ----------------------------<  141<H>  4.4   |  23.0  |  1.69<H>    Creatinine: 0.84 mg/dL (11.27.23)    Ca    8.0<L>      01 Dec 2023 02:10  Mg     2.7     12-01    TPro  6.0<L>  /  Alb  2.6<L>  /  TBili  0.7  /  DBili  x   /  AST  32  /  ALT  27  /  AlkPhos  97  12-01    PTT - ( 01 Dec 2023 12:50 )  PTT:45.8 sec  Urinalysis Basic - ( 01 Dec 2023 02:10 )    Color: x / Appearance: x / SG: x / pH: x  Gluc: 141 mg/dL / Ketone: x  / Bili: x / Urobili: x   Blood: x / Protein: x / Nitrite: x   Leuk Esterase: x / RBC: x / WBC x   Sq Epi: x / Non Sq Epi: x / Bacteria: x      Magnesium: 2.7 mg/dL (12-01 @ 02:10)      RADIOLOGY & ADDITIONAL TESTS:  < from: Xray Chest 1 View- PORTABLE-Routine (Xray Chest 1 View- PORTABLE-Routine in AM.) (12.01.23 @ 06:50) >    ACC: [id number] EXAM:  XR CHEST PORTABLE ROUTINE 1V   ORDERED BY: JESSICA COLBY     ACC: [id number] EXAM:  XR CHEST PORTABLE ROUTINE 1V   ORDERED BY: JESSICA COLBY     ACC: [id number] EXAM:  XR CHEST PORTABLE URGENT 1V   ORDERED BY: SERENA OLSEN    PROCEDURE DATE:  11/30/2023          INTERPRETATION:  Chest one view 11/30/2023 5:45 AM    HISTORY: Postop    COMPARISON STUDY: 11/29/2023    Frontal expiratory view of the chest shows the heart to be similar in   size. Left chest tubes remain present. Sternal wires and right jugular   catheter are again noted.    The lungs show progression of left upper lobe atelectasis with   progression of left pleural effusion or pleural thickening and there is   no evidence of pneumothorax nor right pleural effusion.    Chest one view 11/30/2023 12:55 PM  Compared to the prior study, there is slight clearing of the left upper   lobe. No pneumothorax.    Chest one view 12/1/2023 5:33 AM  Compared to the prior study, there is return of left upper lobe   atelectasis. No pneumothorax.    IMPRESSION:  Left pleural thickening as noted. No pneumothorax.    < end of copied text >

## 2023-12-01 NOTE — PROGRESS NOTE ADULT - NS ATTEND AMEND GEN_ALL_CORE FT
plan discussed with NP and changes incorporated in the note.    agree with the plan above  patient seen and examined  sister at bedside  had tacos and cardioversion  dc meds for diabetes will depend on what he will require in the next few days

## 2023-12-01 NOTE — PROGRESS NOTE ADULT - NS ATTEND AMEND GEN_ALL_CORE FT
.  .  S/p CV with restoration of NSR. Continue anticoagulation. Start amiodarone to maintain NSR, likely will need ablation as outpatient. Continue GDMT for HFrEF, likely AFL-mediated. If EF does not recover, will need ischemic evaluation. Continue abx for empyema.    Thank you for this consultation. EP will sign off. Please contact EP team with any questions.    Gaurav Boliavr MD  Clinical Cardiac Electrophysiology

## 2023-12-01 NOTE — PROGRESS NOTE ADULT - SUBJECTIVE AND OBJECTIVE BOX
Pt doing well s/p uncomplicated QUIN & DCCV, 300J x 1 with restoration of sinus rhythm. Sleepy from anesthesia but denies complaint.     PAST MEDICAL & SURGICAL HISTORY:  Coronary artery disease involving native coronary artery of native heart, unspecified whether angina  CHF with unknown LVEF  Primary hypertension  Hyperlipidemia, unspecified hyperlipidemia type  S/P CABG x 5  History of cholecystectomy    Post-procedure VS: 79 O2 sat 95%   sleepy but arousable, no acute distress    Skin: no erythema/edema/blistering at defib pad sites.   Card: S1/S2, RRR, no m/g/r  Resp: diminished b/l   Abd: S/NT/ND  Ext: no edema, distal pulses intact    EKG: NSR 79 bpm, notched P waves, LAD     QUIN: 12/1/23:   Summary:   1. Left ventricular ejection fraction, by visual estimation, is 20 to 25%.   2. Severely decreased global left ventricular systolic function.   3. Normal RV size with moderately reduced RV systolic function, RVSP least 29 mmHg.   4. Moderately enlarged left atrium.   5. No left atrial appendage thrombus, left atrial appendage enlargement and normal left atrial appendage velocities.   6. Mild thickening of the anterior and posterior mitral valve leaflets with mild posterior leaflet prolapse.   7. Moderate mitral valve regurgitation.   8. Color flow doppler and intravenous injection of agitated saline demonstrates the presence of an intact intra atrial septum.   9. Lipomatous hypertrophy of the intra-atrial septum.  10. Moderate complex atheromas at the aortic arch.  11. There is no evidence of pericardial effusion.  12. Post-QUIN patient underwent external synchronized direct current cardioversion with 300 Joules x1 from Aflutter RVR 130s into sinus rhythm by Dr. Ceballos.  Tyler Edmonds DO Electronically signed on 12/1/2023 at 11:34:13 AM    Assessment:   73 year old male patient with HTN, hyperlipidemia, obesity BMI 41.3, CAD s/p CABG x4 (2020 [facility]ha Abr at [facility]), HFrEF, EF 20-25%, remote AF (transient in the postop setting of CABG with no reoccurrence- not on AC). He presented to [facility] ED on 11/18 with several day history of dyspnea and lower extremity swelling. Admitted with acute respiratory failure due to large pleural effusion, acute HFrEF exacerbation with reduced RV function, and atrial flutter (likely typical) with RVR.   CT chest showed large loculated pleural effusion and he had chest tube placement on 11/18. He has worsening left chest opacification with loculation despite PTC. Now s/p VATS on 11/27 with 2 left sided chest tubes draining. He remains in atrial flutter with difficult to control rates. Now status post QUIN negative for RADAMES thrombus and uncomplicated DCCV with restoration of sinus rhythm.     Plan:   # AFlutter with RVR, CHADSVASc = 4 (age, CHF, CAD, HTN).  - s/p QUIN guided cardioversion 12/1/23   - Observation on telemetry per post op protocol.    - Resume PO intake.   - Ambulate w/ assist once fully awake & back to baseline mental status w/ VSS.  - Continue heparin gtt for now. Maintain therapeutic PTT. Transition to Eliquis 5mg Q 12 hrs when able.   - Pt to remain on uninterrupted AC for at least 30 days.   - Add amiodarone 400mg BID x 10 days, then 200mg daily thereafter.   - Reduce metoprolol tartrate to 50mg BID.    # HFrEF, EF 20-25%   - Daily weights.   - Strict I/Os   - GDMT and diuresis as per general cardiology   - Outpatient ischemic workup per cardiology     #Loculated pleural effusion, likely Empyema  - s/p PTC 11/18   - s/p VATS on 11/27   - ID and thoracic following   - abx as per ID     No further EP intervention at this time. Outpatient follow up with Dr. Bolivar in 2-4 weeks.                 Pt doing well s/p uncomplicated QUIN & DCCV, 300J x 1 with restoration of sinus rhythm. Sleepy from anesthesia but denies complaint.     PAST MEDICAL & SURGICAL HISTORY:  Coronary artery disease involving native coronary artery of native heart, unspecified whether angina  CHF with unknown LVEF  Primary hypertension  Hyperlipidemia, unspecified hyperlipidemia type  S/P CABG x 5  History of cholecystectomy    Post-procedure VS: 79 O2 sat 95%   sleepy but arousable, no acute distress    Skin: no erythema/edema/blistering at defib pad sites.   Card: S1/S2, RRR, no m/g/r  Resp: diminished b/l   Abd: S/NT/ND  Ext: no edema, distal pulses intact    EKG: NSR 79 bpm, notched P waves, LAD     QUIN: 12/1/23:   Summary:   1. Left ventricular ejection fraction, by visual estimation, is 20 to 25%.   2. Severely decreased global left ventricular systolic function.   3. Normal RV size with moderately reduced RV systolic function, RVSP least 29 mmHg.   4. Moderately enlarged left atrium.   5. No left atrial appendage thrombus, left atrial appendage enlargement and normal left atrial appendage velocities.   6. Mild thickening of the anterior and posterior mitral valve leaflets with mild posterior leaflet prolapse.   7. Moderate mitral valve regurgitation.   8. Color flow doppler and intravenous injection of agitated saline demonstrates the presence of an intact intra atrial septum.   9. Lipomatous hypertrophy of the intra-atrial septum.  10. Moderate complex atheromas at the aortic arch.  11. There is no evidence of pericardial effusion.  12. Post-QUIN patient underwent external synchronized direct current cardioversion with 300 Joules x1 from Aflutter RVR 130s into sinus rhythm by Dr. Ceballos.  Tyler Edmonds DO Electronically signed on 12/1/2023 at 11:34:13 AM    Assessment:   73 year old male patient with HTN, hyperlipidemia, obesity BMI 41.3, CAD s/p CABG x4 (2020 Talha Abr at [facility]), HFrEF, EF 20-25%, remote AF (transient in the postop setting of CABG with no reoccurrence- not on AC). He presented to [facility] ED on 11/18 with several day history of dyspnea and lower extremity swelling. Admitted with acute respiratory failure due to large pleural effusion, acute HFrEF exacerbation with reduced RV function, and atrial flutter (likely typical) with RVR.   CT chest showed large loculated pleural effusion and he had chest tube placement on 11/18. He has worsening left chest opacification with loculation despite PTC. Now s/p VATS on 11/27 with 2 left sided chest tubes draining. He remains in atrial flutter with difficult to control rates. Now status post QUIN negative for RADAMES thrombus and uncomplicated DCCV with restoration of sinus rhythm.     Plan:   # AFlutter with RVR, CHADSVASc = 4 (age, CHF, CAD, HTN).  - s/p QUIN guided cardioversion 12/1/23   - Observation on telemetry per post op protocol.    - Resume PO intake.   - Ambulate w/ assist once fully awake & back to baseline mental status w/ VSS.  - Continue heparin gtt for now. Maintain therapeutic PTT. Transition to Eliquis 5mg Q 12 hrs when able.   - Pt to remain on uninterrupted AC for at least 30 days.   - Add amiodarone 400mg BID x 10 days, then 200mg daily thereafter.   - Reduce metoprolol tartrate to 50mg BID.    # HFrEF, EF 20-25%   - Daily weights.   - Strict I/Os   - GDMT and diuresis as per general cardiology   - Outpatient ischemic workup per cardiology     #Loculated pleural effusion, likely Empyema  - s/p PTC 11/18   - s/p VATS on 11/27   - ID and thoracic following   - abx as per ID     No further EP intervention at this time. Outpatient follow up with Dr. Bolivar in 2-4 weeks.

## 2023-12-01 NOTE — PROGRESS NOTE ADULT - SUBJECTIVE AND OBJECTIVE BOX
Brief summary:  73yMale POD# 4  s/p FB, left VATS total decort     SUBJECTIVE: Pt in bed alert and oriented. Pt states, " I am ok"   Patient denies acute pain with radiating or aggravating factors.  He denies chest pain, shortness of breath, palpitations, headache, dizziness, nausea, or vomiting.      Overnight events:  no acute overnight events       PAST MEDICAL & SURGICAL HISTORY:  Coronary artery disease involving native coronary artery of native heart, unspecified whether angina      CHF with unknown LVEF      Primary hypertension      Hyperlipidemia, unspecified hyperlipidemia type      S/P CABG x 5      History of cholecystectomy          MEDICATIONS   acetaminophen     Tablet .. 975 milliGRAM(s) Oral every 6 hours  aspirin enteric coated 81 milliGRAM(s) Oral daily  atorvastatin 20 milliGRAM(s) Oral at bedtime  cefTRIAXone Injectable.      cefTRIAXone Injectable. 2000 milliGRAM(s) IV Push every 24 hours  chlorhexidine 2% Cloths 1 Application(s) Topical <User Schedule>  dextrose 50% Injectable 50 milliLiter(s) IV Push every 15 minutes  heparin   Injectable 43725 Unit(s) IV Push every 6 hours PRN  heparin   Injectable 5000 Unit(s) IV Push every 6 hours PRN  heparin  Infusion. 1700 Unit(s)/Hr IV Continuous <Continuous>  insulin glargine Injectable (LANTUS) 16 Unit(s) SubCutaneous at bedtime  insulin lispro (ADMELOG) corrective regimen sliding scale   SubCutaneous Before meals and at bedtime  insulin lispro Injectable (ADMELOG) 4 Unit(s) SubCutaneous three times a day before meals  metoprolol tartrate 50 milliGRAM(s) Oral every 6 hours  ondansetron Injectable 4 milliGRAM(s) IV Push every 8 hours PRN  pantoprazole    Tablet 40 milliGRAM(s) Oral before breakfast  polyethylene glycol 3350 17 Gram(s) Oral two times a day  senna 2 Tablet(s) Oral at bedtime  sodium chloride 0.9% lock flush 10 milliLiter(s) IV Push every 1 hour PRN  MEDICATIONS  (PRN):  heparin   Injectable 00752 Unit(s) IV Push every 6 hours PRN For aPTT less than 40  heparin   Injectable 5000 Unit(s) IV Push every 6 hours PRN For aPTT between 40 - 57  ondansetron Injectable 4 milliGRAM(s) IV Push every 8 hours PRN Nausea and/or Vomiting  sodium chloride 0.9% lock flush 10 milliLiter(s) IV Push every 1 hour PRN Pre/post blood products, medications, blood draw, and to maintain line patency      Daily     Daily       ABG - ( 29 Nov 2023 02:00 )  pH, Arterial: 7.470 pH, Blood: x     /  pCO2: 39    /  pO2: 151   / HCO3: 28    / Base Excess: 4.7   /  SaO2: 99.8                                    9.6    11.88 )-----------( 272      ( 30 Nov 2023 01:40 )             30.6   11-30    139  |  102  |  72.5<H>  ----------------------------<  127<H>  4.1   |  24.0  |  1.85<H>    Ca    7.9<L>      30 Nov 2023 01:40  Mg     2.6     11-30    TPro  5.9<L>  /  Alb  2.5<L>  /  TBili  1.0  /  DBili  x   /  AST  34  /  ALT  26  /  AlkPhos  100  11-30      PTT - ( 30 Nov 2023 18:58 )  PTT:66.0 sec      Objective:  T(C): 36.4 (11-30-23 @ 15:51), Max: 36.7 (11-30-23 @ 12:00)  HR: 127 (12-01-23 @ 00:00) (125 - 129)  BP: 140/70 (11-30-23 @ 03:00) (140/70 - 140/70)  RR: 26 (12-01-23 @ 00:00) (20 - 36)  SpO2: 97% (12-01-23 @ 00:00) (88% - 97%)  Wt(kg): --CAPILLARY BLOOD GLUCOSE      POCT Blood Glucose.: 141 mg/dL (30 Nov 2023 22:30)  POCT Blood Glucose.: 127 mg/dL (30 Nov 2023 16:49)  POCT Blood Glucose.: 143 mg/dL (30 Nov 2023 07:38)  I&O's Summary    29 Nov 2023 07:01  -  30 Nov 2023 07:00  --------------------------------------------------------  IN: 903 mL / OUT: 3230 mL / NET: -2327 mL    30 Nov 2023 07:01  -  01 Dec 2023 00:51  --------------------------------------------------------  IN: 315 mL / OUT: 1755 mL / NET: -1440 mL        Physical Exam  Neuro: A+O x 3, non-focal, speech clear and intact  Pulm: B/L diminished at the bases, no accessory muscles utilized   CV:  irregularly irregular, +S1S2  Abd: soft, NT, ND, +BS  Ext: +DP Pulses b/l, +PT pulses, trace b/l LE edema  Chest tubes: Left chest tube x2  WS     Imaging:  CXR:  < from: Xray Chest 1 View- PORTABLE-Urgent (Xray Chest 1 View- PORTABLE-Urgent .) (11.29.23 @ 06:19) >    ACC: [id number] EXAM:  XR CHEST PORTABLE URGENT 1V   ORDERED BY: SERENA OLSEN     PROCEDURE DATE:  11/29/2023          INTERPRETATION:  Portable chest radiograph    CLINICAL INFORMATION: Pneumothorax. Follow-up    TECHNIQUE:  Portable  AP chest radiograph.    COMPARISON: 11/28/2023 chest x-ray .    FINDINGS:  CATHETERS AND TUBES: RIGHT IJ catheter tip in SVC.  LEFT chest tube tip overlies apex and second chest tube overlies lung   base.  .    PULMONARY: Residual loculated pleural effusion and/or basilar airspace   disease.  RIGHT lung clear.    .   No pneumothorax.    HEART/VASCULAR: The  heart is enlarged in transverse diameter. Status   post median sternotomy.   .    BONES: Visualized osseous thorax intact.    IMPRESSION:   No parenchymal significant change..  Catheters and tubes in place.  .    --- End of Report ---            TANNER MACHADO MD; Attending Radiologist  This document has been electronically signed. Nov 30 2023 11:17AM    < end of copied text >        ECG:  < from: 12 Lead ECG (11.29.23 @ 04:08) >    Ventricular Rate 127 BPM    Atrial Rate 254 BPM    QRS Duration 144 ms    Q-T Interval 368 ms    QTC Calculation(Bazett) 534 ms    P Axis -84 degrees    R Axis -40 degrees    T Axis 181 degrees    Diagnosis Line Atrial flutter with 2:1 A-V conduction  Left axis deviation  Left ventricular hypertrophy with QRS widening and repolarization abnormality  Left bundle branch block  Cannot rule out Anteroseptal infarct , age undetermined  Abnormal ECG    Confirmed by STANLEY LONGORIA ([id number]) on 11/30/2023 6:29:44 AM    < end of copied text >

## 2023-12-01 NOTE — PROGRESS NOTE ADULT - SUBJECTIVE AND OBJECTIVE BOX
INTERVAL EVENTS:  family at bedside  Follow up for diabetes management    ROS: Denies chest pain or sob. S/p successful QUIN/DCCV this morning    MEDICATIONS  (STANDING):  acetaminophen     Tablet .. 975 milliGRAM(s) Oral every 6 hours  aspirin enteric coated 81 milliGRAM(s) Oral daily  atorvastatin 20 milliGRAM(s) Oral at bedtime  cefTRIAXone Injectable.      cefTRIAXone Injectable. 2000 milliGRAM(s) IV Push every 24 hours  chlorhexidine 2% Cloths 1 Application(s) Topical <User Schedule>  dextrose 50% Injectable 50 milliLiter(s) IV Push every 15 minutes  heparin  Infusion. 1700 Unit(s)/Hr (17 mL/Hr) IV Continuous <Continuous>  insulin glargine Injectable (LANTUS) 16 Unit(s) SubCutaneous at bedtime  insulin lispro (ADMELOG) corrective regimen sliding scale   SubCutaneous Before meals and at bedtime  insulin lispro Injectable (ADMELOG) 4 Unit(s) SubCutaneous three times a day before meals  metoprolol tartrate 50 milliGRAM(s) Oral every 6 hours  pantoprazole    Tablet 40 milliGRAM(s) Oral before breakfast  polyethylene glycol 3350 17 Gram(s) Oral two times a day  senna 2 Tablet(s) Oral at bedtime    MEDICATIONS  (PRN):  heparin   Injectable 78067 Unit(s) IV Push every 6 hours PRN For aPTT less than 40  heparin   Injectable 5000 Unit(s) IV Push every 6 hours PRN For aPTT between 40 - 57  ondansetron Injectable 4 milliGRAM(s) IV Push every 8 hours PRN Nausea and/or Vomiting  sodium chloride 0.9% lock flush 10 milliLiter(s) IV Push every 1 hour PRN Pre/post blood products, medications, blood draw, and to maintain line patency      Allergies  No Known Allergies      Vital Signs Last 24 Hrs  T(C): 36.5 (01 Dec 2023 10:45), Max: 36.7 (01 Dec 2023 07:00)  T(F): 97.7 (01 Dec 2023 10:45), Max: 98 (01 Dec 2023 07:00)  HR: 130 (01 Dec 2023 10:45) (125 - 132)  BP: --  BP(mean): --  RR: 24 (01 Dec 2023 10:45) (24 - 41)  SpO2: 98% (01 Dec 2023 10:45) (90% - 98%)    Parameters below as of 01 Dec 2023 10:45  Patient On (Oxygen Delivery Method): nasal cannula  O2 Flow (L/min): 2      PHYSICAL EXAM:  GENERAL: NAD, comfortable, obese  NECK: Supple; trachea midline  CHEST/LUNG: Clear to auscultation bilaterally; No wheezes, chest tubes  HEART: Regular rate and rhythm; No murmurs, rubs, or gallops  ABDOMEN: Soft, Nontender, Nondistended; Bowel sounds present, hsu  NEURO: AAOx3, no tremor  EXTREMITIES:  2+ Peripheral Pulses      LABS:                        10.3   12.68 )-----------( 293      ( 01 Dec 2023 04:06 )             33.6     12-01    137  |  103  |  60.3<H>  ----------------------------<  141<H>  4.4   |  23.0  |  1.69<H>    Ca    8.0<L>      01 Dec 2023 02:10  Mg     2.7     12-01    TPro  6.0<L>  /  Alb  2.6<L>  /  TBili  0.7  /  DBili  x   /  AST  32  /  ALT  27  /  AlkPhos  97  12-01    Urinalysis Basic - ( 01 Dec 2023 02:10 )    Color: x / Appearance: x / SG: x / pH: x  Gluc: 141 mg/dL / Ketone: x  / Bili: x / Urobili: x   Blood: x / Protein: x / Nitrite: x   Leuk Esterase: x / RBC: x / WBC x   Sq Epi: x / Non Sq Epi: x / Bacteria: x      POCT Blood Glucose.: 127 mg/dL (12-01-23 @ 12:46)  POCT Blood Glucose.: 141 mg/dL (11-30-23 @ 22:30)  POCT Blood Glucose.: 127 mg/dL (11-30-23 @ 16:49)

## 2023-12-01 NOTE — PROGRESS NOTE ADULT - PROBLEM SELECTOR PLAN 1
Pleural fluid exudative by light's criteria  Culture prelim streptococcus intermedius, +empyema ID following   Zosyn switched to ceftraxione as per ID   cytology (-)  Underwent VATS/decort on 11/27 - extubated 11/28  EP will plan for cardioversion 12/1, Pt currently fulling Anticoagulated   dicuss if can de-intensify post DCCV   Plan to be discussed with Thoracic team.

## 2023-12-01 NOTE — PROGRESS NOTE ADULT - SUBJECTIVE AND OBJECTIVE BOX
GRETA AGUAYO  [id number]    HPI:  Patient is a 73 yo M w/ PMH of CAD s/p CABG, CHF, HTN, HLD, Afib (not on AC due to hx of GIB) presenting for chief complaint of SOB. Patient states he started experiencing SOB and LE swelling for the past few days He states he was seen by his Cardiologist on, Dr. Milian, on Tuesday and his Metoprolol was increased from 50mg to 100mg. He states his symptoms worsened yesterday therefore he came to the ED today. He states he has been unable to walk around without getting short of breath. He reports compliance with medications and diet. Patient denies fever, chills, chest pain, palpitations, cough, wheezing, abdominal pain, nausea, vomiting, diarrhea, constipation, bloody bowel movements, melena, hematemesis, urinary complaints, syncope, calf tenderness, paresthesias.  (18 Nov 2023 14:38)      Surgery/Hospital Course:  ·  PRE-OP DIAGNOSIS:  Empyema of left pleural space  ·  POST-OP DIAGNOSIS:  Empyema of left pleural space   ·  PROCEDURES:  Decortication, lung, total, using VATS 27-Nov-2023   Flexible bronchoscopy     S/p uncomplicated QUIN & DCCV, 300J x 1 with restoration of sinus rhythm. 01-Dec-2023    Today:  No acute events -    ICU Vital Signs Last 24 Hrs  T(C): 36.6 (01 Dec 2023 12:00), Max: 36.7 (01 Dec 2023 07:00)  T(F): 97.8 (01 Dec 2023 12:00), Max: 98 (01 Dec 2023 07:00)  HR: 130 (01 Dec 2023 10:45) (125 - 132)  BP: --  BP(mean): --  ABP: 98/66 (01 Dec 2023 10:45) (93/75 - 131/71)  ABP(mean): 83 (01 Dec 2023 10:00) (75 - 95)  RR: 24 (01 Dec 2023 10:45) (24 - 41)  SpO2: 98% (01 Dec 2023 10:45) (90% - 98%)    O2 Parameters below as of 01 Dec 2023 10:45  Patient On (Oxygen Delivery Method): nasal cannula  O2 Flow (L/min): 2          Physical Exam:  Gen: A&O   CNS: non focal 	  Neck: no JVD  RES : clear , no wheezing              CVS: Regular  rhythm. Normal S1/S2  Abd: Soft, non-distended. Bowel sounds present.  Skin: No rash.  Ext:  no edema    ============================I/O===========================   I&O's Detail    30 Nov 2023 07:01  -  01 Dec 2023 07:00  --------------------------------------------------------  IN:    Heparin Infusion: 230 mL    Heparin Infusion: 204 mL  Total IN: 434 mL    OUT:    Chest Tube (mL): 140 mL    Chest Tube (mL): 170 mL    Indwelling Catheter - Urethral (mL): 1915 mL  Total OUT: 2225 mL    Total NET: -1791 mL        ============================ LABS =========================                        10.3   12.68 )-----------( 293      ( 01 Dec 2023 04:06 )             33.6     12-01    137  |  103  |  60.3<H>  ----------------------------<  141<H>  4.4   |  23.0  |  1.69<H>    Ca    8.0<L>      01 Dec 2023 02:10  Mg     2.7     12-01    TPro  6.0<L>  /  Alb  2.6<L>  /  TBili  0.7  /  DBili  x   /  AST  32  /  ALT  27  /  AlkPhos  97  12-01    LIVER FUNCTIONS - ( 01 Dec 2023 02:10 )  Alb: 2.6 g/dL / Pro: 6.0 g/dL / ALK PHOS: 97 U/L / ALT: 27 U/L / AST: 32 U/L / GGT: x           PTT - ( 01 Dec 2023 12:50 )  PTT:45.8 sec    Urinalysis Basic - ( 01 Dec 2023 02:10 )    Color: x / Appearance: x / SG: x / pH: x  Gluc: 141 mg/dL / Ketone: x  / Bili: x / Urobili: x   Blood: x / Protein: x / Nitrite: x   Leuk Esterase: x / RBC: x / WBC x   Sq Epi: x / Non Sq Epi: x / Bacteria: x      ======================Micro/Rad/Cardio=================  Culture: Reviewed   CXR: Reviewed  Echo:Reviewed  ======================================================  PAST MEDICAL & SURGICAL HISTORY:  Coronary artery disease involving native coronary artery of native heart, unspecified whether angina      CHF with unknown LVEF      Primary hypertension      Hyperlipidemia, unspecified hyperlipidemia type      S/P CABG x 5      History of cholecystectomy        ====================ASSESSMENT ==============  72-year-old male with history of hypertension CAD status post quintuple bypass November 2021 followed by cardiology/Dr. Milian in [place]  resents the ED via EMS complaining of worsening dyspnea on exertion since yesterday.  Thoracic surgery consulted treatment Left effusion. Left PTC placed 11/28. Patient now pending FB left robotic assisted decort     --- Pleural effusion.   --- Left PTC placed 11/18  ---Acute respiratory failure with hypoxia.   ---Atrial flutter.   ---Coronary artery disease involving native coronary artery of native heart, unspecified whether angina.   --- Acute kidney injury.   ---Post op Hypovolemia  ---Post op respiratory insufficiency   ---S/p uncomplicated QUIN & DCCV, 300J x 1 with restoration of sinus rhythm. ---    Plan:  -Maintain CT  to H2O seal  -Continue ceftriaxone 2g IV daily  -  -cardioversion today  - Continue lipitor  -ASA when appropriate.  -Renal following  -Monitor vanco/dig level.  -DC A line    ====================== NEUROLOGY=====================  acetaminophen     Tablet .. 975 milliGRAM(s) Oral every 6 hours  ondansetron Injectable 4 milliGRAM(s) IV Push every 8 hours PRN Nausea and/or Vomiting    ==================== RESPIRATORY======================  Post op respiratory insufficiency    ====================CARDIOVASCULAR==================  Post op Hypovolemia  metoprolol tartrate 50 milliGRAM(s) Oral every 6 hours    ===================HEMATOLOGIC/ONC ===================  Monitor H&H/Plts    aspirin enteric coated 81 milliGRAM(s) Oral daily  heparin   Injectable 49109 Unit(s) IV Push every 6 hours PRN For aPTT less than 40  heparin   Injectable 5000 Unit(s) IV Push every 6 hours PRN For aPTT between 40 - 57  heparin  Infusion. 1700 Unit(s)/Hr (17 mL/Hr) IV Continuous <Continuous>    ===================== RENAL =========================  Continue monitoring urine output, I&OS, BUN/Cr     ==================== GASTROINTESTINAL===================  pantoprazole    Tablet 40 milliGRAM(s) Oral before breakfast  polyethylene glycol 3350 17 Gram(s) Oral two times a day  senna 2 Tablet(s) Oral at bedtime  sodium chloride 0.9% lock flush 10 milliLiter(s) IV Push every 1 hour PRN Pre/post blood products, medications, blood draw, and to maintain line patency    =======================    ENDOCRINE  =====================  atorvastatin 20 milliGRAM(s) Oral at bedtime  dextrose 50% Injectable 50 milliLiter(s) IV Push every 15 minutes  insulin glargine Injectable (LANTUS) 16 Unit(s) SubCutaneous at bedtime  insulin lispro (ADMELOG) corrective regimen sliding scale   SubCutaneous Before meals and at bedtime  insulin lispro Injectable (ADMELOG) 4 Unit(s) SubCutaneous three times a day before meals    ========================INFECTIOUS DISEASE================  cefTRIAXone Injectable.      cefTRIAXone Injectable. 2000 milliGRAM(s) IV Push every 24 hours      -Monitor Neurologic status ,   -Head of the bed should remain elevated to 45 degrees,  -Monitor for arrhythmias and monitor parameters for organ perfusion,  -Glycemic control is satisfactory,  -Nutritional goals will be met using po eventually , insure adequate caloric intake and monitor the same ,  -Electrolytes have been repleted as necessary , pain control has been achieved  and wound care has been carried out ,  -Stress ulcer and VTE prophylaxis will be achieved,  -Agressive PT and early mobility and ambulation goals will be met,  -The family was updated about the course and plan .      I have spent 35 minutes providing acute care for this critically ill patient     Patient requires continuous monitoring with bedside rhythm monitoring, pulse ox monitoring, and intermittent blood gas analysis. Care plan discussed with ICU care team. Patient remained critical and at risk for life threatening decompensation.

## 2023-12-01 NOTE — PROGRESS NOTE ADULT - ASSESSMENT
72M w/ PMH of T2DM, CAD s/p CABG, CHF, HTN, HLD, Afib (not on AC due to hx of GIB) presents with a few days worsening of dyspnea and LE edema. found to have large loculated pleural effusion. 11/28 s/p L lung decortication.    Consulted for diabetes management  Home diabetes meds: Was on Januvia previously, not recent  Current a1c: 7.3%    1. T2DM- s/p insulin gtt, glucose remains stable  - Continue with Lantus 16 units qhs  - Continue with Admelog 4 units tid and sliding scale coverage  - Will consider adding other oral meds in addition for discharge    2. Pleural effusion  - S/p decortication, care per primary team    3. HLD  - C/w statin    4. Aflutter- s/p successful QUIN/DCCV 12/1 72M w/ PMH of T2DM, CAD s/p CABG, CHF, HTN, HLD, Afib (not on AC due to hx of GIB) presents with a few days worsening of dyspnea and LE edema. found to have large loculated pleural effusion. 11/28 s/p L lung decortication.    Consulted for diabetes management  Home diabetes meds: Was on Januvia previously, not recent  Current a1c: 7.3%    1. T2DM- s/p insulin gtt, glucose remains stable  - Continue with Lantus 16 units qhs  - Continue with Admelog 4 units tid and sliding scale coverage  - Will consider basal insulin with oral meds vs only oral meds for discharge    2. Pleural effusion  - S/p decortication, care per primary team    3. HLD- C/w statin    4. Aflutter- s/p successful QUIN/DCCV 12/1

## 2023-12-01 NOTE — PROGRESS NOTE ADULT - ASSESSMENT
72-year-old male with history of hypertension CAD status post quintuple bypass November 2021 followed by cardiology/Dr. Milian in [place] resents the ED via EMS complaining of worsening dyspnea on exertion since yesterday. Admitted with AF, CHF,  Thoracic surgery consulted treatment Left effusion. 11/18 L Pl effusion PTC placed with 600cc removed initially, 11/20 CT Chest : Large loculated left hydropneumothorax containing a pigtail catheter. 11/24 Febrile 100.3 wbc 18 from 8 11/27 completed dig, OR for VATS – remained intubated, on Levo 11/27 Left VATS decortication.  Extubated POD 1., pre and post op course c/b Aflutter in which EP following. Pt planned for DCCV 12/1

## 2023-12-02 LAB
ALBUMIN SERPL ELPH-MCNC: 2.5 G/DL — LOW (ref 3.3–5.2)
ALBUMIN SERPL ELPH-MCNC: 2.5 G/DL — LOW (ref 3.3–5.2)
ALP SERPL-CCNC: 91 U/L — SIGNIFICANT CHANGE UP (ref 40–120)
ALP SERPL-CCNC: 91 U/L — SIGNIFICANT CHANGE UP (ref 40–120)
ALT FLD-CCNC: 28 U/L — SIGNIFICANT CHANGE UP
ALT FLD-CCNC: 28 U/L — SIGNIFICANT CHANGE UP
ANION GAP SERPL CALC-SCNC: 12 MMOL/L — SIGNIFICANT CHANGE UP (ref 5–17)
ANION GAP SERPL CALC-SCNC: 12 MMOL/L — SIGNIFICANT CHANGE UP (ref 5–17)
APTT BLD: 53.5 SEC — HIGH (ref 24.5–35.6)
APTT BLD: 53.5 SEC — HIGH (ref 24.5–35.6)
AST SERPL-CCNC: 35 U/L — SIGNIFICANT CHANGE UP
AST SERPL-CCNC: 35 U/L — SIGNIFICANT CHANGE UP
BASOPHILS # BLD AUTO: 0.1 K/UL — SIGNIFICANT CHANGE UP (ref 0–0.2)
BASOPHILS # BLD AUTO: 0.1 K/UL — SIGNIFICANT CHANGE UP (ref 0–0.2)
BASOPHILS NFR BLD AUTO: 0.9 % — SIGNIFICANT CHANGE UP (ref 0–2)
BASOPHILS NFR BLD AUTO: 0.9 % — SIGNIFICANT CHANGE UP (ref 0–2)
BILIRUB SERPL-MCNC: 0.6 MG/DL — SIGNIFICANT CHANGE UP (ref 0.4–2)
BILIRUB SERPL-MCNC: 0.6 MG/DL — SIGNIFICANT CHANGE UP (ref 0.4–2)
BUN SERPL-MCNC: 53 MG/DL — HIGH (ref 8–20)
BUN SERPL-MCNC: 53 MG/DL — HIGH (ref 8–20)
CALCIUM SERPL-MCNC: 8.5 MG/DL — SIGNIFICANT CHANGE UP (ref 8.4–10.5)
CALCIUM SERPL-MCNC: 8.5 MG/DL — SIGNIFICANT CHANGE UP (ref 8.4–10.5)
CHLORIDE SERPL-SCNC: 104 MMOL/L — SIGNIFICANT CHANGE UP (ref 96–108)
CHLORIDE SERPL-SCNC: 104 MMOL/L — SIGNIFICANT CHANGE UP (ref 96–108)
CO2 SERPL-SCNC: 22 MMOL/L — SIGNIFICANT CHANGE UP (ref 22–29)
CO2 SERPL-SCNC: 22 MMOL/L — SIGNIFICANT CHANGE UP (ref 22–29)
CREAT SERPL-MCNC: 1.49 MG/DL — HIGH (ref 0.5–1.3)
CREAT SERPL-MCNC: 1.49 MG/DL — HIGH (ref 0.5–1.3)
CULTURE RESULTS: ABNORMAL
CULTURE RESULTS: ABNORMAL
EGFR: 50 ML/MIN/1.73M2 — LOW
EGFR: 50 ML/MIN/1.73M2 — LOW
EOSINOPHIL # BLD AUTO: 0.17 K/UL — SIGNIFICANT CHANGE UP (ref 0–0.5)
EOSINOPHIL # BLD AUTO: 0.17 K/UL — SIGNIFICANT CHANGE UP (ref 0–0.5)
EOSINOPHIL NFR BLD AUTO: 1.5 % — SIGNIFICANT CHANGE UP (ref 0–6)
EOSINOPHIL NFR BLD AUTO: 1.5 % — SIGNIFICANT CHANGE UP (ref 0–6)
GLUCOSE BLDC GLUCOMTR-MCNC: 117 MG/DL — HIGH (ref 70–99)
GLUCOSE BLDC GLUCOMTR-MCNC: 117 MG/DL — HIGH (ref 70–99)
GLUCOSE BLDC GLUCOMTR-MCNC: 128 MG/DL — HIGH (ref 70–99)
GLUCOSE BLDC GLUCOMTR-MCNC: 128 MG/DL — HIGH (ref 70–99)
GLUCOSE BLDC GLUCOMTR-MCNC: 130 MG/DL — HIGH (ref 70–99)
GLUCOSE BLDC GLUCOMTR-MCNC: 130 MG/DL — HIGH (ref 70–99)
GLUCOSE BLDC GLUCOMTR-MCNC: 180 MG/DL — HIGH (ref 70–99)
GLUCOSE BLDC GLUCOMTR-MCNC: 180 MG/DL — HIGH (ref 70–99)
GLUCOSE SERPL-MCNC: 124 MG/DL — HIGH (ref 70–99)
GLUCOSE SERPL-MCNC: 124 MG/DL — HIGH (ref 70–99)
GRAM STN FLD: ABNORMAL
GRAM STN FLD: ABNORMAL
HCT VFR BLD CALC: 34.1 % — LOW (ref 39–50)
HCT VFR BLD CALC: 34.1 % — LOW (ref 39–50)
HGB BLD-MCNC: 10 G/DL — LOW (ref 13–17)
HGB BLD-MCNC: 10 G/DL — LOW (ref 13–17)
IMM GRANULOCYTES NFR BLD AUTO: 7 % — HIGH (ref 0–0.9)
IMM GRANULOCYTES NFR BLD AUTO: 7 % — HIGH (ref 0–0.9)
LYMPHOCYTES # BLD AUTO: 0.94 K/UL — LOW (ref 1–3.3)
LYMPHOCYTES # BLD AUTO: 0.94 K/UL — LOW (ref 1–3.3)
LYMPHOCYTES # BLD AUTO: 8.1 % — LOW (ref 13–44)
LYMPHOCYTES # BLD AUTO: 8.1 % — LOW (ref 13–44)
MAGNESIUM SERPL-MCNC: 2.7 MG/DL — HIGH (ref 1.6–2.6)
MAGNESIUM SERPL-MCNC: 2.7 MG/DL — HIGH (ref 1.6–2.6)
MCHC RBC-ENTMCNC: 26.9 PG — LOW (ref 27–34)
MCHC RBC-ENTMCNC: 26.9 PG — LOW (ref 27–34)
MCHC RBC-ENTMCNC: 29.3 GM/DL — LOW (ref 32–36)
MCHC RBC-ENTMCNC: 29.3 GM/DL — LOW (ref 32–36)
MCV RBC AUTO: 91.7 FL — SIGNIFICANT CHANGE UP (ref 80–100)
MCV RBC AUTO: 91.7 FL — SIGNIFICANT CHANGE UP (ref 80–100)
MONOCYTES # BLD AUTO: 0.84 K/UL — SIGNIFICANT CHANGE UP (ref 0–0.9)
MONOCYTES # BLD AUTO: 0.84 K/UL — SIGNIFICANT CHANGE UP (ref 0–0.9)
MONOCYTES NFR BLD AUTO: 7.3 % — SIGNIFICANT CHANGE UP (ref 2–14)
MONOCYTES NFR BLD AUTO: 7.3 % — SIGNIFICANT CHANGE UP (ref 2–14)
NEUTROPHILS # BLD AUTO: 8.7 K/UL — HIGH (ref 1.8–7.4)
NEUTROPHILS # BLD AUTO: 8.7 K/UL — HIGH (ref 1.8–7.4)
NEUTROPHILS NFR BLD AUTO: 75.2 % — SIGNIFICANT CHANGE UP (ref 43–77)
NEUTROPHILS NFR BLD AUTO: 75.2 % — SIGNIFICANT CHANGE UP (ref 43–77)
PLATELET # BLD AUTO: 313 K/UL — SIGNIFICANT CHANGE UP (ref 150–400)
PLATELET # BLD AUTO: 313 K/UL — SIGNIFICANT CHANGE UP (ref 150–400)
POTASSIUM SERPL-MCNC: 4.6 MMOL/L — SIGNIFICANT CHANGE UP (ref 3.5–5.3)
POTASSIUM SERPL-MCNC: 4.6 MMOL/L — SIGNIFICANT CHANGE UP (ref 3.5–5.3)
POTASSIUM SERPL-SCNC: 4.6 MMOL/L — SIGNIFICANT CHANGE UP (ref 3.5–5.3)
POTASSIUM SERPL-SCNC: 4.6 MMOL/L — SIGNIFICANT CHANGE UP (ref 3.5–5.3)
PROT SERPL-MCNC: 6.3 G/DL — LOW (ref 6.6–8.7)
PROT SERPL-MCNC: 6.3 G/DL — LOW (ref 6.6–8.7)
RBC # BLD: 3.72 M/UL — LOW (ref 4.2–5.8)
RBC # BLD: 3.72 M/UL — LOW (ref 4.2–5.8)
RBC # FLD: 16.5 % — HIGH (ref 10.3–14.5)
RBC # FLD: 16.5 % — HIGH (ref 10.3–14.5)
SODIUM SERPL-SCNC: 138 MMOL/L — SIGNIFICANT CHANGE UP (ref 135–145)
SODIUM SERPL-SCNC: 138 MMOL/L — SIGNIFICANT CHANGE UP (ref 135–145)
SPECIMEN SOURCE: SIGNIFICANT CHANGE UP
SPECIMEN SOURCE: SIGNIFICANT CHANGE UP
WBC # BLD: 11.56 K/UL — HIGH (ref 3.8–10.5)
WBC # BLD: 11.56 K/UL — HIGH (ref 3.8–10.5)
WBC # FLD AUTO: 11.56 K/UL — HIGH (ref 3.8–10.5)
WBC # FLD AUTO: 11.56 K/UL — HIGH (ref 3.8–10.5)

## 2023-12-02 PROCEDURE — 99291 CRITICAL CARE FIRST HOUR: CPT | Mod: 24

## 2023-12-02 PROCEDURE — 99233 SBSQ HOSP IP/OBS HIGH 50: CPT

## 2023-12-02 PROCEDURE — 71045 X-RAY EXAM CHEST 1 VIEW: CPT | Mod: 26

## 2023-12-02 PROCEDURE — 99024 POSTOP FOLLOW-UP VISIT: CPT

## 2023-12-02 RX ORDER — ACETAMINOPHEN 500 MG
650 TABLET ORAL EVERY 6 HOURS
Refills: 0 | Status: DISCONTINUED | OUTPATIENT
Start: 2023-12-02 | End: 2023-12-04

## 2023-12-02 RX ORDER — APIXABAN 2.5 MG/1
5 TABLET, FILM COATED ORAL EVERY 12 HOURS
Refills: 0 | Status: DISCONTINUED | OUTPATIENT
Start: 2023-12-02 | End: 2023-12-06

## 2023-12-02 RX ADMIN — Medication 50 MILLIGRAM(S): at 11:24

## 2023-12-02 RX ADMIN — Medication 4 UNIT(S): at 11:25

## 2023-12-02 RX ADMIN — Medication 2: at 11:24

## 2023-12-02 RX ADMIN — CHLORHEXIDINE GLUCONATE 1 APPLICATION(S): 213 SOLUTION TOPICAL at 06:09

## 2023-12-02 RX ADMIN — Medication 50 MILLIGRAM(S): at 16:41

## 2023-12-02 RX ADMIN — APIXABAN 5 MILLIGRAM(S): 2.5 TABLET, FILM COATED ORAL at 16:41

## 2023-12-02 RX ADMIN — Medication 4 UNIT(S): at 16:42

## 2023-12-02 RX ADMIN — CEFTRIAXONE 2000 MILLIGRAM(S): 500 INJECTION, POWDER, FOR SOLUTION INTRAMUSCULAR; INTRAVENOUS at 02:52

## 2023-12-02 RX ADMIN — Medication 50 MILLIGRAM(S): at 07:21

## 2023-12-02 RX ADMIN — Medication 0: at 16:42

## 2023-12-02 RX ADMIN — Medication 81 MILLIGRAM(S): at 11:24

## 2023-12-02 RX ADMIN — HEPARIN SODIUM 2300 UNIT(S)/HR: 5000 INJECTION INTRAVENOUS; SUBCUTANEOUS at 09:17

## 2023-12-02 RX ADMIN — Medication 4 UNIT(S): at 07:53

## 2023-12-02 RX ADMIN — HEPARIN SODIUM 2000 UNIT(S)/HR: 5000 INJECTION INTRAVENOUS; SUBCUTANEOUS at 07:22

## 2023-12-02 RX ADMIN — PANTOPRAZOLE SODIUM 40 MILLIGRAM(S): 20 TABLET, DELAYED RELEASE ORAL at 08:03

## 2023-12-02 RX ADMIN — ATORVASTATIN CALCIUM 20 MILLIGRAM(S): 80 TABLET, FILM COATED ORAL at 21:56

## 2023-12-02 RX ADMIN — Medication 0: at 07:52

## 2023-12-02 RX ADMIN — INSULIN GLARGINE 16 UNIT(S): 100 INJECTION, SOLUTION SUBCUTANEOUS at 21:57

## 2023-12-02 NOTE — PROGRESS NOTE ADULT - ASSESSMENT
DEVON: renal hypoperfusion s/p VATS/decortication---> resolving slowly  No gross hydro on renal sono  - avoid potential nephrotoxins  - diuretics remain on hold; restart when clinically indicated  - follow labs

## 2023-12-02 NOTE — PROGRESS NOTE ADULT - SUBJECTIVE AND OBJECTIVE BOX
GRETA AGUAYO  MRN-[id number]    HPI:  Patient is a 71 yo M w/ PMH of CAD s/p CABG, CHF, HTN, HLD, Afib (not on AC due to hx of GIB) presenting for chief complaint of SOB. Patient states he started experiencing SOB and LE swelling for the past few days He states he was seen by his Cardiologist on, Dr. Milian, on Tuesday and his Metoprolol was increased from 50mg to 100mg. He states his symptoms worsened yesterday therefore he came to the ED today. He states he has been unable to walk around without getting short of breath. He reports compliance with medications and diet. Patient denies fever, chills, chest pain, palpitations, cough, wheezing, abdominal pain, nausea, vomiting, diarrhea, constipation, bloody bowel movements, melena, hematemesis, urinary complaints, syncope, calf tenderness, paresthesias.  (18 Nov 2023 14:38)      Surgery/Hospital Course:  ·  PRE-OP DIAGNOSIS:  Empyema of left pleural space  ·  POST-OP DIAGNOSIS:  Empyema of left pleural space   ·  PROCEDURES:  Decortication, lung, total, using VATS 27-Nov-2023   Flexible bronchoscopy     S/p uncomplicated QUIN & DCCV, 300J x 1 with restoration of sinus rhythm. 01-Dec-2023    Today:  No acute events --    ICU Vital Signs Last 24 Hrs  T(C): 36.6 (02 Dec 2023 08:00), Max: 37.2 (02 Dec 2023 05:00)  T(F): 97.8 (02 Dec 2023 08:00), Max: 98.9 (02 Dec 2023 05:00)  HR: 72 (02 Dec 2023 10:00) (66 - 79)  BP: 136/67 (02 Dec 2023 10:00) (134/63 - 159/73)  BP(mean): 94 (02 Dec 2023 10:00) (91 - 105)  ABP: 95/90 (01 Dec 2023 16:00) (95/90 - 123/61)  ABP(mean): 91 (01 Dec 2023 16:00) (-1 - 91)  RR: 24 (02 Dec 2023 10:00) (13 - 31)  SpO2: 96% (02 Dec 2023 10:00) (91% - 100%)    O2 Parameters below as of 02 Dec 2023 10:00  Patient On (Oxygen Delivery Method): nasal cannula  O2 Flow (L/min): 2          Physical Exam:  Gen: A&O   CNS: non focal 	  Neck: no JVD  RES : clear , no wheezing              CVS: Regular  rhythm. Normal S1/S2  Abd: Soft, non-distended. Bowel sounds present.  Skin: No rash.  Ext:  no edema    ============================I/O===========================   I&O's Detail    01 Dec 2023 07:01  -  02 Dec 2023 07:00  --------------------------------------------------------  IN:    Heparin Infusion: 302 mL    Heparin Infusion: 160 mL    Oral Fluid: 100 mL  Total IN: 562 mL    OUT:    Chest Tube (mL): 10 mL    Indwelling Catheter - Urethral (mL): 845 mL  Total OUT: 855 mL    Total NET: -293 mL      02 Dec 2023 07:01  -  02 Dec 2023 11:07  --------------------------------------------------------  IN:    Heparin Infusion: 20 mL    Oral Fluid: 240 mL  Total IN: 260 mL    OUT:    Chest Tube (mL): 0 mL    Indwelling Catheter - Urethral (mL): 50 mL  Total OUT: 50 mL    Total NET: 210 mL        ============================ LABS =========================                        10.0   11.56 )-----------( 313      ( 02 Dec 2023 07:02 )             34.1     12-02    138  |  104  |  53.0<H>  ----------------------------<  124<H>  4.6   |  22.0  |  1.49<H>    Ca    8.5      02 Dec 2023 07:02  Mg     2.7     12-02    TPro  6.3<L>  /  Alb  2.5<L>  /  TBili  0.6  /  DBili  x   /  AST  35  /  ALT  28  /  AlkPhos  91  12-02    LIVER FUNCTIONS - ( 02 Dec 2023 07:02 )  Alb: 2.5 g/dL / Pro: 6.3 g/dL / ALK PHOS: 91 U/L / ALT: 28 U/L / AST: 35 U/L / GGT: x           PTT - ( 02 Dec 2023 07:02 )  PTT:53.5 sec    Urinalysis Basic - ( 02 Dec 2023 07:02 )    Color: x / Appearance: x / SG: x / pH: x  Gluc: 124 mg/dL / Ketone: x  / Bili: x / Urobili: x   Blood: x / Protein: x / Nitrite: x   Leuk Esterase: x / RBC: x / WBC x   Sq Epi: x / Non Sq Epi: x / Bacteria: x      ======================Micro/Rad/Cardio=================  Culture: Reviewed   CXR: Reviewed  Echo:Reviewed  ======================================================  PAST MEDICAL & SURGICAL HISTORY:  Coronary artery disease involving native coronary artery of native heart, unspecified whether angina      CHF with unknown LVEF      Primary hypertension      Hyperlipidemia, unspecified hyperlipidemia type      S/P CABG x 5      History of cholecystectomy        ====================ASSESSMENT ==============  72-year-old male with history of hypertension CAD status post quintuple bypass November 2021 [facility] of worsening dyspnea on exertion since yesterday.  Thoracic surgery consulted treatment Left effusion. Left PTC placed 11/28. Patient now pending FB left robotic assisted decort     --- Pleural effusion.   --- Left PTC placed 11/18  ---Acute respiratory failure with hypoxia.   ---Atrial flutter.   ---Coronary artery disease involving native coronary artery of native heart, unspecified whether angina.   --- Acute kidney injury.   ---Post op Hypovolemia  ---Post op respiratory insufficiency   ---S/p uncomplicated QUIN & DCCV, 300J x 1 with restoration of sinus rhythm. ---    Plan:  -DC Chest tube  -Continue ceftriaxone 2g IV daily  -  -cardioversion yesterday  - Continue lipitor  -ASA when appropriate.  -Renal following  -Monitor vanco/dig level.    ====================== NEUROLOGY=====================  acetaminophen     Tablet .. 650 milliGRAM(s) Oral every 6 hours PRN Temp greater or equal to 38C (100.4F), Mild Pain (1 - 3)  ondansetron Injectable 4 milliGRAM(s) IV Push every 8 hours PRN Nausea and/or Vomiting    ==================== RESPIRATORY======================  Post op respiratory insufficiency  ====================CARDIOVASCULAR==================  Post op Hypovolemia  metoprolol tartrate 50 milliGRAM(s) Oral every 6 hours    ===================HEMATOLOGIC/ONC ===================  Monitor H&H/Plts    aspirin enteric coated 81 milliGRAM(s) Oral daily  heparin   Injectable 53912 Unit(s) IV Push every 6 hours PRN For aPTT less than 40  heparin   Injectable 5000 Unit(s) IV Push every 6 hours PRN For aPTT between 40 - 57  heparin  Infusion. 2000 Unit(s)/Hr (20 mL/Hr) IV Continuous <Continuous>    ===================== RENAL =========================  Continue monitoring urine output, I&OS, BUN/Cr     ==================== GASTROINTESTINAL===================  pantoprazole    Tablet 40 milliGRAM(s) Oral before breakfast  polyethylene glycol 3350 17 Gram(s) Oral two times a day  senna 2 Tablet(s) Oral at bedtime  sodium chloride 0.9% lock flush 10 milliLiter(s) IV Push every 1 hour PRN Pre/post blood products, medications, blood draw, and to maintain line patency    =======================    ENDOCRINE  =====================  atorvastatin 20 milliGRAM(s) Oral at bedtime  dextrose 50% Injectable 50 milliLiter(s) IV Push every 15 minutes  insulin glargine Injectable (LANTUS) 16 Unit(s) SubCutaneous at bedtime  insulin lispro (ADMELOG) corrective regimen sliding scale   SubCutaneous Before meals and at bedtime  insulin lispro Injectable (ADMELOG) 4 Unit(s) SubCutaneous three times a day before meals    ========================INFECTIOUS DISEASE================  cefTRIAXone Injectable.      cefTRIAXone Injectable. 2000 milliGRAM(s) IV Push every 24 hours    -Monitor Neurologic status ,   -Head of the bed should remain elevated to 45 degrees,  -Monitor for arrhythmias and monitor parameters for organ perfusion,  -Glycemic control is satisfactory,  -Nutritional goals will be met using po eventually , insure adequate caloric intake and monitor the same ,  -Electrolytes have been repleted as necessary , pain control has been achieved  and wound care has been carried out ,  -Stress ulcer and VTE prophylaxis will be achieved,  -Agressive PT and early mobility and ambulation goals will be met,    I have spent 35 minutes providing acute care for this critically ill patient     Patient requires continuous monitoring with bedside rhythm monitoring, pulse ox monitoring, and intermittent blood gas analysis. Care plan discussed with ICU care team. Patient remained critical and at risk for life threatening decompensation.

## 2023-12-02 NOTE — PROGRESS NOTE ADULT - SUBJECTIVE AND OBJECTIVE BOX
Patient seen and examined.  Denies CP, SOB, N/V.  Underwent cardioversion yesterday.  No acute events overnight.  Remains in NSR on heparin drip.     T(C): 36.6 (12-01-23 @ 16:10)  T(F): 97.8 (12-01-23 @ 16:10)  HR: 72 (12-01-23 @ 23:00)  BP: 135/68 (12-01-23 @ 22:00)  BP(mean): 94 (12-01-23 @ 22:00)  ABP: 95/90 (12-01-23 @ 16:00)  ABP(mean): 91 (12-01-23 @ 16:00)  RR: 27 (12-01-23 @ 23:00)  SpO2: 91% (12-01-23 @ 23:00)      Physical Exam:  Gen: A&Ox3  Pulm:  decreased left side  CV:  S1S2, RRR,   Abd: +BS, soft, NT, ND  Ext:  +DP b/l, no c/c/e  Incision:  c/d/i no exudate    I&O's Detail    30 Nov 2023 07:01  -  01 Dec 2023 07:00  --------------------------------------------------------  IN:    Heparin Infusion: 230 mL    Heparin Infusion: 204 mL  Total IN: 434 mL    OUT:    Chest Tube (mL): 140 mL    Chest Tube (mL): 170 mL    Indwelling Catheter - Urethral (mL): 1915 mL  Total OUT: 2225 mL    Total NET: -1791 mL      01 Dec 2023 07:01  -  02 Dec 2023 00:40  --------------------------------------------------------  IN:    Heparin Infusion: 302 mL    Oral Fluid: 50 mL  Total IN: 352 mL    OUT:    Indwelling Catheter - Urethral (mL): 360 mL  Total OUT: 360 mL    Total NET: -8 mL                              9.9    12.60 )-----------( 271      ( 01 Dec 2023 22:13 )             33.4   12-01    137  |  103  |  60.3<H>  ----------------------------<  141<H>  4.4   |  23.0  |  1.69<H>    Ca    8.0<L>      01 Dec 2023 02:10  Mg     2.7     12-01    TPro  6.0<L>  /  Alb  2.6<L>  /  TBili  0.7  /  DBili  x   /  AST  32  /  ALT  27  /  AlkPhos  97  12-01  aPTT: 84.3 sec; PT: x    ; INR: x      12-01-23 @ 22:13         CAPILLARY BLOOD GLUCOSE      POCT Blood Glucose.: 135 mg/dL (01 Dec 2023 21:19)        Medications:  acetaminophen     Tablet .. 975 milliGRAM(s) Oral every 6 hours  aspirin enteric coated 81 milliGRAM(s) Oral daily  atorvastatin 20 milliGRAM(s) Oral at bedtime  cefTRIAXone Injectable.      cefTRIAXone Injectable. 2000 milliGRAM(s) IV Push every 24 hours  chlorhexidine 2% Cloths 1 Application(s) Topical <User Schedule>  dextrose 50% Injectable 50 milliLiter(s) IV Push every 15 minutes  heparin   Injectable 5000 Unit(s) IV Push every 6 hours PRN  heparin   Injectable 60879 Unit(s) IV Push every 6 hours PRN  heparin  Infusion. 2000 Unit(s)/Hr IV Continuous <Continuous>  insulin glargine Injectable (LANTUS) 16 Unit(s) SubCutaneous at bedtime  insulin lispro (ADMELOG) corrective regimen sliding scale   SubCutaneous Before meals and at bedtime  insulin lispro Injectable (ADMELOG) 4 Unit(s) SubCutaneous three times a day before meals  metoprolol tartrate 50 milliGRAM(s) Oral every 6 hours  ondansetron Injectable 4 milliGRAM(s) IV Push every 8 hours PRN  pantoprazole    Tablet 40 milliGRAM(s) Oral before breakfast  polyethylene glycol 3350 17 Gram(s) Oral two times a day  senna 2 Tablet(s) Oral at bedtime  sodium chloride 0.9% lock flush 10 milliLiter(s) IV Push every 1 hour PRN

## 2023-12-02 NOTE — PROGRESS NOTE ADULT - PROBLEM SELECTOR PLAN 1
Pleural fluid exudative by light's criteria  Culture prelim streptococcus intermedius, +empyema ID following   Zosyn switched to ceftraxione as per ID   cytology (-)  Underwent VATS/decort on 11/27 - extubated 11/28  Cardioversion 12/1, Pt currently fully Anticoagulated   Posterior chest tube remains to water seal.    Plan to be discussed with Thoracic team.

## 2023-12-02 NOTE — PROGRESS NOTE ADULT - SUBJECTIVE AND OBJECTIVE BOX
[facility]  INFECTIOUS DISEASES at [place] and [place]  ===============================================================                  Luis Ivey MD               Diplomates American Board of Internal Medicine & Infectious Diseases                * [place] Office - Appt - Tel  [phone] Fax [phone]                * [place] Office - Appt - Tel [phone] Fax [phone]                                  Hospital Consult line:  [phone]  ==============================================================    GRETA AGUAYO [id number]    Follow up: empyema     on 12/1 s/p uncomplicated QUIN & DCCV, 300J x 1 with restoration of sinus rhythm.  hemodynamically stable   reported diarrhea for past 3-4 days, per RN x4 loose yesterday and x2 so far loose not watery today    I have personally reviewed the labs and data; pertinent labs and data are listed in this note; please see below.     _______________________________________________________________  REVIEW OF SYSTEMS  as above  ________________________________________________________________  Allergies:  No Known Allergies        ________________________________________________________________  PHYSICAL EXAM  GEN: in NAD, sitting in chair on o2 via nasal canula Morbidly obese.   HEENT: Anicteric sclerae.   NECK: Supple.   LUNGS: unlabored breathing. Decreased BS in left lung. Left CT x1 with serous output   HEART: RRR, no m/r/g  ABDOMEN: Soft, NT, ND,  +BS.    : Burger catheter  EXTREMITIES: mild edema.  NEUROLOGIC: axo3 no focal deficits  SKIN: No rash or jaundice    ________________________________________________________________  Vitals:  Vital Signs Last 24 Hrs  T(C): 36.6 (02 Dec 2023 08:00), Max: 37.2 (02 Dec 2023 05:00)  T(F): 97.8 (02 Dec 2023 08:00), Max: 98.9 (02 Dec 2023 05:00)  HR: 72 (02 Dec 2023 10:00) (66 - 79)  BP: 136/67 (02 Dec 2023 10:00) (134/63 - 159/73)  BP(mean): 94 (02 Dec 2023 10:00) (91 - 105)  RR: 24 (02 Dec 2023 10:00) (13 - 31)  SpO2: 96% (02 Dec 2023 10:00) (91% - 100%)    Parameters below as of 02 Dec 2023 10:00  Patient On (Oxygen Delivery Method): nasal cannula  O2 Flow (L/min): 2      Current Antibiotics:  cefTRIAXone Injectable.        Other medications:  acetaminophen     Tablet .. 975 milliGRAM(s) Oral every 6 hours  aspirin enteric coated 81 milliGRAM(s) Oral daily  atorvastatin 20 milliGRAM(s) Oral at bedtime  chlorhexidine 2% Cloths 1 Application(s) Topical <User Schedule>  dextrose 50% Injectable 50 milliLiter(s) IV Push every 15 minutes  heparin  Infusion.  Unit(s)/Hr IV Continuous <Continuous>  insulin glargine Injectable (LANTUS) 16 Unit(s) SubCutaneous at bedtime  insulin lispro (ADMELOG) corrective regimen sliding scale   SubCutaneous Before meals and at bedtime  insulin lispro Injectable (ADMELOG) 4 Unit(s) SubCutaneous three times a day before meals  metoprolol tartrate 50 milliGRAM(s) Oral every 6 hours  pantoprazole    Tablet 40 milliGRAM(s) Oral before breakfast  polyethylene glycol 3350 17 Gram(s) Oral two times a day  senna 2 Tablet(s) Oral at bedtime                                                10.0   11.56 )-----------( 313      ( 02 Dec 2023 07:02 )             34.1       12-02    138  |  104  |  53.0<H>  ----------------------------<  124<H>  4.6   |  22.0  |  1.49<H>    Ca    8.5      02 Dec 2023 07:02  Mg     2.7     12-02    TPro  6.3<L>  /  Alb  2.5<L>  /  TBili  0.6  /  DBili  x   /  AST  35  /  ALT  28  /  AlkPhos  91  12-02              Urinalysis Basic - ( 02 Dec 2023 07:02 )    Color: x / Appearance: x / SG: x / pH: x  Gluc: 124 mg/dL / Ketone: x  / Bili: x / Urobili: x   Blood: x / Protein: x / Nitrite: x   Leuk Esterase: x / RBC: x / WBC x   Sq Epi: x / Non Sq Epi: x / Bacteria: x        PTT - ( 02 Dec 2023 07:02 )  PTT:53.5 sec          CAPILLARY BLOOD GLUCOSE      POCT Blood Glucose.: 180 mg/dL (02 Dec 2023 11:16)              RECENT CULTURES:  11-27 @ 18:56 .Body Fluid left pleural fluid     No growth to date.    Few polymorphonuclear leukocytes seen per low power field  Moderate Gram positive cocci in pairs seen per oil power field      11-27 @ 18:30 .Tissue left pleural peel     No growth    Few polymorphonuclear leukocytes seen per low power field  No organisms seen per oil power field      11-26 @ 07:20 .Blood Blood-Peripheral     No growth at 72 Hours      11-26 @ 07:15 .Blood Blood-Peripheral     No growth at 72 Hours      11-24 @ 09:00    RVP with SARS-CoV-2   NotDete      11-24 @ 01:25 .Blood Blood-Peripheral     No growth at 5 days      11-23 @ 12:42 .Blood Blood-Peripheral     No growth at 5 days      11-23 @ 12:35 .Blood Blood-Peripheral     No growth at 5 days      11-18 @ 14:00 Pleural Fl Pleural Fluid Streptococcus intermedius    Few Streptococcus intermedius    polymorphonuclear leukocytes seen  No organisms seen  by cytocentrifuge      11-18 @ 08:30   RVP with SARS-CoV-2   NotDetec      WBC Count: 11.88 K/uL (11-30-23 @ 01:40)  WBC Count: 14.87 K/uL (11-29-23 @ 03:00)  WBC Count: 14.54 K/uL (11-28-23 @ 20:10)  WBC Count: 29.35 K/uL (11-28-23 @ 00:40)  WBC Count: 24.62 K/uL (11-27-23 @ 21:05)  WBC Count: 17.64 K/uL (11-27-23 @ 06:34)  WBC Count: 18.34 K/uL (11-26-23 @ 07:20)    Creatinine: 1.85 mg/dL (11-30-23 @ 01:40)  Creatinine: 2.17 mg/dL (11-29-23 @ 03:00)  Creatinine: 2.10 mg/dL (11-28-23 @ 20:10)  Creatinine: 2.15 mg/dL (11-28-23 @ 00:40)  Creatinine: 2.09 mg/dL (11-27-23 @ 22:00)  Creatinine: 0.84 mg/dL (11-27-23 @ 21:05)  Creatinine: 1.99 mg/dL (11-27-23 @ 06:34)  Creatinine: 2.06 mg/dL (11-26-23 @ 07:20)        Procalcitonin, Serum: 0.74 ng/mL (11-24-23 @ 01:25)  Procalcitonin, Serum: 2.59 ng/mL (11-19-23 @ 01:29)     SARS-CoV-2: NotDetec (11-24-23 @ 09:00)  SARS-CoV-2: NotDetec (11-18-23 @ 08:30)    Vancomycin Level, Trough: <4.0 ug/mL (11-28-23 @ 04:00)    ________________________________________________________________  CARDIOLOGY   < from: TTE Echo Complete w/o Contrast w/ Doppler (11.18.23 @ 12:34) >    Summary:   1. There is mild concentric left ventricular hypertrophy.   2. Moderate to severely increased left ventricular internal cavity size.   3. Left ventricular ejection fraction, by visual estimation, is 20 to   25%.   4. Severely decreased global left ventricular systolic function.   5. The mitral in-flow pattern reveals no discernable A-wave, therefore   no comment on diastolic function can be made.   6. Mildly enlarged right ventricle.   7. Severely reduced RV systolic function.   8. Mildly enlarged left atrium.   9. Normal right atrial size.  10. Sclerotic aortic valve with normal opening.  11. Mild thickening and calcification of the anterior and posterior   mitral valve leaflets.  12. Mild mitral annular calcification.  13. Mild mitral valve regurgitation.  14. The main pulmonary artery is normal in size.  15. There is no evidence of pericardial effusion.    < end of copied text >    ________________________________________________________________  RADIOLOGY  < from: Xray Chest 1 View- PORTABLE-Urgent (Xray Chest 1 View- PORTABLE-Urgent .) (11.28.23 @ 06:31) >  IMPRESSION: Significant pleural drainage after insertion of 2 left chest   tubes. Mild to moderate residual remains. NG tube inserted. Endotracheal   tube and right jugular sleeve remain.    < end of copied text >

## 2023-12-02 NOTE — PROGRESS NOTE ADULT - ASSESSMENT
72M with CAD s/p CABG, HFrEF, HTN, Afib admitted on 11/18 with dyspnea. Found to have large loculated left pleural effusion s/p CT on 11/18. Hospital course complicated by Afl/afib with RVR followed by EP     Left empyema   Aflutter with RVR   CKD   Diarrhea      - s/p VATS/decortication on 11/27 >> empyema   - Extubated on 11/28   - Continue ceftriaxone 2g IV daily   - Follow surgical cultures     - Gram with GPC in pairs. Cultures ngtd   - 11/18 CT Chest with large loculated pleural effusion   - s/p left chest tube on 11/18   - 11/18 Pleural fluid culture with Streptococcus intermedius   - 11/23, 11/24 and 11/26 BCx ngtd  - Difficulty achieving rate control for Aflutter - on 12/1 s/p uncomplicated QUIN & DCCV, 300J x 1 with restoration of sinus rhythm.  - EF 20-25%  - Leukocytosis improving  - monitor diarrhea, may be from laxative use. laxatives held for now  - Remainder of care as per CTICU     D/w  CTICU team PA and RN    ID will f/u

## 2023-12-02 NOTE — PROGRESS NOTE ADULT - ASSESSMENT
72-year-old male with history of hypertension CAD status post quintuple bypass November 2021 followed by cardiology/Dr. Milian in [place] resents the ED via EMS complaining of worsening dyspnea on exertion since yesterday. Admitted with AF, CHF,  Thoracic surgery consulted treatment Left effusion. 11/18 L Pl effusion PTC placed with 600cc removed initially, 11/20 CT Chest : Large loculated left hydropneumothorax containing a pigtail catheter. 11/24 Febrile 100.3 wbc 18 from 8 11/27 completed dig, OR for VATS – remained intubated, on Levo 11/27 Left VATS decortication.  Extubated POD 1., pre and post op course c/b Aflutter in which EP following. Successful cardioversion on 12/1.

## 2023-12-02 NOTE — PROGRESS NOTE ADULT - SUBJECTIVE AND OBJECTIVE BOX
NEPHROLOGY INTERVAL HPI/OVERNIGHT EVENTS:  pt clinically stable  no acute overnight events noted    MEDICATIONS  (STANDING):  aspirin enteric coated 81 milliGRAM(s) Oral daily  atorvastatin 20 milliGRAM(s) Oral at bedtime  cefTRIAXone Injectable.      cefTRIAXone Injectable. 2000 milliGRAM(s) IV Push every 24 hours  chlorhexidine 2% Cloths 1 Application(s) Topical <User Schedule>  dextrose 50% Injectable 50 milliLiter(s) IV Push every 15 minutes  heparin  Infusion. 2000 Unit(s)/Hr (20 mL/Hr) IV Continuous <Continuous>  insulin glargine Injectable (LANTUS) 16 Unit(s) SubCutaneous at bedtime  insulin lispro (ADMELOG) corrective regimen sliding scale   SubCutaneous Before meals and at bedtime  insulin lispro Injectable (ADMELOG) 4 Unit(s) SubCutaneous three times a day before meals  metoprolol tartrate 50 milliGRAM(s) Oral every 6 hours  pantoprazole    Tablet 40 milliGRAM(s) Oral before breakfast  polyethylene glycol 3350 17 Gram(s) Oral two times a day  senna 2 Tablet(s) Oral at bedtime    MEDICATIONS  (PRN):  acetaminophen     Tablet .. 650 milliGRAM(s) Oral every 6 hours PRN Temp greater or equal to 38C (100.4F), Mild Pain (1 - 3)  heparin   Injectable 5000 Unit(s) IV Push every 6 hours PRN For aPTT between 40 - 57  heparin   Injectable 33070 Unit(s) IV Push every 6 hours PRN For aPTT less than 40  ondansetron Injectable 4 milliGRAM(s) IV Push every 8 hours PRN Nausea and/or Vomiting  sodium chloride 0.9% lock flush 10 milliLiter(s) IV Push every 1 hour PRN Pre/post blood products, medications, blood draw, and to maintain line patency      Allergies    No Known Allergies          Vital Signs Last 24 Hrs  T(C): 36.6 (02 Dec 2023 08:00), Max: 37.2 (02 Dec 2023 05:00)  T(F): 97.8 (02 Dec 2023 08:00), Max: 98.9 (02 Dec 2023 05:00)  HR: 74 (02 Dec 2023 08:00) (66 - 130)  BP: 138/66 (02 Dec 2023 08:00) (134/63 - 159/73)  BP(mean): 95 (02 Dec 2023 08:00) (91 - 105)  RR: 26 (02 Dec 2023 08:00) (13 - 31)  SpO2: 94% (02 Dec 2023 08:00) (91% - 100%)    Parameters below as of 02 Dec 2023 08:00  Patient On (Oxygen Delivery Method): nasal cannula  O2 Flow (L/min): 2      PHYSICAL EXAM:  GENERAL: Deconditioned  HEENT: Dry MMs  NECK: Supple, No JVD  CHEST/LUNG: Clear, diminished BS  CVS:  No rub  Abd:  Soft +BS  EXTREMITIES: + dependent edema improved    LABS:                        10.0   11.56 )-----------( 313      ( 02 Dec 2023 07:02 )             34.1     12-02    138  |  104  |  53.0<H>  ----------------------------<  124<H>  4.6   |  22.0  |  1.49<H>    Ca    8.5      02 Dec 2023 07:02  Mg     2.7     12-02    TPro  6.3<L>  /  Alb  2.5<L>  /  TBili  0.6  /  DBili  x   /  AST  35  /  ALT  28  /  AlkPhos  91  12-02    PTT - ( 02 Dec 2023 07:02 )  PTT:53.5 sec  Urinalysis Basic - ( 02 Dec 2023 07:02 )    Color: x / Appearance: x / SG: x / pH: x  Gluc: 124 mg/dL / Ketone: x  / Bili: x / Urobili: x   Blood: x / Protein: x / Nitrite: x   Leuk Esterase: x / RBC: x / WBC x   Sq Epi: x / Non Sq Epi: x / Bacteria: x      Magnesium: 2.7 mg/dL (12-02 @ 07:02)      RADIOLOGY & ADDITIONAL TESTS:

## 2023-12-03 DIAGNOSIS — Z29.9 ENCOUNTER FOR PROPHYLACTIC MEASURES, UNSPECIFIED: ICD-10-CM

## 2023-12-03 DIAGNOSIS — E11.9 TYPE 2 DIABETES MELLITUS WITHOUT COMPLICATIONS: ICD-10-CM

## 2023-12-03 LAB
ANION GAP SERPL CALC-SCNC: 10 MMOL/L — SIGNIFICANT CHANGE UP (ref 5–17)
ANION GAP SERPL CALC-SCNC: 10 MMOL/L — SIGNIFICANT CHANGE UP (ref 5–17)
BUN SERPL-MCNC: 48.6 MG/DL — HIGH (ref 8–20)
BUN SERPL-MCNC: 48.6 MG/DL — HIGH (ref 8–20)
CALCIUM SERPL-MCNC: 8.1 MG/DL — LOW (ref 8.4–10.5)
CALCIUM SERPL-MCNC: 8.1 MG/DL — LOW (ref 8.4–10.5)
CHLORIDE SERPL-SCNC: 104 MMOL/L — SIGNIFICANT CHANGE UP (ref 96–108)
CHLORIDE SERPL-SCNC: 104 MMOL/L — SIGNIFICANT CHANGE UP (ref 96–108)
CO2 SERPL-SCNC: 24 MMOL/L — SIGNIFICANT CHANGE UP (ref 22–29)
CO2 SERPL-SCNC: 24 MMOL/L — SIGNIFICANT CHANGE UP (ref 22–29)
CREAT SERPL-MCNC: 1.56 MG/DL — HIGH (ref 0.5–1.3)
CREAT SERPL-MCNC: 1.56 MG/DL — HIGH (ref 0.5–1.3)
EGFR: 47 ML/MIN/1.73M2 — LOW
EGFR: 47 ML/MIN/1.73M2 — LOW
GLUCOSE BLDC GLUCOMTR-MCNC: 101 MG/DL — HIGH (ref 70–99)
GLUCOSE BLDC GLUCOMTR-MCNC: 101 MG/DL — HIGH (ref 70–99)
GLUCOSE BLDC GLUCOMTR-MCNC: 102 MG/DL — HIGH (ref 70–99)
GLUCOSE BLDC GLUCOMTR-MCNC: 102 MG/DL — HIGH (ref 70–99)
GLUCOSE BLDC GLUCOMTR-MCNC: 112 MG/DL — HIGH (ref 70–99)
GLUCOSE BLDC GLUCOMTR-MCNC: 112 MG/DL — HIGH (ref 70–99)
GLUCOSE BLDC GLUCOMTR-MCNC: 114 MG/DL — HIGH (ref 70–99)
GLUCOSE BLDC GLUCOMTR-MCNC: 114 MG/DL — HIGH (ref 70–99)
GLUCOSE SERPL-MCNC: 102 MG/DL — HIGH (ref 70–99)
GLUCOSE SERPL-MCNC: 102 MG/DL — HIGH (ref 70–99)
HCT VFR BLD CALC: 30.5 % — LOW (ref 39–50)
HCT VFR BLD CALC: 30.5 % — LOW (ref 39–50)
HGB BLD-MCNC: 9.1 G/DL — LOW (ref 13–17)
HGB BLD-MCNC: 9.1 G/DL — LOW (ref 13–17)
MAGNESIUM SERPL-MCNC: 2.6 MG/DL — SIGNIFICANT CHANGE UP (ref 1.8–2.6)
MAGNESIUM SERPL-MCNC: 2.6 MG/DL — SIGNIFICANT CHANGE UP (ref 1.8–2.6)
MCHC RBC-ENTMCNC: 27.2 PG — SIGNIFICANT CHANGE UP (ref 27–34)
MCHC RBC-ENTMCNC: 27.2 PG — SIGNIFICANT CHANGE UP (ref 27–34)
MCHC RBC-ENTMCNC: 29.8 GM/DL — LOW (ref 32–36)
MCHC RBC-ENTMCNC: 29.8 GM/DL — LOW (ref 32–36)
MCV RBC AUTO: 91.3 FL — SIGNIFICANT CHANGE UP (ref 80–100)
MCV RBC AUTO: 91.3 FL — SIGNIFICANT CHANGE UP (ref 80–100)
PLATELET # BLD AUTO: 292 K/UL — SIGNIFICANT CHANGE UP (ref 150–400)
PLATELET # BLD AUTO: 292 K/UL — SIGNIFICANT CHANGE UP (ref 150–400)
POTASSIUM SERPL-MCNC: 4.2 MMOL/L — SIGNIFICANT CHANGE UP (ref 3.5–5.3)
POTASSIUM SERPL-MCNC: 4.2 MMOL/L — SIGNIFICANT CHANGE UP (ref 3.5–5.3)
POTASSIUM SERPL-SCNC: 4.2 MMOL/L — SIGNIFICANT CHANGE UP (ref 3.5–5.3)
POTASSIUM SERPL-SCNC: 4.2 MMOL/L — SIGNIFICANT CHANGE UP (ref 3.5–5.3)
RBC # BLD: 3.34 M/UL — LOW (ref 4.2–5.8)
RBC # BLD: 3.34 M/UL — LOW (ref 4.2–5.8)
RBC # FLD: 16.8 % — HIGH (ref 10.3–14.5)
RBC # FLD: 16.8 % — HIGH (ref 10.3–14.5)
SODIUM SERPL-SCNC: 138 MMOL/L — SIGNIFICANT CHANGE UP (ref 135–145)
SODIUM SERPL-SCNC: 138 MMOL/L — SIGNIFICANT CHANGE UP (ref 135–145)
WBC # BLD: 10.88 K/UL — HIGH (ref 3.8–10.5)
WBC # BLD: 10.88 K/UL — HIGH (ref 3.8–10.5)
WBC # FLD AUTO: 10.88 K/UL — HIGH (ref 3.8–10.5)
WBC # FLD AUTO: 10.88 K/UL — HIGH (ref 3.8–10.5)

## 2023-12-03 PROCEDURE — 71045 X-RAY EXAM CHEST 1 VIEW: CPT | Mod: 26

## 2023-12-03 PROCEDURE — 99232 SBSQ HOSP IP/OBS MODERATE 35: CPT

## 2023-12-03 RX ORDER — INSULIN GLARGINE 100 [IU]/ML
12 INJECTION, SOLUTION SUBCUTANEOUS AT BEDTIME
Refills: 0 | Status: DISCONTINUED | OUTPATIENT
Start: 2023-12-03 | End: 2023-12-05

## 2023-12-03 RX ORDER — TAMSULOSIN HYDROCHLORIDE 0.4 MG/1
0.4 CAPSULE ORAL AT BEDTIME
Refills: 0 | Status: DISCONTINUED | OUTPATIENT
Start: 2023-12-03 | End: 2023-12-06

## 2023-12-03 RX ADMIN — Medication 650 MILLIGRAM(S): at 16:14

## 2023-12-03 RX ADMIN — Medication 4 UNIT(S): at 08:19

## 2023-12-03 RX ADMIN — APIXABAN 5 MILLIGRAM(S): 2.5 TABLET, FILM COATED ORAL at 16:14

## 2023-12-03 RX ADMIN — Medication 4 UNIT(S): at 12:16

## 2023-12-03 RX ADMIN — CHLORHEXIDINE GLUCONATE 1 APPLICATION(S): 213 SOLUTION TOPICAL at 06:17

## 2023-12-03 RX ADMIN — Medication 0: at 08:17

## 2023-12-03 RX ADMIN — Medication 650 MILLIGRAM(S): at 22:52

## 2023-12-03 RX ADMIN — PANTOPRAZOLE SODIUM 40 MILLIGRAM(S): 20 TABLET, DELAYED RELEASE ORAL at 06:17

## 2023-12-03 RX ADMIN — Medication 50 MILLIGRAM(S): at 06:17

## 2023-12-03 RX ADMIN — Medication 650 MILLIGRAM(S): at 17:00

## 2023-12-03 RX ADMIN — Medication 50 MILLIGRAM(S): at 12:18

## 2023-12-03 RX ADMIN — Medication 81 MILLIGRAM(S): at 12:17

## 2023-12-03 RX ADMIN — Medication 50 MILLIGRAM(S): at 16:14

## 2023-12-03 RX ADMIN — ATORVASTATIN CALCIUM 20 MILLIGRAM(S): 80 TABLET, FILM COATED ORAL at 22:08

## 2023-12-03 RX ADMIN — TAMSULOSIN HYDROCHLORIDE 0.4 MILLIGRAM(S): 0.4 CAPSULE ORAL at 22:08

## 2023-12-03 RX ADMIN — Medication 0: at 16:22

## 2023-12-03 RX ADMIN — Medication 50 MILLIGRAM(S): at 00:19

## 2023-12-03 RX ADMIN — Medication 0: at 12:15

## 2023-12-03 RX ADMIN — CEFTRIAXONE 2000 MILLIGRAM(S): 500 INJECTION, POWDER, FOR SOLUTION INTRAMUSCULAR; INTRAVENOUS at 01:35

## 2023-12-03 RX ADMIN — APIXABAN 5 MILLIGRAM(S): 2.5 TABLET, FILM COATED ORAL at 04:16

## 2023-12-03 RX ADMIN — Medication 4 UNIT(S): at 16:23

## 2023-12-03 RX ADMIN — Medication 650 MILLIGRAM(S): at 23:52

## 2023-12-03 RX ADMIN — INSULIN GLARGINE 12 UNIT(S): 100 INJECTION, SOLUTION SUBCUTANEOUS at 22:08

## 2023-12-03 NOTE — PROGRESS NOTE ADULT - SUBJECTIVE AND OBJECTIVE BOX
Interval Events:  no overnight events  follow up on diabetes    patient seen and examined at bedside.  FS reviewed- improving  labs and imaging independently reviewed  feels well   no complaints  eating and drinking well    ROS as noted above    No Known Allergies      MEDICATIONS  (STANDING):  apixaban 5 milliGRAM(s) Oral every 12 hours  aspirin enteric coated 81 milliGRAM(s) Oral daily  atorvastatin 20 milliGRAM(s) Oral at bedtime  cefTRIAXone Injectable.      cefTRIAXone Injectable. 2000 milliGRAM(s) IV Push every 24 hours  chlorhexidine 2% Cloths 1 Application(s) Topical <User Schedule>  dextrose 50% Injectable 50 milliLiter(s) IV Push every 15 minutes  insulin glargine Injectable (LANTUS) 16 Unit(s) SubCutaneous at bedtime  insulin lispro (ADMELOG) corrective regimen sliding scale   SubCutaneous Before meals and at bedtime  insulin lispro Injectable (ADMELOG) 4 Unit(s) SubCutaneous three times a day before meals  metoprolol tartrate 50 milliGRAM(s) Oral every 6 hours  pantoprazole    Tablet 40 milliGRAM(s) Oral before breakfast    MEDICATIONS  (PRN):  acetaminophen     Tablet .. 650 milliGRAM(s) Oral every 6 hours PRN Temp greater or equal to 38C (100.4F), Mild Pain (1 - 3)  ondansetron Injectable 4 milliGRAM(s) IV Push every 8 hours PRN Nausea and/or Vomiting  sodium chloride 0.9% lock flush 10 milliLiter(s) IV Push every 1 hour PRN Pre/post blood products, medications, blood draw, and to maintain line patency      Vital Signs Last 24 Hrs  T(C): 36.5 (03 Dec 2023 08:00), Max: 36.7 (02 Dec 2023 20:00)  T(F): 97.7 (03 Dec 2023 08:00), Max: 98 (02 Dec 2023 20:00)  HR: 66 (03 Dec 2023 10:00) (58 - 69)  BP: 134/63 (03 Dec 2023 10:00) (122/57 - 153/67)  BP(mean): 91 (03 Dec 2023 10:00) (82 - 101)  RR: 19 (03 Dec 2023 10:00) (17 - 26)  SpO2: 98% (03 Dec 2023 10:00) (90% - 99%)    Parameters below as of 03 Dec 2023 08:00  Patient On (Oxygen Delivery Method): nasal cannula  O2 Flow (L/min): 2    Weight (kg): 127 (11-28-23 @ 03:29)    Physical Exam:    Constitutional: NAD, obese   Neck: trachea midline, no thyroid enlargement  Respiratory: CTAB, normal respirations  Cardiovascular: S1 and S2, RRR  Gastrointestinal: BS+, soft, ntnd  Extremities: + peripheral edema  Neurological: AOx3, no focal deficits  Psychiatric: Normal mood and normal affect      LABS  12-03    138  |  104  |  48.6<H>  ----------------------------<  102<H>  4.2   |  24.0  |  1.56<H>    Ca    8.1<L>      03 Dec 2023 02:15  Mg     2.6     12-03    TPro  6.3<L>  /  Alb  2.5<L>  /  TBili  0.6  /  DBili  x   /  AST  35  /  ALT  28  /  AlkPhos  91  12-02                          9.1    10.88 )-----------( 292      ( 03 Dec 2023 02:15 )             30.5       A1C with Estimated Average Glucose Result: 7.3 % (11-28-23 @ 00:40)    Aspartate Aminotransferase (AST/SGOT): 35 U/L (12-02-23 @ 07:02)  Alanine Aminotransferase (ALT/SGPT): 28 U/L (12-02-23 @ 07:02)  Alkaline Phosphatase: 91 U/L (12-02-23 @ 07:02)  Albumin: 2.5 g/dL (12-02-23 @ 07:02)          CAPILLARY BLOOD GLUCOSE      POCT Blood Glucose.: 102 mg/dL (03 Dec 2023 11:35)  POCT Blood Glucose.: 112 mg/dL (03 Dec 2023 07:58)  POCT Blood Glucose.: 117 mg/dL (02 Dec 2023 21:55)  POCT Blood Glucose.: 130 mg/dL (02 Dec 2023 16:17)

## 2023-12-03 NOTE — PROGRESS NOTE ADULT - ASSESSMENT
72-year-old male with history of hypertension CAD status post quintuple bypass November 2021 followed by cardiology/Dr. Milian in [place] resents the ED via EMS complaining of worsening dyspnea on exertion since yesterday. Admitted with AF, CHF,  Thoracic surgery consulted treatment Left effusion. 11/18 L Pl effusion PTC placed with 600cc removed initially, 11/20 CT Chest : Large loculated left hydropneumothorax containing a pigtail catheter. 11/24 Febrile 100.3 wbc 18 from 8 11/27 completed dig, OR for VATS – remained intubated, on Levo 11/27 Left VATS decortication.  Extubated POD 1., pre and post op course c/b Aflutter in which EP following. Successful cardioversion on 12/1.        72-year-old male with history of hypertension CAD status post quintuple bypass November 2021 followed by cardiology/Dr. Milian in [place] resents the ED via EMS complaining of worsening dyspnea on exertion since yesterday. Admitted with AF, CHF,  Thoracic surgery consulted treatment Left effusion. 11/18 L Pl effusion PTC placed with 600cc removed initially, 11/20 CT Chest : Large loculated left hydropneumothorax containing a pigtail catheter. 11/24 Febrile 100.3 wbc 18 from 8 11/27 completed dig, OR for VATS – remained intubated, on Levo 11/27 Left VATS decortication.  Extubated POD 1., pre and post op course c/b Aflutter in which EP following. Successful cardioversion on 12/1.        72-year-old male with history of hypertension CAD status post quintuple bypass November 2021 followed by cardiology/Dr. Milian in [place] resents the ED via EMS complaining of worsening dyspnea on exertion since yesterday. Admitted with AF, CHF,  Thoracic surgery consulted treatment Left effusion. 11/18 L Pl effusion PTC placed with 600cc removed initially, 11/20 CT Chest : Large loculated left hydropneumothorax containing a pigtail catheter. 11/24 Febrile 100.3 wbc 18 from 8 11/27 completed dig, OR for VATS – remained intubated, on Levo 11/27 Left VATS decortication.  Extubated POD 1., pre and post op course c/b Aflutter in which EP following. Successful cardioversion on 12/1.

## 2023-12-03 NOTE — PROGRESS NOTE ADULT - PROBLEM SELECTOR PLAN 1
Pleural fluid exudative by light's criteria  Culture prelim streptococcus intermedius, +empyema ID following   Zosyn switched to ceftraxione as per ID   cytology (-)  Underwent VATS/decort on 11/27 - extubated 11/28  Cardioversion 12/1, Pt currently fully Anticoagulated   All chest tubes removed   + sutures remain at the chest tube insertion site     Plan to be discussed with Thoracic team.

## 2023-12-03 NOTE — PROGRESS NOTE ADULT - SUBJECTIVE AND OBJECTIVE BOX
NEPHROLOGY INTERVAL HPI/OVERNIGHT EVENTS:  pt doing well  tolerating diet  off IVF  hsu with clear urine    MEDICATIONS  (STANDING):  apixaban 5 milliGRAM(s) Oral every 12 hours  aspirin enteric coated 81 milliGRAM(s) Oral daily  atorvastatin 20 milliGRAM(s) Oral at bedtime  cefTRIAXone Injectable.      cefTRIAXone Injectable. 2000 milliGRAM(s) IV Push every 24 hours  chlorhexidine 2% Cloths 1 Application(s) Topical <User Schedule>  dextrose 50% Injectable 50 milliLiter(s) IV Push every 15 minutes  insulin glargine Injectable (LANTUS) 16 Unit(s) SubCutaneous at bedtime  insulin lispro (ADMELOG) corrective regimen sliding scale   SubCutaneous Before meals and at bedtime  insulin lispro Injectable (ADMELOG) 4 Unit(s) SubCutaneous three times a day before meals  metoprolol tartrate 50 milliGRAM(s) Oral every 6 hours  pantoprazole    Tablet 40 milliGRAM(s) Oral before breakfast    MEDICATIONS  (PRN):  acetaminophen     Tablet .. 650 milliGRAM(s) Oral every 6 hours PRN Temp greater or equal to 38C (100.4F), Mild Pain (1 - 3)  ondansetron Injectable 4 milliGRAM(s) IV Push every 8 hours PRN Nausea and/or Vomiting  sodium chloride 0.9% lock flush 10 milliLiter(s) IV Push every 1 hour PRN Pre/post blood products, medications, blood draw, and to maintain line patency      Allergies    No Known Allergies        Vital Signs Last 24 Hrs  T(C): 36.6 (03 Dec 2023 12:00), Max: 36.7 (02 Dec 2023 20:00)  T(F): 97.9 (03 Dec 2023 12:00), Max: 98 (02 Dec 2023 20:00)  HR: 66 (03 Dec 2023 10:00) (58 - 69)  BP: 134/63 (03 Dec 2023 10:00) (122/57 - 153/67)  BP(mean): 91 (03 Dec 2023 10:00) (82 - 101)  RR: 19 (03 Dec 2023 10:00) (17 - 26)  SpO2: 98% (03 Dec 2023 10:00) (90% - 99%)    Parameters below as of 03 Dec 2023 08:00  Patient On (Oxygen Delivery Method): nasal cannula  O2 Flow (L/min): 2      PHYSICAL EXAM:  GENERAL: Weak, tired  HEENT: Dry MMs  NECK: Supple, No JVD  CHEST/LUNG: Clear, diminished BS  CVS:  No rub  Abd:  Soft +BS  EXTREMITIES: + dependent edema     LABS:                        9.1    10.88 )-----------( 292      ( 03 Dec 2023 02:15 )             30.5     12-03    138  |  104  |  48.6<H>  ----------------------------<  102<H>  4.2   |  24.0  |  1.56<H>        Ca    8.1<L>      03 Dec 2023 02:15  Mg     2.6     12-03    TPro  6.3<L>  /  Alb  2.5<L>  /  TBili  0.6  /  DBili  x   /  AST  35  /  ALT  28  /  AlkPhos  91  12-02    PTT - ( 02 Dec 2023 07:02 )  PTT:53.5 sec  Urinalysis Basic - ( 03 Dec 2023 02:15 )    Color: x / Appearance: x / SG: x / pH: x  Gluc: 102 mg/dL / Ketone: x  / Bili: x / Urobili: x   Blood: x / Protein: x / Nitrite: x   Leuk Esterase: x / RBC: x / WBC x   Sq Epi: x / Non Sq Epi: x / Bacteria: x      Magnesium: 2.6 mg/dL (12-03 @ 02:15)      RADIOLOGY & ADDITIONAL TESTS:

## 2023-12-03 NOTE — PROGRESS NOTE ADULT - SUBJECTIVE AND OBJECTIVE BOX
Brief summary:  73yMale POD# 6 FB Left VATS total decort     SUBJECTIVE:  pt in bed sleeping but easily arousable   Patient denies acute pain with radiating or aggravating factors.  He denies chest pain, shortness of breath, palpitations, headache, dizziness, nausea, or vomiting.      Overnight events:  no acute overnight events     PAST MEDICAL & SURGICAL HISTORY:  Coronary artery disease involving native coronary artery of native heart, unspecified whether angina      CHF with unknown LVEF      Primary hypertension      Hyperlipidemia, unspecified hyperlipidemia type      S/P CABG x 5      History of cholecystectomy          MEDICATIONS   acetaminophen     Tablet .. 650 milliGRAM(s) Oral every 6 hours PRN  apixaban 5 milliGRAM(s) Oral every 12 hours  aspirin enteric coated 81 milliGRAM(s) Oral daily  atorvastatin 20 milliGRAM(s) Oral at bedtime  cefTRIAXone Injectable.      cefTRIAXone Injectable. 2000 milliGRAM(s) IV Push every 24 hours  chlorhexidine 2% Cloths 1 Application(s) Topical <User Schedule>  dextrose 50% Injectable 50 milliLiter(s) IV Push every 15 minutes  insulin glargine Injectable (LANTUS) 16 Unit(s) SubCutaneous at bedtime  insulin lispro (ADMELOG) corrective regimen sliding scale   SubCutaneous Before meals and at bedtime  insulin lispro Injectable (ADMELOG) 4 Unit(s) SubCutaneous three times a day before meals  metoprolol tartrate 50 milliGRAM(s) Oral every 6 hours  ondansetron Injectable 4 milliGRAM(s) IV Push every 8 hours PRN  pantoprazole    Tablet 40 milliGRAM(s) Oral before breakfast  sodium chloride 0.9% lock flush 10 milliLiter(s) IV Push every 1 hour PRN  MEDICATIONS  (PRN):  acetaminophen     Tablet .. 650 milliGRAM(s) Oral every 6 hours PRN Temp greater or equal to 38C (100.4F), Mild Pain (1 - 3)  ondansetron Injectable 4 milliGRAM(s) IV Push every 8 hours PRN Nausea and/or Vomiting  sodium chloride 0.9% lock flush 10 milliLiter(s) IV Push every 1 hour PRN Pre/post blood products, medications, blood draw, and to maintain line patency      Daily     Daily                               9.1    10.88 )-----------( 292      ( 03 Dec 2023 02:15 )             30.5   12-03    138  |  104  |  48.6<H>  ----------------------------<  102<H>  4.2   |  24.0  |  1.56<H>    Ca    8.1<L>      03 Dec 2023 02:15  Mg     2.6     12-03    TPro  6.3<L>  /  Alb  2.5<L>  /  TBili  0.6  /  DBili  x   /  AST  35  /  ALT  28  /  AlkPhos  91  12-02      PTT - ( 02 Dec 2023 07:02 )  PTT:53.5 sec      Objective:  T(C): 36.7 (12-03-23 @ 00:00), Max: 37.2 (12-02-23 @ 05:00)  HR: 58 (12-03-23 @ 03:00) (58 - 74)  BP: 129/62 (12-03-23 @ 03:00) (122/57 - 153/67)  RR: 25 (12-03-23 @ 03:00) (19 - 26)  SpO2: 96% (12-03-23 @ 03:00) (69% - 99%)  Wt(kg): --CAPILLARY BLOOD GLUCOSE      POCT Blood Glucose.: 117 mg/dL (02 Dec 2023 21:55)  POCT Blood Glucose.: 130 mg/dL (02 Dec 2023 16:17)  POCT Blood Glucose.: 180 mg/dL (02 Dec 2023 11:16)  POCT Blood Glucose.: 128 mg/dL (02 Dec 2023 07:34)  I&O's Summary    01 Dec 2023 07:01  -  02 Dec 2023 07:00  --------------------------------------------------------  IN: 562 mL / OUT: 855 mL / NET: -293 mL    02 Dec 2023 07:01  -  03 Dec 2023 03:49  --------------------------------------------------------  IN: 828 mL / OUT: 500 mL / NET: 328 mL        Physical Exam  Neuro: A+O x 3, non-focal, speech clear and intact  Pulm: B/L diminished at the bases, no accessory muscles utilized   CV: RRR, +S1S2  Abd: soft, NT, ND, +BS  Ext: +DP Pulses b/l, +PT pulses, 2+B/l LE edema    Imaging:  CXR:    < from: Xray Chest 1 View- PORTABLE-Urgent (Xray Chest 1 View- PORTABLE-Urgent .) (12.01.23 @ 17:31) >    ACC: [id number] EXAM:  XR CHEST PORTABLE URGENT 1V   ORDERED BY: JESSICA COLBY     PROCEDURE DATE:  12/01/2023          INTERPRETATION:  Portable chest radiograph    CLINICAL INFORMATION:  Postoperative  Cardiac surgery.    TECHNIQUE:  Portable  AP view of the chest was obtained.    FINDINGS:   No previous examinations are available for review.  [A right IJ catheter/ sheath tip in SVC.]  Left chest tube in place.    LEFT lower zone airspace consolidation and/or effusion layering along   LEFT lateral thoracic wall.  RIGHT lung parenchyma remains clear.  . No pneumothorax.    The heart and mediastinum are within normal limits following cardiac    surgery.    Visualized osseous structures are intact.    IMPRESSION:  No significant change.    ---End of Report ---            TANNER MACHADO MD; Attending Radiologist  This document has been electronically signed. Dec  2 2023  3:34PM    < end of copied text >    ECG:                 Brief summary:  73yMale POD# 6 FB Left VATS total decort     SUBJECTIVE:  pt in bed sleeping but easily arousable   Patient denies acute pain with radiating or aggravating factors.  He denies chest pain, shortness of breath, palpitations, headache, dizziness, nausea, or vomiting.      Overnight events:  no acute overnight events     PAST MEDICAL & SURGICAL HISTORY:  Coronary artery disease involving native coronary artery of native heart, unspecified whether angina      CHF with unknown LVEF      Primary hypertension      Hyperlipidemia, unspecified hyperlipidemia type      S/P CABG x 5      History of cholecystectomy          MEDICATIONS   acetaminophen     Tablet .. 650 milliGRAM(s) Oral every 6 hours PRN  apixaban 5 milliGRAM(s) Oral every 12 hours  aspirin enteric coated 81 milliGRAM(s) Oral daily  atorvastatin 20 milliGRAM(s) Oral at bedtime  cefTRIAXone Injectable.      cefTRIAXone Injectable. 2000 milliGRAM(s) IV Push every 24 hours  chlorhexidine 2% Cloths 1 Application(s) Topical <User Schedule>  dextrose 50% Injectable 50 milliLiter(s) IV Push every 15 minutes  insulin glargine Injectable (LANTUS) 16 Unit(s) SubCutaneous at bedtime  insulin lispro (ADMELOG) corrective regimen sliding scale   SubCutaneous Before meals and at bedtime  insulin lispro Injectable (ADMELOG) 4 Unit(s) SubCutaneous three times a day before meals  metoprolol tartrate 50 milliGRAM(s) Oral every 6 hours  ondansetron Injectable 4 milliGRAM(s) IV Push every 8 hours PRN  pantoprazole    Tablet 40 milliGRAM(s) Oral before breakfast  sodium chloride 0.9% lock flush 10 milliLiter(s) IV Push every 1 hour PRN  MEDICATIONS  (PRN):  acetaminophen     Tablet .. 650 milliGRAM(s) Oral every 6 hours PRN Temp greater or equal to 38C (100.4F), Mild Pain (1 - 3)  ondansetron Injectable 4 milliGRAM(s) IV Push every 8 hours PRN Nausea and/or Vomiting  sodium chloride 0.9% lock flush 10 milliLiter(s) IV Push every 1 hour PRN Pre/post blood products, medications, blood draw, and to maintain line patency      Daily     Daily                               9.1    10.88 )-----------( 292      ( 03 Dec 2023 02:15 )             30.5   12-03    138  |  104  |  48.6<H>  ----------------------------<  102<H>  4.2   |  24.0  |  1.56<H>    Ca    8.1<L>      03 Dec 2023 02:15  Mg     2.6     12-03    TPro  6.3<L>  /  Alb  2.5<L>  /  TBili  0.6  /  DBili  x   /  AST  35  /  ALT  28  /  AlkPhos  91  12-02      PTT - ( 02 Dec 2023 07:02 )  PTT:53.5 sec      Objective:  T(C): 36.7 (12-03-23 @ 00:00), Max: 37.2 (12-02-23 @ 05:00)  HR: 58 (12-03-23 @ 03:00) (58 - 74)  BP: 129/62 (12-03-23 @ 03:00) (122/57 - 153/67)  RR: 25 (12-03-23 @ 03:00) (19 - 26)  SpO2: 96% (12-03-23 @ 03:00) (69% - 99%)  Wt(kg): --CAPILLARY BLOOD GLUCOSE      POCT Blood Glucose.: 117 mg/dL (02 Dec 2023 21:55)  POCT Blood Glucose.: 130 mg/dL (02 Dec 2023 16:17)  POCT Blood Glucose.: 180 mg/dL (02 Dec 2023 11:16)  POCT Blood Glucose.: 128 mg/dL (02 Dec 2023 07:34)  I&O's Summary    01 Dec 2023 07:01  -  02 Dec 2023 07:00  --------------------------------------------------------  IN: 562 mL / OUT: 855 mL / NET: -293 mL    02 Dec 2023 07:01  -  03 Dec 2023 03:49  --------------------------------------------------------  IN: 828 mL / OUT: 500 mL / NET: 328 mL        Physical Exam  Neuro: A+O x 3, non-focal, speech clear and intact  Pulm: B/L diminished at the bases, no accessory muscles utilized   CV: RRR, +S1S2  Abd: soft, NT, ND, +BS  Ext: +DP Pulses b/l, +PT pulses, 2+B/l LE edema    Imaging:  CXR:    < from: Xray Chest 1 View- PORTABLE-Urgent (Xray Chest 1 View- PORTABLE-Urgent .) (12.01.23 @ 17:31) >    ACC: [id number] EXAM:  XR CHEST PORTABLE URGENT 1V   ORDERED BY: JESSICA COLBY     PROCEDURE DATE:  12/01/2023          INTERPRETATION:  Portable chest radiograph    CLINICAL INFORMATION:  Postoperative  Cardiac surgery.    TECHNIQUE:  Portable  AP view of the chest was obtained.    FINDINGS:   No previous examinations are available for review.  [A right IJ catheter/ sheath tip in SVC.]  Left chest tube in place.    LEFT lower zone airspace consolidation and/or effusion layering along   LEFT lateral thoracic wall.  RIGHT lung parenchyma remains clear.  . No pneumothorax.    The heart and mediastinum are within normal limits following cardiac    surgery.    Visualized osseous structures are intact.    IMPRESSION:  No significant change.    ---End of Report ---            TANNER MACHADO MD; Attending Radiologist  This document has been electronically signed. Dec  2 2023  3:34PM    < end of copied text >    ECG:                 Brief summary:  73yMale POD# 6 FB Left VATS total decort     SUBJECTIVE:  pt in bed sleeping but easily arousable   Patient denies acute pain with radiating or aggravating factors.  He denies chest pain, shortness of breath, palpitations, headache, dizziness, nausea, or vomiting.      Overnight events:  no acute overnight events     PAST MEDICAL & SURGICAL HISTORY:  Coronary artery disease involving native coronary artery of native heart, unspecified whether angina      CHF with unknown LVEF      Primary hypertension      Hyperlipidemia, unspecified hyperlipidemia type      S/P CABG x 5      History of cholecystectomy          MEDICATIONS   acetaminophen     Tablet .. 650 milliGRAM(s) Oral every 6 hours PRN  apixaban 5 milliGRAM(s) Oral every 12 hours  aspirin enteric coated 81 milliGRAM(s) Oral daily  atorvastatin 20 milliGRAM(s) Oral at bedtime  cefTRIAXone Injectable.      cefTRIAXone Injectable. 2000 milliGRAM(s) IV Push every 24 hours  chlorhexidine 2% Cloths 1 Application(s) Topical <User Schedule>  dextrose 50% Injectable 50 milliLiter(s) IV Push every 15 minutes  insulin glargine Injectable (LANTUS) 16 Unit(s) SubCutaneous at bedtime  insulin lispro (ADMELOG) corrective regimen sliding scale   SubCutaneous Before meals and at bedtime  insulin lispro Injectable (ADMELOG) 4 Unit(s) SubCutaneous three times a day before meals  metoprolol tartrate 50 milliGRAM(s) Oral every 6 hours  ondansetron Injectable 4 milliGRAM(s) IV Push every 8 hours PRN  pantoprazole    Tablet 40 milliGRAM(s) Oral before breakfast  sodium chloride 0.9% lock flush 10 milliLiter(s) IV Push every 1 hour PRN  MEDICATIONS  (PRN):  acetaminophen     Tablet .. 650 milliGRAM(s) Oral every 6 hours PRN Temp greater or equal to 38C (100.4F), Mild Pain (1 - 3)  ondansetron Injectable 4 milliGRAM(s) IV Push every 8 hours PRN Nausea and/or Vomiting  sodium chloride 0.9% lock flush 10 milliLiter(s) IV Push every 1 hour PRN Pre/post blood products, medications, blood draw, and to maintain line patency      Daily     Daily                               9.1    10.88 )-----------( 292      ( 03 Dec 2023 02:15 )             30.5   12-03    138  |  104  |  48.6<H>  ----------------------------<  102<H>  4.2   |  24.0  |  1.56<H>    Ca    8.1<L>      03 Dec 2023 02:15  Mg     2.6     12-03    TPro  6.3<L>  /  Alb  2.5<L>  /  TBili  0.6  /  DBili  x   /  AST  35  /  ALT  28  /  AlkPhos  91  12-02      PTT - ( 02 Dec 2023 07:02 )  PTT:53.5 sec      Objective:  T(C): 36.7 (12-03-23 @ 00:00), Max: 37.2 (12-02-23 @ 05:00)  HR: 58 (12-03-23 @ 03:00) (58 - 74)  BP: 129/62 (12-03-23 @ 03:00) (122/57 - 153/67)  RR: 25 (12-03-23 @ 03:00) (19 - 26)  SpO2: 96% (12-03-23 @ 03:00) (69% - 99%)  Wt(kg): --CAPILLARY BLOOD GLUCOSE      POCT Blood Glucose.: 117 mg/dL (02 Dec 2023 21:55)  POCT Blood Glucose.: 130 mg/dL (02 Dec 2023 16:17)  POCT Blood Glucose.: 180 mg/dL (02 Dec 2023 11:16)  POCT Blood Glucose.: 128 mg/dL (02 Dec 2023 07:34)  I&O's Summary    01 Dec 2023 07:01  -  02 Dec 2023 07:00  --------------------------------------------------------  IN: 562 mL / OUT: 855 mL / NET: -293 mL    02 Dec 2023 07:01  -  03 Dec 2023 03:49  --------------------------------------------------------  IN: 828 mL / OUT: 500 mL / NET: 328 mL        Physical Exam  Neuro: A+O x 3, non-focal, speech clear and intact  Pulm: B/L diminished at the bases, no accessory muscles utilized   CV: RRR, +S1S2  Abd: soft, NT, ND, +BS  Ext: +DP Pulses b/l, +PT pulses, 2+B/l LE edema    Imaging:  CXR:    < from: Xray Chest 1 View- PORTABLE-Urgent (Xray Chest 1 View- PORTABLE-Urgent .) (12.01.23 @ 17:31) >    ACC: [id number] EXAM:  XR CHEST PORTABLE URGENT 1V   ORDERED BY: JESSICA COLBY     PROCEDURE DATE:  12/01/2023          INTERPRETATION:  Portable chest radiograph    CLINICAL INFORMATION:  Postoperative  Cardiac surgery.    TECHNIQUE:  Portable  AP view of the chest was obtained.    FINDINGS:   No previous examinations are available for review.  [A right IJ catheter/ sheath tip in SVC.]  Left chest tube in place.    LEFT lower zone airspace consolidation and/or effusion layering along   LEFT lateral thoracic wall.  RIGHT lung parenchyma remains clear.  . No pneumothorax.    The heart and mediastinum are within normal limits following cardiac    surgery.    Visualized osseous structures are intact.    IMPRESSION:  No significant change.    ---End of Report ---            TANNER MACHADO MD; Attending Radiologist  This document has been electronically signed. Dec  2 2023  3:34PM    < end of copied text >    ECG:

## 2023-12-03 NOTE — PROGRESS NOTE ADULT - ASSESSMENT
72M w/ PMH of T2DM, CAD s/p CABG, CHF, HTN, HLD, Afib (not on AC due to hx of GIB) presents with a few days worsening of dyspnea and LE edema. found to have large loculated pleural effusion. 11/28 s/p L lung decortication.    Consulted for diabetes management  Home diabetes meds: Was on Januvia previously, not recent  Current a1c: 7.3%    1. T2DM- FS a bit low  - Change to Lantus 12 units qhs  - dc admelog premeal  - Continue sliding scale coverage  - Will consider basal insulin with oral meds vs only oral meds for discharge    2. Pleural effusion  - S/p decortication, care per primary team    3. HLD- C/w statin    4. Aflutter- s/p successful QUIN/DCCV 12/1

## 2023-12-04 ENCOUNTER — TRANSCRIPTION ENCOUNTER (OUTPATIENT)
Age: 73
End: 2023-12-04

## 2023-12-04 DIAGNOSIS — I50.21 ACUTE SYSTOLIC (CONGESTIVE) HEART FAILURE: ICD-10-CM

## 2023-12-04 LAB
ANION GAP SERPL CALC-SCNC: 12 MMOL/L — SIGNIFICANT CHANGE UP (ref 5–17)
ANION GAP SERPL CALC-SCNC: 12 MMOL/L — SIGNIFICANT CHANGE UP (ref 5–17)
BUN SERPL-MCNC: 40.9 MG/DL — HIGH (ref 8–20)
BUN SERPL-MCNC: 40.9 MG/DL — HIGH (ref 8–20)
CALCIUM SERPL-MCNC: 8.3 MG/DL — LOW (ref 8.4–10.5)
CALCIUM SERPL-MCNC: 8.3 MG/DL — LOW (ref 8.4–10.5)
CHLORIDE SERPL-SCNC: 101 MMOL/L — SIGNIFICANT CHANGE UP (ref 96–108)
CHLORIDE SERPL-SCNC: 101 MMOL/L — SIGNIFICANT CHANGE UP (ref 96–108)
CO2 SERPL-SCNC: 22 MMOL/L — SIGNIFICANT CHANGE UP (ref 22–29)
CO2 SERPL-SCNC: 22 MMOL/L — SIGNIFICANT CHANGE UP (ref 22–29)
CREAT SERPL-MCNC: 1.47 MG/DL — HIGH (ref 0.5–1.3)
CREAT SERPL-MCNC: 1.47 MG/DL — HIGH (ref 0.5–1.3)
CULTURE RESULTS: ABNORMAL
CULTURE RESULTS: ABNORMAL
EGFR: 51 ML/MIN/1.73M2 — LOW
EGFR: 51 ML/MIN/1.73M2 — LOW
GLUCOSE BLDC GLUCOMTR-MCNC: 112 MG/DL — HIGH (ref 70–99)
GLUCOSE BLDC GLUCOMTR-MCNC: 112 MG/DL — HIGH (ref 70–99)
GLUCOSE BLDC GLUCOMTR-MCNC: 114 MG/DL — HIGH (ref 70–99)
GLUCOSE BLDC GLUCOMTR-MCNC: 114 MG/DL — HIGH (ref 70–99)
GLUCOSE BLDC GLUCOMTR-MCNC: 115 MG/DL — HIGH (ref 70–99)
GLUCOSE BLDC GLUCOMTR-MCNC: 115 MG/DL — HIGH (ref 70–99)
GLUCOSE BLDC GLUCOMTR-MCNC: 134 MG/DL — HIGH (ref 70–99)
GLUCOSE BLDC GLUCOMTR-MCNC: 134 MG/DL — HIGH (ref 70–99)
GLUCOSE SERPL-MCNC: 100 MG/DL — HIGH (ref 70–99)
GLUCOSE SERPL-MCNC: 100 MG/DL — HIGH (ref 70–99)
HCT VFR BLD CALC: 31.2 % — LOW (ref 39–50)
HCT VFR BLD CALC: 31.2 % — LOW (ref 39–50)
HGB BLD-MCNC: 9.5 G/DL — LOW (ref 13–17)
HGB BLD-MCNC: 9.5 G/DL — LOW (ref 13–17)
MAGNESIUM SERPL-MCNC: 2.2 MG/DL — SIGNIFICANT CHANGE UP (ref 1.8–2.6)
MAGNESIUM SERPL-MCNC: 2.2 MG/DL — SIGNIFICANT CHANGE UP (ref 1.8–2.6)
MCHC RBC-ENTMCNC: 26.7 PG — LOW (ref 27–34)
MCHC RBC-ENTMCNC: 26.7 PG — LOW (ref 27–34)
MCHC RBC-ENTMCNC: 30.4 GM/DL — LOW (ref 32–36)
MCHC RBC-ENTMCNC: 30.4 GM/DL — LOW (ref 32–36)
MCV RBC AUTO: 87.6 FL — SIGNIFICANT CHANGE UP (ref 80–100)
MCV RBC AUTO: 87.6 FL — SIGNIFICANT CHANGE UP (ref 80–100)
PLATELET # BLD AUTO: 279 K/UL — SIGNIFICANT CHANGE UP (ref 150–400)
PLATELET # BLD AUTO: 279 K/UL — SIGNIFICANT CHANGE UP (ref 150–400)
POTASSIUM SERPL-MCNC: 3.9 MMOL/L — SIGNIFICANT CHANGE UP (ref 3.5–5.3)
POTASSIUM SERPL-MCNC: 3.9 MMOL/L — SIGNIFICANT CHANGE UP (ref 3.5–5.3)
POTASSIUM SERPL-SCNC: 3.9 MMOL/L — SIGNIFICANT CHANGE UP (ref 3.5–5.3)
POTASSIUM SERPL-SCNC: 3.9 MMOL/L — SIGNIFICANT CHANGE UP (ref 3.5–5.3)
RBC # BLD: 3.56 M/UL — LOW (ref 4.2–5.8)
RBC # BLD: 3.56 M/UL — LOW (ref 4.2–5.8)
RBC # FLD: 16.4 % — HIGH (ref 10.3–14.5)
RBC # FLD: 16.4 % — HIGH (ref 10.3–14.5)
SODIUM SERPL-SCNC: 135 MMOL/L — SIGNIFICANT CHANGE UP (ref 135–145)
SODIUM SERPL-SCNC: 135 MMOL/L — SIGNIFICANT CHANGE UP (ref 135–145)
SPECIMEN SOURCE: SIGNIFICANT CHANGE UP
SPECIMEN SOURCE: SIGNIFICANT CHANGE UP
WBC # BLD: 12.17 K/UL — HIGH (ref 3.8–10.5)
WBC # BLD: 12.17 K/UL — HIGH (ref 3.8–10.5)
WBC # FLD AUTO: 12.17 K/UL — HIGH (ref 3.8–10.5)
WBC # FLD AUTO: 12.17 K/UL — HIGH (ref 3.8–10.5)

## 2023-12-04 PROCEDURE — 99223 1ST HOSP IP/OBS HIGH 75: CPT

## 2023-12-04 PROCEDURE — 99232 SBSQ HOSP IP/OBS MODERATE 35: CPT

## 2023-12-04 PROCEDURE — 71045 X-RAY EXAM CHEST 1 VIEW: CPT | Mod: 26

## 2023-12-04 PROCEDURE — 99233 SBSQ HOSP IP/OBS HIGH 50: CPT

## 2023-12-04 PROCEDURE — 99024 POSTOP FOLLOW-UP VISIT: CPT

## 2023-12-04 RX ORDER — ACETAMINOPHEN 500 MG
975 TABLET ORAL EVERY 6 HOURS
Refills: 0 | Status: DISCONTINUED | OUTPATIENT
Start: 2023-12-04 | End: 2023-12-06

## 2023-12-04 RX ORDER — SACUBITRIL AND VALSARTAN 24; 26 MG/1; MG/1
1 TABLET, FILM COATED ORAL
Refills: 0 | Status: DISCONTINUED | OUTPATIENT
Start: 2023-12-04 | End: 2023-12-06

## 2023-12-04 RX ORDER — FUROSEMIDE 40 MG
40 TABLET ORAL DAILY
Refills: 0 | Status: DISCONTINUED | OUTPATIENT
Start: 2023-12-04 | End: 2023-12-06

## 2023-12-04 RX ORDER — GABAPENTIN 400 MG/1
100 CAPSULE ORAL
Refills: 0 | Status: DISCONTINUED | OUTPATIENT
Start: 2023-12-04 | End: 2023-12-06

## 2023-12-04 RX ORDER — METHOCARBAMOL 500 MG/1
500 TABLET, FILM COATED ORAL
Refills: 0 | Status: DISCONTINUED | OUTPATIENT
Start: 2023-12-04 | End: 2023-12-06

## 2023-12-04 RX ADMIN — GABAPENTIN 100 MILLIGRAM(S): 400 CAPSULE ORAL at 17:47

## 2023-12-04 RX ADMIN — Medication 50 MILLIGRAM(S): at 05:35

## 2023-12-04 RX ADMIN — Medication 81 MILLIGRAM(S): at 11:16

## 2023-12-04 RX ADMIN — Medication 50 MILLIGRAM(S): at 17:47

## 2023-12-04 RX ADMIN — CEFTRIAXONE 2000 MILLIGRAM(S): 500 INJECTION, POWDER, FOR SOLUTION INTRAMUSCULAR; INTRAVENOUS at 01:08

## 2023-12-04 RX ADMIN — Medication 50 MILLIGRAM(S): at 11:20

## 2023-12-04 RX ADMIN — Medication 975 MILLIGRAM(S): at 12:00

## 2023-12-04 RX ADMIN — PANTOPRAZOLE SODIUM 40 MILLIGRAM(S): 20 TABLET, DELAYED RELEASE ORAL at 05:35

## 2023-12-04 RX ADMIN — Medication 650 MILLIGRAM(S): at 06:36

## 2023-12-04 RX ADMIN — ATORVASTATIN CALCIUM 20 MILLIGRAM(S): 80 TABLET, FILM COATED ORAL at 21:29

## 2023-12-04 RX ADMIN — METHOCARBAMOL 500 MILLIGRAM(S): 500 TABLET, FILM COATED ORAL at 12:25

## 2023-12-04 RX ADMIN — Medication 975 MILLIGRAM(S): at 11:15

## 2023-12-04 RX ADMIN — Medication 650 MILLIGRAM(S): at 05:36

## 2023-12-04 RX ADMIN — Medication 50 MILLIGRAM(S): at 00:10

## 2023-12-04 RX ADMIN — INSULIN GLARGINE 12 UNIT(S): 100 INJECTION, SOLUTION SUBCUTANEOUS at 21:30

## 2023-12-04 RX ADMIN — Medication 40 MILLIGRAM(S): at 11:18

## 2023-12-04 RX ADMIN — APIXABAN 5 MILLIGRAM(S): 2.5 TABLET, FILM COATED ORAL at 04:10

## 2023-12-04 RX ADMIN — CHLORHEXIDINE GLUCONATE 1 APPLICATION(S): 213 SOLUTION TOPICAL at 05:38

## 2023-12-04 RX ADMIN — SACUBITRIL AND VALSARTAN 1 TABLET(S): 24; 26 TABLET, FILM COATED ORAL at 17:47

## 2023-12-04 RX ADMIN — TAMSULOSIN HYDROCHLORIDE 0.4 MILLIGRAM(S): 0.4 CAPSULE ORAL at 21:25

## 2023-12-04 RX ADMIN — APIXABAN 5 MILLIGRAM(S): 2.5 TABLET, FILM COATED ORAL at 17:46

## 2023-12-04 NOTE — PROVIDER CONTACT NOTE (CRITICAL VALUE NOTIFICATION) - TEST AND RESULT REPORTED:
APTT>200
Body fluid culture on 11/18. Showed few streptocci intermediate
Positive cultures: Body fluid (left pleural): Few polymorphic leukocytes in low pwer field, moderate gram positive cocc in pairs in oil power field
11/27 Body Culture Fluid streptococcus intermedius
APTT>200

## 2023-12-04 NOTE — PROGRESS NOTE ADULT - SUBJECTIVE AND OBJECTIVE BOX
[facility]  INFECTIOUS DISEASES at [place] and [place]  ===============================================================                  Luis Ivey MD               Diplomates American Board of Internal Medicine & Infectious Diseases                * [place] Office - Appt - Tel  [phone] Fax [phone]                * [place] Office - Appt - Tel [phone] Fax [phone]                                  Hospital Consult line:  [phone]  ==============================================================    GRETA AGUAYO [id number]    Follow up:    I have personally reviewed the labs and data; pertinent labs and data are listed in this note; please see below.     _______________________________________________________________  REVIEW OF SYSTEMS  C/o severe lower back pain and diarrhea - had ~6 loose BM overnight. Denies nausea, vomiting, anorexia, CP, SOB, palpitations.   ________________________________________________________________  Allergies:  No Known Allergies    ________________________________________________________________  PHYSICAL EXAM  GEN: in NAD, sitting in reclining chair in NAD   HEENT: Anicteric sclerae. Moist mucous membranes.   NECK: Supple.   LUNGS: unlabored breathing. Diminished BS in left lung. Speaking full sentences  HEART: RRR, no m/r/g  ABDOMEN: Soft, NT, ND, obese.  +BS.    : Burger catheter  EXTREMITIES: slight edema.  NEUROLOGIC: Grossly no focal deficits   PSYCHIATRIC: Appropriate affect and mood  SKIN: No rash or jaundice  LINES: PIV   ________________________________________________________________  Vitals:  T(F): 97.6 (04 Dec 2023 07:00), Max: 97.9 (03 Dec 2023 12:00)  HR: 67 (04 Dec 2023 08:00)  BP: 152/68 (04 Dec 2023 08:00)  RR: 34 (04 Dec 2023 08:00)  SpO2: 94% (04 Dec 2023 08:00) (92% - 100%)  temp max in last 48H T(F): , Max: 98 (12-02-23 @ 20:00)    Current Antibiotics:  cefTRIAXone Injectable.      cefTRIAXone Injectable. 2000 milliGRAM(s) IV Push every 24 hours    Other medications:  apixaban 5 milliGRAM(s) Oral every 12 hours  aspirin enteric coated 81 milliGRAM(s) Oral daily  atorvastatin 20 milliGRAM(s) Oral at bedtime  chlorhexidine 2% Cloths 1 Application(s) Topical <User Schedule>  insulin glargine Injectable (LANTUS) 12 Unit(s) SubCutaneous at bedtime  insulin lispro (ADMELOG) corrective regimen sliding scale   SubCutaneous Before meals and at bedtime  metoprolol tartrate 50 milliGRAM(s) Oral every 6 hours  pantoprazole    Tablet 40 milliGRAM(s) Oral before breakfast  tamsulosin 0.4 milliGRAM(s) Oral at bedtime                            9.5    12.17 )-----------( 279      ( 04 Dec 2023 02:30 )             31.2     12-04    135  |  101  |  40.9<H>  ----------------------------<  100<H>  3.9   |  22.0  |  1.47<H>    Ca    8.3<L>      04 Dec 2023 02:30  Mg     2.2     12-04      RECENT CULTURES:  11-27 @ 18:56 .Body Fluid left pleural fluid     No growth to date.    Few polymorphonuclear leukocytes seen per low power field  Moderate Gram positive cocci in pairs seen per oil power field      11-27 @ 18:30 .Tissue left pleural peel     Rare Streptococcus intermedius "Susceptibilities not performed"    Few polymorphonuclear leukocytes seen per low power field  No organisms seen per oil power field      11-26 @ 07:20 .Blood Blood-Peripheral     No growth at 5 days      11-26 @ 07:15 .Blood Blood-Peripheral     No growth at 5 days      11-24 @ 09:00    RVP with SARS-CoV-2   NotDetec      11-24 @ 01:25 .Blood Blood-Peripheral     No growth at 5 days      11-23 @ 12:42 .Blood Blood-Peripheral     No growth at 5 days      11-23 @ 12:35 .Blood Blood-Peripheral     No growth at 5 days      WBC Count: 12.17 K/uL (12-04-23 @ 02:30)  WBC Count: 10.88 K/uL (12-03-23 @ 02:15)  WBC Count: 11.56 K/uL (12-02-23 @ 07:02)  WBC Count: 12.60 K/uL (12-01-23 @ 22:13)  WBC Count: 12.68 K/uL (12-01-23 @ 04:06)  WBC Count: 12.18 K/uL (12-01-23 @ 02:10)  WBC Count: 11.88 K/uL (11-30-23 @ 01:40)    Creatinine: 1.47 mg/dL (12-04-23 @ 02:30)  Creatinine: 1.56 mg/dL (12-03-23 @ 02:15)  Creatinine: 1.49 mg/dL (12-02-23 @ 07:02)  Creatinine: 1.69 mg/dL (12-01-23 @ 02:10)  Creatinine: 1.85 mg/dL (11-30-23 @ 01:40)      Procalcitonin, Serum: 0.74 ng/mL (11-24-23 @ 01:25)     SARS-CoV-2: NotDetec (11-24-23 @ 09:00)  SARS-CoV-2: NotDetec (11-18-23 @ 08:30)    ________________________________________________________________  CARDIOLOGY   < from: QUIN Echo Doppler (12.01.23 @ 11:13) >  Summary:   1. Left ventricularejection fraction, by visual estimation, is 20 to   25%.   2. Severely decreased global left ventricular systolic function.   3. Normal RV size with moderately reduced RV systolic function, RVSP   least 29 mmHg.   4. Moderately enlarged left atrium.   5. No left atrial appendage thrombus, left atrial appendage enlargement   and normal left atrial appendage velocities.   6. Mild thickening of the anterior and posterior mitral valve leaflets   with mild posterior leaflet prolapse.   7. Moderate mitral valve regurgitation.   8. Color flow doppler and intravenous injection of agitated saline   demonstrates the presence of an intact intra atrial septum.   9. Lipomatous hypertrophy of the intra-atrial septum.  10. Moderate complex atheromas at the aortic arch.  11. There is no evidence of pericardial effusion.  12. Post-QUIN patient underwent external synchronized direct current   cardioversion with 300 Joules x1 from Aflutter RVR 130s into sinus rhythm   by Dr. Ceballos.    < end of copied text >    ________________________________________________________________  RADIOLOGY - images personally reviewed     < from: Xray Chest 1 View- PORTABLE-Routine (Xray Chest 1 View- PORTABLE-Routine in AM.) (12.04.23 @ 06:51) >  IMPRESSION:    Status post removal of left-sided chest tube. No evidence of   pneumothorax. No significant change in the lung fields or pleural spaces.        CLINICAL HISTORY: Follow-up    CHEST:  Frontal projections of the chest were obtained by portable technique on   12/3/2023 at 5:41 AM and again on 12/4/2023 at 5:02 AM. Allowing for   technical and positional variations there is interval change is noted. No   pneumothorax is noted. Residual extensive pleural-parenchymal changes and   volume loss in the left hemithorax are again noted. Right lung and right   costophrenic angle are clear. The patient is again noted to be status   post thoracotomy. A small amount of subcutaneous emphysema is again noted   along the left lower lateral chest wall.    IMPRESSION:    Serial examinations are grossly unchanged    < end of copied text >   [facility]  INFECTIOUS DISEASES at [place] and [place]  ===============================================================                  Luis Ivey MD               Diplomates American Board of Internal Medicine & Infectious Diseases                * [place] Office - Appt - Tel  [phone] Fax [phone]                * [place] Office - Appt - Tel [phone] Fax [phone]                                  Hospital Consult line:  [phone]  ==============================================================    GRETA AGUAYO [id number]    Follow up:    I have personally reviewed the labs and data; pertinent labs and data are listed in this note; please see below.     _______________________________________________________________  REVIEW OF SYSTEMS  C/o severe lower back pain and diarrhea - had ~6 loose BM overnight. Denies nausea, vomiting, anorexia, CP, SOB, palpitations.   ________________________________________________________________  Allergies:  No Known Allergies    ________________________________________________________________  PHYSICAL EXAM  GEN: in NAD, sitting in reclining chair in NAD   HEENT: Anicteric sclerae. Moist mucous membranes.   NECK: Supple.   LUNGS: unlabored breathing. Diminished BS in left lung. Speaking full sentences  HEART: RRR, no m/r/g  ABDOMEN: Soft, NT, ND, obese.  +BS.    : Burger catheter  EXTREMITIES: slight edema.  NEUROLOGIC: Grossly no focal deficits   PSYCHIATRIC: Appropriate affect and mood  SKIN: No rash or jaundice  LINES: PIV   ________________________________________________________________  Vitals:  T(F): 97.6 (04 Dec 2023 07:00), Max: 97.9 (03 Dec 2023 12:00)  HR: 67 (04 Dec 2023 08:00)  BP: 152/68 (04 Dec 2023 08:00)  RR: 34 (04 Dec 2023 08:00)  SpO2: 94% (04 Dec 2023 08:00) (92% - 100%)  temp max in last 48H T(F): , Max: 98 (12-02-23 @ 20:00)    Current Antibiotics:  cefTRIAXone Injectable.      cefTRIAXone Injectable. 2000 milliGRAM(s) IV Push every 24 hours    Other medications:  apixaban 5 milliGRAM(s) Oral every 12 hours  aspirin enteric coated 81 milliGRAM(s) Oral daily  atorvastatin 20 milliGRAM(s) Oral at bedtime  chlorhexidine 2% Cloths 1 Application(s) Topical <User Schedule>  insulin glargine Injectable (LANTUS) 12 Unit(s) SubCutaneous at bedtime  insulin lispro (ADMELOG) corrective regimen sliding scale   SubCutaneous Before meals and at bedtime  metoprolol tartrate 50 milliGRAM(s) Oral every 6 hours  pantoprazole    Tablet 40 milliGRAM(s) Oral before breakfast  tamsulosin 0.4 milliGRAM(s) Oral at bedtime                            9.5    12.17 )-----------( 279      ( 04 Dec 2023 02:30 )             31.2     12-04    135  |  101  |  40.9<H>  ----------------------------<  100<H>  3.9   |  22.0  |  1.47<H>    Ca    8.3<L>      04 Dec 2023 02:30  Mg     2.2     12-04      RECENT CULTURES:  11-27 @ 18:56 .Body Fluid left pleural fluid     No growth to date.    Few polymorphonuclear leukocytes seen per low power field  Moderate Gram positive cocci in pairs seen per oil power field      11-27 @ 18:30 .Tissue left pleural peel     Rare Streptococcus intermedius "Susceptibilities not performed"    Few polymorphonuclear leukocytes seen per low power field  No organisms seen per oil power field      11-26 @ 07:20 .Blood Blood-Peripheral     No growth at 5 days      11-26 @ 07:15 .Blood Blood-Peripheral     No growth at 5 days      11-24 @ 09:00    RVP with SARS-CoV-2   NotDetec      11-24 @ 01:25 .Blood Blood-Peripheral     No growth at 5 days      11-23 @ 12:42 .Blood Blood-Peripheral     No growth at 5 days      11-23 @ 12:35 .Blood Blood-Peripheral     No growth at 5 days      WBC Count: 12.17 K/uL (12-04-23 @ 02:30)  WBC Count: 10.88 K/uL (12-03-23 @ 02:15)  WBC Count: 11.56 K/uL (12-02-23 @ 07:02)  WBC Count: 12.60 K/uL (12-01-23 @ 22:13)  WBC Count: 12.68 K/uL (12-01-23 @ 04:06)  WBC Count: 12.18 K/uL (12-01-23 @ 02:10)  WBC Count: 11.88 K/uL (11-30-23 @ 01:40)    Creatinine: 1.47 mg/dL (12-04-23 @ 02:30)  Creatinine: 1.56 mg/dL (12-03-23 @ 02:15)  Creatinine: 1.49 mg/dL (12-02-23 @ 07:02)  Creatinine: 1.69 mg/dL (12-01-23 @ 02:10)  Creatinine: 1.85 mg/dL (11-30-23 @ 01:40)      Procalcitonin, Serum: 0.74 ng/mL (11-24-23 @ 01:25)     SARS-CoV-2: NotDetec (11-24-23 @ 09:00)  SARS-CoV-2: NotDetec (11-18-23 @ 08:30)    ________________________________________________________________  CARDIOLOGY   < from: QUIN Echo Doppler (12.01.23 @ 11:13) >  Summary:   1. Left ventricularejection fraction, by visual estimation, is 20 to   25%.   2. Severely decreased global left ventricular systolic function.   3. Normal RV size with moderately reduced RV systolic function, RVSP   least 29 mmHg.   4. Moderately enlarged left atrium.   5. No left atrial appendage thrombus, left atrial appendage enlargement   and normal left atrial appendage velocities.   6. Mild thickening of the anterior and posterior mitral valve leaflets   with mild posterior leaflet prolapse.   7. Moderate mitral valve regurgitation.   8. Color flow doppler and intravenous injection of agitated saline   demonstrates the presence of an intact intra atrial septum.   9. Lipomatous hypertrophy of the intra-atrial septum.  10. Moderate complex atheromas at the aortic arch.  11. There is no evidence of pericardial effusion.  12. Post-QUIN patient underwent external synchronized direct current   cardioversion with 300 Joules x1 from Aflutter RVR 130s into sinus rhythm   by Dr. Ceballos.    < end of copied text >    ________________________________________________________________  RADIOLOGY - images personally reviewed     < from: Xray Chest 1 View- PORTABLE-Routine (Xray Chest 1 View- PORTABLE-Routine in AM.) (12.04.23 @ 06:51) >  IMPRESSION:    Status post removal of left-sided chest tube. No evidence of   pneumothorax. No significant change in the lung fields or pleural spaces.        CLINICAL HISTORY: Follow-up    CHEST:  Frontal projections of the chest were obtained by portable technique on   12/3/2023 at 5:41 AM and again on 12/4/2023 at 5:02 AM. Allowing for   technical and positional variations there is interval change is noted. No   pneumothorax is noted. Residual extensive pleural-parenchymal changes and   volume loss in the left hemithorax are again noted. Right lung and right   costophrenic angle are clear. The patient is again noted to be status   post thoracotomy. A small amount of subcutaneous emphysema is again noted   along the left lower lateral chest wall.    IMPRESSION:    Serial examinations are grossly unchanged    < end of copied text >

## 2023-12-04 NOTE — CONSULT NOTE ADULT - ASSESSMENT
Outpatient cardiologist: Dr. Milian  Initial HF c/s: Dr. Brenner    74 y/o M with a history of HTN, CAD, s/p CABG (11/2021 at [place]), and former smoker (1PPD x20 years, quit 11/2021), who presented to [facility] on 11/18 with worsening SOB and LE edema.    He reports intermittent SOB since his CABG in 2021 (prior to his surgery he never had any CP or SOB). At best, he was able to walk about 4 blocks with a walker. He typically sleeps in the recliner for comfort but does endorse orthopnea and PND if he lays in the bed. He denies any known history of HFrEF or CKD. His PCP retired, so he has only been following with his cardiologist, Dr. Milian, since his CABG who he sees about every 3 months. 2 weeks PTA he noticed increased SOB with minimal exertion and LE edema. Metolazone 5 mg was added to his medications twice weekly and his metoprolol was also increased to 100 mg BID.     On admission he was found to have a mod-large loculated left pleural effusion. A chest tube was inserted and drained ~600cc initially (cultures + for strep intermedius). TTE showed severe biventricular dysfunction (LVEF 20-25%) and mod MR. His labs were significant for DEVON (BUN/Cr 48/2.12, unknown baseline Cr), pro-BNP 5657. EKG showed new onset Aflutter with RVR and he was also diagnosed with new onset DM2 (A1c 7.3%).    On 11/27 he was taken to the OR for left VATS decortication. Extubated POD 1. He is s/p successful DCCV on 12/1.     Cardiac Studies:  12/1/23 QUIN: LVEF 20-25%, normal RV size with mod RV dysfunction, mod LAE, mod MR.  11/18/23 TTE: LVIDd 6.09cm, LVEF 20-25%, mild RVE with severe RV dysfunction, mild LAE, normal RA size, mild MR. Outpatient cardiologist: Dr. Milian  Initial HF c/s: Dr. Brenner    74 y/o M with a history of HTN, CAD, s/p CABG (11/2021 at [place]), and former smoker (1PPD x20 years, quit 11/2021), who presented to [facility] on 11/18 with worsening SOB and LE edema.    He reports intermittent SOB since his CABG in 2021 (prior to his surgery he never had any CP or SOB). At best, he was able to walk about 4 blocks with a walker. He typically sleeps in the recliner for comfort but does endorse orthopnea and PND if he lays in the bed. He denies any known history of HFrEF or CKD. His PCP retired, so he has only been following with his cardiologist, Dr. Milian, since his CABG who he sees about every 3 months. 2 weeks PTA he noticed increased SOB with minimal exertion and LE edema. Metolazone 5 mg was added to his medications twice weekly and his metoprolol was also increased to 100 mg BID.     On admission he was found to have a mod-large loculated left pleural effusion. A chest tube was inserted and drained ~600cc initially (cultures + for strep intermedius). TTE showed severe biventricular dysfunction (LVEF 20-25%) and mod MR. His labs were significant for DEVON (BUN/Cr 48/2.12, unknown baseline Cr), pro-BNP 5657. EKG showed new onset Aflutter with RVR and he was also diagnosed with new onset DM2 (A1c 7.3%).    On 11/27 he was taken to the OR for left VATS decortication. Extubated POD 1. He is s/p successful DCCV on 12/1.     Cardiac Studies:  12/1/23 QUIN: LVEF 20-25%, normal RV size with mod RV dysfunction, mod LAE, mod MR.  11/18/23 TTE: LVIDd 6.09cm, LVEF 20-25%, mild RVE with severe RV dysfunction, mild LAE, normal RA size, mild MR.

## 2023-12-04 NOTE — DISCHARGE NOTE PROVIDER - HOSPITAL COURSE
72-year-old male with history of hypertension, CAD s/p CABGx 4 2021, HLD, obesity, presented to ED 11/18 c/o worsening SAVAGE x1 day, admitted with AF RVR and acute on chronic systolic HF exacerbation, found to have large L pleural effusion requiring pigtail placement. Pleural fluid cultures + strept intermedius. Pt underwent FB L VATS total decortication 11/27 with Dr. Encarnacion. Postop pt underwent successful DCCV 12/1 with EP. CT removed without incident postop. Pt remains on ceftriaxone per ID for empyema. Pt hemodynamically stable but deconditioned. Pt stable for discharge to rehab as per Dr. Encarnacion.

## 2023-12-04 NOTE — PROGRESS NOTE ADULT - ASSESSMENT
72-year-old male with history of hypertension CAD status post quintuple bypass November 2021 followed by cardiology/Dr. Milian in [place] resents the ED via EMS complaining of worsening dyspnea on exertion since yesterday. Admitted with AF, CHF,  Thoracic surgery consulted treatment Left effusion. 11/18 L Pl effusion PTC placed with 600cc removed initially, 11/20 CT Chest : Large loculated left hydropneumothorax containing a pigtail catheter. 11/24 Febrile 100.3 wbc 18 from 8 11/27 completed dig, OR for VATS – remained intubated, on Levo 11/27 Left VATS decortication.  Extubated POD 1., pre and post op course c/b Aflutter in which EP following. Successful cardioversion on 12/1.     dispo [facility]      72-year-old male with history of hypertension CAD status post quintuple bypass November 2021 followed by cardiology/Dr. Milian in [place] resents the ED via EMS complaining of worsening dyspnea on exertion since yesterday. Admitted with AF, CHF,  Thoracic surgery consulted treatment Left effusion. 11/18 L Pl effusion PTC placed with 600cc removed initially, 11/20 CT Chest : Large loculated left hydropneumothorax containing a pigtail catheter. 11/24 Febrile 100.3 wbc 18 from 8 11/27 completed dig, OR for VATS – remained intubated, on Levo 11/27 Left VATS decortication.  Extubated POD 1., pre and post op course c/b Aflutter in which EP following. Successful cardioversion on 12/1.     dispo BERTIN

## 2023-12-04 NOTE — PROGRESS NOTE ADULT - ASSESSMENT
72-year-old male with history of hypertension CAD status post quintuple bypass November 2021 followed by cardiology/Dr. Milian in [place] resents the ED via EMS complaining of worsening dyspnea on exertion since yesterday. Admitted with AF, CHF,  Thoracic surgery consulted treatment Left effusion. 11/18 L Pl effusion PTC placed with 600cc removed initially, 11/20 CT Chest : Large loculated left hydropneumothorax containing a pigtail catheter. 11/24 Febrile 100.3 wbc 18 from 8 11/27 completed dig, OR for VATS – remained intubated, on Levo 11/27 Left VATS decortication.  Extubated POD 1., pre and post op course c/b Aflutter in which EP following. Successful cardioversion on 12/1.        72-year-old male with history of hypertension CAD status post quintuple bypass November 2021 followed by cardiology/Dr. Milian in [place] resents the ED via EMS complaining of worsening dyspnea on exertion since yesterday. Admitted with AF, CHF,  Thoracic surgery consulted treatment Left effusion. 11/18 L Pl effusion PTC placed with 600cc removed initially, 11/20 CT Chest : Large loculated left hydropneumothorax containing a pigtail catheter. 11/24 Febrile 100.3 wbc 18 from 8 11/27 completed dig, OR for VATS – remained intubated, on Levo 11/27 Left VATS decortication.  Extubated POD 1., pre and post op course c/b Aflutter in which EP following. Successful cardioversion on 12/1.        72-year-old male with history of hypertension CAD status post quintuple bypass November 2021 followed by cardiology/Dr. Milian in [place] resents the ED via EMS complaining of worsening dyspnea on exertion since yesterday. Admitted with AF, CHF,  Thoracic surgery consulted treatment Left effusion. 11/18 L Pl effusion PTC placed with 600cc removed initially, 11/20 CT Chest : Large loculated left hydropneumothorax containing a pigtail catheter. 11/24 Febrile 100.3 wbc 18 from 8 11/27 completed dig, OR for VATS – remained intubated, on Levo 11/27 Left VATS decortication.  Extubated POD 1., pre and post op course c/b Aflutter in which EP following. Successful cardioversion on 12/1.     dispo [facility]IN      72-year-old male with history of hypertension CAD status post quintuple bypass November 2021 followed by cardiology/Dr. Milian in [place] resents the ED via EMS complaining of worsening dyspnea on exertion since yesterday. Admitted with AF, CHF,  Thoracic surgery consulted treatment Left effusion. 11/18 L Pl effusion PTC placed with 600cc removed initially, 11/20 CT Chest : Large loculated left hydropneumothorax containing a pigtail catheter. 11/24 Febrile 100.3 wbc 18 from 8 11/27 completed dig, OR for VATS – remained intubated, on Levo 11/27 Left VATS decortication.  Extubated POD 1., pre and post op course c/b Aflutter in which EP following. Successful cardioversion on 12/1.     dispo BERTIN

## 2023-12-04 NOTE — DISCHARGE NOTE PROVIDER - CARE PROVIDERS DIRECT ADDRESSES
,[email or web address] ,[email or web address]

## 2023-12-04 NOTE — CONSULT NOTE ADULT - PROBLEM SELECTOR RECOMMENDATION 3
- Unknown baseline Cr but is now ranging 1.4-1.5  - Nephrology following  - Will monitor renal function closely with GDMT optimization
GDMT       Beta Blocker: Will change to metoprolol 25 mg every 6 hours until cardiopulmonary needs are determined.       RAAS Inhibitor: Will hold in setting of DEVON       MRA: Will start Aldactone 25 mg daily       Diuretic: Will change to Lasix 40 mg IV every 12 hours       SGLT2i: N/A       Other: N/A  Strict I&O's  Daily standing weights (if able)  Will try and get records from Dr. Milian's office.

## 2023-12-04 NOTE — CONSULT NOTE ADULT - NS ATTEND AMEND GEN_ALL_CORE FT
74 y/o male with h/o CAD s/p CABG (2021 @ [place]), HTN and former tobacco use who presented with ADHF.His admission labs were remarkable for WBC 16.2 H/H 15.3/47.2 Plat 278 BUN 48 Creat 2.12  serum pBNP 5657 pg/mL and A1c 7.3%.  A chest cT demonstrated moderate to large loculated pleural effusion and his TTE showed LVEF 20-25% with severe RV dysfunction. It’s unclear what was the patient’s prior LVEF as we have no records available. As per the pt, his diuretics and BB were uptitrated by his cardiologist 2 weeks prior to admission. His EKG was abnormal with newly diagnosed aflutter with RVR.     His hospital course has been remarkable for chest tube placement with ~600cc drained and +culture for Strep intermedius requiring left VATs+decortication. DEVON and transaminitis which are improving. He also underwent QUIN+successful DCCV on 12/1.     Today, he reports he feels much better. He denies SOB. He has not move out of bed yet. He appears close to euvolemia on the physical exam: JVD ~7-8cm, CTAB, RRR, No m/g/r and no LE edema.   He denies any prior HF related hospitalizations.     Pertinent cardiac testing  12/1/23 QUIN: LVEF 20-25%, normal RV size with mod RV dysfunction, mod LAE, mod MR.  11/18/23 TTE: LVIDd 6.09cm, LVEF 20-25%, mild RVE with severe RV dysfunction, mild LAE, normal RA size, mild MR.    In summary this is a 74 y/o male with h/o CAD s/p CABG, HTN, former tobacco use admitted with ADHF, CMP LVEF 20-25%, loculated pleural effusion, newly diagnosed aflutter and DM2. He appears better compensated now and end organ function has improved.  Will continue diuresis and GDMT optimization. Would benefit from PT and outpt cardiac rehab. Recommend to obtain records from his prior cardiologist to determine need of ischemia work up. 74 y/o male with h/o CAD s/p CABG (2021 @ [place]), HTN and former tobacco use who presented with ADHF.His admission labs were remarkable for WBC 16.2 H/H 15.3/47.2 Plat 278 BUN 48 Creat 2.12  serum pBNP 5657 pg/mL and A1c 7.3%.  A chest cT demonstrated moderate to large loculated pleural effusion and his TTE showed LVEF 20-25% with severe RV dysfunction. It’s unclear what was the patient’s prior LVEF as we have no records available. As per the pt, his diuretics and BB were uptitrated by his cardiologist 2 weeks prior to admission. His EKG was abnormal with newly diagnosed aflutter with RVR.     His hospital course has been remarkable for chest tube placement with ~600cc drained and +culture for Strep intermedius requiring left VATs+decortication. DEVON and transaminitis which are improving. He also underwent QUIN+successful DCCV on 12/1.     Today, he reports he feels much better. He denies SOB. He has not move out of bed yet. He appears close to euvolemia on the physical exam: JVD ~7-8cm, CTAB, RRR, No m/g/r and no LE edema.   He denies any prior HF related hospitalizations.     Pertinent cardiac testing  12/1/23 QUIN: LVEF 20-25%, normal RV size with mod RV dysfunction, mod LAE, mod MR.  11/18/23 TTE: LVIDd 6.09cm, LVEF 20-25%, mild RVE with severe RV dysfunction, mild LAE, normal RA size, mild MR.    In summary this is a 74 y/o male with h/o CAD s/p CABG, HTN, former tobacco use admitted with ADHF, CMP LVEF 20-25%, loculated pleural effusion, newly diagnosed aflutter and DM2. He appears better compensated now and end organ function has improved.  Will continue diuresis and GDMT optimization. Would benefit from PT and outpt cardiac rehab. Recommend to obtain records from his prior cardiologist to determine need of ischemia work up.

## 2023-12-04 NOTE — CONSULT NOTE ADULT - PROBLEM SELECTOR RECOMMENDATION 4
CHADS2-Vasc: 4 for age, CHF, CAD, and HTN  Anticoagulation: Heparin drip started  Rate/Rhythm Control: Will change to metoprolol 25 mg every 6 hours until cardiopulmonary needs are determined  EP consult called
- s/p DCCV on 12/1 and now in SR  - c/w BB and AC with Eliquis  - per EP's note on 12/1, patient should be on amiodarone?

## 2023-12-04 NOTE — CONSULT NOTE ADULT - PROBLEM SELECTOR RECOMMENDATION 2
Right and left heart catheterization on Monday  GDMT:        APT: Will continue aspirin 81 mg daily       Statin: Will continue Lipitor 20 mg daily and check lipids in AM.       Beta Blocker: Will change to metoprolol 25 mg every 6 hours until cardiopulmonary needs are determined.       Other Antianginals: N/A       Aggressive cardiovascular risk reduction and lifestyle modification.  Will try and get records from Dr. Milian's office.
- LHC deferred by IC as above  - Continue ASA, statin, and BB

## 2023-12-04 NOTE — DISCHARGE NOTE PROVIDER - NSDCMRMEDTOKEN_GEN_ALL_CORE_FT
aspirin 81 mg oral capsule: 1 cap(s) orally once a day  Entresto 49 mg-51 mg oral tablet: 1 tab(s) orally 2 times a day  Lasix 40 mg oral tablet: 1 tab(s) orally once a day  Lipitor 20 mg oral tablet: 1 tab(s) orally once a day  metOLazone 5 mg oral tablet: 1 tab(s) orally once a day  Metoprolol Tartrate 100 mg oral tablet: 1 tab(s) orally 2 times a day   acetaminophen 325 mg oral tablet: 3 tab(s) orally every 6 hours As needed Temp greater or equal to 38C (100.4F), Mild Pain (1 - 3)  apixaban 5 mg oral tablet: 1 tab(s) orally every 12 hours  aspirin 81 mg oral delayed release tablet: 1 tab(s) orally once a day  atorvastatin 20 mg oral tablet: 1 tab(s) orally once a day (at bedtime)  dapagliflozin 10 mg oral tablet: 1 tab(s) orally once a day  furosemide 40 mg oral tablet: 1 tab(s) orally once a day  gabapentin 100 mg oral capsule: 1 cap(s) orally 2 times a day  insulin glargine 100 units/mL subcutaneous solution: 6 unit(s) subcutaneous once a day (at bedtime)  Januvia 25 mg oral tablet: 1 tab(s) orally once a day  methocarbamol 500 mg oral tablet: 1 tab(s) orally 2 times a day  metoprolol succinate 100 mg oral tablet, extended release: 1 tab(s) orally once a day (at bedtime)  pantoprazole 40 mg oral delayed release tablet: 1 tab(s) orally once a day (before a meal)  sacubitril-valsartan 49 mg-51 mg oral tablet: 1 tab(s) orally 2 times a day  spironolactone 25 mg oral tablet: 1 tab(s) orally once a day  tamsulosin 0.4 mg oral capsule: 1 cap(s) orally once a day (at bedtime)

## 2023-12-04 NOTE — CONSULT NOTE ADULT - CONSULT REQUESTED DATE/TIME
18-Nov-2023 19:06
29-Nov-2023
18-Nov-2023
18-Nov-2023
18-Nov-2023 17:24
25-Nov-2023 10:17
25-Nov-2023
30-Nov-2023 15:41
04-Dec-2023 14:06

## 2023-12-04 NOTE — PROGRESS NOTE ADULT - SUBJECTIVE AND OBJECTIVE BOX
SUBJECTIVE   "i had this once before"   without pain or other acute issues   admit shistory of hematuria     INTERIM HISTORY SIGNIFICANT FOR   seen by urology for hematuria   as per gagan with a history of the same   seen once by a urologist   started on flomax     Patient is a 73y old  Male who presents with a chief complaint of SOB (03 Dec 2023 13:07)    HPI:  Patient is a 71 yo M w/ PMH of CAD s/p CABG, CHF, HTN, HLD, Afib (not on AC due to hx of GIB) presenting for chief complaint of SOB. Patient states he started experiencing SOB and LE swelling for the past few days He states he was seen by his Cardiologist on, Dr. Milian, on Tuesday and his Metoprolol was increased from 50mg to 100mg. He states his symptoms worsened yesterday therefore he came to the ED today. He states he has been unable to walk around without getting short of breath. He reports compliance with medications and diet. Patient denies fever, chills, chest pain, palpitations, cough, wheezing, abdominal pain, nausea, vomiting, diarrhea, constipation, bloody bowel movements, melena, hematemesis, urinary complaints, syncope, calf tenderness, paresthesias.  (18 Nov 2023 14:38)    OBJECTIVE  PAST MEDICAL & SURGICAL HISTORY:  Coronary artery disease involving native coronary artery of native heart, unspecified whether angina      CHF with unknown LVEF      Primary hypertension      Hyperlipidemia, unspecified hyperlipidemia type      S/P CABG x 5      History of cholecystectomy        No Known Allergies    Home Medications:  aspirin 81 mg oral capsule: 1 cap(s) orally once a day (18 Nov 2023 14:28)  Entresto 49 mg-51 mg oral tablet: 1 tab(s) orally 2 times a day (18 Nov 2023 14:28)  Lasix 40 mg oral tablet: 1 tab(s) orally once a day (18 Nov 2023 14:27)  Lipitor 20 mg oral tablet: 1 tab(s) orally once a day (18 Nov 2023 14:28)  metOLazone 5 mg oral tablet: 1 tab(s) orally once a day (18 Nov 2023 14:27)  Metoprolol Tartrate 100 mg oral tablet: 1 tab(s) orally 2 times a day (18 Nov 2023 14:27)    VITALS  Currently in sinus rhythm with vitals as below  ICU Vital Signs Last 24 Hrs  T(C): 36.6 (03 Dec 2023 16:05), Max: 36.6 (03 Dec 2023 12:00)  T(F): 97.8 (03 Dec 2023 16:05), Max: 97.9 (03 Dec 2023 12:00)  HR: 66 (04 Dec 2023 04:00) (59 - 76)  BP: 171/73 (04 Dec 2023 04:00) (128/59 - 171/73)  BP(mean): 105 (04 Dec 2023 04:00) (80 - 114)  ABP: --  ABP(mean): --  RR: 19 (04 Dec 2023 04:00) (17 - 32)  SpO2: 92% (04 Dec 2023 04:00) (90% - 100%)    O2 Parameters below as of 03 Dec 2023 18:00  Patient On (Oxygen Delivery Method): room air          LABS                        9.5    12.17 )-----------( 279      ( 04 Dec 2023 02:30 )             31.2   PTT - ( 02 Dec 2023 07:02 )  PTT:53.5 [id number]    135  |  101  |  40.9<H>  ----------------------------<  100<H>  3.9   |  22.0  |  1.47<H>    Ca    8.3<L>      04 Dec 2023 02:30  Mg     2.2     12-04    TPro  6.3<L>  /  Alb  2.5<L>  /  TBili  0.6  /  DBili  x   /  AST  35  /  ALT  28  /  AlkPhos  91  12-02  CAPILLARY BLOOD GLUCOSE      POCT Blood Glucose.: 114 mg/dL (03 Dec 2023 22:07)          IN/OUT    12-02-23 @ 07:01  -  12-03-23 @ 07:00  --------------------------------------------------------  IN: 928 mL / OUT: 820 mL / NET: 108 mL    12-03-23 @ 07:01  -  12-04-23 @ 05:15  --------------------------------------------------------  IN: 1180 mL / OUT: 1235 mL / NET: -55 mL      IMAGING  personally reviewed imaging     CURRENT MEDICATIONS  MEDICATIONS  (STANDING):  apixaban 5 milliGRAM(s) Oral every 12 hours  aspirin enteric coated 81 milliGRAM(s) Oral daily  atorvastatin 20 milliGRAM(s) Oral at bedtime  cefTRIAXone Injectable.      cefTRIAXone Injectable. 2000 milliGRAM(s) IV Push every 24 hours  chlorhexidine 2% Cloths 1 Application(s) Topical <User Schedule>  insulin glargine Injectable (LANTUS) 12 Unit(s) SubCutaneous at bedtime  insulin lispro (ADMELOG) corrective regimen sliding scale   SubCutaneous Before meals and at bedtime  metoprolol tartrate 50 milliGRAM(s) Oral every 6 hours  pantoprazole    Tablet 40 milliGRAM(s) Oral before breakfast  tamsulosin 0.4 milliGRAM(s) Oral at bedtime    MEDICATIONS  (PRN):  acetaminophen     Tablet .. 650 milliGRAM(s) Oral every 6 hours PRN Temp greater or equal to 38C (100.4F), Mild Pain (1 - 3)  ondansetron Injectable 4 milliGRAM(s) IV Push every 8 hours PRN Nausea and/or Vomiting  sodium chloride 0.9% lock flush 10 milliLiter(s) IV Push every 1 hour PRN Pre/post blood products, medications, blood draw, and to maintain line patency SUBJECTIVE   "i had this once before"   without pain or other acute issues   admit shistory of hematuria     INTERIM HISTORY SIGNIFICANT FOR   seen by urology for hematuria   as per gagan with a history of the same   seen once by a urologist   started on flomax     Patient is a 73y old  Male who presents with a chief complaint of SOB (03 Dec 2023 13:07)    HPI:  Patient is a 73 yo M w/ PMH of CAD s/p CABG, CHF, HTN, HLD, Afib (not on AC due to hx of GIB) presenting for chief complaint of SOB. Patient states he started experiencing SOB and LE swelling for the past few days He states he was seen by his Cardiologist on, Dr. Milian, on Tuesday and his Metoprolol was increased from 50mg to 100mg. He states his symptoms worsened yesterday therefore he came to the ED today. He states he has been unable to walk around without getting short of breath. He reports compliance with medications and diet. Patient denies fever, chills, chest pain, palpitations, cough, wheezing, abdominal pain, nausea, vomiting, diarrhea, constipation, bloody bowel movements, melena, hematemesis, urinary complaints, syncope, calf tenderness, paresthesias.  (18 Nov 2023 14:38)    OBJECTIVE  PAST MEDICAL & SURGICAL HISTORY:  Coronary artery disease involving native coronary artery of native heart, unspecified whether angina      CHF with unknown LVEF      Primary hypertension      Hyperlipidemia, unspecified hyperlipidemia type      S/P CABG x 5      History of cholecystectomy        No Known Allergies    Home Medications:  aspirin 81 mg oral capsule: 1 cap(s) orally once a day (18 Nov 2023 14:28)  Entresto 49 mg-51 mg oral tablet: 1 tab(s) orally 2 times a day (18 Nov 2023 14:28)  Lasix 40 mg oral tablet: 1 tab(s) orally once a day (18 Nov 2023 14:27)  Lipitor 20 mg oral tablet: 1 tab(s) orally once a day (18 Nov 2023 14:28)  metOLazone 5 mg oral tablet: 1 tab(s) orally once a day (18 Nov 2023 14:27)  Metoprolol Tartrate 100 mg oral tablet: 1 tab(s) orally 2 times a day (18 Nov 2023 14:27)    VITALS  Currently in sinus rhythm with vitals as below  ICU Vital Signs Last 24 Hrs  T(C): 36.6 (03 Dec 2023 16:05), Max: 36.6 (03 Dec 2023 12:00)  T(F): 97.8 (03 Dec 2023 16:05), Max: 97.9 (03 Dec 2023 12:00)  HR: 66 (04 Dec 2023 04:00) (59 - 76)  BP: 171/73 (04 Dec 2023 04:00) (128/59 - 171/73)  BP(mean): 105 (04 Dec 2023 04:00) (80 - 114)  ABP: --  ABP(mean): --  RR: 19 (04 Dec 2023 04:00) (17 - 32)  SpO2: 92% (04 Dec 2023 04:00) (90% - 100%)    O2 Parameters below as of 03 Dec 2023 18:00  Patient On (Oxygen Delivery Method): room air          LABS                        9.5    12.17 )-----------( 279      ( 04 Dec 2023 02:30 )             31.2   PTT - ( 02 Dec 2023 07:02 )  PTT:53.5 [id number]    135  |  101  |  40.9<H>  ----------------------------<  100<H>  3.9   |  22.0  |  1.47<H>    Ca    8.3<L>      04 Dec 2023 02:30  Mg     2.2     12-04    TPro  6.3<L>  /  Alb  2.5<L>  /  TBili  0.6  /  DBili  x   /  AST  35  /  ALT  28  /  AlkPhos  91  12-02  CAPILLARY BLOOD GLUCOSE      POCT Blood Glucose.: 114 mg/dL (03 Dec 2023 22:07)          IN/OUT    12-02-23 @ 07:01  -  12-03-23 @ 07:00  --------------------------------------------------------  IN: 928 mL / OUT: 820 mL / NET: 108 mL    12-03-23 @ 07:01  -  12-04-23 @ 05:15  --------------------------------------------------------  IN: 1180 mL / OUT: 1235 mL / NET: -55 mL      IMAGING  personally reviewed imaging     CURRENT MEDICATIONS  MEDICATIONS  (STANDING):  apixaban 5 milliGRAM(s) Oral every 12 hours  aspirin enteric coated 81 milliGRAM(s) Oral daily  atorvastatin 20 milliGRAM(s) Oral at bedtime  cefTRIAXone Injectable.      cefTRIAXone Injectable. 2000 milliGRAM(s) IV Push every 24 hours  chlorhexidine 2% Cloths 1 Application(s) Topical <User Schedule>  insulin glargine Injectable (LANTUS) 12 Unit(s) SubCutaneous at bedtime  insulin lispro (ADMELOG) corrective regimen sliding scale   SubCutaneous Before meals and at bedtime  metoprolol tartrate 50 milliGRAM(s) Oral every 6 hours  pantoprazole    Tablet 40 milliGRAM(s) Oral before breakfast  tamsulosin 0.4 milliGRAM(s) Oral at bedtime    MEDICATIONS  (PRN):  acetaminophen     Tablet .. 650 milliGRAM(s) Oral every 6 hours PRN Temp greater or equal to 38C (100.4F), Mild Pain (1 - 3)  ondansetron Injectable 4 milliGRAM(s) IV Push every 8 hours PRN Nausea and/or Vomiting  sodium chloride 0.9% lock flush 10 milliLiter(s) IV Push every 1 hour PRN Pre/post blood products, medications, blood draw, and to maintain line patency SUBJECTIVE   overall without issues overnight   recovering well     INTERIM HISTORY SIGNIFICANT FOR   started on flomax     Patient is a 73y old  Male who presents with a chief complaint of SOB (03 Dec 2023 13:07)    HPI:  Patient is a 71 yo M w/ PMH of CAD s/p CABG, CHF, HTN, HLD, Afib (not on AC due to hx of GIB) presenting for chief complaint of SOB. Patient states he started experiencing SOB and LE swelling for the past few days He states he was seen by his Cardiologist on, Dr. Milian, on Tuesday and his Metoprolol was increased from 50mg to 100mg. He states his symptoms worsened yesterday therefore he came to the ED today. He states he has been unable to walk around without getting short of breath. He reports compliance with medications and diet. Patient denies fever, chills, chest pain, palpitations, cough, wheezing, abdominal pain, nausea, vomiting, diarrhea, constipation, bloody bowel movements, melena, hematemesis, urinary complaints, syncope, calf tenderness, paresthesias.  (18 Nov 2023 14:38)    OBJECTIVE  PAST MEDICAL & SURGICAL HISTORY:  Coronary artery disease involving native coronary artery of native heart, unspecified whether angina      CHF with unknown LVEF      Primary hypertension      Hyperlipidemia, unspecified hyperlipidemia type      S/P CABG x 5      History of cholecystectomy        No Known Allergies    Home Medications:  aspirin 81 mg oral capsule: 1 cap(s) orally once a day (18 Nov 2023 14:28)  Entresto 49 mg-51 mg oral tablet: 1 tab(s) orally 2 times a day (18 Nov 2023 14:28)  Lasix 40 mg oral tablet: 1 tab(s) orally once a day (18 Nov 2023 14:27)  Lipitor 20 mg oral tablet: 1 tab(s) orally once a day (18 Nov 2023 14:28)  metOLazone 5 mg oral tablet: 1 tab(s) orally once a day (18 Nov 2023 14:27)  Metoprolol Tartrate 100 mg oral tablet: 1 tab(s) orally 2 times a day (18 Nov 2023 14:27)    VITALS  Currently in sinus rhythm with vitals as below  ICU Vital Signs Last 24 Hrs  T(C): 36.6 (03 Dec 2023 16:05), Max: 36.6 (03 Dec 2023 12:00)  T(F): 97.8 (03 Dec 2023 16:05), Max: 97.9 (03 Dec 2023 12:00)  HR: 66 (04 Dec 2023 04:00) (59 - 76)  BP: 171/73 (04 Dec 2023 04:00) (128/59 - 171/73)  BP(mean): 105 (04 Dec 2023 04:00) (80 - 114)  ABP: --  ABP(mean): --  RR: 19 (04 Dec 2023 04:00) (17 - 32)  SpO2: 92% (04 Dec 2023 04:00) (90% - 100%)    O2 Parameters below as of 03 Dec 2023 18:00  Patient On (Oxygen Delivery Method): room air          LABS                        9.5    12.17 )-----------( 279      ( 04 Dec 2023 02:30 )             31.2   PTT - ( 02 Dec 2023 07:02 )  PTT:53.5 [id number]    135  |  101  |  40.9<H>  ----------------------------<  100<H>  3.9   |  22.0  |  1.47<H>    Ca    8.3<L>      04 Dec 2023 02:30  Mg     2.2     12-04    TPro  6.3<L>  /  Alb  2.5<L>  /  TBili  0.6  /  DBili  x   /  AST  35  /  ALT  28  /  AlkPhos  91  12-02  CAPILLARY BLOOD GLUCOSE      POCT Blood Glucose.: 114 mg/dL (03 Dec 2023 22:07)          IN/OUT    12-02-23 @ 07:01  -  12-03-23 @ 07:00  --------------------------------------------------------  IN: 928 mL / OUT: 820 mL / NET: 108 mL    12-03-23 @ 07:01  -  12-04-23 @ 05:15  --------------------------------------------------------  IN: 1180 mL / OUT: 1235 mL / NET: -55 mL      IMAGING  personally reviewed imaging     CURRENT MEDICATIONS  MEDICATIONS  (STANDING):  apixaban 5 milliGRAM(s) Oral every 12 hours  aspirin enteric coated 81 milliGRAM(s) Oral daily  atorvastatin 20 milliGRAM(s) Oral at bedtime  cefTRIAXone Injectable.      cefTRIAXone Injectable. 2000 milliGRAM(s) IV Push every 24 hours  chlorhexidine 2% Cloths 1 Application(s) Topical <User Schedule>  insulin glargine Injectable (LANTUS) 12 Unit(s) SubCutaneous at bedtime  insulin lispro (ADMELOG) corrective regimen sliding scale   SubCutaneous Before meals and at bedtime  metoprolol tartrate 50 milliGRAM(s) Oral every 6 hours  pantoprazole    Tablet 40 milliGRAM(s) Oral before breakfast  tamsulosin 0.4 milliGRAM(s) Oral at bedtime    MEDICATIONS  (PRN):  acetaminophen     Tablet .. 650 milliGRAM(s) Oral every 6 hours PRN Temp greater or equal to 38C (100.4F), Mild Pain (1 - 3)  ondansetron Injectable 4 milliGRAM(s) IV Push every 8 hours PRN Nausea and/or Vomiting  sodium chloride 0.9% lock flush 10 milliLiter(s) IV Push every 1 hour PRN Pre/post blood products, medications, blood draw, and to maintain line patency SUBJECTIVE   overall without issues overnight   recovering well     INTERIM HISTORY SIGNIFICANT FOR   started on flomax     Patient is a 73y old  Male who presents with a chief complaint of SOB (03 Dec 2023 13:07)    HPI:  Patient is a 71 yo M w/ PMH of CAD s/p CABG, CHF, HTN, HLD, Afib (not on AC due to hx of GIB) presenting for chief complaint of SOB. Patient states he started experiencing SOB and LE swelling for the past few days He states he was seen by his Cardiologist on, Dr. Milian, on Tuesday and his Metoprolol was increased from 50mg to 100mg. He states his symptoms worsened yesterday therefore he came to the ED today. He states he has been unable to walk around without getting short of breath. He reports compliance with medications and diet. Patient denies fever, chills, chest pain, palpitations, cough, wheezing, abdominal pain, nausea, vomiting, diarrhea, constipation, bloody bowel movements, melena, hematemesis, urinary complaints, syncope, calf tenderness, paresthesias.  (18 Nov 2023 14:38)    OBJECTIVE  PAST MEDICAL & SURGICAL HISTORY:  Coronary artery disease involving native coronary artery of native heart, unspecified whether angina      CHF with unknown LVEF      Primary hypertension      Hyperlipidemia, unspecified hyperlipidemia type      S/P CABG x 5      History of cholecystectomy        No Known Allergies    Home Medications:  aspirin 81 mg oral capsule: 1 cap(s) orally once a day (18 Nov 2023 14:28)  Entresto 49 mg-51 mg oral tablet: 1 tab(s) orally 2 times a day (18 Nov 2023 14:28)  Lasix 40 mg oral tablet: 1 tab(s) orally once a day (18 Nov 2023 14:27)  Lipitor 20 mg oral tablet: 1 tab(s) orally once a day (18 Nov 2023 14:28)  metOLazone 5 mg oral tablet: 1 tab(s) orally once a day (18 Nov 2023 14:27)  Metoprolol Tartrate 100 mg oral tablet: 1 tab(s) orally 2 times a day (18 Nov 2023 14:27)    VITALS  Currently in sinus rhythm with vitals as below  ICU Vital Signs Last 24 Hrs  T(C): 36.6 (03 Dec 2023 16:05), Max: 36.6 (03 Dec 2023 12:00)  T(F): 97.8 (03 Dec 2023 16:05), Max: 97.9 (03 Dec 2023 12:00)  HR: 66 (04 Dec 2023 04:00) (59 - 76)  BP: 171/73 (04 Dec 2023 04:00) (128/59 - 171/73)  BP(mean): 105 (04 Dec 2023 04:00) (80 - 114)  ABP: --  ABP(mean): --  RR: 19 (04 Dec 2023 04:00) (17 - 32)  SpO2: 92% (04 Dec 2023 04:00) (90% - 100%)    O2 Parameters below as of 03 Dec 2023 18:00  Patient On (Oxygen Delivery Method): room air          LABS                        9.5    12.17 )-----------( 279      ( 04 Dec 2023 02:30 )             31.2   PTT - ( 02 Dec 2023 07:02 )  PTT:53.5 [id number]    135  |  101  |  40.9<H>  ----------------------------<  100<H>  3.9   |  22.0  |  1.47<H>    Ca    8.3<L>      04 Dec 2023 02:30  Mg     2.2     12-04    TPro  6.3<L>  /  Alb  2.5<L>  /  TBili  0.6  /  DBili  x   /  AST  35  /  ALT  28  /  AlkPhos  91  12-02  CAPILLARY BLOOD GLUCOSE      POCT Blood Glucose.: 114 mg/dL (03 Dec 2023 22:07)          IN/OUT    12-02-23 @ 07:01  -  12-03-23 @ 07:00  --------------------------------------------------------  IN: 928 mL / OUT: 820 mL / NET: 108 mL    12-03-23 @ 07:01  -  12-04-23 @ 05:15  --------------------------------------------------------  IN: 1180 mL / OUT: 1235 mL / NET: -55 mL      IMAGING  personally reviewed imaging     CURRENT MEDICATIONS  MEDICATIONS  (STANDING):  apixaban 5 milliGRAM(s) Oral every 12 hours  aspirin enteric coated 81 milliGRAM(s) Oral daily  atorvastatin 20 milliGRAM(s) Oral at bedtime  cefTRIAXone Injectable.      cefTRIAXone Injectable. 2000 milliGRAM(s) IV Push every 24 hours  chlorhexidine 2% Cloths 1 Application(s) Topical <User Schedule>  insulin glargine Injectable (LANTUS) 12 Unit(s) SubCutaneous at bedtime  insulin lispro (ADMELOG) corrective regimen sliding scale   SubCutaneous Before meals and at bedtime  metoprolol tartrate 50 milliGRAM(s) Oral every 6 hours  pantoprazole    Tablet 40 milliGRAM(s) Oral before breakfast  tamsulosin 0.4 milliGRAM(s) Oral at bedtime    MEDICATIONS  (PRN):  acetaminophen     Tablet .. 650 milliGRAM(s) Oral every 6 hours PRN Temp greater or equal to 38C (100.4F), Mild Pain (1 - 3)  ondansetron Injectable 4 milliGRAM(s) IV Push every 8 hours PRN Nausea and/or Vomiting  sodium chloride 0.9% lock flush 10 milliLiter(s) IV Push every 1 hour PRN Pre/post blood products, medications, blood draw, and to maintain line patency

## 2023-12-04 NOTE — PROGRESS NOTE ADULT - SUBJECTIVE AND OBJECTIVE BOX
SUBJECTIVE   overall without issues overnight   recovering well     INTERIM HISTORY SIGNIFICANT FOR   started on flomax     Patient is a 73y old  Male who presents with a chief complaint of SOB (03 Dec 2023 13:07)    HPI:  Patient is a 71 yo M w/ PMH of CAD s/p CABG, CHF, HTN, HLD, Afib (not on AC due to hx of GIB) presenting for chief complaint of SOB. Patient states he started experiencing SOB and LE swelling for the past few days He states he was seen by his Cardiologist on, Dr. Milian, on Tuesday and his Metoprolol was increased from 50mg to 100mg. He states his symptoms worsened yesterday therefore he came to the ED today. He states he has been unable to walk around without getting short of breath. He reports compliance with medications and diet. Patient denies fever, chills, chest pain, palpitations, cough, wheezing, abdominal pain, nausea, vomiting, diarrhea, constipation, bloody bowel movements, melena, hematemesis, urinary complaints, syncope, calf tenderness, paresthesias.  (18 Nov 2023 14:38)    OBJECTIVE  PAST MEDICAL & SURGICAL HISTORY:  Coronary artery disease involving native coronary artery of native heart, unspecified whether angina      CHF with unknown LVEF      Primary hypertension      Hyperlipidemia, unspecified hyperlipidemia type      S/P CABG x 5      History of cholecystectomy        No Known Allergies    Home Medications:  aspirin 81 mg oral capsule: 1 cap(s) orally once a day (18 Nov 2023 14:28)  Entresto 49 mg-51 mg oral tablet: 1 tab(s) orally 2 times a day (18 Nov 2023 14:28)  Lasix 40 mg oral tablet: 1 tab(s) orally once a day (18 Nov 2023 14:27)  Lipitor 20 mg oral tablet: 1 tab(s) orally once a day (18 Nov 2023 14:28)  metOLazone 5 mg oral tablet: 1 tab(s) orally once a day (18 Nov 2023 14:27)  Metoprolol Tartrate 100 mg oral tablet: 1 tab(s) orally 2 times a day (18 Nov 2023 14:27)    VITALS  Currently in sinus rhythm with vitals as below  ICU Vital Signs Last 24 Hrs  T(C): 36.6 (03 Dec 2023 16:05), Max: 36.6 (03 Dec 2023 12:00)  T(F): 97.8 (03 Dec 2023 16:05), Max: 97.9 (03 Dec 2023 12:00)  HR: 66 (04 Dec 2023 04:00) (59 - 76)  BP: 171/73 (04 Dec 2023 04:00) (128/59 - 171/73)  BP(mean): 105 (04 Dec 2023 04:00) (80 - 114)  ABP: --  ABP(mean): --  RR: 19 (04 Dec 2023 04:00) (17 - 32)  SpO2: 92% (04 Dec 2023 04:00) (90% - 100%)    O2 Parameters below as of 03 Dec 2023 18:00  Patient On (Oxygen Delivery Method): room air          LABS                        9.5    12.17 )-----------( 279      ( 04 Dec 2023 02:30 )             31.2   PTT - ( 02 Dec 2023 07:02 )  PTT:53.5 [id number]    135  |  101  |  40.9<H>  ----------------------------<  100<H>  3.9   |  22.0  |  1.47<H>    Ca    8.3<L>      04 Dec 2023 02:30  Mg     2.2     12-04    TPro  6.3<L>  /  Alb  2.5<L>  /  TBili  0.6  /  DBili  x   /  AST  35  /  ALT  28  /  AlkPhos  91  12-02  CAPILLARY BLOOD GLUCOSE      POCT Blood Glucose.: 114 mg/dL (03 Dec 2023 22:07)          IN/OUT    12-02-23 @ 07:01  -  12-03-23 @ 07:00  --------------------------------------------------------  IN: 928 mL / OUT: 820 mL / NET: 108 mL    12-03-23 @ 07:01  -  12-04-23 @ 05:15  --------------------------------------------------------  IN: 1180 mL / OUT: 1235 mL / NET: -55 mL      IMAGING  personally reviewed imaging     CURRENT MEDICATIONS  MEDICATIONS  (STANDING):  apixaban 5 milliGRAM(s) Oral every 12 hours  aspirin enteric coated 81 milliGRAM(s) Oral daily  atorvastatin 20 milliGRAM(s) Oral at bedtime  cefTRIAXone Injectable.      cefTRIAXone Injectable. 2000 milliGRAM(s) IV Push every 24 hours  chlorhexidine 2% Cloths 1 Application(s) Topical <User Schedule>  insulin glargine Injectable (LANTUS) 12 Unit(s) SubCutaneous at bedtime  insulin lispro (ADMELOG) corrective regimen sliding scale   SubCutaneous Before meals and at bedtime  metoprolol tartrate 50 milliGRAM(s) Oral every 6 hours  pantoprazole    Tablet 40 milliGRAM(s) Oral before breakfast  tamsulosin 0.4 milliGRAM(s) Oral at bedtime    MEDICATIONS  (PRN):  acetaminophen     Tablet .. 650 milliGRAM(s) Oral every 6 hours PRN Temp greater or equal to 38C (100.4F), Mild Pain (1 - 3)  ondansetron Injectable 4 milliGRAM(s) IV Push every 8 hours PRN Nausea and/or Vomiting  sodium chloride 0.9% lock flush 10 milliLiter(s) IV Push every 1 hour PRN Pre/post blood products, medications, blood draw, and to maintain line patency SUBJECTIVE   overall without issues overnight   recovering well     INTERIM HISTORY SIGNIFICANT FOR   started on flomax     Patient is a 73y old  Male who presents with a chief complaint of SOB (03 Dec 2023 13:07)    HPI:  Patient is a 73 yo M w/ PMH of CAD s/p CABG, CHF, HTN, HLD, Afib (not on AC due to hx of GIB) presenting for chief complaint of SOB. Patient states he started experiencing SOB and LE swelling for the past few days He states he was seen by his Cardiologist on, Dr. Milian, on Tuesday and his Metoprolol was increased from 50mg to 100mg. He states his symptoms worsened yesterday therefore he came to the ED today. He states he has been unable to walk around without getting short of breath. He reports compliance with medications and diet. Patient denies fever, chills, chest pain, palpitations, cough, wheezing, abdominal pain, nausea, vomiting, diarrhea, constipation, bloody bowel movements, melena, hematemesis, urinary complaints, syncope, calf tenderness, paresthesias.  (18 Nov 2023 14:38)    OBJECTIVE  PAST MEDICAL & SURGICAL HISTORY:  Coronary artery disease involving native coronary artery of native heart, unspecified whether angina      CHF with unknown LVEF      Primary hypertension      Hyperlipidemia, unspecified hyperlipidemia type      S/P CABG x 5      History of cholecystectomy        No Known Allergies    Home Medications:  aspirin 81 mg oral capsule: 1 cap(s) orally once a day (18 Nov 2023 14:28)  Entresto 49 mg-51 mg oral tablet: 1 tab(s) orally 2 times a day (18 Nov 2023 14:28)  Lasix 40 mg oral tablet: 1 tab(s) orally once a day (18 Nov 2023 14:27)  Lipitor 20 mg oral tablet: 1 tab(s) orally once a day (18 Nov 2023 14:28)  metOLazone 5 mg oral tablet: 1 tab(s) orally once a day (18 Nov 2023 14:27)  Metoprolol Tartrate 100 mg oral tablet: 1 tab(s) orally 2 times a day (18 Nov 2023 14:27)    VITALS  Currently in sinus rhythm with vitals as below  ICU Vital Signs Last 24 Hrs  T(C): 36.6 (03 Dec 2023 16:05), Max: 36.6 (03 Dec 2023 12:00)  T(F): 97.8 (03 Dec 2023 16:05), Max: 97.9 (03 Dec 2023 12:00)  HR: 66 (04 Dec 2023 04:00) (59 - 76)  BP: 171/73 (04 Dec 2023 04:00) (128/59 - 171/73)  BP(mean): 105 (04 Dec 2023 04:00) (80 - 114)  ABP: --  ABP(mean): --  RR: 19 (04 Dec 2023 04:00) (17 - 32)  SpO2: 92% (04 Dec 2023 04:00) (90% - 100%)    O2 Parameters below as of 03 Dec 2023 18:00  Patient On (Oxygen Delivery Method): room air          LABS                        9.5    12.17 )-----------( 279      ( 04 Dec 2023 02:30 )             31.2   PTT - ( 02 Dec 2023 07:02 )  PTT:53.5 [id number]    135  |  101  |  40.9<H>  ----------------------------<  100<H>  3.9   |  22.0  |  1.47<H>    Ca    8.3<L>      04 Dec 2023 02:30  Mg     2.2     12-04    TPro  6.3<L>  /  Alb  2.5<L>  /  TBili  0.6  /  DBili  x   /  AST  35  /  ALT  28  /  AlkPhos  91  12-02  CAPILLARY BLOOD GLUCOSE      POCT Blood Glucose.: 114 mg/dL (03 Dec 2023 22:07)          IN/OUT    12-02-23 @ 07:01  -  12-03-23 @ 07:00  --------------------------------------------------------  IN: 928 mL / OUT: 820 mL / NET: 108 mL    12-03-23 @ 07:01  -  12-04-23 @ 05:15  --------------------------------------------------------  IN: 1180 mL / OUT: 1235 mL / NET: -55 mL      IMAGING  personally reviewed imaging     CURRENT MEDICATIONS  MEDICATIONS  (STANDING):  apixaban 5 milliGRAM(s) Oral every 12 hours  aspirin enteric coated 81 milliGRAM(s) Oral daily  atorvastatin 20 milliGRAM(s) Oral at bedtime  cefTRIAXone Injectable.      cefTRIAXone Injectable. 2000 milliGRAM(s) IV Push every 24 hours  chlorhexidine 2% Cloths 1 Application(s) Topical <User Schedule>  insulin glargine Injectable (LANTUS) 12 Unit(s) SubCutaneous at bedtime  insulin lispro (ADMELOG) corrective regimen sliding scale   SubCutaneous Before meals and at bedtime  metoprolol tartrate 50 milliGRAM(s) Oral every 6 hours  pantoprazole    Tablet 40 milliGRAM(s) Oral before breakfast  tamsulosin 0.4 milliGRAM(s) Oral at bedtime    MEDICATIONS  (PRN):  acetaminophen     Tablet .. 650 milliGRAM(s) Oral every 6 hours PRN Temp greater or equal to 38C (100.4F), Mild Pain (1 - 3)  ondansetron Injectable 4 milliGRAM(s) IV Push every 8 hours PRN Nausea and/or Vomiting  sodium chloride 0.9% lock flush 10 milliLiter(s) IV Push every 1 hour PRN Pre/post blood products, medications, blood draw, and to maintain line patency

## 2023-12-04 NOTE — CONSULT NOTE ADULT - PROBLEM SELECTOR RECOMMENDATION 5
Creatinine 2.12, GFR 33  Avoid nephrotoxic agents
- ID following  - continue IV abx with ceftriaxone (plan to treat through 12/25)

## 2023-12-04 NOTE — PROGRESS NOTE ADULT - SUBJECTIVE AND OBJECTIVE BOX
Interval Events:  no overnight events    No issues   ROS as noted above    No Known Allergies      MEDICATIONS  (STANDING):  apixaban 5 milliGRAM(s) Oral every 12 hours  aspirin enteric coated 81 milliGRAM(s) Oral daily  atorvastatin 20 milliGRAM(s) Oral at bedtime  cefTRIAXone Injectable.      cefTRIAXone Injectable. 2000 milliGRAM(s) IV Push every 24 hours  chlorhexidine 2% Cloths 1 Application(s) Topical <User Schedule>  furosemide    Tablet 40 milliGRAM(s) Oral daily  insulin glargine Injectable (LANTUS) 12 Unit(s) SubCutaneous at bedtime  insulin lispro (ADMELOG) corrective regimen sliding scale   SubCutaneous Before meals and at bedtime  methocarbamol 500 milliGRAM(s) Oral two times a day  metoprolol tartrate 50 milliGRAM(s) Oral every 6 hours  pantoprazole    Tablet 40 milliGRAM(s) Oral before breakfast  tamsulosin 0.4 milliGRAM(s) Oral at bedtime    MEDICATIONS  (PRN):  acetaminophen     Tablet .. 975 milliGRAM(s) Oral every 6 hours PRN Temp greater or equal to 38C (100.4F), Mild Pain (1 - 3)  ondansetron Injectable 4 milliGRAM(s) IV Push every 8 hours PRN Nausea and/or Vomiting  sodium chloride 0.9% lock flush 10 milliLiter(s) IV Push every 1 hour PRN Pre/post blood products, medications, blood draw, and to maintain line patency      Vital Signs Last 24 Hrs  T(C): 36.7 (04 Dec 2023 12:00), Max: 36.7 (04 Dec 2023 12:00)  T(F): 98.1 (04 Dec 2023 12:00), Max: 98.1 (04 Dec 2023 12:00)  HR: 79 (04 Dec 2023 12:00) (64 - 79)  BP: 148/67 (04 Dec 2023 12:00) (128/59 - 171/73)  BP(mean): 96 (04 Dec 2023 12:00) (80 - 114)  RR: 28 (04 Dec 2023 12:00) (17 - 35)  SpO2: 97% (04 Dec 2023 12:00) (92% - 99%)    Parameters below as of 04 Dec 2023 12:00  Patient On (Oxygen Delivery Method): room air      Weight (kg): 127 (11-28-23 @ 03:29)    Physical Exam:    Constitutional: NAD, well-developed  Extremities: No peripheral edema  Neurological: AOx3, no focal deficits  Psychiatric: Normal mood and normal affect      LABS  12-04    135  |  101  |  40.9<H>  ----------------------------<  100<H>  3.9   |  22.0  |  1.47<H>    Ca    8.3<L>      04 Dec 2023 02:30  Mg     2.2     12-04                            9.5    12.17 )-----------( 279      ( 04 Dec 2023 02:30 )             31.2       A1C with Estimated Average Glucose Result: 7.3 % (11-28-23 @ 00:40)            CAPILLARY BLOOD GLUCOSE      POCT Blood Glucose.: 134 mg/dL (04 Dec 2023 11:23)  POCT Blood Glucose.: 114 mg/dL (04 Dec 2023 07:46)  POCT Blood Glucose.: 114 mg/dL (03 Dec 2023 22:07)  POCT Blood Glucose.: 101 mg/dL (03 Dec 2023 16:20)

## 2023-12-04 NOTE — DISCHARGE NOTE PROVIDER - NSDCCPTREATMENT_GEN_ALL_CORE_FT
PRINCIPAL PROCEDURE  Procedure: Decortication, lung, total, using VATS  Findings and Treatment: LEFT      SECONDARY PROCEDURE  Procedure: Flexible bronchoscopy  Findings and Treatment:

## 2023-12-04 NOTE — CONSULT NOTE ADULT - SUBJECTIVE AND OBJECTIVE BOX
ADVANCED HEART FAILURE - CONSULT NOTE  [address] [place] [address]  Office Phone: [phone]/Fax: [phone]  Service/On Call Phone [phone]  _______________________________________________________________________________________________________    HPI:  72 y/o M with a history of HTN, CAD, s/p CABG (2021 at [place]), and former smoker (1PPD x20 years, quit 2021), who presented to [facility] on  with worsening SOB and LE edema.    He reports intermittent SOB since his CABG in  (prior to his surgery he never had any CP or SOB). At best, he was able to walk about 4 blocks with a walker. He typically sleeps in the recliner for comfort but does endorse orthopnea and PND if he lays in the bed. He denies any known history of HFrEF or CKD. His PCP retired, so he has only been following with his cardiologist, Dr. Milian, since his CABG who he sees about every 3 months. 2 weeks PTA he noticed increased SOB with minimal exertion and LE edema. Metolazone 5 mg was added to his medications twice weekly and his metoprolol was also increased to 100 mg BID.     On admission he was found to have a mod-large loculated left pleural effusion. A chest tube was inserted and drained ~600cc initially (cultures + for strep intermedius). TTE showed severe biventricular dysfunction (LVEF 20-25%) and mod MR. His labs were significant for DEVON (BUN/Cr 48/2.12, unknown baseline Cr), pro-BNP 5657. EKG showed new onset Aflutter with RVR and he was also diagnosed with new onset DM2 (A1c 7.3%).    On  he was taken to the OR for left VATS decortication. Extubated POD 1. He is s/p successful DCCV on .       PAST MEDICAL & SURGICAL HISTORY:  Coronary artery disease involving native coronary artery of native heart, unspecified whether angina      CHF with unknown LVEF      Primary hypertension      Hyperlipidemia, unspecified hyperlipidemia type      S/P CABG x 5      History of cholecystectomy        REVIEW OF SYSTEMS:  14 point ROS negative in detail apart from as documented in HPI.    MEDICATIONS  (STANDING):  apixaban 5 milliGRAM(s) Oral every 12 hours  aspirin enteric coated 81 milliGRAM(s) Oral daily  atorvastatin 20 milliGRAM(s) Oral at bedtime  cefTRIAXone Injectable.      cefTRIAXone Injectable. 2000 milliGRAM(s) IV Push every 24 hours  chlorhexidine 2% Cloths 1 Application(s) Topical <User Schedule>  furosemide    Tablet 40 milliGRAM(s) Oral daily  insulin glargine Injectable (LANTUS) 12 Unit(s) SubCutaneous at bedtime  insulin lispro (ADMELOG) corrective regimen sliding scale   SubCutaneous Before meals and at bedtime  methocarbamol 500 milliGRAM(s) Oral two times a day  metoprolol tartrate 50 milliGRAM(s) Oral every 6 hours  pantoprazole    Tablet 40 milliGRAM(s) Oral before breakfast  tamsulosin 0.4 milliGRAM(s) Oral at bedtime    MEDICATIONS  (PRN):  acetaminophen     Tablet .. 975 milliGRAM(s) Oral every 6 hours PRN Temp greater or equal to 38C (100.4F), Mild Pain (1 - 3)  ondansetron Injectable 4 milliGRAM(s) IV Push every 8 hours PRN Nausea and/or Vomiting  sodium chloride 0.9% lock flush 10 milliLiter(s) IV Push every 1 hour PRN Pre/post blood products, medications, blood draw, and to maintain line patency    Home Medications:  aspirin 81 mg oral capsule: 1 cap(s) orally once a day (2023 14:28)  Entresto 49 mg-51 mg oral tablet: 1 tab(s) orally 2 times a day (2023 14:28)  Lasix 40 mg oral tablet: 1 tab(s) orally once a day (2023 14:27)  Lipitor 20 mg oral tablet: 1 tab(s) orally once a day (2023 14:28)  metOLazone 5 mg oral tablet: twice weekly (2023 14:27)  Metoprolol Tartrate 100 mg oral tablet: 1 tab(s) orally 2 times a day (2023 14:27)    Allergies    No Known Allergies    Intolerances      Social History:  Former smoker 1 PDD x 20 years, quit 2021  Denies any alcohol or illicit drug use  Lives alone, no children  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 Comfortable.  Neck: JVP not elevated.  Respiratory: LLL diminished otherwise CTA  CV: RRR. Normal S1 and S2. No murmurs, rub, or gallops. Radial pulses normal.  Abdomen: Soft, non-distended, non-tender  Extremities: Cool, no edema  Neurology: Non-focal, alert and oriented times three.   Psych: Affect normal    Labs:    ( 23 @ 02:30 )               9.5    12.17 )--------( 279                  31.2     ( 23 @ 02:30 )     135  |  101  |  40.9  ---------------------<  100  3.9  |  22.0  |  1.47    Ca 8.3  Phos x   Mg 2.2    ( 23 @ 07:02 )  TPro  6.3  /  Alb  2.5  /  TBili  0.6  /  DBili  x   /  AST  35  /  ALT  28  /  AlkPhos  91    ( 23 @ 09:02 )  TropHS 41    / CK 43    / CKMB x       ADVANCED HEART FAILURE - CONSULT NOTE  [address] [place] [address]  Office Phone: [phone]/Fax: [phone]  Service/On Call Phone [phone]  _______________________________________________________________________________________________________    HPI:  72 y/o M with a history of HTN, CAD, s/p CABG (2021 at [place]), and former smoker (1PPD x20 years, quit 2021), who presented to [facility] on  with worsening SOB and LE edema.    He reports intermittent SOB since his CABG in  (prior to his surgery he never had any CP or SOB). At best, he was able to walk about 4 blocks with a walker. He typically sleeps in the recliner for comfort but does endorse orthopnea and PND if he lays in the bed. He denies any known history of HFrEF or CKD. His PCP retired, so he has only been following with his cardiologist, Dr. Milian, since his CABG who he sees about every 3 months. 2 weeks PTA he noticed increased SOB with minimal exertion and LE edema. Metolazone 5 mg was added to his medications twice weekly and his metoprolol was also increased to 100 mg BID.     On admission he was found to have a mod-large loculated left pleural effusion. A chest tube was inserted and drained ~600cc initially (cultures + for strep intermedius). TTE showed severe biventricular dysfunction (LVEF 20-25%) and mod MR. His labs were significant for DEVON (BUN/Cr 48/2.12, unknown baseline Cr), pro-BNP 5657. EKG showed new onset Aflutter with RVR and he was also diagnosed with new onset DM2 (A1c 7.3%).    On  he was taken to the OR for left VATS decortication. Extubated POD 1. He is s/p successful DCCV on .       PAST MEDICAL & SURGICAL HISTORY:  Coronary artery disease involving native coronary artery of native heart, unspecified whether angina      CHF with unknown LVEF      Primary hypertension      Hyperlipidemia, unspecified hyperlipidemia type      S/P CABG x 5      History of cholecystectomy        REVIEW OF SYSTEMS:  14 point ROS negative in detail apart from as documented in HPI.    MEDICATIONS  (STANDING):  apixaban 5 milliGRAM(s) Oral every 12 hours  aspirin enteric coated 81 milliGRAM(s) Oral daily  atorvastatin 20 milliGRAM(s) Oral at bedtime  cefTRIAXone Injectable.      cefTRIAXone Injectable. 2000 milliGRAM(s) IV Push every 24 hours  chlorhexidine 2% Cloths 1 Application(s) Topical <User Schedule>  furosemide    Tablet 40 milliGRAM(s) Oral daily  insulin glargine Injectable (LANTUS) 12 Unit(s) SubCutaneous at bedtime  insulin lispro (ADMELOG) corrective regimen sliding scale   SubCutaneous Before meals and at bedtime  methocarbamol 500 milliGRAM(s) Oral two times a day  metoprolol tartrate 50 milliGRAM(s) Oral every 6 hours  pantoprazole    Tablet 40 milliGRAM(s) Oral before breakfast  tamsulosin 0.4 milliGRAM(s) Oral at bedtime    MEDICATIONS  (PRN):  acetaminophen     Tablet .. 975 milliGRAM(s) Oral every 6 hours PRN Temp greater or equal to 38C (100.4F), Mild Pain (1 - 3)  ondansetron Injectable 4 milliGRAM(s) IV Push every 8 hours PRN Nausea and/or Vomiting  sodium chloride 0.9% lock flush 10 milliLiter(s) IV Push every 1 hour PRN Pre/post blood products, medications, blood draw, and to maintain line patency    Home Medications:  aspirin 81 mg oral capsule: 1 cap(s) orally once a day (2023 14:28)  Entresto 49 mg-51 mg oral tablet: 1 tab(s) orally 2 times a day (2023 14:28)  Lasix 40 mg oral tablet: 1 tab(s) orally once a day (2023 14:27)  Lipitor 20 mg oral tablet: 1 tab(s) orally once a day (2023 14:28)  metOLazone 5 mg oral tablet: twice weekly (2023 14:27)  Metoprolol Tartrate 100 mg oral tablet: 1 tab(s) orally 2 times a day (2023 14:27)    Allergies    No Known Allergies    Intolerances      Social History:  Former smoker 1 PDD x 20 years, quit 2021  Denies any alcohol or illicit drug use  Lives alone, no children  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 Comfortable.  Neck: JVP not elevated.  Respiratory: LLL diminished otherwise CTA  CV: RRR. Normal S1 and S2. No murmurs, rub, or gallops. Radial pulses normal.  Abdomen: Soft, non-distended, non-tender  Extremities: Cool, no edema  Neurology: Non-focal, alert and oriented times three.   Psych: Affect normal    Labs:    ( 23 @ 02:30 )               9.5    12.17 )--------( 279                  31.2     ( 23 @ 02:30 )     135  |  101  |  40.9  ---------------------<  100  3.9  |  22.0  |  1.47    Ca 8.3  Phos x   Mg 2.2    ( 23 @ 07:02 )  TPro  6.3  /  Alb  2.5  /  TBili  0.6  /  DBili  x   /  AST  35  /  ALT  28  /  AlkPhos  91    ( 23 @ 09:02 )  TropHS 41    / CK 43    / CKMB x

## 2023-12-04 NOTE — CONSULT NOTE ADULT - PROVIDER SPECIALTY LIST ADULT
Infectious Disease
Thoracic Surgery
Endocrinology
Intervent Cardiology
Nephrology
Cardiology
MICU
Electrophysiology
Heart Failure

## 2023-12-04 NOTE — PROVIDER CONTACT NOTE (CRITICAL VALUE NOTIFICATION) - PERSON GIVING RESULT:
Lab
Silverio harris/Yanira
[facility]: Marisol
Dimal from Lab
Silverio Poe/lab

## 2023-12-04 NOTE — DISCHARGE NOTE PROVIDER - NSDCFUADDINST_GEN_ALL_CORE_FT
Please call the Cardiothoracic Surgery office at [phone] if you are experiencing any shortness of breath, chest pain, fevers or chills, drainage from the incisions, persistent nausea, vomiting or if you have any questions about your medications. If the symptoms are severe, call 911 and go to the nearest hospital. You can also call [phone] for an emergency [facility] ambulance, which will take you to the closest [facility].    If you need any assistance for making any appointments for a new consult or referral in any specialty, please call our [facility] Clinical Coordination Center at [phone].  Please call the Cardiothoracic Surgery office at [phone] if you are experiencing any shortness of breath, chest pain, fevers or chills, drainage from the incisions, persistent nausea, vomiting or if you have any questions about your medications. If the symptoms are severe, call 911 and go to the nearest hospital. You can also call [phone] for an emergency [facility] ambulance, which will take you to the closest [facility].    If you need any assistance for making any appointments for a new consult or referral in any specialty, please call our [facility] Clinical Coordination Center at [phone].

## 2023-12-04 NOTE — DISCHARGE NOTE PROVIDER - NSDCCPCAREPLAN_GEN_ALL_CORE_FT
PRINCIPAL DISCHARGE DIAGNOSIS  Diagnosis: Empyema  Assessment and Plan of Treatment:       SECONDARY DISCHARGE DIAGNOSES  Diagnosis: Large pleural effusion  Assessment and Plan of Treatment:

## 2023-12-04 NOTE — PROGRESS NOTE ADULT - ASSESSMENT
72M with CAD s/p CABG, HFrEF, HTN, Afib admitted on 11/18 with dyspnea. Found to have large loculated left pleural effusion s/p CT on 11/18. Hospital course complicated by Afl/afib with RVR followed by EP     Left empyema   Aflutter with RVR   CKD   Diarrhea      - s/p VATS/decortication on 11/27 >> empyema   - Extubated on 11/28   - Continue ceftriaxone 2g IV daily   - Surgical cultures with Strep intermedius   - 11/18 CT Chest with large loculated pleural effusion   - s/p left chest tube on 11/18   - 11/18 Pleural fluid culture with Streptococcus intermedius   - 11/23, 11/24 and 11/26 BCx ngtd  - Aflutter with RVR now resolved >> 12/1 s/p uncomplicated QUIN & DCCV, 300J x 1 with restoration of sinus rhythm.  - EF 20-25%  - monitor diarrhea off laxatives. If persistent check GI PCR and C. diff.   - Remainder of care as per CTICU     - Patient will need midline once ready for discharge. Plan to treat through 12/25 with CTX at current dose.     D/w  CTICU

## 2023-12-04 NOTE — CONSULT NOTE ADULT - PROBLEM SELECTOR RECOMMENDATION 9
- Etiology: Possibly ischemic vs tachy induced. C deferred for DEVON this admission.  - Clinically close to euvolemic w/ lukewarm extremities.   - GDMT: Currently on metoprolol tartrate 50 mg PO q6hrs.   - Diuretics: Started on Lasix 40 mg PO daily today  - Please document strict I&Os and daily standing weight  - Device: Will repeat TTE once on GDMT to see if he meets criteria for primary prevention ICD - Etiology: Possibly ischemic vs tachy induced. C deferred for DEVON this admission.  - Clinically close to euvolemic w/ lukewarm extremities.   - GDMT: Currently on metoprolol tartrate 50 mg PO q6hrs. Can switch to Toprol XL and start Entresto 24-26 mg BID.  - Diuretics: Started on Lasix 40 mg PO daily today  - Please document strict I&Os and daily standing weight  - Device: Will repeat TTE once on GDMT to see if he meets criteria for primary prevention ICD

## 2023-12-04 NOTE — DISCHARGE NOTE PROVIDER - NSDCFUADDAPPT_GEN_ALL_CORE_FT
Follow up with Dr. Encarnacion ____      STAR patient : CHF    Appt. made with Dr Edmonds- oli 12/11/23, @ 9:00- 103 [facility].  If you are unable to attend your pre-scheduled appt.   please contact the office directly at [phone]  Cardio  appt. made for you with Dr Cunningham's office.      Follow up with Dr. Encarnacion ____      STAR patient : CHF    Appt. made with Dr Edmonds- oli 12/11/23, @ 9:00- 9[facility].  If you are unable to attend your pre-scheduled appt.   please contact the office directly at [phone]  Cardio  appt. made for you with Dr Cunningham's office.      Follow up with Dr. Barry on 12/18 at 9am      STAR patient : CHF    Appt. made with Dr Edmonds- oli 12/11/23, @ 9:00 [facility].  If you are unable to attend your pre-scheduled appt.   please contact the office directly at [phone]  Cardio  appt. made for you with Dr Cunningham's office.      Follow up with Dr. Barry on 12/18 at 9am      STAR patient : CHF    Appt. made with Dr Edmonds- oli 12/11/23, @ 9:00 402 [facility].  If you are unable to attend your pre-scheduled appt.   please contact the office directly at [phone]  Cardio  appt. made for you with Dr Cunningham's office.

## 2023-12-04 NOTE — DISCHARGE NOTE PROVIDER - CARE PROVIDER_API CALL
Dominguez Encarnacion  Thoracic Surgery  [address] [place] [id number]  Phone: [phone]  Fax: [phone]  Follow Up Time:    Dominguez Encarnacion  Thoracic Surgery  [address] [place] [address]  Phone: [phone]  Fax: [phone]  Follow Up Time:    Luis Angel Barry  Thoracic Surgery  [address] [place] [id number]  Phone: [phone]  Fax: [phone]  Follow Up Time:    Luis Angel Barry  Thoracic Surgery  [address] [place] [id number]  Phone: [phone]  Fax: [phone]  Follow Up Time:

## 2023-12-04 NOTE — DISCHARGE NOTE PROVIDER - PROVIDER TOKENS
PROVIDER:[TOKEN:[[id number]]] PROVIDER:[TOKEN:[[id number]]] PROVIDER:[TOKEN:[[id number]]] PROVIDER:[TOKEN:[[id number]]]

## 2023-12-04 NOTE — PROGRESS NOTE ADULT - ASSESSMENT
72M w/ PMH of T2DM, CAD s/p CABG, CHF, HTN, HLD, Afib (not on AC due to hx of GIB) presents with a few days worsening of dyspnea and LE edema. found to have large loculated pleural effusion. 11/28 s/p L lung decortication.    Consulted for diabetes management  Home diabetes meds: Was on Januvia previously, not recent  Current a1c: 7.3%    1. T2DM- FS a bit low  - Continue  Lantus 12 units qhs  - Continue sliding scale coverage  - Will consider basal insulin with oral meds vs only oral meds for discharge    2. Pleural effusion  - S/p decortication, care per primary team    3. HLD- C/w statin    4. Aflutter- s/p successful QUIN/DCCV 12/1

## 2023-12-04 NOTE — CONSULT NOTE ADULT - PROBLEM SELECTOR PROBLEM 2
Coronary artery disease involving native coronary artery of native heart, unspecified whether angina
Coronary artery disease involving native coronary artery of native heart, unspecified whether angina

## 2023-12-05 LAB
ANION GAP SERPL CALC-SCNC: 13 MMOL/L — SIGNIFICANT CHANGE UP (ref 5–17)
ANION GAP SERPL CALC-SCNC: 13 MMOL/L — SIGNIFICANT CHANGE UP (ref 5–17)
BUN SERPL-MCNC: 32 MG/DL — HIGH (ref 8–20)
BUN SERPL-MCNC: 32 MG/DL — HIGH (ref 8–20)
CALCIUM SERPL-MCNC: 8.3 MG/DL — LOW (ref 8.4–10.5)
CALCIUM SERPL-MCNC: 8.3 MG/DL — LOW (ref 8.4–10.5)
CHLORIDE SERPL-SCNC: 102 MMOL/L — SIGNIFICANT CHANGE UP (ref 96–108)
CHLORIDE SERPL-SCNC: 102 MMOL/L — SIGNIFICANT CHANGE UP (ref 96–108)
CO2 SERPL-SCNC: 23 MMOL/L — SIGNIFICANT CHANGE UP (ref 22–29)
CO2 SERPL-SCNC: 23 MMOL/L — SIGNIFICANT CHANGE UP (ref 22–29)
CREAT SERPL-MCNC: 1.34 MG/DL — HIGH (ref 0.5–1.3)
CREAT SERPL-MCNC: 1.34 MG/DL — HIGH (ref 0.5–1.3)
EGFR: 57 ML/MIN/1.73M2 — LOW
EGFR: 57 ML/MIN/1.73M2 — LOW
GLUCOSE BLDC GLUCOMTR-MCNC: 103 MG/DL — HIGH (ref 70–99)
GLUCOSE BLDC GLUCOMTR-MCNC: 103 MG/DL — HIGH (ref 70–99)
GLUCOSE BLDC GLUCOMTR-MCNC: 109 MG/DL — HIGH (ref 70–99)
GLUCOSE BLDC GLUCOMTR-MCNC: 109 MG/DL — HIGH (ref 70–99)
GLUCOSE BLDC GLUCOMTR-MCNC: 128 MG/DL — HIGH (ref 70–99)
GLUCOSE BLDC GLUCOMTR-MCNC: 128 MG/DL — HIGH (ref 70–99)
GLUCOSE BLDC GLUCOMTR-MCNC: 132 MG/DL — HIGH (ref 70–99)
GLUCOSE BLDC GLUCOMTR-MCNC: 132 MG/DL — HIGH (ref 70–99)
GLUCOSE SERPL-MCNC: 109 MG/DL — HIGH (ref 70–99)
GLUCOSE SERPL-MCNC: 109 MG/DL — HIGH (ref 70–99)
HCT VFR BLD CALC: 32.2 % — LOW (ref 39–50)
HCT VFR BLD CALC: 32.2 % — LOW (ref 39–50)
HGB BLD-MCNC: 9.7 G/DL — LOW (ref 13–17)
HGB BLD-MCNC: 9.7 G/DL — LOW (ref 13–17)
MAGNESIUM SERPL-MCNC: 2 MG/DL — SIGNIFICANT CHANGE UP (ref 1.6–2.6)
MAGNESIUM SERPL-MCNC: 2 MG/DL — SIGNIFICANT CHANGE UP (ref 1.6–2.6)
MCHC RBC-ENTMCNC: 26.6 PG — LOW (ref 27–34)
MCHC RBC-ENTMCNC: 26.6 PG — LOW (ref 27–34)
MCHC RBC-ENTMCNC: 30.1 GM/DL — LOW (ref 32–36)
MCHC RBC-ENTMCNC: 30.1 GM/DL — LOW (ref 32–36)
MCV RBC AUTO: 88.5 FL — SIGNIFICANT CHANGE UP (ref 80–100)
MCV RBC AUTO: 88.5 FL — SIGNIFICANT CHANGE UP (ref 80–100)
PLATELET # BLD AUTO: 299 K/UL — SIGNIFICANT CHANGE UP (ref 150–400)
PLATELET # BLD AUTO: 299 K/UL — SIGNIFICANT CHANGE UP (ref 150–400)
POTASSIUM SERPL-MCNC: 4 MMOL/L — SIGNIFICANT CHANGE UP (ref 3.5–5.3)
POTASSIUM SERPL-MCNC: 4 MMOL/L — SIGNIFICANT CHANGE UP (ref 3.5–5.3)
POTASSIUM SERPL-SCNC: 4 MMOL/L — SIGNIFICANT CHANGE UP (ref 3.5–5.3)
POTASSIUM SERPL-SCNC: 4 MMOL/L — SIGNIFICANT CHANGE UP (ref 3.5–5.3)
RBC # BLD: 3.64 M/UL — LOW (ref 4.2–5.8)
RBC # BLD: 3.64 M/UL — LOW (ref 4.2–5.8)
RBC # FLD: 16.5 % — HIGH (ref 10.3–14.5)
RBC # FLD: 16.5 % — HIGH (ref 10.3–14.5)
SODIUM SERPL-SCNC: 138 MMOL/L — SIGNIFICANT CHANGE UP (ref 135–145)
SODIUM SERPL-SCNC: 138 MMOL/L — SIGNIFICANT CHANGE UP (ref 135–145)
WBC # BLD: 13.36 K/UL — HIGH (ref 3.8–10.5)
WBC # BLD: 13.36 K/UL — HIGH (ref 3.8–10.5)
WBC # FLD AUTO: 13.36 K/UL — HIGH (ref 3.8–10.5)
WBC # FLD AUTO: 13.36 K/UL — HIGH (ref 3.8–10.5)

## 2023-12-05 PROCEDURE — 71045 X-RAY EXAM CHEST 1 VIEW: CPT | Mod: 26

## 2023-12-05 PROCEDURE — 99232 SBSQ HOSP IP/OBS MODERATE 35: CPT

## 2023-12-05 PROCEDURE — 99233 SBSQ HOSP IP/OBS HIGH 50: CPT

## 2023-12-05 PROCEDURE — 99024 POSTOP FOLLOW-UP VISIT: CPT

## 2023-12-05 RX ORDER — SPIRONOLACTONE 25 MG/1
25 TABLET, FILM COATED ORAL DAILY
Refills: 0 | Status: DISCONTINUED | OUTPATIENT
Start: 2023-12-05 | End: 2023-12-06

## 2023-12-05 RX ORDER — INSULIN GLARGINE 100 [IU]/ML
10 INJECTION, SOLUTION SUBCUTANEOUS AT BEDTIME
Refills: 0 | Status: DISCONTINUED | OUTPATIENT
Start: 2023-12-05 | End: 2023-12-06

## 2023-12-05 RX ADMIN — Medication 50 MILLIGRAM(S): at 05:27

## 2023-12-05 RX ADMIN — METHOCARBAMOL 500 MILLIGRAM(S): 500 TABLET, FILM COATED ORAL at 05:21

## 2023-12-05 RX ADMIN — TAMSULOSIN HYDROCHLORIDE 0.4 MILLIGRAM(S): 0.4 CAPSULE ORAL at 21:55

## 2023-12-05 RX ADMIN — APIXABAN 5 MILLIGRAM(S): 2.5 TABLET, FILM COATED ORAL at 17:29

## 2023-12-05 RX ADMIN — PANTOPRAZOLE SODIUM 40 MILLIGRAM(S): 20 TABLET, DELAYED RELEASE ORAL at 05:27

## 2023-12-05 RX ADMIN — Medication 50 MILLIGRAM(S): at 00:21

## 2023-12-05 RX ADMIN — CEFTRIAXONE 2000 MILLIGRAM(S): 500 INJECTION, POWDER, FOR SOLUTION INTRAMUSCULAR; INTRAVENOUS at 02:54

## 2023-12-05 RX ADMIN — Medication 81 MILLIGRAM(S): at 12:56

## 2023-12-05 RX ADMIN — Medication 50 MILLIGRAM(S): at 17:29

## 2023-12-05 RX ADMIN — Medication 975 MILLIGRAM(S): at 10:55

## 2023-12-05 RX ADMIN — GABAPENTIN 100 MILLIGRAM(S): 400 CAPSULE ORAL at 05:20

## 2023-12-05 RX ADMIN — Medication 40 MILLIGRAM(S): at 06:38

## 2023-12-05 RX ADMIN — Medication 975 MILLIGRAM(S): at 11:54

## 2023-12-05 RX ADMIN — SACUBITRIL AND VALSARTAN 1 TABLET(S): 24; 26 TABLET, FILM COATED ORAL at 17:30

## 2023-12-05 RX ADMIN — Medication 50 MILLIGRAM(S): at 12:56

## 2023-12-05 RX ADMIN — INSULIN GLARGINE 10 UNIT(S): 100 INJECTION, SOLUTION SUBCUTANEOUS at 21:55

## 2023-12-05 RX ADMIN — CHLORHEXIDINE GLUCONATE 1 APPLICATION(S): 213 SOLUTION TOPICAL at 05:27

## 2023-12-05 RX ADMIN — APIXABAN 5 MILLIGRAM(S): 2.5 TABLET, FILM COATED ORAL at 05:20

## 2023-12-05 RX ADMIN — ATORVASTATIN CALCIUM 20 MILLIGRAM(S): 80 TABLET, FILM COATED ORAL at 21:55

## 2023-12-05 RX ADMIN — METHOCARBAMOL 500 MILLIGRAM(S): 500 TABLET, FILM COATED ORAL at 17:30

## 2023-12-05 RX ADMIN — GABAPENTIN 100 MILLIGRAM(S): 400 CAPSULE ORAL at 17:30

## 2023-12-05 RX ADMIN — SACUBITRIL AND VALSARTAN 1 TABLET(S): 24; 26 TABLET, FILM COATED ORAL at 05:20

## 2023-12-05 NOTE — PROGRESS NOTE ADULT - ASSESSMENT
Outpatient cardiologist: Dr. Milian  Initial HF c/s: Dr. Brenner    72 y/o M with a history of HTN, CAD, s/p CABG (11/2021 at [place]), and former smoker (1PPD x20 years, quit 11/2021), who presented to [facility] on 11/18 with worsening SOB and LE edema.    He reports intermittent SOB since his CABG in 2021 (prior to his surgery he never had any CP or SOB). At best, he was able to walk about 4 blocks with a walker. He typically sleeps in the recliner for comfort but does endorse orthopnea and PND if he lays in the bed. He denies any known history of HFrEF or CKD. His PCP retired, so he has only been following with his cardiologist, Dr. Milian, since his CABG who he sees about every 3 months. 2 weeks PTA he noticed increased SOB with minimal exertion and LE edema. Metolazone 5 mg was added to his medications twice weekly and his metoprolol was also increased to 100 mg BID.     On admission he was found to have a mod-large loculated left pleural effusion. A chest tube was inserted and drained ~600cc initially (cultures + for strep intermedius). TTE showed severe biventricular dysfunction (LVEF 20-25%) and mod MR. His labs were significant for DEVON (BUN/Cr 48/2.12, unknown baseline Cr), pro-BNP 5657. EKG showed new onset Aflutter with RVR and he was also diagnosed with new onset DM2 (A1c 7.3%).    On 11/27 he was taken to the OR for left VATS decortication. Extubated POD 1. He is s/p successful DCCV on 12/1.     Cardiac Studies:  12/1/23 QUIN: LVEF 20-25%, normal RV size with mod RV dysfunction, mod LAE, mod MR.  11/18/23 TTE: LVIDd 6.09cm, LVEF 20-25%, mild RVE with severe RV dysfunction, mild LAE, normal RA size, mild MR. Outpatient cardiologist: Dr. Milian  Initial HF c/s: Dr. Brenner    72 y/o M with a history of HTN, CAD, s/p CABG (11/2021 at [place]), and former smoker (1PPD x20 years, quit 11/2021), who presented to [facility] on 11/18 with worsening SOB and LE edema.    He reports intermittent SOB since his CABG in 2021 (prior to his surgery he never had any CP or SOB). At best, he was able to walk about 4 blocks with a walker. He typically sleeps in the recliner for comfort but does endorse orthopnea and PND if he lays in the bed. He denies any known history of HFrEF or CKD. His PCP retired, so he has only been following with his cardiologist, Dr. Milian, since his CABG who he sees about every 3 months. 2 weeks PTA he noticed increased SOB with minimal exertion and LE edema. Metolazone 5 mg was added to his medications twice weekly and his metoprolol was also increased to 100 mg BID.     On admission he was found to have a mod-large loculated left pleural effusion. A chest tube was inserted and drained ~600cc initially (cultures + for strep intermedius). TTE showed severe biventricular dysfunction (LVEF 20-25%) and mod MR. His labs were significant for DEVON (BUN/Cr 48/2.12, unknown baseline Cr), pro-BNP 5657. EKG showed new onset Aflutter with RVR and he was also diagnosed with new onset DM2 (A1c 7.3%).    On 11/27 he was taken to the OR for left VATS decortication. Extubated POD 1. He is s/p successful DCCV on 12/1.     Cardiac Studies:  12/1/23 QUIN: LVEF 20-25%, normal RV size with mod RV dysfunction, mod LAE, mod MR.  11/18/23 TTE: LVIDd 6.09cm, LVEF 20-25%, mild RVE with severe RV dysfunction, mild LAE, normal RA size, mild MR.

## 2023-12-05 NOTE — PROGRESS NOTE ADULT - PROBLEM SELECTOR PLAN 2
- LHC deferred by IC as above (will also need to wait at least 30 days post DCCV in order to hold AC)  - Recommend to obtain records from his prior cardiologist to determine need of ischemia work up.  - Continue ASA, statin, and BB.

## 2023-12-05 NOTE — PROGRESS NOTE ADULT - SUBJECTIVE AND OBJECTIVE BOX
[facility]  INFECTIOUS DISEASES at [place] and [place]  ===============================================================                  Luis Ivey MD               Diplomates American Board of Internal Medicine & Infectious Diseases                * [place] Office - Appt - Tel  [phone] Fax [phone]                * [place] Office - Appt - Tel [phone] Fax [phone]                                  Hospital Consult line:  [phone]  ==============================================================    GRETA AGUAYO [id number]    Follow up: empyema    Transferred out of CTICU on 12/4  No acute events overnight   afebrile and hemodynamically stable     I have personally reviewed the labs and data; pertinent labs and data are listed in this note; please see below.     _______________________________________________________________  REVIEW OF SYSTEMS  Sleeping but arousable momentarily. ROS limited. Feeling OK   ________________________________________________________________  Allergies:  No Known Allergies        ________________________________________________________________  PHYSICAL EXAM  GEN: in NAD, sitting in reclining chair. Morbidly obese.    HEENT: Anicteric sclerae.   NECK: Supple.   LUNGS: eupneic. BS slightly diminished in left lung base.   HEART: RRR, no m/r/g  ABDOMEN: Soft, NT, ND,  +BS.    : Burger catheter  EXTREMITIES:  edema.  NEUROLOGIC: Grossly no motor focal deficits   SKIN: No rash or jaundice  LINES: PIV   ________________________________________________________________  Vitals:  T(F): 98.1 (05 Dec 2023 05:00), Max: 98.1 (04 Dec 2023 12:00)  HR: 77 (05 Dec 2023 05:00)  BP: 135/76 (05 Dec 2023 05:00)  RR: 18 (05 Dec 2023 05:00)  SpO2: 98% (05 Dec 2023 05:00) (95% - 98%)  temp max in last 48H T(F): , Max: 98.1 (12-04-23 @ 12:00)    Current Antibiotics:  cefTRIAXone Injectable.      cefTRIAXone Injectable. 2000 milliGRAM(s) IV Push every 24 hours    Other medications:  apixaban 5 milliGRAM(s) Oral every 12 hours  aspirin enteric coated 81 milliGRAM(s) Oral daily  atorvastatin 20 milliGRAM(s) Oral at bedtime  chlorhexidine 2% Cloths 1 Application(s) Topical <User Schedule>  furosemide    Tablet 40 milliGRAM(s) Oral daily  gabapentin 100 milliGRAM(s) Oral two times a day  insulin glargine Injectable (LANTUS) 12 Unit(s) SubCutaneous at bedtime  insulin lispro (ADMELOG) corrective regimen sliding scale   SubCutaneous Before meals and at bedtime  methocarbamol 500 milliGRAM(s) Oral two times a day  metoprolol tartrate 50 milliGRAM(s) Oral every 6 hours  pantoprazole    Tablet 40 milliGRAM(s) Oral before breakfast  sacubitril 24 mG/valsartan 26 mG 1 Tablet(s) Oral two times a day  tamsulosin 0.4 milliGRAM(s) Oral at bedtime                            9.7    13.36 )-----------( 299      ( 05 Dec 2023 06:28 )             32.2     12-05    138  |  102  |  32.0<H>  ----------------------------<  109<H>  4.0   |  23.0  |  1.34<H>    Ca    8.3<L>      05 Dec 2023 06:28  Mg     2.0     12-05      RECENT CULTURES:  11-27 @ 18:56 .Body Fluid left pleural fluid     Streptococcus intermedius  "Susceptibilities not performed"    Few polymorphonuclear leukocytes seen per low power field  Moderate Gram positive cocci in pairs seen per oil power field      11-27 @ 18:30 .Tissue left pleural peel     Rare Streptococcus intermedius "Susceptibilities not performed"    Few polymorphonuclear leukocytes seen per low power field  No organisms seen per oil power field      11-26 @ 07:20 .Blood Blood-Peripheral     No growth at 5 days      11-26 @ 07:15 .Blood Blood-Peripheral     No growth at 5 days      11-24 @ 09:00    RVP with SARS-CoV-2   NotDetec      11-24 @ 01:25 .Blood Blood-Peripheral     No growth at 5 days      11-23 @ 12:42 .Blood Blood-Peripheral     No growth at 5 days      11-23 @ 12:35 .Blood Blood-Peripheral     No growth at 5 days      WBC Count: 13.36 K/uL (12-05-23 @ 06:28)  WBC Count: 12.17 K/uL (12-04-23 @ 02:30)  WBC Count: 10.88 K/uL (12-03-23 @ 02:15)  WBC Count: 11.56 K/uL (12-02-23 @ 07:02)  WBC Count: 12.60 K/uL (12-01-23 @ 22:13)  WBC Count: 12.68 K/uL (12-01-23 @ 04:06)  WBC Count: 12.18 K/uL (12-01-23 @ 02:10)    Creatinine: 1.34 mg/dL (12-05-23 @ 06:28)  Creatinine: 1.47 mg/dL (12-04-23 @ 02:30)  Creatinine: 1.56 mg/dL (12-03-23 @ 02:15)  Creatinine: 1.49 mg/dL (12-02-23 @ 07:02)  Creatinine: 1.69 mg/dL (12-01-23 @ 02:10)      Procalcitonin, Serum: 0.74 ng/mL (11-24-23 @ 01:25)     SARS-CoV-2: NotDetec (11-24-23 @ 09:00)  SARS-CoV-2: NotDetec (11-18-23 @ 08:30)      ________________________________________________________________  CARDIOLOGY   < from: QUIN Echo Doppler (12.01.23 @ 11:13) >  Summary:   1. Left ventricularejection fraction, by visual estimation, is 20 to   25%.   2. Severely decreased global left ventricular systolic function.   3. Normal RV size with moderately reduced RV systolic function, RVSP   least 29 mmHg.   4. Moderately enlarged left atrium.   5. No left atrial appendage thrombus, left atrial appendage enlargement   and normal left atrial appendage velocities.   6. Mild thickening of the anterior and posterior mitral valve leaflets   with mild posterior leaflet prolapse.   7. Moderate mitral valve regurgitation.   8. Color flow doppler and intravenous injection of agitated saline   demonstrates the presence of an intact intra atrial septum.   9. Lipomatous hypertrophy of the intra-atrial septum.  10. Moderate complex atheromas at the aortic arch.  11. There is no evidence of pericardial effusion.  12. Post-QUIN patient underwent external synchronized direct current   cardioversion with 300 Joules x1 from Aflutter RVR 130s into sinus rhythm   by Dr. Ceballos.    < end of copied text >    ________________________________________________________________  RADIOLOGY  < from: Xray Chest 1 View- PORTABLE-Routine (Xray Chest 1 View- PORTABLE-Routine in AM.) (12.04.23 @ 06:51) >  CLINICAL HISTORY: Follow-up    CHEST:  Frontal projections of the chest were obtained by portable technique on   12/3/2023 at 5:41 AM and again on 12/4/2023 at 5:02 AM. Allowing for   technical and positional variations there is interval change is noted. No   pneumothorax is noted. Residual extensive pleural-parenchymal changes and   volume loss in the left hemithorax are again noted. Right lung and right   costophrenic angle are clear. The patient is again noted to be status   post thoracotomy. A small amount of subcutaneous emphysema is again noted   along the left lower lateral chest wall.    IMPRESSION:    Serial examinations are grossly unchanged    < end of copied text >   [facility]  INFECTIOUS DISEASES at [place] and [place]  ===============================================================                  Luis Ivey MD               Diplomates American Board of Internal Medicine & Infectious Diseases                * [place] Office - Appt - Tel  [phone] Fax [phone]                * [place] Office - Appt - Tel [phone] Fax [phone]                                  Hospital Consult line:  [phone]  ==============================================================    GRETA AGUAYO [id number]    Follow up: empyema    Transferred out of CTICU on 12/4  No acute events overnight   afebrile and hemodynamically stable     I have personally reviewed the labs and data; pertinent labs and data are listed in this note; please see below.     _______________________________________________________________  REVIEW OF SYSTEMS  Sleeping but arousable momentarily. ROS limited. Feeling OK   ________________________________________________________________  Allergies:  No Known Allergies        ________________________________________________________________  PHYSICAL EXAM  GEN: in NAD, sitting in reclining chair. Morbidly obese.    HEENT: Anicteric sclerae.   NECK: Supple.   LUNGS: eupneic. BS slightly diminished in left lung base.   HEART: RRR, no m/r/g  ABDOMEN: Soft, NT, ND,  +BS.    : Burger catheter  EXTREMITIES:  edema.  NEUROLOGIC: Grossly no motor focal deficits   SKIN: No rash or jaundice  LINES: PIV   ________________________________________________________________  Vitals:  T(F): 98.1 (05 Dec 2023 05:00), Max: 98.1 (04 Dec 2023 12:00)  HR: 77 (05 Dec 2023 05:00)  BP: 135/76 (05 Dec 2023 05:00)  RR: 18 (05 Dec 2023 05:00)  SpO2: 98% (05 Dec 2023 05:00) (95% - 98%)  temp max in last 48H T(F): , Max: 98.1 (12-04-23 @ 12:00)    Current Antibiotics:  cefTRIAXone Injectable.      cefTRIAXone Injectable. 2000 milliGRAM(s) IV Push every 24 hours    Other medications:  apixaban 5 milliGRAM(s) Oral every 12 hours  aspirin enteric coated 81 milliGRAM(s) Oral daily  atorvastatin 20 milliGRAM(s) Oral at bedtime  chlorhexidine 2% Cloths 1 Application(s) Topical <User Schedule>  furosemide    Tablet 40 milliGRAM(s) Oral daily  gabapentin 100 milliGRAM(s) Oral two times a day  insulin glargine Injectable (LANTUS) 12 Unit(s) SubCutaneous at bedtime  insulin lispro (ADMELOG) corrective regimen sliding scale   SubCutaneous Before meals and at bedtime  methocarbamol 500 milliGRAM(s) Oral two times a day  metoprolol tartrate 50 milliGRAM(s) Oral every 6 hours  pantoprazole    Tablet 40 milliGRAM(s) Oral before breakfast  sacubitril 24 mG/valsartan 26 mG 1 Tablet(s) Oral two times a day  tamsulosin 0.4 milliGRAM(s) Oral at bedtime                            9.7    13.36 )-----------( 299      ( 05 Dec 2023 06:28 )             32.2     12-05    138  |  102  |  32.0<H>  ----------------------------<  109<H>  4.0   |  23.0  |  1.34<H>    Ca    8.3<L>      05 Dec 2023 06:28  Mg     2.0     12-05      RECENT CULTURES:  11-27 @ 18:56 .Body Fluid left pleural fluid     Streptococcus intermedius  "Susceptibilities not performed"    Few polymorphonuclear leukocytes seen per low power field  Moderate Gram positive cocci in pairs seen per oil power field      11-27 @ 18:30 .Tissue left pleural peel     Rare Streptococcus intermedius "Susceptibilities not performed"    Few polymorphonuclear leukocytes seen per low power field  No organisms seen per oil power field      11-26 @ 07:20 .Blood Blood-Peripheral     No growth at 5 days      11-26 @ 07:15 .Blood Blood-Peripheral     No growth at 5 days      11-24 @ 09:00    RVP with SARS-CoV-2   NotDetec      11-24 @ 01:25 .Blood Blood-Peripheral     No growth at 5 days      11-23 @ 12:42 .Blood Blood-Peripheral     No growth at 5 days      11-23 @ 12:35 .Blood Blood-Peripheral     No growth at 5 days      WBC Count: 13.36 K/uL (12-05-23 @ 06:28)  WBC Count: 12.17 K/uL (12-04-23 @ 02:30)  WBC Count: 10.88 K/uL (12-03-23 @ 02:15)  WBC Count: 11.56 K/uL (12-02-23 @ 07:02)  WBC Count: 12.60 K/uL (12-01-23 @ 22:13)  WBC Count: 12.68 K/uL (12-01-23 @ 04:06)  WBC Count: 12.18 K/uL (12-01-23 @ 02:10)    Creatinine: 1.34 mg/dL (12-05-23 @ 06:28)  Creatinine: 1.47 mg/dL (12-04-23 @ 02:30)  Creatinine: 1.56 mg/dL (12-03-23 @ 02:15)  Creatinine: 1.49 mg/dL (12-02-23 @ 07:02)  Creatinine: 1.69 mg/dL (12-01-23 @ 02:10)      Procalcitonin, Serum: 0.74 ng/mL (11-24-23 @ 01:25)     SARS-CoV-2: NotDetec (11-24-23 @ 09:00)  SARS-CoV-2: NotDetec (11-18-23 @ 08:30)      ________________________________________________________________  CARDIOLOGY   < from: QUIN Echo Doppler (12.01.23 @ 11:13) >  Summary:   1. Left ventricularejection fraction, by visual estimation, is 20 to   25%.   2. Severely decreased global left ventricular systolic function.   3. Normal RV size with moderately reduced RV systolic function, RVSP   least 29 mmHg.   4. Moderately enlarged left atrium.   5. No left atrial appendage thrombus, left atrial appendage enlargement   and normal left atrial appendage velocities.   6. Mild thickening of the anterior and posterior mitral valve leaflets   with mild posterior leaflet prolapse.   7. Moderate mitral valve regurgitation.   8. Color flow doppler and intravenous injection of agitated saline   demonstrates the presence of an intact intra atrial septum.   9. Lipomatous hypertrophy of the intra-atrial septum.  10. Moderate complex atheromas at the aortic arch.  11. There is no evidence of pericardial effusion.  12. Post-QUIN patient underwent external synchronized direct current   cardioversion with 300 Joules x1 from Aflutter RVR 130s into sinus rhythm   by Dr. Ceballos.    < end of copied text >    ________________________________________________________________  RADIOLOGY  < from: Xray Chest 1 View- PORTABLE-Routine (Xray Chest 1 View- PORTABLE-Routine in AM.) (12.04.23 @ 06:51) >  CLINICAL HISTORY: Follow-up    CHEST:  Frontal projections of the chest were obtained by portable technique on   12/3/2023 at 5:41 AM and again on 12/4/2023 at 5:02 AM. Allowing for   technical and positional variations there is interval change is noted. No   pneumothorax is noted. Residual extensive pleural-parenchymal changes and   volume loss in the left hemithorax are again noted. Right lung and right   costophrenic angle are clear. The patient is again noted to be status   post thoracotomy. A small amount of subcutaneous emphysema is again noted   along the left lower lateral chest wall.    IMPRESSION:    Serial examinations are grossly unchanged    < end of copied text >

## 2023-12-05 NOTE — PROCEDURE NOTE - NSPROCDETAILS_GEN_ALL_CORE
Seldinger technique/dressing applied/secured in place/Trendelenburg position/thoracostomy tube placed percutaneously/ultrasound assessment of fluid (location)
location identified, draped/prepped, sterile technique used/sterile dressing applied/sterile technique, catheter placed/supine position/ultrasound guidance
guidewire recovered/lumen(s) aspirated and flushed/sterile dressing applied/sterile technique, catheter placed/ultrasound guidance with use of sterile gel and probe cove

## 2023-12-05 NOTE — PROGRESS NOTE ADULT - PROBLEM SELECTOR PLAN 1
Pleural fluid exudative by light's criteria  Culture with streptococcus intermedius, +empyema ID following   Zosyn switched to ceftraxione as per ID to complete 12/25, will need midline when ready for discharge  diarrhea improved off laxatives  cytology (-)  Underwent VATS/decort on 11/27 - extubated 11/28  Cardioversion 12/1, Pt currently fully Anticoagulated   All chest tubes removed   + sutures remain at the chest tube insertion site   dispo: pending BERTIN,    Plan to be discussed with Thoracic surgery attending in AM rounds

## 2023-12-05 NOTE — PROGRESS NOTE ADULT - NS ATTEND AMEND GEN_ALL_CORE FT
Pt denies overt HF symptoms. However, he has move out of bed.   Appears close to euvolemia.   No acute events reported overnight.  Will continue GDMT optimization as tolerated.  Recommend aggressive PT.

## 2023-12-05 NOTE — PROGRESS NOTE ADULT - NS ATTEND AMEND GEN_ALL_CORE FT
72M w/ PMH of T2DM, CAD s/p CABG, CHF, HTN, HLD, Afib (not on AC due to hx of GIB) presents with a few days worsening of dyspnea and LE edema. found to have large loculated pleural effusion. 11/28 s/p L lung decortication.  Lower lantus to 10 daily, if insulin requirements continue to lower may discharge only on oral agents (Tradjenta) vs basal and Tradjenta depending on the sugars

## 2023-12-05 NOTE — PROGRESS NOTE ADULT - ASSESSMENT
72M with CAD s/p CABG, HFrEF, HTN, Afib admitted on 11/18 with dyspnea. Found to have large loculated left pleural effusion s/p CT on 11/18. Hospital course complicated by Afl/afib with RVR followed by EP     Left empyema   Aflutter with RVR   CKD   Diarrhea      - s/p VATS/decortication on 11/27 >> empyema   - Extubated on 11/28   - Continue ceftriaxone 2g IV daily   - Surgical cultures with Strep intermedius   - 11/18 CT Chest with large loculated pleural effusion   - s/p left chest tube on 11/18   - 11/18 Pleural fluid culture with Streptococcus intermedius   - 11/23, 11/24 and 11/26 BCx ngtd  - Aflutter with RVR now resolved >> 12/1 s/p uncomplicated QUIN & DCCV, 300J x 1 with restoration of sinus rhythm.  - EF 20-25%  - monitor diarrhea off laxatives. If persistent check GI PCR and C. diff.     - Patient will need midline once ready for discharge.   - To complete treatment course with ceftriaxone 2g IV daily through 12/25/23  - Patient will need weekly CBC, CMP while on IV antibiotics (Fax: [phone])  - Follow up with me within 2 weeks of discharge. Please call [phone] to confirm appointment time    ID will sign off. Please call with questions.      72M with CAD s/p CABG, HFrEF, HTN, Afib admitted on 11/18 with dyspnea. Found to have large loculated left pleural effusion s/p CT on 11/18. Hospital course complicated by Afl/afib with RVR followed by EP     Left empyema   Aflutter with RVR   CKD   Diarrhea      - s/p VATS/decortication on 11/27 >> empyema   - Extubated on 11/28   - Continue ceftriaxone 2g IV daily   - Surgical cultures with Strep intermedius   - 11/18 CT Chest with large loculated pleural effusion   - s/p left chest tube on 11/18   - 11/18 Pleural fluid culture with Streptococcus intermedius   - 11/23, 11/24 and 11/26 BCx ngtd  - Aflutter with RVR now resolved >> 12/1 s/p uncomplicated QUIN & DCCV, 300J x 1 with restoration of sinus rhythm.  - EF 20-25%  - monitor diarrhea off laxatives. If persistent check GI PCR and C. diff.     - Patient will need midline once ready for discharge.   - To complete treatment course with ceftriaxone 2g IV daily through 12/25/23  - Patient will need weekly CBC, CMP while on IV antibiotics (Fax: [phone])  - Follow up with me within 2 weeks of discharge. Please call [phone] to confirm appointment time    ID will sign off. Please call with questions.      72M with CAD s/p CABG, HFrEF, HTN, Afib admitted on 11/18 with dyspnea. Found to have large loculated left pleural effusion s/p CT on 11/18. Hospital course complicated by Afl/afib with RVR followed by EP     Left empyema   Aflutter with RVR   CKD   Diarrhea      - s/p VATS/decortication on 11/27 >> empyema   - Extubated on 11/28   - Continue ceftriaxone 2g IV daily   - Surgical cultures with Strep intermedius   - 11/18 CT Chest with large loculated pleural effusion   - s/p left chest tube on 11/18   - 11/18 Pleural fluid culture with Streptococcus intermedius   - 11/23, 11/24 and 11/26 BCx ngtd  - Aflutter with RVR now resolved >> 12/1 s/p uncomplicated QUIN & DCCV, 300J x 1 with restoration of sinus rhythm.  - EF 20-25%  - monitor diarrhea off laxatives. If persistent check GI PCR and C. diff.     - Patient will need midline once ready for discharge.   - To complete treatment course with ceftriaxone 2g IV daily through 12/25/23  - Patient will need weekly CBC, CMP while on IV antibiotics (Fax: [phone])  - Follow up with me within 2 weeks of discharge. Please call [phone] to confirm appointment time  - Dispo: [facility]     ID will sign off. Please call with questions.      72M with CAD s/p CABG, HFrEF, HTN, Afib admitted on 11/18 with dyspnea. Found to have large loculated left pleural effusion s/p CT on 11/18. Hospital course complicated by Afl/afib with RVR followed by EP     Left empyema   Aflutter with RVR   CKD   Diarrhea      - s/p VATS/decortication on 11/27 >> empyema   - Extubated on 11/28   - Continue ceftriaxone 2g IV daily   - Surgical cultures with Strep intermedius   - 11/18 CT Chest with large loculated pleural effusion   - s/p left chest tube on 11/18   - 11/18 Pleural fluid culture with Streptococcus intermedius   - 11/23, 11/24 and 11/26 BCx ngtd  - Aflutter with RVR now resolved >> 12/1 s/p uncomplicated QUIN & DCCV, 300J x 1 with restoration of sinus rhythm.  - EF 20-25%  - monitor diarrhea off laxatives. If persistent check GI PCR and C. diff.     - Patient will need midline once ready for discharge.   - To complete treatment course with ceftriaxone 2g IV daily through 12/25/23  - Patient will need weekly CBC, CMP while on IV antibiotics (Fax: [phone])  - Follow up with me within 2 weeks of discharge. Please call [phone] to confirm appointment time  - Dispo: [facility]     ID will sign off. Please call with questions.

## 2023-12-05 NOTE — PROGRESS NOTE ADULT - ASSESSMENT
72-year-old male with history of hypertension, CAD s/p CABGx 4 2021, HLD, obesity, presented to ED 11/18 c/o worsening SAVAGE x1 day, admitted with AF RVR and acute on chronic systolic HF exacerbation, found to have large L pleural effusion requiring pigtail placement. Pleural fluid cultures + strept intermedius. Pt underwent FB L VATS total decortication 11/27 with Dr. Encarnacion. Postop pt underwent successful DCCV 12/1 with EP. CT removed without incident postop. Pt remains on ceftriaxone per ID for empyema

## 2023-12-05 NOTE — PROGRESS NOTE ADULT - SUBJECTIVE AND OBJECTIVE BOX
INTERVAL  EVENTS:  Follow up for diabetes management    ROS: denies chest pain or sob. denies n/v. reports eating 100% of breakfast    MEDICATIONS  (STANDING):  apixaban 5 milliGRAM(s) Oral every 12 hours  aspirin enteric coated 81 milliGRAM(s) Oral daily  atorvastatin 20 milliGRAM(s) Oral at bedtime  cefTRIAXone Injectable.      cefTRIAXone Injectable. 2000 milliGRAM(s) IV Push every 24 hours  chlorhexidine 2% Cloths 1 Application(s) Topical <User Schedule>  furosemide    Tablet 40 milliGRAM(s) Oral daily  gabapentin 100 milliGRAM(s) Oral two times a day  insulin glargine Injectable (LANTUS) 10 Unit(s) SubCutaneous at bedtime  insulin lispro (ADMELOG) corrective regimen sliding scale   SubCutaneous Before meals and at bedtime  methocarbamol 500 milliGRAM(s) Oral two times a day  metoprolol tartrate 50 milliGRAM(s) Oral every 6 hours  pantoprazole    Tablet 40 milliGRAM(s) Oral before breakfast  sacubitril 24 mG/valsartan 26 mG 1 Tablet(s) Oral two times a day  spironolactone 25 milliGRAM(s) Oral daily  tamsulosin 0.4 milliGRAM(s) Oral at bedtime    MEDICATIONS  (PRN):  acetaminophen     Tablet .. 975 milliGRAM(s) Oral every 6 hours PRN Temp greater or equal to 38C (100.4F), Mild Pain (1 - 3)  ondansetron Injectable 4 milliGRAM(s) IV Push every 8 hours PRN Nausea and/or Vomiting  sodium chloride 0.9% lock flush 10 milliLiter(s) IV Push every 1 hour PRN Pre/post blood products, medications, blood draw, and to maintain line patency      Allergies  No Known Allergies      Vital Signs Last 24 Hrs  T(C): 36.9 (05 Dec 2023 08:10), Max: 36.9 (05 Dec 2023 08:10)  T(F): 98.4 (05 Dec 2023 08:10), Max: 98.4 (05 Dec 2023 08:10)  HR: 67 (05 Dec 2023 08:10) (67 - 79)  BP: 108/67 (05 Dec 2023 08:10) (108/67 - 179/81)  BP(mean): 99 (04 Dec 2023 16:47) (96 - 117)  RR: 18 (05 Dec 2023 08:10) (18 - 35)  SpO2: 95% (05 Dec 2023 08:10) (95% - 98%)    Parameters below as of 05 Dec 2023 08:10  Patient On (Oxygen Delivery Method): room air        PHYSICAL EXAM:  Constitutional: NAD, obese   Neck: trachea midline, no thyroid enlargement  Respiratory: CTAB, normal respirations  Cardiovascular: S1 and S2, RRR  Gastrointestinal: BS+, soft, ntnd  Extremities: + peripheral edema  Neurological: AOx3, no focal deficits  Psychiatric: Normal mood and normal affect    LABS:                        9.7    13.36 )-----------( 299      ( 05 Dec 2023 06:28 )             32.2     12-05    138  |  102  |  32.0<H>  ----------------------------<  109<H>  4.0   |  23.0  |  1.34<H>    Ca    8.3<L>      05 Dec 2023 06:28  Mg     2.0     12-05      Urinalysis Basic - ( 05 Dec 2023 06:28 )    Color: x / Appearance: x / SG: x / pH: x  Gluc: 109 mg/dL / Ketone: x  / Bili: x / Urobili: x   Blood: x / Protein: x / Nitrite: x   Leuk Esterase: x / RBC: x / WBC x   Sq Epi: x / Non Sq Epi: x / Bacteria: x          POCT Blood Glucose.: 109 mg/dL (12-05-23 @ 08:28)  POCT Blood Glucose.: 115 mg/dL (12-04-23 @ 21:21)  POCT Blood Glucose.: 112 mg/dL (12-04-23 @ 17:14)  POCT Blood Glucose.: 134 mg/dL (12-04-23 @ 11:23)           INTERVAL  EVENTS:  Follow up for diabetes management    ROS: denies chest pain or sob. denies n/v. reports eating 100% of breakfast    MEDICATIONS  (STANDING):  apixaban 5 milliGRAM(s) Oral every 12 hours  aspirin enteric coated 81 milliGRAM(s) Oral daily  atorvastatin 20 milliGRAM(s) Oral at bedtime  cefTRIAXone Injectable.      cefTRIAXone Injectable. 2000 milliGRAM(s) IV Push every 24 hours  chlorhexidine 2% Cloths 1 Application(s) Topical <User Schedule>  furosemide    Tablet 40 milliGRAM(s) Oral daily  gabapentin 100 milliGRAM(s) Oral two times a day  insulin glargine Injectable (LANTUS) 10 Unit(s) SubCutaneous at bedtime  insulin lispro (ADMELOG) corrective regimen sliding scale   SubCutaneous Before meals and at bedtime  methocarbamol 500 milliGRAM(s) Oral two times a day  metoprolol tartrate 50 milliGRAM(s) Oral every 6 hours  pantoprazole    Tablet 40 milliGRAM(s) Oral before breakfast  sacubitril 24 mG/valsartan 26 mG 1 Tablet(s) Oral two times a day  spironolactone 25 milliGRAM(s) Oral daily  tamsulosin 0.4 milliGRAM(s) Oral at bedtime    MEDICATIONS  (PRN):  acetaminophen     Tablet .. 975 milliGRAM(s) Oral every 6 hours PRN Temp greater or equal to 38C (100.4F), Mild Pain (1 - 3)  ondansetron Injectable 4 milliGRAM(s) IV Push every 8 hours PRN Nausea and/or Vomiting  sodium chloride 0.9% lock flush 10 milliLiter(s) IV Push every 1 hour PRN Pre/post blood products, medications, blood draw, and to maintain line patency      Allergies  No Known Allergies      Vital Signs Last 24 Hrs  T(C): 36.9 (05 Dec 2023 08:10), Max: 36.9 (05 Dec 2023 08:10)  T(F): 98.4 (05 Dec 2023 08:10), Max: 98.4 (05 Dec 2023 08:10)  HR: 67 (05 Dec 2023 08:10) (67 - 79)  BP: 108/67 (05 Dec 2023 08:10) (108/67 - 179/81)  BP(mean): 99 (04 Dec 2023 16:47) (96 - 117)  RR: 18 (05 Dec 2023 08:10) (18 - 35)  SpO2: 95% (05 Dec 2023 08:10) (95% - 98%)    Parameters below as of 05 Dec 2023 08:10  Patient On (Oxygen Delivery Method): room air        PHYSICAL EXAM:  Constitutional: NAD, obese   Respiratory: CTAB, normal respirations  Neurological: AOx3, no focal deficits  Psychiatric: Normal mood and normal affect    LABS:                        9.7    13.36 )-----------( 299      ( 05 Dec 2023 06:28 )             32.2     12-05    138  |  102  |  32.0<H>  ----------------------------<  109<H>  4.0   |  23.0  |  1.34<H>    Ca    8.3<L>      05 Dec 2023 06:28  Mg     2.0     12-05      Urinalysis Basic - ( 05 Dec 2023 06:28 )    Color: x / Appearance: x / SG: x / pH: x  Gluc: 109 mg/dL / Ketone: x  / Bili: x / Urobili: x   Blood: x / Protein: x / Nitrite: x   Leuk Esterase: x / RBC: x / WBC x   Sq Epi: x / Non Sq Epi: x / Bacteria: x          POCT Blood Glucose.: 109 mg/dL (12-05-23 @ 08:28)  POCT Blood Glucose.: 115 mg/dL (12-04-23 @ 21:21)  POCT Blood Glucose.: 112 mg/dL (12-04-23 @ 17:14)  POCT Blood Glucose.: 134 mg/dL (12-04-23 @ 11:23)

## 2023-12-05 NOTE — CHART NOTE - NSCHARTNOTEFT_GEN_A_CORE
Source: Patient [x]  Family [x ]   other [ ]    72M w/ PMH of T2DM, CAD s/p CABG, CHF, HTN, HLD, Afib (not on AC due to hx of GIB) presents with a few days worsening of dyspnea and LE edema. found to have large loculated pleural effusion. 11/28 s/p L lung decortication.    Current Diet: Diet, DASH/TLC:   Sodium & Cholesterol Restricted  Consistent Carbohydrate {No Snacks} (CSTCHO) (11-30-23 @ 16:10)    PO intake:  < 50% [ ]   50-75%  [ ]   %  [x]  other :    Source for PO intake [x ] Patient [x ] family [ ] chart [ ] staff [ ] other    Current Weight:   12/5- 285.4 pounds  12/3- 319.7 pounds  ? accuracy of weights, continue to trend and maintain strict Is&Os   Edema 1+ generalized    Pertinent Medications: MEDICATIONS  (STANDING):  apixaban 5 milliGRAM(s) Oral every 12 hours  atorvastatin 20 milliGRAM(s) Oral at bedtime  gabapentin 100 milliGRAM(s) Oral two times a day  insulin glargine Injectable (LANTUS) 10 Unit(s) SubCutaneous at bedtime  insulin lispro (ADMELOG) corrective regimen sliding scale   SubCutaneous Before meals and at bedtime  methocarbamol 500 milliGRAM(s) Oral two times a day  metoprolol tartrate 50 milliGRAM(s) Oral every 6 hours  pantoprazole    Tablet 40 milliGRAM(s) Oral before breakfast  spironolactone 25 milliGRAM(s) Oral daily  tamsulosin 0.4 milliGRAM(s) Oral at bedtime    MEDICATIONS  (PRN):  ondansetron Injectable 4 milliGRAM(s) IV Push every 8 hours PRN Nausea and/or Vomiting    Pertinent Labs: CBC Full  -  ( 05 Dec 2023 06:28 )  WBC Count : 13.36 K/uL  RBC Count : 3.64 M/uL  Hemoglobin : 9.7 g/dL  Hematocrit : 32.2 %  Platelet Count - Automated : 299 K/uL  Mean Cell Volume : 88.5 fl  Mean Cell Hemoglobin : 26.6 pg  Mean Cell Hemoglobin Concentration : 30.1 gm/dL    Skin: Stage 2 to left buttocks    Nutrition focused physical exam previously conducted - found signs of malnutrition [x]absent [ ]present    Subcutaneous fat loss: [ ] Orbital fat pads region, [ ]Buccal fat region, [ ]Triceps region,  [ ]Ribs region    Muscle wasting: [ ]Temples region, [ ]Clavicle region, [ ]Shoulder region, [ ]Scapula region, [ ]Interosseous region,  [ ]thigh region, [ ]Calf region    Estimated Needs:   [x] no change since previous assessment  [ ] recalculated:     Current Nutrition Diagnosis: Pt remains a high nutrition risk secondary to Increased nutrient needs (energy, protein, micronutrients) related to increased physiological demand in setting of poor skin integrity as evidenced by multiple pressure injuries (stage 2 R and L buttock, DTI R and L buttock). Pt reports good appetite and PO intake prior and throughout admission. A1C 7.3%. Diabetes education provided to family and patient. RD will follow up with additional education as feasible.       Recommendations:   1. Provide additional diabetes education as needed.   2. Rx: MVI, VIt C 500 mg daily to aid wound healing  3. Daily weights to trend    Monitoring and Evaluation:   [x ] PO intake [x ] Tolerance to diet prescription [X] Weights  [X] Follow up per protocol [X] Labs: chem 8, mg, phos, H/H, BGM

## 2023-12-05 NOTE — PROGRESS NOTE ADULT - ASSESSMENT
72M w/ PMH of T2DM, CAD s/p CABG, CHF, HTN, HLD, Afib (not on AC due to hx of GIB) presents with a few days worsening of dyspnea and LE edema. found to have large loculated pleural effusion. 11/28 s/p L lung decortication.    Consulted for diabetes management  Home diabetes meds: Was on Januvia previously, not recent  Current a1c: 7.3%    1. T2DM- FS running low normal  - Decrease Lantus to 10 units qhs  - Continue sliding scale coverage  - Will consider basal insulin with oral meds vs only oral meds for discharge    2. Pleural effusion  - S/p decortication, care per primary team    3. HLD- C/w statin    4. Aflutter- s/p successful QIUN/DCCV 12/1       72M w/ PMH of T2DM, CAD s/p CABG, CHF, HTN, HLD, Afib (not on AC due to hx of GIB) presents with a few days worsening of dyspnea and LE edema. found to have large loculated pleural effusion. 11/28 s/p L lung decortication.    Consulted for diabetes management  Home diabetes meds: Was on Januvia previously, not recent  Current a1c: 7.3%    1. T2DM- FS running low normal  - Decrease Lantus to 10 units qhs  - Continue sliding scale coverage  - Will consider basal insulin with oral meds vs only oral meds for discharge    2. Pleural effusion  - S/p decortication, care per primary team    3. HLD- C/w statin    4. Aflutter- s/p successful QUIN/DCCV 12/1

## 2023-12-05 NOTE — PROGRESS NOTE ADULT - SUBJECTIVE AND OBJECTIVE BOX
POD 7 s/p FB L VATS total decortication     Subjective: no complaints denies CP, palpitations, SOB, cough, fever, chills, itchiness/rash, diaphoresis, vision changes, HA, dizziness/lightheadedness, numbness/tingling, abd pain, N/V     T(C): 36.4 (12-04-23 @ 21:26), Max: 36.7 (12-04-23 @ 12:00)  HR: 73 (12-04-23 @ 21:26) (64 - 79)  BP: 119/74 (12-04-23 @ 21:26) (119/74 - 179/81)  RR: 19 (12-04-23 @ 21:26) (19 - 35)  SpO2: 95% (12-04-23 @ 21:26) (92% - 97%)    12-04    135  |  101  |  40.9<H>  ----------------------------<  100<H>  3.9   |  22.0  |  1.47<H>    Ca    8.3<L>      04 Dec 2023 02:30  Mg     2.2     12-04                                 9.5    12.17 )-----------( 279      ( 04 Dec 2023 02:30 )             31.2                    CAPILLARY BLOOD GLUCOSE  POCT Blood Glucose.: 115 mg/dL (04 Dec 2023 21:21)  POCT Blood Glucose.: 112 mg/dL (04 Dec 2023 17:14)  POCT Blood Glucose.: 134 mg/dL (04 Dec 2023 11:23)  POCT Blood Glucose.: 114 mg/dL (04 Dec 2023 07:46)    I&O's Detail    03 Dec 2023 07:01  -  04 Dec 2023 07:00  --------------------------------------------------------  IN:    Oral Fluid: 1420 mL  Total IN: 1420 mL    OUT:    Indwelling Catheter - Urethral (mL): 1485 mL  Total OUT: 1485 mL  Total NET: -65 mL    04 Dec 2023 07:01  -  05 Dec 2023 00:33  --------------------------------------------------------  IN:    Oral Fluid: 118 mL  Total IN: 118 mL    OUT:    Indwelling Catheter - Urethral (mL): 670 mL  Total OUT: 670 mL  Total NET: -552 mL    Drug Dosing Weight  Height (cm): 175.3 (28 Nov 2023 03:29)  Weight (kg): 127 (28 Nov 2023 03:29)  BMI (kg/m2): 41.3 (28 Nov 2023 03:29)  BSA (m2): 2.38 (28 Nov 2023 03:29)    MEDICATIONS  (STANDING):  apixaban 5 milliGRAM(s) Oral every 12 hours  aspirin enteric coated 81 milliGRAM(s) Oral daily  atorvastatin 20 milliGRAM(s) Oral at bedtime  cefTRIAXone Injectable.      cefTRIAXone Injectable. 2000 milliGRAM(s) IV Push every 24 hours  chlorhexidine 2% Cloths 1 Application(s) Topical <User Schedule>  furosemide    Tablet 40 milliGRAM(s) Oral daily  gabapentin 100 milliGRAM(s) Oral two times a day  insulin glargine Injectable (LANTUS) 12 Unit(s) SubCutaneous at bedtime  insulin lispro (ADMELOG) corrective regimen sliding scale   SubCutaneous Before meals and at bedtime  methocarbamol 500 milliGRAM(s) Oral two times a day  metoprolol tartrate 50 milliGRAM(s) Oral every 6 hours  pantoprazole    Tablet 40 milliGRAM(s) Oral before breakfast  sacubitril 24 mG/valsartan 26 mG 1 Tablet(s) Oral two times a day  tamsulosin 0.4 milliGRAM(s) Oral at bedtime    MEDICATIONS  (PRN):  acetaminophen     Tablet .. 975 milliGRAM(s) Oral every 6 hours PRN Temp greater or equal to 38C (100.4F), Mild Pain (1 - 3)  ondansetron Injectable 4 milliGRAM(s) IV Push every 8 hours PRN Nausea and/or Vomiting  sodium chloride 0.9% lock flush 10 milliLiter(s) IV Push every 1 hour PRN Pre/post blood products, medications, blood draw, and to maintain line patency    Physical Exam  Gen: NAD  Neuro: A&Ox3 non focal speech clear and intact   Pulm: decreased BS b/l no wheezing   CV: S1S2 RRR  Abd: +BS soft NT ND  Extrem/MS: + LE edema, no cyanosis  Incision(s): L VATS inc C/D/I 2 sutures at prior CT sites

## 2023-12-05 NOTE — PROGRESS NOTE ADULT - SUBJECTIVE AND OBJECTIVE BOX
NEPHROLOGY INTERVAL HPI/OVERNIGHT EVENTS:  pt seated in chair  comfortable  hsu with clear urine    MEDICATIONS  (STANDING):  apixaban 5 milliGRAM(s) Oral every 12 hours  aspirin enteric coated 81 milliGRAM(s) Oral daily  atorvastatin 20 milliGRAM(s) Oral at bedtime  cefTRIAXone Injectable. 2000 milliGRAM(s) IV Push every 24 hours  cefTRIAXone Injectable.      chlorhexidine 2% Cloths 1 Application(s) Topical <User Schedule>  furosemide    Tablet 40 milliGRAM(s) Oral daily  gabapentin 100 milliGRAM(s) Oral two times a day  insulin glargine Injectable (LANTUS) 10 Unit(s) SubCutaneous at bedtime  insulin lispro (ADMELOG) corrective regimen sliding scale   SubCutaneous Before meals and at bedtime  methocarbamol 500 milliGRAM(s) Oral two times a day  metoprolol tartrate 50 milliGRAM(s) Oral every 6 hours  pantoprazole    Tablet 40 milliGRAM(s) Oral before breakfast  sacubitril 24 mG/valsartan 26 mG 1 Tablet(s) Oral two times a day  spironolactone 25 milliGRAM(s) Oral daily  tamsulosin 0.4 milliGRAM(s) Oral at bedtime    MEDICATIONS  (PRN):  acetaminophen     Tablet .. 975 milliGRAM(s) Oral every 6 hours PRN Temp greater or equal to 38C (100.4F), Mild Pain (1 - 3)  ondansetron Injectable 4 milliGRAM(s) IV Push every 8 hours PRN Nausea and/or Vomiting  sodium chloride 0.9% lock flush 10 milliLiter(s) IV Push every 1 hour PRN Pre/post blood products, medications, blood draw, and to maintain line patency      Allergies    No Known Allergies        Vital Signs Last 24 Hrs  T(C): 36.9 (05 Dec 2023 08:10), Max: 36.9 (05 Dec 2023 08:10)  T(F): 98.4 (05 Dec 2023 08:10), Max: 98.4 (05 Dec 2023 08:10)  HR: 67 (05 Dec 2023 08:10) (67 - 77)  BP: 108/67 (05 Dec 2023 08:10) (108/67 - 179/81)  BP(mean): 99 (04 Dec 2023 16:47) (99 - 117)  RR: 18 (05 Dec 2023 08:10) (18 - 22)  SpO2: 95% (05 Dec 2023 08:10) (95% - 98%)    Parameters below as of 05 Dec 2023 08:10  Patient On (Oxygen Delivery Method): room air        PHYSICAL EXAM:  GENERAL: Languid  HEENT: No periorbital edema  NECK: Supple, No JVD  CHEST/LUNG: Clear, diminished BS  CVS:  No rub  Abd:  Soft +BS  EXTREMITIES: + dependent edema  : Hsu    LABS:                        9.7    13.36 )-----------( 299      ( 05 Dec 2023 06:28 )             32.2     12-05    138  |  102  |  32.0<H>  ----------------------------<  109<H>  4.0   |  23.0  |  1.34<H>    Ca    8.3<L>      05 Dec 2023 06:28  Mg     2.0     12-05        Urinalysis Basic - ( 05 Dec 2023 06:28 )    Color: x / Appearance: x / SG: x / pH: x  Gluc: 109 mg/dL / Ketone: x  / Bili: x / Urobili: x   Blood: x / Protein: x / Nitrite: x   Leuk Esterase: x / RBC: x / WBC x   Sq Epi: x / Non Sq Epi: x / Bacteria: x      Magnesium: 2.0 mg/dL (12-05 @ 06:28)      RADIOLOGY & ADDITIONAL TESTS:  < from: US Renal (11.19.23 @ 09:27) >    ACC: [id number] EXAM:  US KIDNEY(S)   ORDERED BY: CAMERON DIEGO     PROCEDURE DATE:  11/19/2023          INTERPRETATION:  CLINICAL INFORMATION: Renal failure.  Acute kidney   injury versus chronic kidney disease.    COMPARISON: None available.    TECHNIQUE: Sonography of the kidneys and bladder.    FINDINGS:  Right kidney: 11.5 cm. No renal mass, hydronephrosis or calculi.  Cysts   measure 4.2 x 2.9 x 4 cm in the upper pole and 2.1 x 1.8 x 1.7 cm in the   midpole.    Left kidney: Poorly visualized.  10.6 cm. No renal mass, hydronephrosis   or calculi.  A cyst measures 4 x 4 0.1 x 3.8 cm in the midpole.    Urinary bladder: Underdistended.  No sonographic abnormality.    IMPRESSION:  The left kidney is poorly visualized.  No renal mass, hydronephrosis or   calculus is visualized sonographically.    < end of copied text >   NEPHROLOGY INTERVAL HPI/OVERNIGHT EVENTS:  pt seated in chair  comfortable  hsu with clear urine    MEDICATIONS  (STANDING):  apixaban 5 milliGRAM(s) Oral every 12 hours  aspirin enteric coated 81 milliGRAM(s) Oral daily  atorvastatin 20 milliGRAM(s) Oral at bedtime  cefTRIAXone Injectable. 2000 milliGRAM(s) IV Push every 24 hours  cefTRIAXone Injectable.      chlorhexidine 2% Cloths 1 Application(s) Topical <User Schedule>  furosemide    Tablet 40 milliGRAM(s) Oral daily  gabapentin 100 milliGRAM(s) Oral two times a day  insulin glargine Injectable (LANTUS) 10 Unit(s) SubCutaneous at bedtime  insulin lispro (ADMELOG) corrective regimen sliding scale   SubCutaneous Before meals and at bedtime  methocarbamol 500 milliGRAM(s) Oral two times a day  metoprolol tartrate 50 milliGRAM(s) Oral every 6 hours  pantoprazole    Tablet 40 milliGRAM(s) Oral before breakfast  sacubitril 24 mG/valsartan 26 mG 1 Tablet(s) Oral two times a day  spironolactone 25 milliGRAM(s) Oral daily  tamsulosin 0.4 milliGRAM(s) Oral at bedtime    MEDICATIONS  (PRN):  acetaminophen     Tablet .. 975 milliGRAM(s) Oral every 6 hours PRN Temp greater or equal to 38C (100.4F), Mild Pain (1 - 3)  ondansetron Injectable 4 milliGRAM(s) IV Push every 8 hours PRN Nausea and/or Vomiting  sodium chloride 0.9% lock flush 10 milliLiter(s) IV Push every 1 hour PRN Pre/post blood products, medications, blood draw, and to maintain line patency      Allergies    No Known Allergies        Vital Signs Last 24 Hrs  T(C): 36.9 (05 Dec 2023 08:10), Max: 36.9 (05 Dec 2023 08:10)  T(F): 98.4 (05 Dec 2023 08:10), Max: 98.4 (05 Dec 2023 08:10)  HR: 67 (05 Dec 2023 08:10) (67 - 77)  BP: 108/67 (05 Dec 2023 08:10) (108/67 - 179/81)  BP(mean): 99 (04 Dec 2023 16:47) (99 - 117)  RR: 18 (05 Dec 2023 08:10) (18 - 22)  SpO2: 95% (05 Dec 2023 08:10) (95% - 98%)    Parameters below as of 05 Dec 2023 08:10  Patient On (Oxygen Delivery Method): room air        PHYSICAL EXAM:  GENERAL: Languid  HEENT: No periorbital edema  NECK: Supple, No JVD  CHEST/LUNG: Clear, diminished BS  CVS:  No rub  Abd:  Soft +BS  EXTREMITIES: + dependent edema  : Hsu    LABS:                        9.7    13.36 )-----------( 299      ( 05 Dec 2023 06:28 )             32.2     12-05    138  |  102  |  32.0<H>  ----------------------------<  109<H>  4.0   |  23.0  |  1.34<H>    Ca    8.3<L>      05 Dec 2023 06:28  Mg     2.0     12-05        Urinalysis Basic - ( 05 Dec 2023 06:28 )    Color: x / Appearance: x / SG: x / pH: x  Gluc: 109 mg/dL / Ketone: x  / Bili: x / Urobili: x   Blood: x / Protein: x / Nitrite: x   Leuk Esterase: x / RBC: x / WBC x   Sq Epi: x / Non Sq Epi: x / Bacteria: x      Magnesium: 2.0 mg/dL (12-05 @ 06:28)      RADIOLOGY & ADDITIONAL TESTS:  < from: US Renal (11.19.23 @ 09:27) >    ACC: [id number] EXAM:  US KIDNEY(S)   ORDERED BY: CAMERON DIEGO     PROCEDURE DATE:  11/19/2023          INTERPRETATION:  CLINICAL INFORMATION: Renal failure.  Acute kidney   injury versus chronic kidney disease.    COMPARISON: None available.    TECHNIQUE: Sonography of the kidneys and bladder.    FINDINGS:  Right kidney: 11.5 cm. No renal mass, hydronephrosis or calculi.  Cysts   measure 4.2 x 2.9 x 4 cm in the upper pole and 2.1 x 1.8 x 1.7 cm in the   midpole.    Left kidney: Poorly visualized.  10.6 cm. No renal mass, hydronephrosis   or calculi.  A cyst measures 4 x 4 0.1 x 3.8 cm in the midpole.    Urinary bladder: Underdistended.  No sonographic abnormality.    IMPRESSION:  The left kidney is poorly visualized.  No renal mass, hydronephrosis or   calculus is visualized sonographically.    < end of copied text >

## 2023-12-05 NOTE — PROGRESS NOTE ADULT - SUBJECTIVE AND OBJECTIVE BOX
ADVANCED HEART FAILURE - PROGRESS NOTE  [address] [place] [address]  Office Phone: [phone]/Fax: [phone]  Service/On Call Phone [phone]  _______________________________________________________________________________________________________    Subjective:  - NAEO  - OOB to chair with no complaints    Medications:  acetaminophen     Tablet .. 975 milliGRAM(s) Oral every 6 hours PRN  apixaban 5 milliGRAM(s) Oral every 12 hours  aspirin enteric coated 81 milliGRAM(s) Oral daily  atorvastatin 20 milliGRAM(s) Oral at bedtime  cefTRIAXone Injectable.      cefTRIAXone Injectable. 2000 milliGRAM(s) IV Push every 24 hours  chlorhexidine 2% Cloths 1 Application(s) Topical <User Schedule>  furosemide    Tablet 40 milliGRAM(s) Oral daily  gabapentin 100 milliGRAM(s) Oral two times a day  insulin glargine Injectable (LANTUS) 12 Unit(s) SubCutaneous at bedtime  insulin lispro (ADMELOG) corrective regimen sliding scale   SubCutaneous Before meals and at bedtime  methocarbamol 500 milliGRAM(s) Oral two times a day  metoprolol tartrate 50 milliGRAM(s) Oral every 6 hours  ondansetron Injectable 4 milliGRAM(s) IV Push every 8 hours PRN  pantoprazole    Tablet 40 milliGRAM(s) Oral before breakfast  sacubitril 24 mG/valsartan 26 mG 1 Tablet(s) Oral two times a day  sodium chloride 0.9% lock flush 10 milliLiter(s) IV Push every 1 hour PRN  tamsulosin 0.4 milliGRAM(s) Oral at bedtime      ICU Vital Signs Last 24 Hrs  T(C): 36.9, Max: 36.9 (12-05 @ 08:10)  HR: 67 (67 - 79)  BP: 108/67 (108/67 - 179/81)  BP(mean): 99 (96 - 117)  ABP: --  ABP(mean): --  RR: 18 (18 - 35)  SpO2: 95% (95% - 98%)    Weight in k.5 (23)  Weight in k.3 (23)      I&O's Summary Last 24 Hrs    IN: 318 mL / OUT: 920 mL / NET: -602 mL      Tele: SR 70s, PVCs    Physical Exam:    General: No distress. Comfortable.  Neck: JVP not elevated.  Respiratory: Clear to auscultation bilaterally  CV: RRR. Normal S1 and S2. No murmurs, rub, or gallops. Radial pulses normal.  Abdomen: Soft, non-distended, non-tender  Extremities: Warm, no edema  Neurology: Non-focal, alert and oriented times three.   Psych: Affect normal    Labs:    ( 23 @ 06:28 )               9.7    13.36 )--------( 299                  32.2     ( 23 @ 06:28 )     138  |  102  |  32.0  ---------------------<  109  4.0  |  23.0  |  1.34    Ca 8.3  Phos x   Mg 2.0    ( 23 @ 09:02 )  TropHS 41    / CK 43    / CKMB x       ADVANCED HEART FAILURE - PROGRESS NOTE  [address] [place] [address]  Office Phone: [phone]/Fax: [phone]  Service/On Call Phone [phone]  _______________________________________________________________________________________________________    Subjective:  - NAEO  - OOB to chair with no complaints    Medications:  acetaminophen     Tablet .. 975 milliGRAM(s) Oral every 6 hours PRN  apixaban 5 milliGRAM(s) Oral every 12 hours  aspirin enteric coated 81 milliGRAM(s) Oral daily  atorvastatin 20 milliGRAM(s) Oral at bedtime  cefTRIAXone Injectable.      cefTRIAXone Injectable. 2000 milliGRAM(s) IV Push every 24 hours  chlorhexidine 2% Cloths 1 Application(s) Topical <User Schedule>  furosemide    Tablet 40 milliGRAM(s) Oral daily  gabapentin 100 milliGRAM(s) Oral two times a day  insulin glargine Injectable (LANTUS) 12 Unit(s) SubCutaneous at bedtime  insulin lispro (ADMELOG) corrective regimen sliding scale   SubCutaneous Before meals and at bedtime  methocarbamol 500 milliGRAM(s) Oral two times a day  metoprolol tartrate 50 milliGRAM(s) Oral every 6 hours  ondansetron Injectable 4 milliGRAM(s) IV Push every 8 hours PRN  pantoprazole    Tablet 40 milliGRAM(s) Oral before breakfast  sacubitril 24 mG/valsartan 26 mG 1 Tablet(s) Oral two times a day  sodium chloride 0.9% lock flush 10 milliLiter(s) IV Push every 1 hour PRN  tamsulosin 0.4 milliGRAM(s) Oral at bedtime      ICU Vital Signs Last 24 Hrs  T(C): 36.9, Max: 36.9 (12-05 @ 08:10)  HR: 67 (67 - 79)  BP: 108/67 (108/67 - 179/81)  BP(mean): 99 (96 - 117)  ABP: --  ABP(mean): --  RR: 18 (18 - 35)  SpO2: 95% (95% - 98%)    Weight in k.5 (23)  Weight in k.3 (23)      I&O's Summary Last 24 Hrs    IN: 318 mL / OUT: 920 mL / NET: -602 mL      Tele: SR 70s, PVCs    Physical Exam:    General: No distress. Comfortable.  Neck: JVP not elevated.  Respiratory: Clear to auscultation bilaterally  CV: RRR. Normal S1 and S2. No murmurs, rub, or gallops. Radial pulses normal.  Abdomen: Soft, non-distended, non-tender  Extremities: Warm, no edema  Neurology: Non-focal, alert and oriented times three.   Psych: Affect normal    Labs:    ( 23 @ 06:28 )               9.7    13.36 )--------( 299                  32.2     ( 23 @ 06:28 )     138  |  102  |  32.0  ---------------------<  109  4.0  |  23.0  |  1.34    Ca 8.3  Phos x   Mg 2.0    ( 23 @ 09:02 )  TropHS 41    / CK 43    / CKMB x

## 2023-12-05 NOTE — PROGRESS NOTE ADULT - ASSESSMENT
DEVON: renal hypoperfusion s/p VATS/decortication---> renal function improved and may have reached baseline  No gross hydro on renal sono  - avoid potential nephrotoxins  - watch labs closely now that Torsemide, Aldactone and Entresto restarted--> may need to tolerate azotemia

## 2023-12-05 NOTE — PROGRESS NOTE ADULT - PROBLEM SELECTOR PLAN 1
- Etiology: Possibly ischemic vs tachy induced. [facility] deferred for DEVON this admission.  - Clinically close to euvolemic w/ lukewarm extremities.   - GDMT: Currently on metoprolol tartrate 50 mg PO q6hrs (will likely switch to Toprol XL tomorrow). Continue Entresto 24-25 mg BID and start spironolactone 25 mg daily. Eventual SGLT2i either prior to discharge or as an outpatient.  - Diuretics: Continue Lasix 40 mg PO daily  - Please document strict I&Os and daily standing weight  - Device: Will repeat TTE once on GDMT to see if he meets criteria for primary prevention ICD. - Etiology: Possibly ischemic vs tachy induced. [facility] deferred for DEVON this admission.  - Clinically close to euvolemic w/ lukewarm extremities.   - GDMT: Currently on metoprolol tartrate 50 mg PO q6hrs (will likely switch to Toprol XL tomorrow). Continue Entresto 24-25 mg BID and start spironolactone 25 mg daily. Eventual SGLT2i either prior to discharge or as an outpatient.  - Diuretics: Continue Lasix 40 mg PO daily  - Please document strict I&Os and daily standing weight  - Device: Will repeat TTE once on GDMT to see if he meets criteria for primary prevention ICD.

## 2023-12-06 VITALS
SYSTOLIC BLOOD PRESSURE: 144 MMHG | HEART RATE: 80 BPM | OXYGEN SATURATION: 95 % | DIASTOLIC BLOOD PRESSURE: 75 MMHG | TEMPERATURE: 98 F | RESPIRATION RATE: 18 BRPM

## 2023-12-06 DIAGNOSIS — I50.21 ACUTE SYSTOLIC (CONGESTIVE) HEART FAILURE: ICD-10-CM

## 2023-12-06 LAB
ANION GAP SERPL CALC-SCNC: 12 MMOL/L — SIGNIFICANT CHANGE UP (ref 5–17)
ANION GAP SERPL CALC-SCNC: 12 MMOL/L — SIGNIFICANT CHANGE UP (ref 5–17)
BUN SERPL-MCNC: 23.6 MG/DL — HIGH (ref 8–20)
BUN SERPL-MCNC: 23.6 MG/DL — HIGH (ref 8–20)
CALCIUM SERPL-MCNC: 7.9 MG/DL — LOW (ref 8.4–10.5)
CALCIUM SERPL-MCNC: 7.9 MG/DL — LOW (ref 8.4–10.5)
CHLORIDE SERPL-SCNC: 106 MMOL/L — SIGNIFICANT CHANGE UP (ref 96–108)
CHLORIDE SERPL-SCNC: 106 MMOL/L — SIGNIFICANT CHANGE UP (ref 96–108)
CO2 SERPL-SCNC: 21 MMOL/L — LOW (ref 22–29)
CO2 SERPL-SCNC: 21 MMOL/L — LOW (ref 22–29)
CREAT SERPL-MCNC: 1.15 MG/DL — SIGNIFICANT CHANGE UP (ref 0.5–1.3)
CREAT SERPL-MCNC: 1.15 MG/DL — SIGNIFICANT CHANGE UP (ref 0.5–1.3)
EGFR: 68 ML/MIN/1.73M2 — SIGNIFICANT CHANGE UP
EGFR: 68 ML/MIN/1.73M2 — SIGNIFICANT CHANGE UP
GLUCOSE BLDC GLUCOMTR-MCNC: 109 MG/DL — HIGH (ref 70–99)
GLUCOSE BLDC GLUCOMTR-MCNC: 109 MG/DL — HIGH (ref 70–99)
GLUCOSE BLDC GLUCOMTR-MCNC: 116 MG/DL — HIGH (ref 70–99)
GLUCOSE BLDC GLUCOMTR-MCNC: 116 MG/DL — HIGH (ref 70–99)
GLUCOSE SERPL-MCNC: 107 MG/DL — HIGH (ref 70–99)
GLUCOSE SERPL-MCNC: 107 MG/DL — HIGH (ref 70–99)
HCT VFR BLD CALC: 34.8 % — LOW (ref 39–50)
HCT VFR BLD CALC: 34.8 % — LOW (ref 39–50)
HGB BLD-MCNC: 9.8 G/DL — LOW (ref 13–17)
HGB BLD-MCNC: 9.8 G/DL — LOW (ref 13–17)
MAGNESIUM SERPL-MCNC: 1.8 MG/DL — SIGNIFICANT CHANGE UP (ref 1.6–2.6)
MAGNESIUM SERPL-MCNC: 1.8 MG/DL — SIGNIFICANT CHANGE UP (ref 1.6–2.6)
MCHC RBC-ENTMCNC: 26.6 PG — LOW (ref 27–34)
MCHC RBC-ENTMCNC: 26.6 PG — LOW (ref 27–34)
MCHC RBC-ENTMCNC: 28.2 GM/DL — LOW (ref 32–36)
MCHC RBC-ENTMCNC: 28.2 GM/DL — LOW (ref 32–36)
MCV RBC AUTO: 94.6 FL — SIGNIFICANT CHANGE UP (ref 80–100)
MCV RBC AUTO: 94.6 FL — SIGNIFICANT CHANGE UP (ref 80–100)
PLATELET # BLD AUTO: 248 K/UL — SIGNIFICANT CHANGE UP (ref 150–400)
PLATELET # BLD AUTO: 248 K/UL — SIGNIFICANT CHANGE UP (ref 150–400)
POTASSIUM SERPL-MCNC: 4 MMOL/L — SIGNIFICANT CHANGE UP (ref 3.5–5.3)
POTASSIUM SERPL-MCNC: 4 MMOL/L — SIGNIFICANT CHANGE UP (ref 3.5–5.3)
POTASSIUM SERPL-SCNC: 4 MMOL/L — SIGNIFICANT CHANGE UP (ref 3.5–5.3)
POTASSIUM SERPL-SCNC: 4 MMOL/L — SIGNIFICANT CHANGE UP (ref 3.5–5.3)
RBC # BLD: 3.68 M/UL — LOW (ref 4.2–5.8)
RBC # BLD: 3.68 M/UL — LOW (ref 4.2–5.8)
RBC # FLD: 16.6 % — HIGH (ref 10.3–14.5)
RBC # FLD: 16.6 % — HIGH (ref 10.3–14.5)
SODIUM SERPL-SCNC: 139 MMOL/L — SIGNIFICANT CHANGE UP (ref 135–145)
SODIUM SERPL-SCNC: 139 MMOL/L — SIGNIFICANT CHANGE UP (ref 135–145)
WBC # BLD: 11.7 K/UL — HIGH (ref 3.8–10.5)
WBC # BLD: 11.7 K/UL — HIGH (ref 3.8–10.5)
WBC # FLD AUTO: 11.7 K/UL — HIGH (ref 3.8–10.5)
WBC # FLD AUTO: 11.7 K/UL — HIGH (ref 3.8–10.5)

## 2023-12-06 PROCEDURE — 71045 X-RAY EXAM CHEST 1 VIEW: CPT | Mod: 26

## 2023-12-06 PROCEDURE — 99233 SBSQ HOSP IP/OBS HIGH 50: CPT

## 2023-12-06 PROCEDURE — 99232 SBSQ HOSP IP/OBS MODERATE 35: CPT | Mod: 24

## 2023-12-06 RX ORDER — SPIRONOLACTONE 25 MG/1
1 TABLET, FILM COATED ORAL
Qty: 0 | Refills: 0 | DISCHARGE
Start: 2023-12-06

## 2023-12-06 RX ORDER — FUROSEMIDE 40 MG
1 TABLET ORAL
Qty: 0 | Refills: 0 | DISCHARGE
Start: 2023-12-06

## 2023-12-06 RX ORDER — APIXABAN 2.5 MG/1
1 TABLET, FILM COATED ORAL
Qty: 0 | Refills: 0 | DISCHARGE
Start: 2023-12-06

## 2023-12-06 RX ORDER — ASPIRIN/CALCIUM CARB/MAGNESIUM 324 MG
1 TABLET ORAL
Refills: 0 | DISCHARGE

## 2023-12-06 RX ORDER — SITAGLIPTIN 50 MG/1
1 TABLET, FILM COATED ORAL
Qty: 0 | Refills: 0 | DISCHARGE

## 2023-12-06 RX ORDER — METOLAZONE 5 MG/1
1 TABLET ORAL
Refills: 0 | DISCHARGE

## 2023-12-06 RX ORDER — SACUBITRIL AND VALSARTAN 24; 26 MG/1; MG/1
1 TABLET, FILM COATED ORAL
Refills: 0 | Status: DISCONTINUED | OUTPATIENT
Start: 2023-12-06 | End: 2023-12-06

## 2023-12-06 RX ORDER — SACUBITRIL AND VALSARTAN 24; 26 MG/1; MG/1
1 TABLET, FILM COATED ORAL
Refills: 0 | DISCHARGE

## 2023-12-06 RX ORDER — INSULIN GLARGINE 100 [IU]/ML
6 INJECTION, SOLUTION SUBCUTANEOUS
Qty: 0 | Refills: 0 | DISCHARGE
Start: 2023-12-06

## 2023-12-06 RX ORDER — ATORVASTATIN CALCIUM 80 MG/1
1 TABLET, FILM COATED ORAL
Refills: 0 | DISCHARGE

## 2023-12-06 RX ORDER — PANTOPRAZOLE SODIUM 20 MG/1
1 TABLET, DELAYED RELEASE ORAL
Qty: 0 | Refills: 0 | DISCHARGE
Start: 2023-12-06

## 2023-12-06 RX ORDER — INSULIN GLARGINE 100 [IU]/ML
6 INJECTION, SOLUTION SUBCUTANEOUS AT BEDTIME
Refills: 0 | Status: DISCONTINUED | OUTPATIENT
Start: 2023-12-06 | End: 2023-12-06

## 2023-12-06 RX ORDER — METHOCARBAMOL 500 MG/1
1 TABLET, FILM COATED ORAL
Qty: 0 | Refills: 0 | DISCHARGE
Start: 2023-12-06

## 2023-12-06 RX ORDER — METOPROLOL TARTRATE 50 MG
1 TABLET ORAL
Refills: 0 | DISCHARGE

## 2023-12-06 RX ORDER — ASPIRIN/CALCIUM CARB/MAGNESIUM 324 MG
1 TABLET ORAL
Qty: 0 | Refills: 0 | DISCHARGE
Start: 2023-12-06

## 2023-12-06 RX ORDER — ACETAMINOPHEN 500 MG
3 TABLET ORAL
Qty: 0 | Refills: 0 | DISCHARGE
Start: 2023-12-06

## 2023-12-06 RX ORDER — ATORVASTATIN CALCIUM 80 MG/1
1 TABLET, FILM COATED ORAL
Qty: 0 | Refills: 0 | DISCHARGE
Start: 2023-12-06

## 2023-12-06 RX ORDER — METOPROLOL TARTRATE 50 MG
1 TABLET ORAL
Qty: 0 | Refills: 0 | DISCHARGE
Start: 2023-12-06

## 2023-12-06 RX ORDER — TAMSULOSIN HYDROCHLORIDE 0.4 MG/1
1 CAPSULE ORAL
Qty: 0 | Refills: 0 | DISCHARGE
Start: 2023-12-06

## 2023-12-06 RX ORDER — DAPAGLIFLOZIN 10 MG/1
1 TABLET, FILM COATED ORAL
Qty: 0 | Refills: 0 | DISCHARGE
Start: 2023-12-06

## 2023-12-06 RX ORDER — METOPROLOL TARTRATE 50 MG
100 TABLET ORAL AT BEDTIME
Refills: 0 | Status: DISCONTINUED | OUTPATIENT
Start: 2023-12-06 | End: 2023-12-06

## 2023-12-06 RX ORDER — DAPAGLIFLOZIN 10 MG/1
10 TABLET, FILM COATED ORAL DAILY
Refills: 0 | Status: DISCONTINUED | OUTPATIENT
Start: 2023-12-06 | End: 2023-12-06

## 2023-12-06 RX ORDER — SACUBITRIL AND VALSARTAN 24; 26 MG/1; MG/1
1 TABLET, FILM COATED ORAL
Qty: 0 | Refills: 0 | DISCHARGE
Start: 2023-12-06

## 2023-12-06 RX ORDER — GABAPENTIN 400 MG/1
1 CAPSULE ORAL
Qty: 0 | Refills: 0 | DISCHARGE
Start: 2023-12-06

## 2023-12-06 RX ORDER — FUROSEMIDE 40 MG
1 TABLET ORAL
Refills: 0 | DISCHARGE

## 2023-12-06 RX ADMIN — Medication 975 MILLIGRAM(S): at 04:15

## 2023-12-06 RX ADMIN — CHLORHEXIDINE GLUCONATE 1 APPLICATION(S): 213 SOLUTION TOPICAL at 05:02

## 2023-12-06 RX ADMIN — GABAPENTIN 100 MILLIGRAM(S): 400 CAPSULE ORAL at 05:00

## 2023-12-06 RX ADMIN — Medication 975 MILLIGRAM(S): at 16:10

## 2023-12-06 RX ADMIN — CEFTRIAXONE 2000 MILLIGRAM(S): 500 INJECTION, POWDER, FOR SOLUTION INTRAMUSCULAR; INTRAVENOUS at 02:06

## 2023-12-06 RX ADMIN — Medication 50 MILLIGRAM(S): at 11:50

## 2023-12-06 RX ADMIN — SACUBITRIL AND VALSARTAN 1 TABLET(S): 24; 26 TABLET, FILM COATED ORAL at 05:00

## 2023-12-06 RX ADMIN — SPIRONOLACTONE 25 MILLIGRAM(S): 25 TABLET, FILM COATED ORAL at 05:02

## 2023-12-06 RX ADMIN — Medication 40 MILLIGRAM(S): at 05:01

## 2023-12-06 RX ADMIN — Medication 50 MILLIGRAM(S): at 05:00

## 2023-12-06 RX ADMIN — Medication 81 MILLIGRAM(S): at 11:50

## 2023-12-06 RX ADMIN — METHOCARBAMOL 500 MILLIGRAM(S): 500 TABLET, FILM COATED ORAL at 05:00

## 2023-12-06 RX ADMIN — DAPAGLIFLOZIN 10 MILLIGRAM(S): 10 TABLET, FILM COATED ORAL at 16:09

## 2023-12-06 RX ADMIN — APIXABAN 5 MILLIGRAM(S): 2.5 TABLET, FILM COATED ORAL at 04:58

## 2023-12-06 RX ADMIN — APIXABAN 5 MILLIGRAM(S): 2.5 TABLET, FILM COATED ORAL at 16:09

## 2023-12-06 RX ADMIN — Medication 50 MILLIGRAM(S): at 00:31

## 2023-12-06 NOTE — PROGRESS NOTE ADULT - PROBLEM SELECTOR PLAN 7
DEVON ? super imposed on CKD  sCr improved  Adjust meds pr CrCl  Monitoring urine output, I&OS  Follow chemistry  Avoid nephrotoxic agents   Tolerating diuretics  Renal following
Protonix for GI prophylaxis.  Eliquis/SCDs for DVT prophylaxis.    Plan to be discussed with thoracic team in the AM
Protonix for GI prophylaxis.  Eliquis/SCDs for DVT prophylaxis.    Plan to be discussed with thoracic team in the AM
Protonix for GI prophylaxis.  Eliquis/SCDs for DVT prophylaxis.
Protonix for GI prophylaxis.  Eliquis/SCDs for DVT prophylaxis.    Plan to be discussed with thoracic team in the AM

## 2023-12-06 NOTE — PROGRESS NOTE ADULT - SUBJECTIVE AND OBJECTIVE BOX
BRIEF HOSPITAL COURSE  72-year-old male with history of hypertension, CAD s/p CABGx 4 (), HLD, and obesity who presented to ED  c/o worsening SAVAGE x1 day. Admitted with AF RVR and acute on chronic systolic HF exacerbation, found to have large L pleural effusion requiring pigtail placement. Pleural fluid cultures + strept intermedius. Pt underwent FB L VATS total decortication on  with Dr. Encarnacion. Postop pt underwent successful DCCV  with EP. CT removed without incident postop. Pt remains on ceftriaxone per ID for empyema. Heart Failure following with reinitiation of GDMT.  Midline placed for ongoing ABX. Dispo planning for BERTIN placement.    SIGNIFICANT RECENT/PAST 24 HR EVENTS  No acute events reported overnight.     SUBJECTIVE  Patient seen and examined on follow up now POD #9 FB, Left VATS total decort. Pt currently lying in bed in NAD. Denies fevers, chills, HA, dizziness, CP, SOB, abd pain, N/V/D, numbness/tingling in extremities, or any other acute complaints. States pain well controlled on current regimen. States "feels week, wants physical therapy."  +Tolerating diet  +Passing BMs since surgery   +Using incentive as instructed  ROS negative x 10 systems except as noted above.    PAST MEDICAL & SURGICAL HISTORY  Coronary artery disease involving native coronary artery of native heart, unspecified whether angina  CHF with unknown LVEF  Primary hypertension  Hyperlipidemia, unspecified hyperlipidemia type  S/P CABG x 5  History of cholecystectomy    DAILY REVIEW  Telemetry: Sinus rhythm   Vitals   T(C): 37.3 (06 Dec 2023 00:30), Max: 37.3 (06 Dec 2023 00:30)  T(F): 99.2 (06 Dec 2023 00:30), Max: 99.2 (06 Dec 2023 00:30)  HR: 85 (06 Dec 2023 00:30) (67 - 85)  BP: 148/72 (06 Dec 2023 00:30) (108/67 - 148/72)  BP(mean): 95 (05 Dec 2023 16:46) (95 - 95)  RR: 18 (06 Dec 2023 00:30) (18 - 18)  SpO2: 98% (06 Dec 2023 00:30) (94% - 98%)    O2 Parameters below as of 06 Dec 2023 00:30  Patient On (Oxygen Delivery Method): room air    I&O's Detail    04 Dec 2023 07:01  -  05 Dec 2023 07:00  --------------------------------------------------------  IN:    Oral Fluid: 318 mL  Total IN: 318 mL    OUT:    Indwelling Catheter - Urethral (mL): 920 mL  Total OUT: 920 mL    Total NET: -602 mL      Daily Weight in k.5 (05 Dec 2023 05:00)  Admit Wt: Drug Dosing Weight  Height (cm): 175.3 (2023 03:29)  Weight (kg): 127 (2023 03:29)  BMI (kg/m2): 41.3 (2023 03:29)  BSA (m2): 2.38 (2023 03:29)    LABS                        9.7    13.36 )-----------( 299      ( 05 Dec 2023 06:28 )             32.2     12-05    138  |  102  |  32.0<H>  ----------------------------<  109<H>  4.0   |  23.0  |  1.34<H>    Ca    8.3<L>      05 Dec 2023 06:28  Mg     2.0     12-05    MEDICATIONS  MEDICATIONS  (STANDING):  apixaban 5 milliGRAM(s) Oral every 12 hours  aspirin enteric coated 81 milliGRAM(s) Oral daily  atorvastatin 20 milliGRAM(s) Oral at bedtime  cefTRIAXone Injectable. 2000 milliGRAM(s) IV Push every 24 hours  cefTRIAXone Injectable.      chlorhexidine 2% Cloths 1 Application(s) Topical <User Schedule>  furosemide    Tablet 40 milliGRAM(s) Oral daily  gabapentin 100 milliGRAM(s) Oral two times a day  insulin glargine Injectable (LANTUS) 10 Unit(s) SubCutaneous at bedtime  insulin lispro (ADMELOG) corrective regimen sliding scale   SubCutaneous Before meals and at bedtime  methocarbamol 500 milliGRAM(s) Oral two times a day  metoprolol tartrate 50 milliGRAM(s) Oral every 6 hours  pantoprazole    Tablet 40 milliGRAM(s) Oral before breakfast  sacubitril 24 mG/valsartan 26 mG 1 Tablet(s) Oral two times a day  spironolactone 25 milliGRAM(s) Oral daily  tamsulosin 0.4 milliGRAM(s) Oral at bedtime    MEDICATIONS  (PRN):  acetaminophen     Tablet .. 975 milliGRAM(s) Oral every 6 hours PRN Temp greater or equal to 38C (100.4F), Mild Pain (1 - 3)  ondansetron Injectable 4 milliGRAM(s) IV Push every 8 hours PRN Nausea and/or Vomiting  sodium chloride 0.9% lock flush 10 milliLiter(s) IV Push every 1 hour PRN Pre/post blood products, medications, blood draw, and to maintain line patency    ALLERGIES  No Known Allergies    DIAGNOSTICS  All laboratory results, radiology and medications reviewed.  Xray Chest 1 View- PORTABLE-Routine (Xray Chest 1 View- PORTABLE-Routine in AM.) (23 @ 06:51)  Single frontal radiograph of the chest was obtained by portable technique   on 2023 at 3:25 PM  and compared to the patient's previous study of   2023 at 5:38 AM. There has been interval removal of a left-sided   chest tube. No pneumothorax is noted. A small amount of subcutaneous   emphysema is present along the left lower right chest wall. Allowing for   technical and positional variations there has been no discrete interval   change in the lung fields or left pleural space. Heart size, hilar   structures and mediastinum cannot adequately be assessed on this limited   portable study. The patient is status post thoracotomy. There is no   evidence of destructive bony lesion. .    IMPRESSION:  Status post removal of left-sided chest tube. No evidence of   pneumothorax. No significant change in the lung fields or pleural spaces.    CHEST:  Frontal projections of the chest were obtained by portable technique on   12/3/2023 at 5:41 AM and again on 2023 at 5:02 AM. Allowing for   technical and positional variations there is interval change is noted. No   pneumothorax is noted. Residual extensive pleural-parenchymal changes and   volume loss in the left hemithorax are again noted. Right lung and right   costophrenic angle are clear. The patient is again noted to be status   post thoracotomy. A small amount of subcutaneous emphysema is again noted   along the left lower lateral chest wall.    IMPRESSION:  Serial examinations are grossly unchanged    PHYSICAL EXAM  Constitutional: NAD  Neck: supple, trachea midline. No JVD   Respiratory: Breath sounds diminished b/l lower fields to auscultation L>R, no accessory muscle use noted. No wheezing, rales, or rhonchi noted b/l   Cardiovascular: Regular rate, regular rhythm, normal S1, S2; no murmurs or rub   Gastrointestinal: Soft, obese, non-tender, non-distended, + bowel sounds   Extremities: LEE x 4, 1+ LE peripheral edema, no cyanosis, no clubbing    Vascular: Equal and normal pulses: 2+ peripheral pulses throughout  Neurological: A+O x 3; speech clear and intact; no gross sensory/motor deficits  Psychiatric: calm, normal mood, normal affect   Skin: warm, dry, well perfused, no rashes   Tubes/Lines: Midline IV  Incision: Left lateral chest sutures x2, incisions healing well

## 2023-12-06 NOTE — PROGRESS NOTE ADULT - PROBLEM SELECTOR PLAN 1
Pleural fluid exudative by light's criteria  Culture with streptococcus intermedius, +empyema ID following   Zosyn switched to ceftraxione as per ID to complete 12/25, Midline placed 12/5  Diarrhea improved off laxatives  cytology (-)  Underwent VATS/decort on 11/27 - extubated 11/28  Cardioversion 12/1, Pt currently fully Anticoagulated with Eliquis  All chest tubes removed   + sutures remain at the chest tube insertion site   Dispo: pending BERTIN placement    Plan to be discussed with Thoracic surgery attending in AM rounds

## 2023-12-06 NOTE — PROGRESS NOTE ADULT - SUBJECTIVE AND OBJECTIVE BOX
ADVANCED HEART FAILURE - PROGRESS NOTE  [address] [place] [address]  Office Phone: [phone]/Fax: [phone]  Service/On Call Phone [phone]  _______________________________________________________________________________________________________    Subjective:  - NAEO  - Endorses some SOB last night prior to going to bed but states his breathing is better this morning    Medications:  acetaminophen     Tablet .. 975 milliGRAM(s) Oral every 6 hours PRN  apixaban 5 milliGRAM(s) Oral every 12 hours  aspirin enteric coated 81 milliGRAM(s) Oral daily  atorvastatin 20 milliGRAM(s) Oral at bedtime  cefTRIAXone Injectable.      cefTRIAXone Injectable. 2000 milliGRAM(s) IV Push every 24 hours  chlorhexidine 2% Cloths 1 Application(s) Topical <User Schedule>  dapagliflozin 10 milliGRAM(s) Oral daily  furosemide    Tablet 40 milliGRAM(s) Oral daily  gabapentin 100 milliGRAM(s) Oral two times a day  insulin glargine Injectable (LANTUS) 10 Unit(s) SubCutaneous at bedtime  insulin lispro (ADMELOG) corrective regimen sliding scale   SubCutaneous Before meals and at bedtime  methocarbamol 500 milliGRAM(s) Oral two times a day  metoprolol tartrate 50 milliGRAM(s) Oral every 6 hours  ondansetron Injectable 4 milliGRAM(s) IV Push every 8 hours PRN  pantoprazole    Tablet 40 milliGRAM(s) Oral before breakfast  sacubitril 49 mG/valsartan 51 mG 1 Tablet(s) Oral two times a day  sodium chloride 0.9% lock flush 10 milliLiter(s) IV Push every 1 hour PRN  spironolactone 25 milliGRAM(s) Oral daily  tamsulosin 0.4 milliGRAM(s) Oral at bedtime      ICU Vital Signs Last 24 Hrs  T(C): 36.5, Max: 38.1 ( @ 03:45)  HR: 73 (63 - 87)  BP: 141/77 (124/65 - 148/72)  BP(mean): 95 (95 - 95)  ABP: --  ABP(mean): --  RR: 18 (18 - 20)  SpO2: 96% (94% - 98%)    Weight in k.5 (23)  Weight in k.3 (23)      I&O's Summary Last 24 Hrs    IN: 0 mL / OUT: 1100 mL / NET: -1100 mL      Tele: SR 60-80s, PVCs, PACs    Physical Exam:    General: No distress. Comfortable.  Neck: JVP not elevated.  Respiratory: Clear to auscultation bilaterally  CV: RRR. Normal S1 and S2. No murmurs, rub, or gallops. Radial pulses normal.  Abdomen: Soft, non-distended, non-tender  Extremities: Warm, no edema  Neurology: Non-focal, alert and oriented times three.   Psych: Affect normal    Labs:    ( 23 @ 06:35 )               9.8    11.70 )--------( 248                  34.8     ( 23 @ 06:35 )     139  |  106  |  23.6  ---------------------<  107  4.0  |  21.0  |  1.15    Ca 7.9  Phos x   Mg 1.8    ( 23 @ 09:02 )  TropHS 41    / CK 43    / CKMB x       ADVANCED HEART FAILURE - PROGRESS NOTE  [address] [place] [address]  Office Phone: [phone]/Fax: [phone]  Service/On Call Phone [phone]  _______________________________________________________________________________________________________    Subjective:  - NAEO  - Endorses some SOB last night prior to going to bed but states his breathing is better this morning    Medications:  acetaminophen     Tablet .. 975 milliGRAM(s) Oral every 6 hours PRN  apixaban 5 milliGRAM(s) Oral every 12 hours  aspirin enteric coated 81 milliGRAM(s) Oral daily  atorvastatin 20 milliGRAM(s) Oral at bedtime  cefTRIAXone Injectable.      cefTRIAXone Injectable. 2000 milliGRAM(s) IV Push every 24 hours  chlorhexidine 2% Cloths 1 Application(s) Topical <User Schedule>  dapagliflozin 10 milliGRAM(s) Oral daily  furosemide    Tablet 40 milliGRAM(s) Oral daily  gabapentin 100 milliGRAM(s) Oral two times a day  insulin glargine Injectable (LANTUS) 10 Unit(s) SubCutaneous at bedtime  insulin lispro (ADMELOG) corrective regimen sliding scale   SubCutaneous Before meals and at bedtime  methocarbamol 500 milliGRAM(s) Oral two times a day  metoprolol tartrate 50 milliGRAM(s) Oral every 6 hours  ondansetron Injectable 4 milliGRAM(s) IV Push every 8 hours PRN  pantoprazole    Tablet 40 milliGRAM(s) Oral before breakfast  sacubitril 49 mG/valsartan 51 mG 1 Tablet(s) Oral two times a day  sodium chloride 0.9% lock flush 10 milliLiter(s) IV Push every 1 hour PRN  spironolactone 25 milliGRAM(s) Oral daily  tamsulosin 0.4 milliGRAM(s) Oral at bedtime      ICU Vital Signs Last 24 Hrs  T(C): 36.5, Max: 38.1 ( @ 03:45)  HR: 73 (63 - 87)  BP: 141/77 (124/65 - 148/72)  BP(mean): 95 (95 - 95)  ABP: --  ABP(mean): --  RR: 18 (18 - 20)  SpO2: 96% (94% - 98%)    Weight in k.5 (23)  Weight in k.3 (23)      I&O's Summary Last 24 Hrs    IN: 0 mL / OUT: 1100 mL / NET: -1100 mL      Tele: SR 60-80s, PVCs, PACs    Physical Exam:    General: No distress. Comfortable.  Neck: JVP not elevated.  Respiratory: Clear to auscultation bilaterally  CV: RRR. Normal S1 and S2. No murmurs, rub, or gallops. Radial pulses normal.  Abdomen: Soft, non-distended, non-tender  Extremities: Warm, no edema  Neurology: Non-focal, alert and oriented times three.   Psych: Affect normal    Labs:    ( 23 @ 06:35 )               9.8    11.70 )--------( 248                  34.8     ( 23 @ 06:35 )     139  |  106  |  23.6  ---------------------<  107  4.0  |  21.0  |  1.15    Ca 7.9  Phos x   Mg 1.8    ( 23 @ 09:02 )  TropHS 41    / CK 43    / CKMB x

## 2023-12-06 NOTE — PROGRESS NOTE ADULT - REASON FOR ADMISSION
SOB

## 2023-12-06 NOTE — PROGRESS NOTE ADULT - PROBLEM SELECTOR PLAN 4
Continue lipitor  Resume ASA
Continue lipitor  Resume ASA
Now NSR post cardioversion  Continue anticoagulation with eliquis
Continue lipitor  Resume ASA when appropriate
- s/p DCCV on 12/1 and now in SR  - c/w BB and AC with Eliquis  - per EP's note on 12/1, patient should be on amiodarone?
- s/p DCCV on 12/1 and now in SR  - c/w BB and AC with Eliquis  - per EP's note on 12/1, patient should be on amiodarone?
Continue lipitor  Resume ASA
endo following   cont lantus/dominga while in house  Endo to decide if basal insulin with oral meds vs only oral meds for discharge
Now NSR post cardioversion  Continue anticoagulation.

## 2023-12-06 NOTE — PROGRESS NOTE ADULT - PROBLEM SELECTOR PLAN 1
- Etiology: Possibly ischemic vs tachy induced. C deferred for DEVON this admission.  - Clinically close to euvolemic w/ lukewarm extremities.   - GDMT: Currently on metoprolol tartrate 50 mg PO q6hrs, will switch to Toprol  mg daily. Increase Entresto to 49-51 mg BID and start Farxiga 10 mg daily. Continue spironolactone 25 mg daily.  - Diuretics: Continue Lasix 40 mg PO daily  - Please document strict I&Os and daily standing weight  - Device: Will repeat TTE once on GDMT to see if he meets criteria for primary prevention ICD.  - Should be screen for CATHERINE as an outpatient  - D/c planning to BERTIN per primary team  - Will arrange follow-up in our [place] office in 1-2 weeks. - Etiology: Possibly ischemic vs tachy induced. C deferred for DEVON this admission.  - Clinically close to euvolemic w/ lukewarm extremities.   - GDMT: Currently on metoprolol tartrate 50 mg PO q6hrs, will switch to Toprol  mg daily. Increase Entresto to 49-51 mg BID and start Farxiga 10 mg daily. Continue spironolactone 25 mg daily.  - Diuretics: Continue Lasix 40 mg PO daily  - Please document strict I&Os and daily standing weight  - Device: Will repeat TTE once on GDMT to see if he meets criteria for primary prevention ICD.  - Should be screen for CATHERINE as an outpatient  - D/c planning to BERTIN per primary team  - Will arrange follow-up in our [place] office in 1-2 weeks.

## 2023-12-06 NOTE — PROGRESS NOTE ADULT - PROBLEM SELECTOR PROBLEM 4
Diabetes
Empyema lung
Atrial flutter
Coronary artery disease involving native coronary artery of native heart, unspecified whether angina
Empyema lung
Atrial flutter
Empyema lung
Atrial flutter
Coronary artery disease involving native coronary artery of native heart, unspecified whether angina
Atrial flutter

## 2023-12-06 NOTE — PROGRESS NOTE ADULT - NS ATTEND AMEND GEN_ALL_CORE FT
Pt seen and examined     pt without PMD   encouraged pt to follow up with us after BERTIN and also fin PMD     pt to go on Farxiga now  per CT surgery  so willnot give januvia

## 2023-12-06 NOTE — PROGRESS NOTE ADULT - PROBLEM SELECTOR PROBLEM 6
Acute kidney injury
Diabetes
Acute kidney injury
Diabetes
Coronary artery disease involving native coronary artery of native heart, unspecified whether angina

## 2023-12-06 NOTE — PROGRESS NOTE ADULT - SUBJECTIVE AND OBJECTIVE BOX
INTERVAL EVENTS:  Follow up for diabetes management    ROS: Patient denies chest pain, abd pain, N/V. Some mild SOB overnight, no complaints this am    MEDICATIONS  (STANDING):  apixaban 5 milliGRAM(s) Oral every 12 hours  aspirin enteric coated 81 milliGRAM(s) Oral daily  atorvastatin 20 milliGRAM(s) Oral at bedtime  cefTRIAXone Injectable.      cefTRIAXone Injectable. 2000 milliGRAM(s) IV Push every 24 hours  chlorhexidine 2% Cloths 1 Application(s) Topical <User Schedule>  dapagliflozin 10 milliGRAM(s) Oral daily  furosemide    Tablet 40 milliGRAM(s) Oral daily  gabapentin 100 milliGRAM(s) Oral two times a day  insulin glargine Injectable (LANTUS) 10 Unit(s) SubCutaneous at bedtime  insulin lispro (ADMELOG) corrective regimen sliding scale   SubCutaneous Before meals and at bedtime  methocarbamol 500 milliGRAM(s) Oral two times a day  metoprolol succinate  milliGRAM(s) Oral at bedtime  pantoprazole    Tablet 40 milliGRAM(s) Oral before breakfast  sacubitril 49 mG/valsartan 51 mG 1 Tablet(s) Oral two times a day  spironolactone 25 milliGRAM(s) Oral daily  tamsulosin 0.4 milliGRAM(s) Oral at bedtime    MEDICATIONS  (PRN):  acetaminophen     Tablet .. 975 milliGRAM(s) Oral every 6 hours PRN Temp greater or equal to 38C (100.4F), Mild Pain (1 - 3)  ondansetron Injectable 4 milliGRAM(s) IV Push every 8 hours PRN Nausea and/or Vomiting  sodium chloride 0.9% lock flush 10 milliLiter(s) IV Push every 1 hour PRN Pre/post blood products, medications, blood draw, and to maintain line patency      Allergies  No Known Allergies      Vital Signs Last 24 Hrs  T(C): 36.5 (06 Dec 2023 12:00), Max: 38.1 (06 Dec 2023 03:45)  T(F): 97.7 (06 Dec 2023 12:00), Max: 100.6 (06 Dec 2023 03:45)  HR: 73 (06 Dec 2023 12:00) (63 - 87)  BP: 141/77 (06 Dec 2023 12:00) (124/65 - 148/72)  BP(mean): 95 (05 Dec 2023 16:46) (95 - 95)  RR: 18 (06 Dec 2023 12:00) (18 - 20)  SpO2: 96% (06 Dec 2023 12:00) (94% - 98%)    Parameters below as of 06 Dec 2023 12:00  Patient On (Oxygen Delivery Method): room air        PHYSICAL EXAM:  GENERAL: NAD, comfortable, obese  NECK: Supple; trachea midline  CHEST/LUNG: Clear to auscultation bilaterally; No wheezes  HEART: Regular rate and rhythm  ABDOMEN: Soft, Nontender, Nondistended; Bowel sounds present  NEURO: AAOx3, no tremor  EXTREMITIES:  2+ Peripheral Pulses      LABS:                        9.8    11.70 )-----------( 248      ( 06 Dec 2023 06:35 )             34.8     12-06    139  |  106  |  23.6<H>  ----------------------------<  107<H>  4.0   |  21.0<L>  |  1.15    Ca    7.9<L>      06 Dec 2023 06:35  Mg     1.8     12-06      Urinalysis Basic - ( 06 Dec 2023 06:35 )    Color: x / Appearance: x / SG: x / pH: x  Gluc: 107 mg/dL / Ketone: x  / Bili: x / Urobili: x   Blood: x / Protein: x / Nitrite: x   Leuk Esterase: x / RBC: x / WBC x   Sq Epi: x / Non Sq Epi: x / Bacteria: x          POCT Blood Glucose.: 116 mg/dL (12-06-23 @ 12:02)  POCT Blood Glucose.: 109 mg/dL (12-06-23 @ 08:09)  POCT Blood Glucose.: 132 mg/dL (12-05-23 @ 21:41)  POCT Blood Glucose.: 103 mg/dL (12-05-23 @ 17:08)

## 2023-12-06 NOTE — CHART NOTE - NSCHARTNOTESELECT_GEN_ALL_CORE
Electrophysiology/Event Note
Event Note
Nutrition Services

## 2023-12-06 NOTE — PROGRESS NOTE ADULT - NS ATTEND BILL GEN_ALL_CORE
Attending to bill
PA/NP to bill
Attending to bill

## 2023-12-06 NOTE — PROGRESS NOTE ADULT - NS ATTEND AMEND GEN_ALL_CORE FT
Pt is stable from the HF standpoint.   Appears euvolemic on the physical exam.   Tolerating GDMT. BP is slightly elevated, will continue to uptitrate.  Plan for DC to [facility].  Will follow up as outpt. Pt is stable from the HF standpoint.   Appears euvolemic on the physical exam.   Tolerating GDMT. BP is slightly elevated, will continue to uptitrate.  Plan for DC to [facility].  Will follow up as outpt.

## 2023-12-06 NOTE — PROGRESS NOTE ADULT - PROBLEM SELECTOR PLAN 2
- LHC deferred by IC as above (will also need to wait at least 30 days post DCCV in order to hold AC)  - Recommend to obtain records from his prior cardiologist to determine need of ischemia work up.  - Continue ASA, statin, and BB. Odomzo Counseling- I discussed with the patient the risks of Odomzo including but not limited to nausea, vomiting, diarrhea, constipation, weight loss, changes in the sense of taste, decreased appetite, muscle spasms, and hair loss.  The patient verbalized understanding of the proper use and possible adverse effects of Odomzo.  All of the patient's questions and concerns were addressed.

## 2023-12-06 NOTE — PROGRESS NOTE ADULT - ASSESSMENT
Outpatient cardiologist: Dr. Milian  Initial HF c/s: Dr. Brenner    72 y/o M with a history of HTN, CAD, s/p CABG (11/2021 at [place]), and former smoker (1PPD x20 years, quit 11/2021), who presented to [facility] on 11/18 with worsening SOB and LE edema.    He reports intermittent SOB since his CABG in 2021 (prior to his surgery he never had any CP or SOB). At best, he was able to walk about 4 blocks with a walker. He typically sleeps in the recliner for comfort but does endorse orthopnea and PND if he lays in the bed. He denies any known history of HFrEF or CKD. His PCP retired, so he has only been following with his cardiologist, Dr. Milian, since his CABG who he sees about every 3 months. 2 weeks PTA he noticed increased SOB with minimal exertion and LE edema. Metolazone 5 mg was added to his medications twice weekly and his metoprolol was also increased to 100 mg BID.     On admission he was found to have a mod-large loculated left pleural effusion. A chest tube was inserted and drained ~600cc initially (cultures + for strep intermedius). TTE showed severe biventricular dysfunction (LVEF 20-25%) and mod MR. His labs were significant for DEVON (BUN/Cr 48/2.12, unknown baseline Cr), pro-BNP 5657. EKG showed new onset Aflutter with RVR and he was also diagnosed with new onset DM2 (A1c 7.3%).    On 11/27 he was taken to the OR for left VATS decortication. Extubated POD 1. He is s/p successful DCCV on 12/1.     Cardiac Studies:  12/1/23 QUIN: LVEF 20-25%, normal RV size with mod RV dysfunction, mod LAE, mod MR.  11/18/23 TTE: LVIDd 6.09cm, LVEF 20-25%, mild RVE with severe RV dysfunction, mild LAE, normal RA size, mild MR. Outpatient cardiologist: Dr. Milian  Initial HF c/s: Dr. Brenner    72 y/o M with a history of HTN, CAD, s/p CABG (11/2021 at [place]), and former smoker (1PPD x20 years, quit 11/2021), who presented to [facility] on 11/18 with worsening SOB and LE edema.    He reports intermittent SOB since his CABG in 2021 (prior to his surgery he never had any CP or SOB). At best, he was able to walk about 4 blocks with a walker. He typically sleeps in the recliner for comfort but does endorse orthopnea and PND if he lays in the bed. He denies any known history of HFrEF or CKD. His PCP retired, so he has only been following with his cardiologist, Dr. Milian, since his CABG who he sees about every 3 months. 2 weeks PTA he noticed increased SOB with minimal exertion and LE edema. Metolazone 5 mg was added to his medications twice weekly and his metoprolol was also increased to 100 mg BID.     On admission he was found to have a mod-large loculated left pleural effusion. A chest tube was inserted and drained ~600cc initially (cultures + for strep intermedius). TTE showed severe biventricular dysfunction (LVEF 20-25%) and mod MR. His labs were significant for DEVON (BUN/Cr 48/2.12, unknown baseline Cr), pro-BNP 5657. EKG showed new onset Aflutter with RVR and he was also diagnosed with new onset DM2 (A1c 7.3%).    On 11/27 he was taken to the OR for left VATS decortication. Extubated POD 1. He is s/p successful DCCV on 12/1.     Cardiac Studies:  12/1/23 QUIN: LVEF 20-25%, normal RV size with mod RV dysfunction, mod LAE, mod MR.  11/18/23 TTE: LVIDd 6.09cm, LVEF 20-25%, mild RVE with severe RV dysfunction, mild LAE, normal RA size, mild MR.

## 2023-12-06 NOTE — PROGRESS NOTE ADULT - PROBLEM SELECTOR PLAN 2
In setting of empyema - RESOLVED  Currently oxygenating well on room air  C/w supportive care: incentive, cough/deep breathing exercises, PT/OOB to chair

## 2023-12-06 NOTE — PROGRESS NOTE ADULT - ASSESSMENT
72M w/ PMH of T2DM, CAD s/p CABG, CHF, HTN, HLD, Afib (not on AC due to hx of GIB) presents with a few days worsening of dyspnea and LE edema. found to have large loculated pleural effusion. 11/28 s/p L lung decortication.    Consulted for diabetes management  Home diabetes meds: Was on Januvia previously, not recent  Current a1c: 7.3%    1. T2DM- FS running low normal  - Weaning patient off basal insulin, likely can go home on just orals- doing well  - Decrease Lantus to 6 units qhs  - Continue sliding scale coverage  - Discharge recommendations: Likely can go home on just orals without basal-> Januvia 25 mg daily    2. Pleural effusion  - S/p decortication, care per primary team    3. HLD- C/w statin    4. Aflutter- s/p successful QUIN/DCCV 12/1       72M w/ PMH of T2DM, CAD s/p CABG, CHF, HTN, HLD, Afib (not on AC due to hx of GIB) presents with a few days worsening of dyspnea and LE edema. found to have large loculated pleural effusion. 11/28 s/p L lung decortication.    Consulted for diabetes management  Home diabetes meds: Was on Januvia previously, not recent  Current a1c: 7.3%    1. T2DM- FS running low normal  - Weaning patient off basal insulin, likely can go home on just orals- doing well  - Decrease Lantus to 6 units qhs  - Continue sliding scale coverage  - Discharge recommendations: Farxiga 10 mg daily  - Follow up outpatient: date TBD- will call pt with appointment time if discharged today    2. Pleural effusion  - S/p decortication, care per primary team    3. HLD- C/w statin    4. Aflutter- s/p successful QUIN/DCCV 12/1

## 2023-12-06 NOTE — PROGRESS NOTE ADULT - PROBLEM SELECTOR PLAN 5
DEVON vs CKD  Unknown baseline  Renal following  Monitor dig level
Continue lipitor  Resume ASA
- ID following  - continue IV abx with ceftriaxone (plan to treat through 12/25).
DEVON vs CKD  Unknown baseline  Renal following  Monitor dig level
DEVON vs CKD  Unknown baseline  Renal following  Monitor vanco/dig level
DEVON vs CKD  Unknown baseline  Renal following  Monitor dig level
- ID following  - continue IV abx with ceftriaxone (plan to treat through 12/25).
Continue lipitor  Resume ASA
Continue lipitor  Resume ASA
cont lipitor and ASA
Now NSR post cardioversion  Continue anticoagulation with Eliquis  C/w beta blocker

## 2023-12-06 NOTE — PROGRESS NOTE ADULT - ASSESSMENT
72-year-old male with history of hypertension, CAD s/p CABGx 4 (2021), HLD, and obesity who presented to ED 11/18 c/o worsening SAVAGE x1 day. Admitted with AF RVR and acute on chronic systolic HF exacerbation, found to have large L pleural effusion requiring pigtail placement. Pleural fluid cultures + strept intermedius. Pt underwent FB L VATS total decortication on 11/27 with Dr. Encarnacion. Postop pt underwent successful DCCV 12/1 with EP. CT removed without incident postop. Pt remains on ceftriaxone per ID for empyema. Heart Failure following with reinitiation of GDMT. 12/5 Midline placed for ongoing ABX. Dispo planning for BERTIN placement.

## 2023-12-06 NOTE — CHART NOTE - NSCHARTNOTEFT_GEN_A_CORE
Nutrition Note: RD provided additional DM education as requested. Discussed plate method, carbohydrate counting, etc.

## 2023-12-06 NOTE — PROGRESS NOTE ADULT - NS ATTEND OPT1 GEN_ALL_CORE

## 2023-12-06 NOTE — PROGRESS NOTE ADULT - PROBLEM SELECTOR PROBLEM 1
HFrEF (heart failure with reduced ejection fraction)
Pleural effusion
Acute systolic congestive heart failure
Pleural effusion
Atrial flutter
Pleural effusion
Pleural effusion
HFrEF (heart failure with reduced ejection fraction)
Pleural effusion
Atrial flutter
HFrEF (heart failure with reduced ejection fraction)
HFrEF (heart failure with reduced ejection fraction)
Atrial flutter
Atrial flutter
HFrEF (heart failure with reduced ejection fraction)
Pleural effusion
Atrial flutter
Pleural effusion
Atrial flutter
Acute systolic congestive heart failure
Pleural effusion

## 2023-12-06 NOTE — PROGRESS NOTE ADULT - PROBLEM SELECTOR PROBLEM 3
Atrial flutter
Coronary artery disease involving native coronary artery of native heart, unspecified whether angina
Diabetes
Acute kidney injury
Acute kidney injury
Pleural effusion
Pleural effusion
Atrial flutter
Atrial flutter
Coronary artery disease involving native coronary artery of native heart, unspecified whether angina
Coronary artery disease involving native coronary artery of native heart, unspecified whether angina
Atrial flutter
Pleural effusion
Pleural effusion
Atrial flutter
Pleural effusion
Pleural effusion
Diabetes
Acute HFrEF (heart failure with reduced ejection fraction)

## 2023-12-06 NOTE — PROGRESS NOTE ADULT - PROBLEM SELECTOR PROBLEM 7
Prophylactic measure
Acute kidney injury
Prophylactic measure

## 2023-12-06 NOTE — PROGRESS NOTE ADULT - PROBLEM SELECTOR PROBLEM 2
Acute respiratory failure with hypoxia
Atrial flutter, unspecified type
HFrEF (heart failure with reduced ejection fraction)
HFrEF (heart failure with reduced ejection fraction)
Acute respiratory failure with hypoxia
Atrial flutter, unspecified type
Atrial flutter
Atrial flutter, unspecified type
HFrEF (heart failure with reduced ejection fraction)
Acute respiratory failure with hypoxia
Acute respiratory failure with hypoxia
Pleural effusion
Acute respiratory failure with hypoxia
Coronary artery disease involving native coronary artery of native heart, unspecified whether angina
Acute respiratory failure with hypoxia
Coronary artery disease involving native coronary artery of native heart, unspecified whether angina

## 2023-12-06 NOTE — PROGRESS NOTE ADULT - PROBLEM SELECTOR PROBLEM 5
Coronary artery disease involving native coronary artery of native heart, unspecified whether angina
Acute kidney injury
Coronary artery disease involving native coronary artery of native heart, unspecified whether angina
Acute kidney injury
Empyema lung
Acute kidney injury
Coronary artery disease involving native coronary artery of native heart, unspecified whether angina
Acute kidney injury
Coronary artery disease involving native coronary artery of native heart, unspecified whether angina
Atrial flutter
Empyema lung

## 2023-12-06 NOTE — PROGRESS NOTE ADULT - NS ATTEND OPT1A GEN_ALL_CORE
Medical decision making
Medical decision making
History/Exam/Medical decision making

## 2023-12-06 NOTE — PROGRESS NOTE ADULT - PROVIDER SPECIALTY LIST ADULT
Critical Care
Critical Care
Electrophysiology
Endocrinology
Endocrinology
Hospitalist
Infectious Disease
Infectious Disease
Nephrology
Nephrology
Thoracic Surgery
Cardiology
Electrophysiology
Endocrinology
Hospitalist
Infectious Disease
Infectious Disease
Thoracic Surgery
Critical Care
Electrophysiology
Endocrinology
Hospitalist
Infectious Disease
Internal Medicine
Nephrology
Nephrology
Thoracic Surgery
Thoracic Surgery
Electrophysiology
Electrophysiology
Infectious Disease
Internal Medicine
Internal Medicine
Nephrology
Nephrology
Cardiology
Cardiology
Endocrinology
Hospitalist
Infectious Disease
Thoracic Surgery
Nephrology
Nephrology
Thoracic Surgery
Endocrinology
Hospitalist
Nephrology
Thoracic Surgery
Heart Failure
Thoracic Surgery
Electrophysiology
Hospitalist
Cardiology
Cardiology
Thoracic Surgery
CT Surgery
Thoracic Surgery
Thoracic Surgery
Critical Care
Heart Failure
Thoracic Surgery

## 2023-12-06 NOTE — PROGRESS NOTE ADULT - PROBLEM SELECTOR PLAN 6
- new onset DM2  - endocrine following  - c/w insulin; will eventually add SGLT2i for HF/CKD.
DEVON vs CKD  Unknown baseline  Renal following  Monitor dig level  f/u need for diuresis
DEVON ? super imposed on CKD  Cr improved  tolerating diuretics
cont lipitor and ASA
- new onset DM2  - endocrine following  - c/w insulin; will add SGLT2i for HF/CKD as above
DEVON vs CKD  Unknown baseline  Renal following  Monitor dig level  f/u need for diuresis
DEVON vs CKD  Unknown baseline  Renal following  Monitor dig level  f/u need for diuresis

## 2023-12-06 NOTE — PROGRESS NOTE ADULT - PROBLEM SELECTOR PLAN 3
Now NSR post cardioversion  Continue anticoagulation.
Potiential left heart cath tomorrow   c/w Asa, atorvastatin, metoprolol    Cardiac meds  aspirin  chewable 81 milliGRAM(s) Oral daily  atorvastatin 20 milliGRAM(s) Oral at bedtime  furosemide   Injectable 40 milliGRAM(s) IV Push every 12 hours  heparin  Infusion.  Unit(s)/Hr (23 mL/Hr) IV Continuous <Continuous>  Metlozone 5mg qd  metoprolol tartrate 50 milliGRAM(s) Oral every 6 hours  spironolactone 25 milliGRAM(s) Oral daily
c/w Asa, atorvastatin and metoprolol      aspirin  chewable 81 milliGRAM(s) Oral daily  atorvastatin 20 milliGRAM(s) Oral at bedtime  furosemide   Injectable 40 milliGRAM(s) IV Push every 12 hours  heparin  Infusion.  Unit(s)/Hr (23 mL/Hr) IV Continuous <Continuous>  metolazone 5 milliGRAM(s) Oral daily  metoprolol tartrate 50 milliGRAM(s) Oral every 6 hours  potassium chloride    Tablet ER 40 milliEquivalent(s) Oral every 4 hours  spironolactone 25 milliGRAM(s) Oral daily
- Right and left heart catheterization on Monday  GDMT:        APT: Will continue aspirin 81 mg daily       Statin: Will continue Lipitor 20 mg daily and check lipids in AM.       Beta Blocker: Continue metoprolol 50 mg every 6 hours.       Other Antianginals: N/A       Aggressive cardiovascular risk reduction and lifestyle modification.  - Will try and get records from Dr. Milian's office.  - NPO after midnight.
Remains in rapid aflutter - 120s  EP following  Pending cardioversion postop.
Remains in rapid aflutter - 120s  EP following  Pending cardioversion 12/1
Remains in rapid aflutter - 120s  EP following  Pending cardioversion postop.
endo following   devreased leonel allison premeal   Endo to decide if basal insulin with oral meds vs only oral meds for discharge
- Unknown baseline but renal function is improving  - Nephrology following  - Will monitor renal function closely with GDMT optimization.
endo following   Continue with Lantus 16 units qhs  - Continue with Admelog 4 units tid and sliding scale coverage  Endo to decide if basal insulin with oral meds vs only oral meds for discharge
endo following   cont lantus/dominga while in house  Endo to decide if basal insulin with oral meds vs only oral meds for discharge
EF 20-25%  Does not appear to be decompensated, appears near euvolemia  s/p DCCV on 12/1, now remains in NSR  Heart Failure consult appreciated  GDMT resumed  Strict I&Os, daily weight  C/w Lasix, Entresto, Aldactone, Beta blocker  Likely switch lopressor to Toprol today
endo following   devreased leonel allison premeal   Endo to decide if basal insulin with oral meds vs only oral meds for discharge
- Unknown baseline but renal function is improving  - Nephrology following  - Will monitor renal function closely with GDMT optimization.

## 2023-12-06 NOTE — PROGRESS NOTE ADULT - SUBJECTIVE AND OBJECTIVE BOX
NEPHROLOGY INTERVAL HPI/OVERNIGHT EVENTS:    No new events.    MEDICATIONS  (STANDING):  apixaban 5 milliGRAM(s) Oral every 12 hours  aspirin enteric coated 81 milliGRAM(s) Oral daily  atorvastatin 20 milliGRAM(s) Oral at bedtime  cefTRIAXone Injectable.      cefTRIAXone Injectable. 2000 milliGRAM(s) IV Push every 24 hours  chlorhexidine 2% Cloths 1 Application(s) Topical <User Schedule>  dapagliflozin 10 milliGRAM(s) Oral daily  furosemide    Tablet 40 milliGRAM(s) Oral daily  gabapentin 100 milliGRAM(s) Oral two times a day  insulin lispro (ADMELOG) corrective regimen sliding scale   SubCutaneous Before meals and at bedtime  methocarbamol 500 milliGRAM(s) Oral two times a day  metoprolol succinate  milliGRAM(s) Oral at bedtime  pantoprazole    Tablet 40 milliGRAM(s) Oral before breakfast  sacubitril 49 mG/valsartan 51 mG 1 Tablet(s) Oral two times a day  spironolactone 25 milliGRAM(s) Oral daily  tamsulosin 0.4 milliGRAM(s) Oral at bedtime    MEDICATIONS  (PRN):  acetaminophen     Tablet .. 975 milliGRAM(s) Oral every 6 hours PRN Temp greater or equal to 38C (100.4F), Mild Pain (1 - 3)  ondansetron Injectable 4 milliGRAM(s) IV Push every 8 hours PRN Nausea and/or Vomiting  sodium chloride 0.9% lock flush 10 milliLiter(s) IV Push every 1 hour PRN Pre/post blood products, medications, blood draw, and to maintain line patency      Allergies    No Known Allergies      Vital Signs Last 24 Hrs  T(C): 36.5 (06 Dec 2023 12:00), Max: 38.1 (06 Dec 2023 03:45)  T(F): 97.7 (06 Dec 2023 12:00), Max: 100.6 (06 Dec 2023 03:45)  HR: 73 (06 Dec 2023 12:00) (63 - 87)  BP: 141/77 (06 Dec 2023 12:00) (124/65 - 148/72)  BP(mean): 95 (05 Dec 2023 16:46) (95 - 95)  RR: 18 (06 Dec 2023 12:00) (18 - 20)  SpO2: 96% (06 Dec 2023 12:00) (94% - 98%)    Parameters below as of 06 Dec 2023 12:00  Patient On (Oxygen Delivery Method): room air         PHYSICAL EXAM:    GENERAL: Comfortable in bed, breathing better  today  HEAD:    EYES:   ENMT:   NECK: veins  not  distended  NERVOUS SYSTEM:  alert, oriented  CHEST/LUNG: no wheezes  HEART: no  rub noted  ABDOMEN: obese  EXTREMITIES:  leg  edema  better  LYMPH:   SKIN: pale    LABS:                        9.8    11.70 )-----------( 248      ( 06 Dec 2023 06:35 )             34.8     12-06    139  |  106  |  23.6<H>  ----------------------------<  107<H>  4.0   |  21.0<L>  |  1.15    Ca    7.9<L>      06 Dec 2023 06:35  Mg     1.8     12-06        Urinalysis Basic - ( 06 Dec 2023 06:35 )    Color: x / Appearance: x / SG: x / pH: x  Gluc: 107 mg/dL / Ketone: x  / Bili: x / Urobili: x   Blood: x / Protein: x / Nitrite: x   Leuk Esterase: x / RBC: x / WBC x   Sq Epi: x / Non Sq Epi: x / Bacteria: x      Magnesium: 1.8 mg/dL (12-06 @ 06:35)          RADIOLOGY & ADDITIONAL TESTS:

## 2023-12-11 ENCOUNTER — APPOINTMENT (OUTPATIENT)
Dept: CARDIOLOGY | Facility: CLINIC | Age: 73
End: 2023-12-11

## 2023-12-12 ENCOUNTER — TRANSCRIPTION ENCOUNTER (OUTPATIENT)
Age: 73
End: 2023-12-12

## 2023-12-12 ENCOUNTER — APPOINTMENT (OUTPATIENT)
Dept: HEART FAILURE | Facility: CLINIC | Age: 73
End: 2023-12-12

## 2023-12-13 PROCEDURE — 71250 CT THORAX DX C-: CPT

## 2023-12-13 PROCEDURE — 85014 HEMATOCRIT: CPT

## 2023-12-13 PROCEDURE — 97530 THERAPEUTIC ACTIVITIES: CPT

## 2023-12-13 PROCEDURE — 82330 ASSAY OF CALCIUM: CPT

## 2023-12-13 PROCEDURE — 84295 ASSAY OF SERUM SODIUM: CPT

## 2023-12-13 PROCEDURE — 86900 BLOOD TYPING SEROLOGIC ABO: CPT

## 2023-12-13 PROCEDURE — 85610 PROTHROMBIN TIME: CPT

## 2023-12-13 PROCEDURE — 80053 COMPREHEN METABOLIC PANEL: CPT

## 2023-12-13 PROCEDURE — 83036 HEMOGLOBIN GLYCOSYLATED A1C: CPT

## 2023-12-13 PROCEDURE — 36415 COLL VENOUS BLD VENIPUNCTURE: CPT

## 2023-12-13 PROCEDURE — 83735 ASSAY OF MAGNESIUM: CPT

## 2023-12-13 PROCEDURE — 0225U NFCT DS DNA&RNA 21 SARSCOV2: CPT

## 2023-12-13 PROCEDURE — 87181 SC STD AGAR DILUTION PER AGT: CPT

## 2023-12-13 PROCEDURE — 93005 ELECTROCARDIOGRAM TRACING: CPT

## 2023-12-13 PROCEDURE — 86803 HEPATITIS C AB TEST: CPT

## 2023-12-13 PROCEDURE — 83986 ASSAY PH BODY FLUID NOS: CPT

## 2023-12-13 PROCEDURE — P9045: CPT

## 2023-12-13 PROCEDURE — 81001 URINALYSIS AUTO W/SCOPE: CPT

## 2023-12-13 PROCEDURE — 84132 ASSAY OF SERUM POTASSIUM: CPT

## 2023-12-13 PROCEDURE — 80202 ASSAY OF VANCOMYCIN: CPT

## 2023-12-13 PROCEDURE — 87075 CULTR BACTERIA EXCEPT BLOOD: CPT

## 2023-12-13 PROCEDURE — 82550 ASSAY OF CK (CPK): CPT

## 2023-12-13 PROCEDURE — 87077 CULTURE AEROBIC IDENTIFY: CPT

## 2023-12-13 PROCEDURE — 85379 FIBRIN DEGRADATION QUANT: CPT

## 2023-12-13 PROCEDURE — 93312 ECHO TRANSESOPHAGEAL: CPT

## 2023-12-13 PROCEDURE — 71045 X-RAY EXAM CHEST 1 VIEW: CPT

## 2023-12-13 PROCEDURE — 99291 CRITICAL CARE FIRST HOUR: CPT

## 2023-12-13 PROCEDURE — 88312 SPECIAL STAINS GROUP 1: CPT

## 2023-12-13 PROCEDURE — 93320 DOPPLER ECHO COMPLETE: CPT

## 2023-12-13 PROCEDURE — 87040 BLOOD CULTURE FOR BACTERIA: CPT

## 2023-12-13 PROCEDURE — 86901 BLOOD TYPING SEROLOGIC RH(D): CPT

## 2023-12-13 PROCEDURE — 82962 GLUCOSE BLOOD TEST: CPT

## 2023-12-13 PROCEDURE — C1889: CPT

## 2023-12-13 PROCEDURE — 94760 N-INVAS EAR/PLS OXIMETRY 1: CPT

## 2023-12-13 PROCEDURE — 83605 ASSAY OF LACTIC ACID: CPT

## 2023-12-13 PROCEDURE — 82435 ASSAY OF BLOOD CHLORIDE: CPT

## 2023-12-13 PROCEDURE — 93325 DOPPLER ECHO COLOR FLOW MAPG: CPT

## 2023-12-13 PROCEDURE — 80048 BASIC METABOLIC PNL TOTAL CA: CPT

## 2023-12-13 PROCEDURE — 85730 THROMBOPLASTIN TIME PARTIAL: CPT

## 2023-12-13 PROCEDURE — 89051 BODY FLUID CELL COUNT: CPT

## 2023-12-13 PROCEDURE — 85027 COMPLETE CBC AUTOMATED: CPT

## 2023-12-13 PROCEDURE — 96374 THER/PROPH/DIAG INJ IV PUSH: CPT

## 2023-12-13 PROCEDURE — 82945 GLUCOSE OTHER FLUID: CPT

## 2023-12-13 PROCEDURE — 82947 ASSAY GLUCOSE BLOOD QUANT: CPT

## 2023-12-13 PROCEDURE — 82042 OTHER SOURCE ALBUMIN QUAN EA: CPT

## 2023-12-13 PROCEDURE — 84145 PROCALCITONIN (PCT): CPT

## 2023-12-13 PROCEDURE — P9047: CPT

## 2023-12-13 PROCEDURE — 83615 LACTATE (LD) (LDH) ENZYME: CPT

## 2023-12-13 PROCEDURE — 84484 ASSAY OF TROPONIN QUANT: CPT

## 2023-12-13 PROCEDURE — 76775 US EXAM ABDO BACK WALL LIM: CPT

## 2023-12-13 PROCEDURE — 83880 ASSAY OF NATRIURETIC PEPTIDE: CPT

## 2023-12-13 PROCEDURE — 86850 RBC ANTIBODY SCREEN: CPT

## 2023-12-13 PROCEDURE — 80076 HEPATIC FUNCTION PANEL: CPT

## 2023-12-13 PROCEDURE — 88112 CYTOPATH CELL ENHANCE TECH: CPT

## 2023-12-13 PROCEDURE — 93970 EXTREMITY STUDY: CPT

## 2023-12-13 PROCEDURE — 82803 BLOOD GASES ANY COMBINATION: CPT

## 2023-12-13 PROCEDURE — C1729: CPT

## 2023-12-13 PROCEDURE — S2900: CPT

## 2023-12-13 PROCEDURE — 93306 TTE W/DOPPLER COMPLETE: CPT

## 2023-12-13 PROCEDURE — 87102 FUNGUS ISOLATION CULTURE: CPT

## 2023-12-13 PROCEDURE — 84100 ASSAY OF PHOSPHORUS: CPT

## 2023-12-13 PROCEDURE — 87205 SMEAR GRAM STAIN: CPT

## 2023-12-13 PROCEDURE — 84157 ASSAY OF PROTEIN OTHER: CPT

## 2023-12-13 PROCEDURE — 87070 CULTURE OTHR SPECIMN AEROBIC: CPT

## 2023-12-13 PROCEDURE — 80162 ASSAY OF DIGOXIN TOTAL: CPT

## 2023-12-13 PROCEDURE — 85025 COMPLETE CBC W/AUTO DIFF WBC: CPT

## 2023-12-13 PROCEDURE — 97110 THERAPEUTIC EXERCISES: CPT

## 2023-12-13 PROCEDURE — 85018 HEMOGLOBIN: CPT

## 2023-12-13 PROCEDURE — 36600 WITHDRAWAL OF ARTERIAL BLOOD: CPT

## 2023-12-13 PROCEDURE — 94003 VENT MGMT INPAT SUBQ DAY: CPT

## 2023-12-13 PROCEDURE — 88305 TISSUE EXAM BY PATHOLOGIST: CPT

## 2023-12-13 PROCEDURE — 94002 VENT MGMT INPAT INIT DAY: CPT

## 2023-12-14 ENCOUNTER — INPATIENT (INPATIENT)
Facility: HOSPITAL | Age: 73
LOS: 4 days | Discharge: NURSING, REHAB OR LONG-TERM CARE | DRG: 308 | End: 2023-12-19
Attending: STUDENT IN AN ORGANIZED HEALTH CARE EDUCATION/TRAINING PROGRAM | Admitting: INTERNAL MEDICINE
Payer: MEDICARE

## 2023-12-14 VITALS
WEIGHT: 279.99 LBS | OXYGEN SATURATION: 96 % | TEMPERATURE: 98 F | SYSTOLIC BLOOD PRESSURE: 114 MMHG | RESPIRATION RATE: 18 BRPM | HEART RATE: 125 BPM | HEIGHT: 69 IN | DIASTOLIC BLOOD PRESSURE: 82 MMHG

## 2023-12-14 DIAGNOSIS — I25.10 ATHEROSCLEROTIC HEART DISEASE OF NATIVE CORONARY ARTERY WITHOUT ANGINA PECTORIS: ICD-10-CM

## 2023-12-14 DIAGNOSIS — I10 ESSENTIAL (PRIMARY) HYPERTENSION: ICD-10-CM

## 2023-12-14 DIAGNOSIS — I50.9 HEART FAILURE, UNSPECIFIED: ICD-10-CM

## 2023-12-14 DIAGNOSIS — E78.5 HYPERLIPIDEMIA, UNSPECIFIED: ICD-10-CM

## 2023-12-14 DIAGNOSIS — Z95.1 PRESENCE OF AORTOCORONARY BYPASS GRAFT: Chronic | ICD-10-CM

## 2023-12-14 DIAGNOSIS — I48.92 UNSPECIFIED ATRIAL FLUTTER: ICD-10-CM

## 2023-12-14 DIAGNOSIS — Z90.49 ACQUIRED ABSENCE OF OTHER SPECIFIED PARTS OF DIGESTIVE TRACT: Chronic | ICD-10-CM

## 2023-12-14 PROBLEM — J86.9 EMPYEMA: Status: ACTIVE | Noted: 2023-12-14

## 2023-12-14 LAB
ALBUMIN SERPL ELPH-MCNC: 2.6 G/DL — LOW (ref 3.3–5.2)
ALBUMIN SERPL ELPH-MCNC: 2.6 G/DL — LOW (ref 3.3–5.2)
ALP SERPL-CCNC: 134 U/L — HIGH (ref 40–120)
ALP SERPL-CCNC: 134 U/L — HIGH (ref 40–120)
ALT FLD-CCNC: 95 U/L — HIGH
ALT FLD-CCNC: 95 U/L — HIGH
ANION GAP SERPL CALC-SCNC: 14 MMOL/L — SIGNIFICANT CHANGE UP (ref 5–17)
ANION GAP SERPL CALC-SCNC: 14 MMOL/L — SIGNIFICANT CHANGE UP (ref 5–17)
APTT BLD: 38.1 SEC — HIGH (ref 24.5–35.6)
APTT BLD: 38.1 SEC — HIGH (ref 24.5–35.6)
AST SERPL-CCNC: 136 U/L — HIGH
AST SERPL-CCNC: 136 U/L — HIGH
BASOPHILS # BLD AUTO: 0.04 K/UL — SIGNIFICANT CHANGE UP (ref 0–0.2)
BASOPHILS # BLD AUTO: 0.04 K/UL — SIGNIFICANT CHANGE UP (ref 0–0.2)
BASOPHILS NFR BLD AUTO: 0.6 % — SIGNIFICANT CHANGE UP (ref 0–2)
BASOPHILS NFR BLD AUTO: 0.6 % — SIGNIFICANT CHANGE UP (ref 0–2)
BILIRUB SERPL-MCNC: 0.5 MG/DL — SIGNIFICANT CHANGE UP (ref 0.4–2)
BILIRUB SERPL-MCNC: 0.5 MG/DL — SIGNIFICANT CHANGE UP (ref 0.4–2)
BUN SERPL-MCNC: 39.1 MG/DL — HIGH (ref 8–20)
BUN SERPL-MCNC: 39.1 MG/DL — HIGH (ref 8–20)
CALCIUM SERPL-MCNC: 8.7 MG/DL — SIGNIFICANT CHANGE UP (ref 8.4–10.5)
CALCIUM SERPL-MCNC: 8.7 MG/DL — SIGNIFICANT CHANGE UP (ref 8.4–10.5)
CHLORIDE SERPL-SCNC: 100 MMOL/L — SIGNIFICANT CHANGE UP (ref 96–108)
CHLORIDE SERPL-SCNC: 100 MMOL/L — SIGNIFICANT CHANGE UP (ref 96–108)
CO2 SERPL-SCNC: 21 MMOL/L — LOW (ref 22–29)
CO2 SERPL-SCNC: 21 MMOL/L — LOW (ref 22–29)
CREAT SERPL-MCNC: 1.54 MG/DL — HIGH (ref 0.5–1.3)
CREAT SERPL-MCNC: 1.54 MG/DL — HIGH (ref 0.5–1.3)
EGFR: 48 ML/MIN/1.73M2 — LOW
EGFR: 48 ML/MIN/1.73M2 — LOW
EOSINOPHIL # BLD AUTO: 0.12 K/UL — SIGNIFICANT CHANGE UP (ref 0–0.5)
EOSINOPHIL # BLD AUTO: 0.12 K/UL — SIGNIFICANT CHANGE UP (ref 0–0.5)
EOSINOPHIL NFR BLD AUTO: 1.9 % — SIGNIFICANT CHANGE UP (ref 0–6)
EOSINOPHIL NFR BLD AUTO: 1.9 % — SIGNIFICANT CHANGE UP (ref 0–6)
GLUCOSE SERPL-MCNC: 97 MG/DL — SIGNIFICANT CHANGE UP (ref 70–99)
GLUCOSE SERPL-MCNC: 97 MG/DL — SIGNIFICANT CHANGE UP (ref 70–99)
HCT VFR BLD CALC: 35.8 % — LOW (ref 39–50)
HCT VFR BLD CALC: 35.8 % — LOW (ref 39–50)
HGB BLD-MCNC: 11.1 G/DL — LOW (ref 13–17)
HGB BLD-MCNC: 11.1 G/DL — LOW (ref 13–17)
IMM GRANULOCYTES NFR BLD AUTO: 1 % — HIGH (ref 0–0.9)
IMM GRANULOCYTES NFR BLD AUTO: 1 % — HIGH (ref 0–0.9)
INR BLD: 1.9 RATIO — HIGH (ref 0.85–1.18)
INR BLD: 1.9 RATIO — HIGH (ref 0.85–1.18)
LYMPHOCYTES # BLD AUTO: 1.46 K/UL — SIGNIFICANT CHANGE UP (ref 1–3.3)
LYMPHOCYTES # BLD AUTO: 1.46 K/UL — SIGNIFICANT CHANGE UP (ref 1–3.3)
LYMPHOCYTES # BLD AUTO: 23.2 % — SIGNIFICANT CHANGE UP (ref 13–44)
LYMPHOCYTES # BLD AUTO: 23.2 % — SIGNIFICANT CHANGE UP (ref 13–44)
MCHC RBC-ENTMCNC: 26.9 PG — LOW (ref 27–34)
MCHC RBC-ENTMCNC: 26.9 PG — LOW (ref 27–34)
MCHC RBC-ENTMCNC: 31 GM/DL — LOW (ref 32–36)
MCHC RBC-ENTMCNC: 31 GM/DL — LOW (ref 32–36)
MCV RBC AUTO: 86.9 FL — SIGNIFICANT CHANGE UP (ref 80–100)
MCV RBC AUTO: 86.9 FL — SIGNIFICANT CHANGE UP (ref 80–100)
MONOCYTES # BLD AUTO: 0.72 K/UL — SIGNIFICANT CHANGE UP (ref 0–0.9)
MONOCYTES # BLD AUTO: 0.72 K/UL — SIGNIFICANT CHANGE UP (ref 0–0.9)
MONOCYTES NFR BLD AUTO: 11.5 % — SIGNIFICANT CHANGE UP (ref 2–14)
MONOCYTES NFR BLD AUTO: 11.5 % — SIGNIFICANT CHANGE UP (ref 2–14)
NEUTROPHILS # BLD AUTO: 3.88 K/UL — SIGNIFICANT CHANGE UP (ref 1.8–7.4)
NEUTROPHILS # BLD AUTO: 3.88 K/UL — SIGNIFICANT CHANGE UP (ref 1.8–7.4)
NEUTROPHILS NFR BLD AUTO: 61.8 % — SIGNIFICANT CHANGE UP (ref 43–77)
NEUTROPHILS NFR BLD AUTO: 61.8 % — SIGNIFICANT CHANGE UP (ref 43–77)
NT-PROBNP SERPL-SCNC: 2224 PG/ML — HIGH (ref 0–300)
NT-PROBNP SERPL-SCNC: 2224 PG/ML — HIGH (ref 0–300)
PLATELET # BLD AUTO: 216 K/UL — SIGNIFICANT CHANGE UP (ref 150–400)
PLATELET # BLD AUTO: 216 K/UL — SIGNIFICANT CHANGE UP (ref 150–400)
POTASSIUM SERPL-MCNC: 4 MMOL/L — SIGNIFICANT CHANGE UP (ref 3.5–5.3)
POTASSIUM SERPL-MCNC: 4 MMOL/L — SIGNIFICANT CHANGE UP (ref 3.5–5.3)
POTASSIUM SERPL-SCNC: 4 MMOL/L — SIGNIFICANT CHANGE UP (ref 3.5–5.3)
POTASSIUM SERPL-SCNC: 4 MMOL/L — SIGNIFICANT CHANGE UP (ref 3.5–5.3)
PROT SERPL-MCNC: 6.6 G/DL — SIGNIFICANT CHANGE UP (ref 6.6–8.7)
PROT SERPL-MCNC: 6.6 G/DL — SIGNIFICANT CHANGE UP (ref 6.6–8.7)
PROTHROM AB SERPL-ACNC: 20.7 SEC — HIGH (ref 9.5–13)
PROTHROM AB SERPL-ACNC: 20.7 SEC — HIGH (ref 9.5–13)
RBC # BLD: 4.12 M/UL — LOW (ref 4.2–5.8)
RBC # BLD: 4.12 M/UL — LOW (ref 4.2–5.8)
RBC # FLD: 17.2 % — HIGH (ref 10.3–14.5)
RBC # FLD: 17.2 % — HIGH (ref 10.3–14.5)
SODIUM SERPL-SCNC: 135 MMOL/L — SIGNIFICANT CHANGE UP (ref 135–145)
SODIUM SERPL-SCNC: 135 MMOL/L — SIGNIFICANT CHANGE UP (ref 135–145)
T4 AB SER-ACNC: 4.7 UG/DL — SIGNIFICANT CHANGE UP (ref 4.5–12)
T4 AB SER-ACNC: 4.7 UG/DL — SIGNIFICANT CHANGE UP (ref 4.5–12)
TSH SERPL-MCNC: 1.38 UIU/ML — SIGNIFICANT CHANGE UP (ref 0.27–4.2)
TSH SERPL-MCNC: 1.38 UIU/ML — SIGNIFICANT CHANGE UP (ref 0.27–4.2)
WBC # BLD: 6.28 K/UL — SIGNIFICANT CHANGE UP (ref 3.8–10.5)
WBC # BLD: 6.28 K/UL — SIGNIFICANT CHANGE UP (ref 3.8–10.5)
WBC # FLD AUTO: 6.28 K/UL — SIGNIFICANT CHANGE UP (ref 3.8–10.5)
WBC # FLD AUTO: 6.28 K/UL — SIGNIFICANT CHANGE UP (ref 3.8–10.5)

## 2023-12-14 PROCEDURE — 71045 X-RAY EXAM CHEST 1 VIEW: CPT | Mod: 26

## 2023-12-14 PROCEDURE — 93010 ELECTROCARDIOGRAM REPORT: CPT

## 2023-12-14 PROCEDURE — 99291 CRITICAL CARE FIRST HOUR: CPT | Mod: GC

## 2023-12-14 RX ORDER — METOPROLOL TARTRATE 50 MG
25 TABLET ORAL ONCE
Refills: 0 | Status: COMPLETED | OUTPATIENT
Start: 2023-12-14 | End: 2023-12-14

## 2023-12-14 RX ORDER — SODIUM CHLORIDE 9 MG/ML
500 INJECTION, SOLUTION INTRAVENOUS ONCE
Refills: 0 | Status: COMPLETED | OUTPATIENT
Start: 2023-12-14 | End: 2023-12-14

## 2023-12-14 RX ORDER — METOPROLOL TARTRATE 50 MG
5 TABLET ORAL ONCE
Refills: 0 | Status: COMPLETED | OUTPATIENT
Start: 2023-12-14 | End: 2023-12-14

## 2023-12-14 RX ADMIN — Medication 5 MILLIGRAM(S): at 18:52

## 2023-12-14 RX ADMIN — Medication 25 MILLIGRAM(S): at 18:52

## 2023-12-14 RX ADMIN — SODIUM CHLORIDE 1000 MILLILITER(S): 9 INJECTION, SOLUTION INTRAVENOUS at 18:51

## 2023-12-14 RX ADMIN — Medication 5 MILLIGRAM(S): at 20:10

## 2023-12-14 RX ADMIN — Medication 25 MILLIGRAM(S): at 20:11

## 2023-12-14 NOTE — ED CLERICAL - NS ED CLERK NOTE PRE-ARRIVAL INFORMATION; ADDITIONAL PRE-ARRIVAL INFORMATION
This patient is enrolled in the [facility] readmission reduction program and has active care navigation. This patient can be followed up by the care navigation team within 24 hours. To arrange close follow-up or to obtain additional clinical information about this patient, please call the contact number above. Please speak with the [place] ED Case Manager for assistance with discharge planning This patient is enrolled in the [facility] readmission reduction program and has active care navigation. This patient can be followed up by the care navigation team within 24 hours. To arrange close follow-up or to obtain additional clinical information about this patient, please call the contact number above. Please speak with the [place] ED Case Manager for assistance with discharge planning

## 2023-12-14 NOTE — REASON FOR VISIT
[de-identified] : Flexible bronchoscopy, Left video-assisted thoracoscopic surgery with total pulmonary decortication, [de-identified] : 11/27/23 [de-identified] : 72-year-old male with history of hypertension, CAD s/p CABGx 4 2021, HLD, obesity, presented to ED 11/18 c/o worsening SAVAGE x1 day, admitted with AF RVR and acute on chronic systolic HF exacerbation, found to have large L pleural effusion requiring pigtail placement. Pleural fluid cultures + strept intermedius. Pt underwent FB L VATS total decortication 11/27 with Dr. Encarnacion. Postop pt underwent successful DCCV 12/1 with EP. CT removed without incident postop. Pt remains on ceftriaxone per ID for empyema. Pt hemodynamically stable but deconditioned. Pt stable for discharge to rehab as per Dr. Encarnacion.

## 2023-12-14 NOTE — ED ADULT NURSE NOTE - NSFALLUNIVINTERV_ED_ALL_ED
Bed/Stretcher in lowest position, wheels locked, appropriate side rails in place/Call bell, personal items and telephone in reach/Instruct patient to call for assistance before getting out of bed/chair/stretcher/Non-slip footwear applied when patient is off stretcher/[place] to call system/Physically safe environment - no spills, clutter or unnecessary equipment/Purposeful proactive rounding/Room/bathroom lighting operational, light cord in reach Bed/Stretcher in lowest position, wheels locked, appropriate side rails in place/Call bell, personal items and telephone in reach/Instruct patient to call for assistance before getting out of bed/chair/stretcher/Non-slip footwear applied when patient is off stretcher/[place] to call system/Physically safe environment - no spills, clutter or unnecessary equipment/Purposeful proactive rounding/Room/bathroom lighting operational, light cord in reach

## 2023-12-14 NOTE — CONSULT NOTE ADULT - PROBLEM SELECTOR RECOMMENDATION 2
HFrEF  Euvolemic on exam however elevated p-BNP (2224)  Last echo 12/01 EF 20-25%  Trend p-BNP    Continue home meds with strict parameters  Maintain K+>4 and mag >2  Daily weight monitor. Strict I&Os Monitor

## 2023-12-14 NOTE — CONSULT NOTE ADULT - PROBLEM SELECTOR RECOMMENDATION 5
Lipid panel fasting  Hold Statins x now, LFTs are elevated    Further recommendations base on above findings

## 2023-12-14 NOTE — CONSULT NOTE ADULT - SUBJECTIVE AND OBJECTIVE BOX
[facility]                                              CARDIOLOGY AT [address], [place] [address]                                             Telephone: [phone]. Fax:[phone]      CARDIOLOGY CONSULTATION NOTE                                                                                             History obtained by: Patient and medical record  Community Cardiologist: [facility]   obtained: Yes  /   No   Reason for Consultation: A-flutter with RVR     Chief complaint:   Patient is a 73y old  Male who presents with a chief complaint of SOB.    HPI: Patient is a 74 yo male with PMhx HTN, HLD, CAD s/p CABG 2021, CKI, former smoker, HFrEF  20-25%, recent admission for L loculated pleural effusion s/p VATS decortication, a flutter with RVR (s/p DCCV on 12/1/23 on Eliqu) presenting with SOB found to be in a fib with RVR. Patient states he has felt SOB while laying flat as well as at rest for 2-3 days. Patient has been taking his medications. Pt also co cough x 2 days. Denies fevers, chills, dizziness, lightheadedness, dysphagia, dysarthria, diplopia, photophobia, syncope, congestion, CP, abdominal pain, neck pain, back pain, diarrhea, dysuria, hematuria, hematochezia, hematemesis, n/v, recent travel, sick contacts, leg swelling.    CARDIAC TESTING     QUIN Echo Doppler (12.01.23 @ 11:13) >  Summary:   1. Left ventricularejection fraction, by visual estimation, is 20 to 25%.   2. Severely decreased global left ventricular systolic function.   3. Normal RV size with moderately reduced RV systolic function, RVSP   least 29 mmHg.   4. Moderately enlarged left atrium.   5. No left atrial appendage thrombus, left atrial appendage enlargement   and normal left atrial appendage velocities.   6. Mild thickening of the anterior and posterior mitral valve leaflets   with mild posterior leaflet prolapse.   7. Moderate mitral valve regurgitation.   8. Color flow doppler and intravenous injection of agitated saline   demonstrates the presence of an intact intra atrial septum.   9. Lipomatous hypertrophy of the intra-atrial septum.  10. Moderate complex atheromas at the aortic arch.  11. There is no evidence of pericardial effusion.  12. Post-QUIN patient underwent external synchronized direct current   cardioversion with 300 Joules x1 from Aflutter RVR 130s into sinus rhythm   by Dr. Ceballos.  < end of copied text >    PAST MEDICAL HISTORY  Coronary artery disease involving native coronary artery of native heart, unspecified whether angina  CHF with unknown LVEF  Primary hypertension  Hyperlipidemia, unspecified hyperlipidemia type    PAST SURGICAL HISTORY  S/P CABG x 5  History of cholecystectomy    SOCIAL HISTORY: + Former smoker (< 1ppd smoker for 20 years, quit 11/2021) Denies alcohol/drugs    FAMILY HISTORY:  Family history of hypotension (Mother)  Mother CAD    Family History of Cardiovascular Disease:  Yes   Coronary Artery Disease in first degree relative: Yes   Sudden Cardiac Death in First degree relative: No     HOME MEDICATIONS:   acetaminophen 325 mg oral tablet: 3 tab(s) orally every 6 hours As needed Temp greater or equal to 38C (100.4F), Mild Pain (1 - 3) (06 Dec 2023 13:07)  apixaban 5 mg oral tablet: 1 tab(s) orally every 12 hours (06 Dec 2023 13:07)  aspirin 81 mg oral delayed release tablet: 1 tab(s) orally once a day (06 Dec 2023 13:07)  atorvastatin 20 mg oral tablet: 1 tab(s) orally once a day (at bedtime) (06 Dec 2023 13:07)  dapagliflozin 10 mg oral tablet: 1 tab(s) orally once a day (06 Dec 2023 13:07)  furosemide 40 mg oral tablet: 1 tab(s) orally once a day (06 Dec 2023 13:07)  gabapentin 100 mg oral capsule: 1 cap(s) orally 2 times a day (06 Dec 2023 13:07)  insulin glargine 100 units/mL subcutaneous solution: 6 unit(s) subcutaneous once a day (at bedtime) (06 Dec 2023 13:07)  methocarbamol 500 mg oral tablet: 1 tab(s) orally 2 times a day (06 Dec 2023 13:07)  metoprolol succinate 100 mg oral tablet, extended release: 1 tab(s) orally once a day (at bedtime) (06 Dec 2023 13:07)  pantoprazole 40 mg oral delayed release tablet: 1 tab(s) orally once a day (before a meal) (06 Dec 2023 13:07)  sacubitril-valsartan 49 mg-51 mg oral tablet: 1 tab(s) orally 2 times a day (06 Dec 2023 13:07)  spironolactone 25 mg oral tablet: 1 tab(s) orally once a day (06 Dec 2023 13:07)  tamsulosin 0.4 mg oral capsule: 1 cap(s) orally once a day (at bedtime) (06 Dec 2023 13:07)    ALLERGIES:   No Known Allergies    REVIEW OF SYMPTOMS:   CONSTITUTIONAL: No fever, no chills, no weight loss, no weight gain, no fatigue   ENMT:  No vertigo; No sinus or throat pain  NECK: No pain or stiffness  CARDIOVASCULAR: No chest pain, + SOB, no syncope/presyncope, no fatigue, no palpitations, no dizziness, + Orthopnea, no Paroxsymal nocturnal dyspnea  RESPIRATORY: No shortness of breath, + cough  : No dysuria, no hematuria   GI: no nausea, no diarrhea, no constipation, no abdominal pain  NEURO: No headache, no slurred speech   MUSCULOSKELETAL: No joint pain or swelling; No muscle, back, or extremity pain  PSYCH: No agitation, no anxiety.    ALL OTHER REVIEW OF SYSTEMS ARE NEGATIVE.    VITAL SIGNS:   T(C): 36.7 (12-14-23 @ 16:22), Max: 36.7 (12-14-23 @ 16:22)  T(F): 98.1 (12-14-23 @ 16:22), Max: 98.1 (12-14-23 @ 16:22)  HR: 118 (12-14-23 @ 19:47) (118 - 125)  BP: 114/82 (12-14-23 @ 16:22) (114/82 - 114/82)  RR: 18 (12-14-23 @ 16:22) (18 - 18)  SpO2: 96% (12-14-23 @ 16:22) (96% - 96%)    PHYSICAL EXAM:   Constitutional: Comfortable . No acute distress.   HEENT: Atraumatic and normocephalic , neck is supple . no JVD.   CNS: A&Ox3. No focal deficits.   Respiratory: No wheezing, No rhonchi. No crackles   Cardiovascular: + Tachycardic  normal s1 s2. No murmur. No rubs or gallop.  Gastrointestinal: Soft, non-tender. +Bowel sounds.   Extremities: 2+ Peripheral Pulses, No edema  Psychiatric: Calm . no agitation.   Skin: Warm and dry    LABS:                        11.1   6.28  )-----------( 216      ( 14 Dec 2023 17:00 )             35.8     12-14    135  |  100  |  39.1<H>  ----------------------------<  97  4.0   |  21.0<L>  |  1.54<H>    Ca    8.7      14 Dec 2023 17:00    TPro  6.6  /  Alb  2.6<L>  /  TBili  0.5  /  DBili  x   /  AST  136<H>  /  ALT  95<H>  /  AlkPhos  134<H>  12-14    PT/INR - ( 14 Dec 2023 17:00 )   PT: 20.7 sec;   INR: 1.90 ratio      PTT - ( 14 Dec 2023 17:00 )  PTT:38.1 sec    Thyroid Stimulating Hormone, Serum: 1.38 uIU/mL (12-14-23 @ 17:00)      ECG:   Prior ECG: Yes    RADIOLOGY & ADDITIONAL STUDIES:       Preliminary evaluation, please await official recommendations     Assessment and recommendations are final when note is signed by the attending.                                      [facility]                                              CARDIOLOGY AT [address], [place] [address]                                             Telephone: [phone]. Fax:[phone]      CARDIOLOGY CONSULTATION NOTE                                                                                             History obtained by: Patient and medical record  Community Cardiologist: [facility]   obtained: Yes  /   No   Reason for Consultation: A-flutter with RVR     Chief complaint:   Patient is a 73y old  Male who presents with a chief complaint of SOB.    HPI: Patient is a 74 yo male with PMhx HTN, HLD, CAD s/p CABG 2021, CKI, former smoker, HFrEF  20-25%, recent admission for L loculated pleural effusion s/p VATS decortication, a flutter with RVR (s/p DCCV on 12/1/23 on Eliqu) presenting with SOB found to be in a fib with RVR. Patient states he has felt SOB while laying flat as well as at rest for 2-3 days. Patient has been taking his medications. Pt also co cough x 2 days. Denies fevers, chills, dizziness, lightheadedness, dysphagia, dysarthria, diplopia, photophobia, syncope, congestion, CP, abdominal pain, neck pain, back pain, diarrhea, dysuria, hematuria, hematochezia, hematemesis, n/v, recent travel, sick contacts, leg swelling.    CARDIAC TESTING     QUIN Echo Doppler (12.01.23 @ 11:13) >  Summary:   1. Left ventricularejection fraction, by visual estimation, is 20 to 25%.   2. Severely decreased global left ventricular systolic function.   3. Normal RV size with moderately reduced RV systolic function, RVSP   least 29 mmHg.   4. Moderately enlarged left atrium.   5. No left atrial appendage thrombus, left atrial appendage enlargement   and normal left atrial appendage velocities.   6. Mild thickening of the anterior and posterior mitral valve leaflets   with mild posterior leaflet prolapse.   7. Moderate mitral valve regurgitation.   8. Color flow doppler and intravenous injection of agitated saline   demonstrates the presence of an intact intra atrial septum.   9. Lipomatous hypertrophy of the intra-atrial septum.  10. Moderate complex atheromas at the aortic arch.  11. There is no evidence of pericardial effusion.  12. Post-QUIN patient underwent external synchronized direct current   cardioversion with 300 Joules x1 from Aflutter RVR 130s into sinus rhythm   by Dr. Ceballos.  < end of copied text >    PAST MEDICAL HISTORY  Coronary artery disease involving native coronary artery of native heart, unspecified whether angina  CHF with unknown LVEF  Primary hypertension  Hyperlipidemia, unspecified hyperlipidemia type    PAST SURGICAL HISTORY  S/P CABG x 5  History of cholecystectomy    SOCIAL HISTORY: + Former smoker (< 1ppd smoker for 20 years, quit 11/2021) Denies alcohol/drugs    FAMILY HISTORY:  Family history of hypotension (Mother)  Mother CAD    Family History of Cardiovascular Disease:  Yes   Coronary Artery Disease in first degree relative: Yes   Sudden Cardiac Death in First degree relative: No     HOME MEDICATIONS:   acetaminophen 325 mg oral tablet: 3 tab(s) orally every 6 hours As needed Temp greater or equal to 38C (100.4F), Mild Pain (1 - 3) (06 Dec 2023 13:07)  apixaban 5 mg oral tablet: 1 tab(s) orally every 12 hours (06 Dec 2023 13:07)  aspirin 81 mg oral delayed release tablet: 1 tab(s) orally once a day (06 Dec 2023 13:07)  atorvastatin 20 mg oral tablet: 1 tab(s) orally once a day (at bedtime) (06 Dec 2023 13:07)  dapagliflozin 10 mg oral tablet: 1 tab(s) orally once a day (06 Dec 2023 13:07)  furosemide 40 mg oral tablet: 1 tab(s) orally once a day (06 Dec 2023 13:07)  gabapentin 100 mg oral capsule: 1 cap(s) orally 2 times a day (06 Dec 2023 13:07)  insulin glargine 100 units/mL subcutaneous solution: 6 unit(s) subcutaneous once a day (at bedtime) (06 Dec 2023 13:07)  methocarbamol 500 mg oral tablet: 1 tab(s) orally 2 times a day (06 Dec 2023 13:07)  metoprolol succinate 100 mg oral tablet, extended release: 1 tab(s) orally once a day (at bedtime) (06 Dec 2023 13:07)  pantoprazole 40 mg oral delayed release tablet: 1 tab(s) orally once a day (before a meal) (06 Dec 2023 13:07)  sacubitril-valsartan 49 mg-51 mg oral tablet: 1 tab(s) orally 2 times a day (06 Dec 2023 13:07)  spironolactone 25 mg oral tablet: 1 tab(s) orally once a day (06 Dec 2023 13:07)  tamsulosin 0.4 mg oral capsule: 1 cap(s) orally once a day (at bedtime) (06 Dec 2023 13:07)    ALLERGIES:   No Known Allergies    REVIEW OF SYMPTOMS:   CONSTITUTIONAL: No fever, no chills, no weight loss, no weight gain, no fatigue   ENMT:  No vertigo; No sinus or throat pain  NECK: No pain or stiffness  CARDIOVASCULAR: No chest pain, + SOB, no syncope/presyncope, no fatigue, no palpitations, no dizziness, + Orthopnea, no Paroxsymal nocturnal dyspnea  RESPIRATORY: No shortness of breath, + cough  : No dysuria, no hematuria   GI: no nausea, no diarrhea, no constipation, no abdominal pain  NEURO: No headache, no slurred speech   MUSCULOSKELETAL: No joint pain or swelling; No muscle, back, or extremity pain  PSYCH: No agitation, no anxiety.    ALL OTHER REVIEW OF SYSTEMS ARE NEGATIVE.    VITAL SIGNS:   T(C): 36.7 (12-14-23 @ 16:22), Max: 36.7 (12-14-23 @ 16:22)  T(F): 98.1 (12-14-23 @ 16:22), Max: 98.1 (12-14-23 @ 16:22)  HR: 118 (12-14-23 @ 19:47) (118 - 125)  BP: 114/82 (12-14-23 @ 16:22) (114/82 - 114/82)  RR: 18 (12-14-23 @ 16:22) (18 - 18)  SpO2: 96% (12-14-23 @ 16:22) (96% - 96%)    PHYSICAL EXAM:   Constitutional: Comfortable . No acute distress.   HEENT: Atraumatic and normocephalic , neck is supple . no JVD.   CNS: A&Ox3. No focal deficits.   Respiratory: No wheezing, No rhonchi. No crackles   Cardiovascular: + Tachycardic  normal s1 s2. No murmur. No rubs or gallop.  Gastrointestinal: Soft, non-tender. +Bowel sounds.   Extremities: 2+ Peripheral Pulses, No edema  Psychiatric: Calm . no agitation.   Skin: Warm and dry    LABS:                        11.1   6.28  )-----------( 216      ( 14 Dec 2023 17:00 )             35.8     12-14    135  |  100  |  39.1<H>  ----------------------------<  97  4.0   |  21.0<L>  |  1.54<H>    Ca    8.7      14 Dec 2023 17:00    TPro  6.6  /  Alb  2.6<L>  /  TBili  0.5  /  DBili  x   /  AST  136<H>  /  ALT  95<H>  /  AlkPhos  134<H>  12-14    PT/INR - ( 14 Dec 2023 17:00 )   PT: 20.7 sec;   INR: 1.90 ratio      PTT - ( 14 Dec 2023 17:00 )  PTT:38.1 sec    Thyroid Stimulating Hormone, Serum: 1.38 uIU/mL (12-14-23 @ 17:00)      ECG:   Prior ECG: Yes    RADIOLOGY & ADDITIONAL STUDIES:       Preliminary evaluation, please await official recommendations     Assessment and recommendations are final when note is signed by the attending.                                      [facility]                                              CARDIOLOGY AT [address], [place] [address]                                             Telephone: [phone]. Fax:[phone]      CARDIOLOGY CONSULTATION NOTE                                                                                             History obtained by: Patient and medical record  Community Cardiologist: [facility]   obtained: Yes  /   No   Reason for Consultation: A-flutter with RVR     Chief complaint:   Patient is a 73y old  Male who presents with a chief complaint of SOB.    HPI: Patient is a 74 yo male with PMhx HTN, HLD, CAD s/p CABG 2021, CKI, former smoker, HFrEF  20-25%, recent admission for L loculated pleural effusion s/p VATS decortication, a flutter with RVR (s/p DCCV on 12/1/23 on Eliqu) presenting with SOB found to be in a fib with RVR. Patient states he has felt SOB while laying flat as well as at rest for 2-3 days. Patient has been taking his medications. Pt also co cough x 2 days. Denies fevers, chills, dizziness, lightheadedness, dysphagia, dysarthria, diplopia, photophobia, syncope, congestion, CP, abdominal pain, neck pain, back pain, diarrhea, dysuria, hematuria, hematochezia, hematemesis, n/v, recent travel, sick contacts, leg swelling.    CARDIAC TESTING     QUIN Echo Doppler (12.01.23 @ 11:13) >  Summary:   1. Left ventricularejection fraction, by visual estimation, is 20 to 25%.   2. Severely decreased global left ventricular systolic function.   3. Normal RV size with moderately reduced RV systolic function, RVSP   least 29 mmHg.   4. Moderately enlarged left atrium.   5. No left atrial appendage thrombus, left atrial appendage enlargement   and normal left atrial appendage velocities.   6. Mild thickening of the anterior and posterior mitral valve leaflets   with mild posterior leaflet prolapse.   7. Moderate mitral valve regurgitation.   8. Color flow doppler and intravenous injection of agitated saline   demonstrates the presence of an intact intra atrial septum.   9. Lipomatous hypertrophy of the intra-atrial septum.  10. Moderate complex atheromas at the aortic arch.  11. There is no evidence of pericardial effusion.  12. Post-QUIN patient underwent external synchronized direct current   cardioversion with 300 Joules x1 from Aflutter RVR 130s into sinus rhythm   by Dr. Ceballos.  < end of copied text >    PAST MEDICAL HISTORY  Coronary artery disease involving native coronary artery of native heart, unspecified whether angina  CHF with unknown LVEF  Primary hypertension  Hyperlipidemia, unspecified hyperlipidemia type    PAST SURGICAL HISTORY  S/P CABG x 5  History of cholecystectomy    SOCIAL HISTORY: + Former smoker (< 1ppd smoker for 20 years, quit 11/2021) Denies alcohol/drugs    FAMILY HISTORY:  Family history of hypotension (Mother)  Mother CAD    Family History of Cardiovascular Disease:  Yes   Coronary Artery Disease in first degree relative: Yes   Sudden Cardiac Death in First degree relative: No     HOME MEDICATIONS:   acetaminophen 325 mg oral tablet: 3 tab(s) orally every 6 hours As needed Temp greater or equal to 38C (100.4F), Mild Pain (1 - 3) (06 Dec 2023 13:07)  apixaban 5 mg oral tablet: 1 tab(s) orally every 12 hours (06 Dec 2023 13:07)  aspirin 81 mg oral delayed release tablet: 1 tab(s) orally once a day (06 Dec 2023 13:07)  atorvastatin 20 mg oral tablet: 1 tab(s) orally once a day (at bedtime) (06 Dec 2023 13:07)  dapagliflozin 10 mg oral tablet: 1 tab(s) orally once a day (06 Dec 2023 13:07)  furosemide 40 mg oral tablet: 1 tab(s) orally once a day (06 Dec 2023 13:07)  gabapentin 100 mg oral capsule: 1 cap(s) orally 2 times a day (06 Dec 2023 13:07)  insulin glargine 100 units/mL subcutaneous solution: 6 unit(s) subcutaneous once a day (at bedtime) (06 Dec 2023 13:07)  methocarbamol 500 mg oral tablet: 1 tab(s) orally 2 times a day (06 Dec 2023 13:07)  metoprolol succinate 100 mg oral tablet, extended release: 1 tab(s) orally once a day (at bedtime) (06 Dec 2023 13:07)  pantoprazole 40 mg oral delayed release tablet: 1 tab(s) orally once a day (before a meal) (06 Dec 2023 13:07)  sacubitril-valsartan 49 mg-51 mg oral tablet: 1 tab(s) orally 2 times a day (06 Dec 2023 13:07)  spironolactone 25 mg oral tablet: 1 tab(s) orally once a day (06 Dec 2023 13:07)  tamsulosin 0.4 mg oral capsule: 1 cap(s) orally once a day (at bedtime) (06 Dec 2023 13:07)    ALLERGIES:   No Known Allergies    REVIEW OF SYMPTOMS:   CONSTITUTIONAL: No fever, no chills, no weight loss, no weight gain, no fatigue   ENMT:  No vertigo; No sinus or throat pain  NECK: No pain or stiffness  CARDIOVASCULAR: No chest pain, + SOB, no syncope/presyncope, no fatigue, no palpitations, no dizziness, + Orthopnea, no Paroxsymal nocturnal dyspnea  RESPIRATORY: No shortness of breath, + cough  : No dysuria, no hematuria   GI: no nausea, no diarrhea, no constipation, no abdominal pain  NEURO: No headache, no slurred speech   MUSCULOSKELETAL: No joint pain or swelling; No muscle, back, or extremity pain  PSYCH: No agitation, no anxiety.    ALL OTHER REVIEW OF SYSTEMS ARE NEGATIVE.    VITAL SIGNS:   T(C): 36.7 (12-14-23 @ 16:22), Max: 36.7 (12-14-23 @ 16:22)  T(F): 98.1 (12-14-23 @ 16:22), Max: 98.1 (12-14-23 @ 16:22)  HR: 118 (12-14-23 @ 19:47) (118 - 125)  BP: 114/82 (12-14-23 @ 16:22) (114/82 - 114/82)  RR: 18 (12-14-23 @ 16:22) (18 - 18)  SpO2: 96% (12-14-23 @ 16:22) (96% - 96%)    PHYSICAL EXAM:   Constitutional: Comfortable . No acute distress.   HEENT: Atraumatic and normocephalic , neck is supple . no JVD.   CNS: A&Ox3. No focal deficits.   Respiratory: No wheezing, No rhonchi. No crackles   Cardiovascular: + Tachycardic  normal s1 s2. No murmur. No rubs or gallop.  Gastrointestinal: Soft, non-tender. +Bowel sounds.   Extremities: 2+ Peripheral Pulses, No edema  Psychiatric: Calm . no agitation.   Skin: Warm and dry    LABS:                        11.1   6.28  )-----------( 216      ( 14 Dec 2023 17:00 )             35.8     12-14    135  |  100  |  39.1<H>  ----------------------------<  97  4.0   |  21.0<L>  |  1.54<H>    Ca    8.7      14 Dec 2023 17:00    TPro  6.6  /  Alb  2.6<L>  /  TBili  0.5  /  DBili  x   /  AST  136<H>  /  ALT  95<H>  /  AlkPhos  134<H>  12-14    PT/INR - ( 14 Dec 2023 17:00 )   PT: 20.7 sec;   INR: 1.90 ratio      PTT - ( 14 Dec 2023 17:00 )  PTT:38.1 sec    Thyroid Stimulating Hormone, Serum: 1.38 uIU/mL (12-14-23 @ 17:00)      ECG: A-flutter.   Prior ECG: Yes    RADIOLOGY & ADDITIONAL STUDIES:       Preliminary evaluation, please await official recommendations     Assessment and recommendations are final when note is signed by the attending.                                      [facility]                                              CARDIOLOGY AT [address], [place] [address]                                             Telephone: [phone]. Fax:[phone]      CARDIOLOGY CONSULTATION NOTE                                                                                             History obtained by: Patient and medical record  Community Cardiologist: [facility]   obtained: Yes  /   No   Reason for Consultation: A-flutter with RVR     Chief complaint:   Patient is a 73y old  Male who presents with a chief complaint of SOB.    HPI: Patient is a 74 yo male with PMhx HTN, HLD, CAD s/p CABG 2021, CKI, former smoker, HFrEF  20-25%, recent admission for L loculated pleural effusion s/p VATS decortication, a flutter with RVR (s/p DCCV on 12/1/23 on Eliqu) presenting with SOB found to be in a fib with RVR. Patient states he has felt SOB while laying flat as well as at rest for 2-3 days. Patient has been taking his medications. Pt also co cough x 2 days. Denies fevers, chills, dizziness, lightheadedness, dysphagia, dysarthria, diplopia, photophobia, syncope, congestion, CP, abdominal pain, neck pain, back pain, diarrhea, dysuria, hematuria, hematochezia, hematemesis, n/v, recent travel, sick contacts, leg swelling.    CARDIAC TESTING     QUIN Echo Doppler (12.01.23 @ 11:13) >  Summary:   1. Left ventricularejection fraction, by visual estimation, is 20 to 25%.   2. Severely decreased global left ventricular systolic function.   3. Normal RV size with moderately reduced RV systolic function, RVSP   least 29 mmHg.   4. Moderately enlarged left atrium.   5. No left atrial appendage thrombus, left atrial appendage enlargement   and normal left atrial appendage velocities.   6. Mild thickening of the anterior and posterior mitral valve leaflets   with mild posterior leaflet prolapse.   7. Moderate mitral valve regurgitation.   8. Color flow doppler and intravenous injection of agitated saline   demonstrates the presence of an intact intra atrial septum.   9. Lipomatous hypertrophy of the intra-atrial septum.  10. Moderate complex atheromas at the aortic arch.  11. There is no evidence of pericardial effusion.  12. Post-QUIN patient underwent external synchronized direct current   cardioversion with 300 Joules x1 from Aflutter RVR 130s into sinus rhythm   by Dr. Ceballos.  < end of copied text >    PAST MEDICAL HISTORY  Coronary artery disease involving native coronary artery of native heart, unspecified whether angina  CHF with unknown LVEF  Primary hypertension  Hyperlipidemia, unspecified hyperlipidemia type    PAST SURGICAL HISTORY  S/P CABG x 5  History of cholecystectomy    SOCIAL HISTORY: + Former smoker (< 1ppd smoker for 20 years, quit 11/2021) Denies alcohol/drugs    FAMILY HISTORY:  Family history of hypotension (Mother)  Mother CAD    Family History of Cardiovascular Disease:  Yes   Coronary Artery Disease in first degree relative: Yes   Sudden Cardiac Death in First degree relative: No     HOME MEDICATIONS:   acetaminophen 325 mg oral tablet: 3 tab(s) orally every 6 hours As needed Temp greater or equal to 38C (100.4F), Mild Pain (1 - 3) (06 Dec 2023 13:07)  apixaban 5 mg oral tablet: 1 tab(s) orally every 12 hours (06 Dec 2023 13:07)  aspirin 81 mg oral delayed release tablet: 1 tab(s) orally once a day (06 Dec 2023 13:07)  atorvastatin 20 mg oral tablet: 1 tab(s) orally once a day (at bedtime) (06 Dec 2023 13:07)  dapagliflozin 10 mg oral tablet: 1 tab(s) orally once a day (06 Dec 2023 13:07)  furosemide 40 mg oral tablet: 1 tab(s) orally once a day (06 Dec 2023 13:07)  gabapentin 100 mg oral capsule: 1 cap(s) orally 2 times a day (06 Dec 2023 13:07)  insulin glargine 100 units/mL subcutaneous solution: 6 unit(s) subcutaneous once a day (at bedtime) (06 Dec 2023 13:07)  methocarbamol 500 mg oral tablet: 1 tab(s) orally 2 times a day (06 Dec 2023 13:07)  metoprolol succinate 100 mg oral tablet, extended release: 1 tab(s) orally once a day (at bedtime) (06 Dec 2023 13:07)  pantoprazole 40 mg oral delayed release tablet: 1 tab(s) orally once a day (before a meal) (06 Dec 2023 13:07)  sacubitril-valsartan 49 mg-51 mg oral tablet: 1 tab(s) orally 2 times a day (06 Dec 2023 13:07)  spironolactone 25 mg oral tablet: 1 tab(s) orally once a day (06 Dec 2023 13:07)  tamsulosin 0.4 mg oral capsule: 1 cap(s) orally once a day (at bedtime) (06 Dec 2023 13:07)    ALLERGIES:   No Known Allergies    REVIEW OF SYMPTOMS:   CONSTITUTIONAL: No fever, no chills, no weight loss, no weight gain, no fatigue   ENMT:  No vertigo; No sinus or throat pain  NECK: No pain or stiffness  CARDIOVASCULAR: No chest pain, + SOB, no syncope/presyncope, no fatigue, no palpitations, no dizziness, + Orthopnea, no Paroxsymal nocturnal dyspnea  RESPIRATORY: No shortness of breath, + cough  : No dysuria, no hematuria   GI: no nausea, no diarrhea, no constipation, no abdominal pain  NEURO: No headache, no slurred speech   MUSCULOSKELETAL: No joint pain or swelling; No muscle, back, or extremity pain  PSYCH: No agitation, no anxiety.    ALL OTHER REVIEW OF SYSTEMS ARE NEGATIVE.    VITAL SIGNS:   T(C): 36.7 (12-14-23 @ 16:22), Max: 36.7 (12-14-23 @ 16:22)  T(F): 98.1 (12-14-23 @ 16:22), Max: 98.1 (12-14-23 @ 16:22)  HR: 118 (12-14-23 @ 19:47) (118 - 125)  BP: 114/82 (12-14-23 @ 16:22) (114/82 - 114/82)  RR: 18 (12-14-23 @ 16:22) (18 - 18)  SpO2: 96% (12-14-23 @ 16:22) (96% - 96%)    PHYSICAL EXAM:   Constitutional: Comfortable . No acute distress.   HEENT: Atraumatic and normocephalic , neck is supple . no JVD.   CNS: A&Ox3. No focal deficits.   Respiratory: No wheezing, No rhonchi. No crackles   Cardiovascular: + Tachycardic  normal s1 s2. No murmur. No rubs or gallop.  Gastrointestinal: Soft, non-tender. +Bowel sounds.   Extremities: 2+ Peripheral Pulses, No edema  Psychiatric: Calm . no agitation.   Skin: Warm and dry    LABS:                        11.1   6.28  )-----------( 216      ( 14 Dec 2023 17:00 )             35.8     12-14    135  |  100  |  39.1<H>  ----------------------------<  97  4.0   |  21.0<L>  |  1.54<H>    Ca    8.7      14 Dec 2023 17:00    TPro  6.6  /  Alb  2.6<L>  /  TBili  0.5  /  DBili  x   /  AST  136<H>  /  ALT  95<H>  /  AlkPhos  134<H>  12-14    PT/INR - ( 14 Dec 2023 17:00 )   PT: 20.7 sec;   INR: 1.90 ratio      PTT - ( 14 Dec 2023 17:00 )  PTT:38.1 sec    Thyroid Stimulating Hormone, Serum: 1.38 uIU/mL (12-14-23 @ 17:00)      ECG: A-flutter.   Prior ECG: Yes    RADIOLOGY & ADDITIONAL STUDIES:       Preliminary evaluation, please await official recommendations     Assessment and recommendations are final when note is signed by the attending.                                      [facility]                                              CARDIOLOGY AT [address], [place] [address]                                             Telephone: [phone]. Fax:[phone]      CARDIOLOGY CONSULTATION NOTE                                                                                             History obtained by: Patient and medical record  Community Cardiologist: [facility]   obtained: Yes  /   No   Reason for Consultation: A-flutter with RVR     Chief complaint:   Patient is a 73y old  Male who presents with a chief complaint of SOB.    HPI: Patient is a 74 yo obese male with PMhx HTN, HLD, CAD s/p CABG 2021, CKI, former smoker, HFrEF  20-25%, recent admission for L loculated pleural effusion s/p VATS decortication, a flutter with RVR (s/p DCCV on 12/1/23 on Eliqu) presenting with SOB found to be in a fib with RVR. Patient states he has felt SOB while laying flat as well as at rest for 2-3 days. Patient has been taking his medications. Pt also co cough x 2 days. Denies fevers, chills, dizziness, lightheadedness, dysphagia, dysarthria, diplopia, photophobia, syncope, congestion, CP, abdominal pain, neck pain, back pain, diarrhea, dysuria, hematuria, hematochezia, hematemesis, n/v, recent travel, sick contacts, leg swelling.    CARDIAC TESTING     QUIN Echo Doppler (12.01.23 @ 11:13) >  Summary:   1. Left ventricularejection fraction, by visual estimation, is 20 to 25%.   2. Severely decreased global left ventricular systolic function.   3. Normal RV size with moderately reduced RV systolic function, RVSP   least 29 mmHg.   4. Moderately enlarged left atrium.   5. No left atrial appendage thrombus, left atrial appendage enlargement   and normal left atrial appendage velocities.   6. Mild thickening of the anterior and posterior mitral valve leaflets   with mild posterior leaflet prolapse.   7. Moderate mitral valve regurgitation.   8. Color flow doppler and intravenous injection of agitated saline   demonstrates the presence of an intact intra atrial septum.   9. Lipomatous hypertrophy of the intra-atrial septum.  10. Moderate complex atheromas at the aortic arch.  11. There is no evidence of pericardial effusion.  12. Post-QUIN patient underwent external synchronized direct current   cardioversion with 300 Joules x1 from Aflutter RVR 130s into sinus rhythm   by Dr. Ceballos.  < end of copied text >    PAST MEDICAL HISTORY  Coronary artery disease involving native coronary artery of native heart, unspecified whether angina  CHF with unknown LVEF  Primary hypertension  Hyperlipidemia, unspecified hyperlipidemia type    PAST SURGICAL HISTORY  S/P CABG x 5  History of cholecystectomy    SOCIAL HISTORY: + Former smoker (< 1ppd smoker for 20 years, quit 11/2021) Denies alcohol/drugs    FAMILY HISTORY:  Family history of hypotension (Mother)  Mother CAD    Family History of Cardiovascular Disease:  Yes   Coronary Artery Disease in first degree relative: Yes   Sudden Cardiac Death in First degree relative: No     HOME MEDICATIONS:   acetaminophen 325 mg oral tablet: 3 tab(s) orally every 6 hours As needed Temp greater or equal to 38C (100.4F), Mild Pain (1 - 3) (06 Dec 2023 13:07)  apixaban 5 mg oral tablet: 1 tab(s) orally every 12 hours (06 Dec 2023 13:07)  aspirin 81 mg oral delayed release tablet: 1 tab(s) orally once a day (06 Dec 2023 13:07)  atorvastatin 20 mg oral tablet: 1 tab(s) orally once a day (at bedtime) (06 Dec 2023 13:07)  dapagliflozin 10 mg oral tablet: 1 tab(s) orally once a day (06 Dec 2023 13:07)  furosemide 40 mg oral tablet: 1 tab(s) orally once a day (06 Dec 2023 13:07)  gabapentin 100 mg oral capsule: 1 cap(s) orally 2 times a day (06 Dec 2023 13:07)  insulin glargine 100 units/mL subcutaneous solution: 6 unit(s) subcutaneous once a day (at bedtime) (06 Dec 2023 13:07)  methocarbamol 500 mg oral tablet: 1 tab(s) orally 2 times a day (06 Dec 2023 13:07)  metoprolol succinate 100 mg oral tablet, extended release: 1 tab(s) orally once a day (at bedtime) (06 Dec 2023 13:07)  pantoprazole 40 mg oral delayed release tablet: 1 tab(s) orally once a day (before a meal) (06 Dec 2023 13:07)  sacubitril-valsartan 49 mg-51 mg oral tablet: 1 tab(s) orally 2 times a day (06 Dec 2023 13:07)  spironolactone 25 mg oral tablet: 1 tab(s) orally once a day (06 Dec 2023 13:07)  tamsulosin 0.4 mg oral capsule: 1 cap(s) orally once a day (at bedtime) (06 Dec 2023 13:07)    ALLERGIES:   No Known Allergies    REVIEW OF SYMPTOMS:   CONSTITUTIONAL: No fever, no chills, no weight loss, no weight gain, no fatigue   ENMT:  No vertigo; No sinus or throat pain  NECK: No pain or stiffness  CARDIOVASCULAR: No chest pain, + SOB, no syncope/presyncope, no fatigue, no palpitations, no dizziness, + Orthopnea, no Paroxsymal nocturnal dyspnea  RESPIRATORY: No shortness of breath, + cough  : No dysuria, no hematuria   GI: no nausea, no diarrhea, no constipation, no abdominal pain  NEURO: No headache, no slurred speech   MUSCULOSKELETAL: No joint pain or swelling; No muscle, back, or extremity pain  PSYCH: No agitation, no anxiety.    ALL OTHER REVIEW OF SYSTEMS ARE NEGATIVE.    VITAL SIGNS:   T(C): 36.7 (12-14-23 @ 16:22), Max: 36.7 (12-14-23 @ 16:22)  T(F): 98.1 (12-14-23 @ 16:22), Max: 98.1 (12-14-23 @ 16:22)  HR: 118 (12-14-23 @ 19:47) (118 - 125)  BP: 114/82 (12-14-23 @ 16:22) (114/82 - 114/82)  RR: 18 (12-14-23 @ 16:22) (18 - 18)  SpO2: 96% (12-14-23 @ 16:22) (96% - 96%)    PHYSICAL EXAM:   Constitutional: Comfortable . No acute distress.   HEENT: Atraumatic and normocephalic , neck is supple . no JVD.   CNS: A&Ox3. No focal deficits.   Respiratory: No wheezing, No rhonchi. No crackles   Cardiovascular: + Tachycardic  normal s1 s2. No murmur. No rubs or gallop.  Gastrointestinal: Soft, non-tender. +Bowel sounds.   Extremities: 2+ Peripheral Pulses, No edema  Psychiatric: Calm . no agitation.   Skin: Warm and dry    LABS:                        11.1   6.28  )-----------( 216      ( 14 Dec 2023 17:00 )             35.8     12-14    135  |  100  |  39.1<H>  ----------------------------<  97  4.0   |  21.0<L>  |  1.54<H>    Ca    8.7      14 Dec 2023 17:00    TPro  6.6  /  Alb  2.6<L>  /  TBili  0.5  /  DBili  x   /  AST  136<H>  /  ALT  95<H>  /  AlkPhos  134<H>  12-14    PT/INR - ( 14 Dec 2023 17:00 )   PT: 20.7 sec;   INR: 1.90 ratio      PTT - ( 14 Dec 2023 17:00 )  PTT:38.1 sec    Thyroid Stimulating Hormone, Serum: 1.38 uIU/mL (12-14-23 @ 17:00)      ECG: A-flutter.   Prior ECG: Yes    RADIOLOGY & ADDITIONAL STUDIES:       Preliminary evaluation, please await official recommendations     Assessment and recommendations are final when note is signed by the attending.                                      [facility]                                              CARDIOLOGY AT [address], [place] [address]                                             Telephone: [phone]. Fax:[phone]      CARDIOLOGY CONSULTATION NOTE                                                                                             History obtained by: Patient and medical record  Community Cardiologist: [facility]   obtained: Yes  /   No   Reason for Consultation: A-flutter with RVR     Chief complaint:   Patient is a 73y old  Male who presents with a chief complaint of SOB.    HPI: Patient is a 74 yo obese male with PMhx HTN, HLD, CAD s/p CABG 2021, CKI, former smoker, HFrEF  20-25%, recent admission for L loculated pleural effusion s/p VATS decortication, a flutter with RVR (s/p DCCV on 12/1/23 on Eliqu) presenting with SOB found to be in a fib with RVR. Patient states he has felt SOB while laying flat as well as at rest for 2-3 days. Patient has been taking his medications. Pt also co cough x 2 days. Denies fevers, chills, dizziness, lightheadedness, dysphagia, dysarthria, diplopia, photophobia, syncope, congestion, CP, abdominal pain, neck pain, back pain, diarrhea, dysuria, hematuria, hematochezia, hematemesis, n/v, recent travel, sick contacts, leg swelling.    CARDIAC TESTING     QUIN Echo Doppler (12.01.23 @ 11:13) >  Summary:   1. Left ventricularejection fraction, by visual estimation, is 20 to 25%.   2. Severely decreased global left ventricular systolic function.   3. Normal RV size with moderately reduced RV systolic function, RVSP   least 29 mmHg.   4. Moderately enlarged left atrium.   5. No left atrial appendage thrombus, left atrial appendage enlargement   and normal left atrial appendage velocities.   6. Mild thickening of the anterior and posterior mitral valve leaflets   with mild posterior leaflet prolapse.   7. Moderate mitral valve regurgitation.   8. Color flow doppler and intravenous injection of agitated saline   demonstrates the presence of an intact intra atrial septum.   9. Lipomatous hypertrophy of the intra-atrial septum.  10. Moderate complex atheromas at the aortic arch.  11. There is no evidence of pericardial effusion.  12. Post-QUIN patient underwent external synchronized direct current   cardioversion with 300 Joules x1 from Aflutter RVR 130s into sinus rhythm   by Dr. Ceballos.  < end of copied text >    PAST MEDICAL HISTORY  Coronary artery disease involving native coronary artery of native heart, unspecified whether angina  CHF with unknown LVEF  Primary hypertension  Hyperlipidemia, unspecified hyperlipidemia type    PAST SURGICAL HISTORY  S/P CABG x 5  History of cholecystectomy    SOCIAL HISTORY: + Former smoker (< 1ppd smoker for 20 years, quit 11/2021) Denies alcohol/drugs    FAMILY HISTORY:  Family history of hypotension (Mother)  Mother CAD    Family History of Cardiovascular Disease:  Yes   Coronary Artery Disease in first degree relative: Yes   Sudden Cardiac Death in First degree relative: No     HOME MEDICATIONS:   acetaminophen 325 mg oral tablet: 3 tab(s) orally every 6 hours As needed Temp greater or equal to 38C (100.4F), Mild Pain (1 - 3) (06 Dec 2023 13:07)  apixaban 5 mg oral tablet: 1 tab(s) orally every 12 hours (06 Dec 2023 13:07)  aspirin 81 mg oral delayed release tablet: 1 tab(s) orally once a day (06 Dec 2023 13:07)  atorvastatin 20 mg oral tablet: 1 tab(s) orally once a day (at bedtime) (06 Dec 2023 13:07)  dapagliflozin 10 mg oral tablet: 1 tab(s) orally once a day (06 Dec 2023 13:07)  furosemide 40 mg oral tablet: 1 tab(s) orally once a day (06 Dec 2023 13:07)  gabapentin 100 mg oral capsule: 1 cap(s) orally 2 times a day (06 Dec 2023 13:07)  insulin glargine 100 units/mL subcutaneous solution: 6 unit(s) subcutaneous once a day (at bedtime) (06 Dec 2023 13:07)  methocarbamol 500 mg oral tablet: 1 tab(s) orally 2 times a day (06 Dec 2023 13:07)  metoprolol succinate 100 mg oral tablet, extended release: 1 tab(s) orally once a day (at bedtime) (06 Dec 2023 13:07)  pantoprazole 40 mg oral delayed release tablet: 1 tab(s) orally once a day (before a meal) (06 Dec 2023 13:07)  sacubitril-valsartan 49 mg-51 mg oral tablet: 1 tab(s) orally 2 times a day (06 Dec 2023 13:07)  spironolactone 25 mg oral tablet: 1 tab(s) orally once a day (06 Dec 2023 13:07)  tamsulosin 0.4 mg oral capsule: 1 cap(s) orally once a day (at bedtime) (06 Dec 2023 13:07)    ALLERGIES:   No Known Allergies    REVIEW OF SYMPTOMS:   CONSTITUTIONAL: No fever, no chills, no weight loss, no weight gain, no fatigue   ENMT:  No vertigo; No sinus or throat pain  NECK: No pain or stiffness  CARDIOVASCULAR: No chest pain, + SOB, no syncope/presyncope, no fatigue, no palpitations, no dizziness, + Orthopnea, no Paroxsymal nocturnal dyspnea  RESPIRATORY: No shortness of breath, + cough  : No dysuria, no hematuria   GI: no nausea, no diarrhea, no constipation, no abdominal pain  NEURO: No headache, no slurred speech   MUSCULOSKELETAL: No joint pain or swelling; No muscle, back, or extremity pain  PSYCH: No agitation, no anxiety.    ALL OTHER REVIEW OF SYSTEMS ARE NEGATIVE.    VITAL SIGNS:   T(C): 36.7 (12-14-23 @ 16:22), Max: 36.7 (12-14-23 @ 16:22)  T(F): 98.1 (12-14-23 @ 16:22), Max: 98.1 (12-14-23 @ 16:22)  HR: 118 (12-14-23 @ 19:47) (118 - 125)  BP: 114/82 (12-14-23 @ 16:22) (114/82 - 114/82)  RR: 18 (12-14-23 @ 16:22) (18 - 18)  SpO2: 96% (12-14-23 @ 16:22) (96% - 96%)    PHYSICAL EXAM:   Constitutional: Comfortable . No acute distress.   HEENT: Atraumatic and normocephalic , neck is supple . no JVD.   CNS: A&Ox3. No focal deficits.   Respiratory: No wheezing, No rhonchi. No crackles   Cardiovascular: + Tachycardic  normal s1 s2. No murmur. No rubs or gallop.  Gastrointestinal: Soft, non-tender. +Bowel sounds.   Extremities: 2+ Peripheral Pulses, No edema  Psychiatric: Calm . no agitation.   Skin: Warm and dry    LABS:                        11.1   6.28  )-----------( 216      ( 14 Dec 2023 17:00 )             35.8     12-14    135  |  100  |  39.1<H>  ----------------------------<  97  4.0   |  21.0<L>  |  1.54<H>    Ca    8.7      14 Dec 2023 17:00    TPro  6.6  /  Alb  2.6<L>  /  TBili  0.5  /  DBili  x   /  AST  136<H>  /  ALT  95<H>  /  AlkPhos  134<H>  12-14    PT/INR - ( 14 Dec 2023 17:00 )   PT: 20.7 sec;   INR: 1.90 ratio      PTT - ( 14 Dec 2023 17:00 )  PTT:38.1 sec    Thyroid Stimulating Hormone, Serum: 1.38 uIU/mL (12-14-23 @ 17:00)      ECG: A-flutter.   Prior ECG: Yes    RADIOLOGY & ADDITIONAL STUDIES:       Preliminary evaluation, please await official recommendations     Assessment and recommendations are final when note is signed by the attending.

## 2023-12-14 NOTE — ED PROVIDER NOTE - CRITICAL CARE ATTENDING CONTRIBUTION TO CARE
Recurrent AF with RVR will rate control with metoprolol, some symptoms associated with CHF as well, will require admission for optimization and rate/rhythm control per EP/Cardiology

## 2023-12-14 NOTE — ED PROVIDER NOTE - CLINICAL SUMMARY MEDICAL DECISION MAKING FREE TEXT BOX
Patient is a 72 yo male with PMhx HTN, CAD s/p CABG, former smoker, HFrEF LVFEF 20-25%, MR, recent admission for L loculated pleural effusion s/p VATS decortication, a flutter with RVR s/p DCCV on eliquis presenting with SOB found to be in a flutter with RVR.       Will control rate, check labs, r.o electrolyte abnormalities, ekg to assess for arrythmia, hydrate, BNP to r.o HF, CXR to r.o PNA vs. recurrent pleural effusion, consult cards and thoracic, reassess.

## 2023-12-14 NOTE — ED PROVIDER NOTE - CROS ED RESP ALL NEG
Assessment/Plan:    Pt is a 7 y/o F presenting with sore throat, cough, and congestion  Ddx- viral URI vs strep pharyngitis allergic rhinitis    Likely viral URI due to physical exam and history as well as friend from school being out for similar symptoms today  Treatment is supportive with tylenol and ibuprofen for if fever develops and for pain  Strep pharyngitis less likely due to benign physical exam and presence of URI symptoms  While allergic rhinitis is possible with history, other close contacts with similar symptoms suggests a viral cause  Diagnoses and all orders for this visit:    Viral upper respiratory tract infection        Patient Instructions   Increase fluids  May give Tylenol or ibuprofen as needed for pain or fever  May gargle with warm salt water  Call if worsening or not improving  Subjective:      Patient ID: Esthela Almanzar is a 8 y o  female  Pt is a 7 y/o F presenting with cough, congestion and sore throat  Symptoms started yesterday at which time her friends from school started having similar symptoms  Pt denies any fever, chills, head ache, difficulty of breathing, changes in diet or activities  Pt did not go to school yesterday or today and needs a school note  The following portions of the patient's history were reviewed and updated as appropriate: She  has a past medical history of Allergic, Tick bite (4/25/2017), and Wheezing    She   Patient Active Problem List    Diagnosis Date Noted    Abnormal weight gain 01/03/2018     Current Outpatient Medications   Medication Sig Dispense Refill    cetirizine (ZyrTEC) oral solution Take 5 mL (5 mg total) by mouth daily (Patient not taking: Reported on 8/20/2022) 150 mL 11    famotidine (Pepcid) 20 mg tablet Take 1 tablet (20 mg total) by mouth 2 (two) times a day 60 tablet 0    fluticasone (FLONASE) 50 mcg/act nasal spray 1 spray into each nostril daily (Patient not taking: Reported on 8/20/2022)       No current facility-administered medications for this visit  Current Outpatient Medications on File Prior to Visit   Medication Sig    cetirizine (ZyrTEC) oral solution Take 5 mL (5 mg total) by mouth daily (Patient not taking: Reported on 8/20/2022)    famotidine (Pepcid) 20 mg tablet Take 1 tablet (20 mg total) by mouth 2 (two) times a day    fluticasone (FLONASE) 50 mcg/act nasal spray 1 spray into each nostril daily (Patient not taking: Reported on 8/20/2022)     No current facility-administered medications on file prior to visit  She has No Known Allergies       Review of Systems   Constitutional: Negative for activity change, appetite change and fever  HENT: Positive for congestion, rhinorrhea and sore throat  Respiratory: Positive for cough  Negative for shortness of breath and wheezing  Cardiovascular: Negative for chest pain  Gastrointestinal: Negative for abdominal pain, diarrhea, nausea and vomiting  Genitourinary: Negative for dysuria  Neurological: Negative for headaches  Objective:      Pulse 88   Temp 98 3 °F (36 8 °C)   Resp 16   Wt 82 6 kg (182 lb)          Physical Exam  Constitutional:       General: She is active  She is not in acute distress  HENT:      Right Ear: Tympanic membrane normal  No middle ear effusion  Tympanic membrane is not erythematous  Left Ear: Tympanic membrane normal   No middle ear effusion  Tympanic membrane is not erythematous  Nose: Congestion present  Mouth/Throat:      Pharynx: No pharyngeal swelling, oropharyngeal exudate or posterior oropharyngeal erythema  Tonsils: No tonsillar exudate  0 on the right  0 on the left  Eyes:      Conjunctiva/sclera: Conjunctivae normal    Cardiovascular:      Rate and Rhythm: Normal rate and regular rhythm  Heart sounds: Normal heart sounds  Pulmonary:      Effort: Pulmonary effort is normal  No respiratory distress  Breath sounds: Normal breath sounds  No wheezing  Lymphadenopathy:      Cervical: No cervical adenopathy  Skin:     General: Skin is warm and dry  Neurological:      Mental Status: She is alert  - - -

## 2023-12-14 NOTE — CONSULT NOTE ADULT - PROBLEM SELECTOR RECOMMENDATION 9
Pt with PMHx HTN, HLD, CAD (s/p CABG 2021), CKI, former smoker, HFrEF 20-25%, recent admission for L loculated pleural effusion s/p VATS decortication, and A-flutter presenting with SOB found to be in a flutter with RVR. Patient states he has felt SOB while laying flat as well as at rest for 2-3 days.  Pt is s/p DCCV on 12/1/23 and is taking Eliquis  Telemonitor  HR Uncontrolled  May use Metoprolol 5 mg IVP for sustained HR >120   IVF   Trend trops x 3. Serial EKGs  Replace all electrolytes. Maintain K+~4 and mag~2  Monitor LFTs and hold statin x now.   Obtain CPK, A1C, lipid panel fasting, TFTs, sed rate to ro comorbidities  Keep NPO x possible cardioversion in AM  EP consult in AM  Obtain viral panel, pt co cough x 2 days Pt with PMHx HTN, HLD, CAD (s/p CABG 2021), CKI, former smoker, HFrEF 20-25%, recent admission for L loculated pleural effusion s/p VATS decortication, and A-flutter presenting with SOB found to be in a flutter with RVR. Patient states he has felt SOB while laying flat as well as at rest for 2-3 days.  Pt is s/p DCCV on 12/1/23 and is taking Eliquis  ECG: A-flutter.   Telemonitor  HR Uncontrolled  May use Metoprolol 5 mg IVP for sustained HR >120   IVF   Trend trops x 3. Serial EKGs  Replace all electrolytes. Maintain K+~4 and mag~2  Monitor LFTs and hold statin x now.   Obtain CPK, A1C, lipid panel fasting, TFTs, sed rate to ro comorbidities  Keep NPO x possible cardioversion in AM  EP consult in AM  Obtain viral panel, pt co cough x 2 days  Pulmonary evaluation Pt with PMHx HTN, HLD, CAD (s/p CABG 2021), CKI, former smoker, HFrEF 20-25%, recent admission for L loculated pleural effusion s/p VATS decortication, and A-flutter presenting with SOB found to be in a flutter with RVR. Patient states he has felt SOB while laying flat as well as at rest for 2-3 days.  Pt is s/p DCCV on 12/1/23 and is taking Eliquis  ECG: A-flutter.   Telemonitor  HR Uncontrolled  May use Metoprolol 5 mg IVP for sustained HR >120   IVF   Trend trops x 3. Serial EKGs  Replace all electrolytes. Maintain K+~4 and mag~2  Monitor LFTs and hold statin x now.   Obtain CPK, A1C, lipid panel fasting, TFTs, sed rate to ro comorbidities  Keep NPO x possible cardioversion in AM  EP consult in AM  Obtain viral panel, pt co cough x 2 days  Pulmonary evaluation    Update: Pt Covid + Pt with PMHx obesity, HTN, HLD, CAD (s/p CABG 2021), CKI, former smoker, HFrEF 20-25%, recent admission for L loculated pleural effusion s/p VATS decortication, and A-flutter presenting with SOB found to be in a flutter with RVR. Patient states he has felt SOB while laying flat as well as at rest for 2-3 days.  Pt is s/p DCCV on 12/1/23 and is taking Eliquis  ECG: A-flutter.   Telemonitor  HR Uncontrolled  May use Metoprolol 5 mg IVP for sustained HR >120   IVF   Trend trops x 3. Serial EKGs  Replace all electrolytes. Maintain K+~4 and mag~2  Monitor LFTs and hold statin x now.   Obtain CPK, A1C, lipid panel fasting, TFTs, sed rate to ro comorbidities  Keep NPO x possible cardioversion in AM  EP consult in AM  Obtain viral panel, pt co cough x 2 days  Pulmonary evaluation    Update: Pt Covid +

## 2023-12-14 NOTE — ED ADULT NURSE NOTE - NS ED NURSE REPORT GIVEN TO FT
Report given to [facility] RN Ofe. Patient AA&Ox4, respirations even/unlabored, no apparent distress or change in mental status noted. Plan of care discussed with patient, all questions answered. VSS, recorded in EMR. Pt in stable condition, will remain in isolation room for Covid+ RVP. Pt remains on CM and .

## 2023-12-14 NOTE — ED PROVIDER NOTE - OBJECTIVE STATEMENT
Patient is a 72 yo male with PMhx HTN, CAD s/p CABG, former smoker, HFrEF LVFEF 20-25%, MR, recent admission for L loculated pleural effusion s/p VATS decortication, a flutter with RVR s/p DCCV on eliquis presenting with SOB found to be in a fib with RVR. Patient states he has felt SOB while laying flat as well as at rest for 2-3 days. Patient has been taking his medications. States his b/l LE edema is much improved at this time. Denies fevers, chills, dizziness, lightheadedness, dysphagia, dysarthria, diplopia, photophobia, syncope, cough, congestion, CP, abdominal pain, neck pain, back pain, diarrhea, dysuria, hematuria, hematochezia, hematemesis, n/v, recent travel, sick contacts, leg swelling. Patient is a 74 yo male with PMhx HTN, CAD s/p CABG, former smoker, HFrEF LVFEF 20-25%, MR, recent admission for L loculated pleural effusion s/p VATS decortication, a flutter with RVR s/p DCCV on eliquis presenting with SOB found to be in a fib with RVR. Patient states he has felt SOB while laying flat as well as at rest for 2-3 days. Patient has been taking his medications. States his b/l LE edema is much improved at this time. Denies fevers, chills, dizziness, lightheadedness, dysphagia, dysarthria, diplopia, photophobia, syncope, cough, congestion, CP, abdominal pain, neck pain, back pain, diarrhea, dysuria, hematuria, hematochezia, hematemesis, n/v, recent travel, sick contacts, leg swelling. Patient is a 74 yo male with PMhx HTN, CAD s/p CABG, former smoker, HFrEF LVFEF 20-25%, MR, recent admission for L loculated pleural effusion s/p VATS decortication, a flutter with RVR s/p DCCV on eliquis presenting with SOB found to be in a flutter with RVR. Patient states he has felt SOB while laying flat as well as at rest for 2-3 days. Patient has been taking his medications. States his b/l LE edema is much improved at this time. Denies fevers, chills, dizziness, lightheadedness, dysphagia, dysarthria, diplopia, photophobia, syncope, cough, congestion, CP, abdominal pain, neck pain, back pain, diarrhea, dysuria, hematuria, hematochezia, hematemesis, n/v, recent travel, sick contacts, leg swelling. Patient is a 72 yo male with PMhx HTN, CAD s/p CABG, former smoker, HFrEF LVFEF 20-25%, MR, recent admission for L loculated pleural effusion s/p VATS decortication, a flutter with RVR s/p DCCV on eliquis presenting with SOB found to be in a flutter with RVR. Patient states he has felt SOB while laying flat as well as at rest for 2-3 days. Patient has been taking his medications. States his b/l LE edema is much improved at this time. Denies fevers, chills, dizziness, lightheadedness, dysphagia, dysarthria, diplopia, photophobia, syncope, cough, congestion, CP, abdominal pain, neck pain, back pain, diarrhea, dysuria, hematuria, hematochezia, hematemesis, n/v, recent travel, sick contacts, leg swelling.

## 2023-12-14 NOTE — ED PROVIDER NOTE - PROGRESS NOTE DETAILS
JK - labs WNL. HR controlled at this time. Will admit to medicine for DCCV as per cardiology. Currently stable.

## 2023-12-15 DIAGNOSIS — I48.91 UNSPECIFIED ATRIAL FIBRILLATION: ICD-10-CM

## 2023-12-15 DIAGNOSIS — J86.9 PYOTHORAX WITHOUT FISTULA: ICD-10-CM

## 2023-12-15 LAB
ANION GAP SERPL CALC-SCNC: 16 MMOL/L — SIGNIFICANT CHANGE UP (ref 5–17)
ANION GAP SERPL CALC-SCNC: 16 MMOL/L — SIGNIFICANT CHANGE UP (ref 5–17)
B PERT DNA SPEC QL NAA+PROBE: SIGNIFICANT CHANGE UP
B PERT DNA SPEC QL NAA+PROBE: SIGNIFICANT CHANGE UP
BUN SERPL-MCNC: 37.1 MG/DL — HIGH (ref 8–20)
BUN SERPL-MCNC: 37.1 MG/DL — HIGH (ref 8–20)
C PNEUM DNA SPEC QL NAA+PROBE: SIGNIFICANT CHANGE UP
C PNEUM DNA SPEC QL NAA+PROBE: SIGNIFICANT CHANGE UP
CALCIUM SERPL-MCNC: 8.3 MG/DL — LOW (ref 8.4–10.5)
CALCIUM SERPL-MCNC: 8.3 MG/DL — LOW (ref 8.4–10.5)
CHLORIDE SERPL-SCNC: 104 MMOL/L — SIGNIFICANT CHANGE UP (ref 96–108)
CHLORIDE SERPL-SCNC: 104 MMOL/L — SIGNIFICANT CHANGE UP (ref 96–108)
CO2 SERPL-SCNC: 20 MMOL/L — LOW (ref 22–29)
CO2 SERPL-SCNC: 20 MMOL/L — LOW (ref 22–29)
CREAT SERPL-MCNC: 1.51 MG/DL — HIGH (ref 0.5–1.3)
CREAT SERPL-MCNC: 1.51 MG/DL — HIGH (ref 0.5–1.3)
EGFR: 49 ML/MIN/1.73M2 — LOW
EGFR: 49 ML/MIN/1.73M2 — LOW
FLUAV H1 2009 PAND RNA SPEC QL NAA+PROBE: SIGNIFICANT CHANGE UP
FLUAV H1 2009 PAND RNA SPEC QL NAA+PROBE: SIGNIFICANT CHANGE UP
FLUAV H1 RNA SPEC QL NAA+PROBE: SIGNIFICANT CHANGE UP
FLUAV H1 RNA SPEC QL NAA+PROBE: SIGNIFICANT CHANGE UP
FLUAV H3 RNA SPEC QL NAA+PROBE: SIGNIFICANT CHANGE UP
FLUAV H3 RNA SPEC QL NAA+PROBE: SIGNIFICANT CHANGE UP
FLUAV SUBTYP SPEC NAA+PROBE: SIGNIFICANT CHANGE UP
FLUAV SUBTYP SPEC NAA+PROBE: SIGNIFICANT CHANGE UP
FLUBV RNA SPEC QL NAA+PROBE: SIGNIFICANT CHANGE UP
FLUBV RNA SPEC QL NAA+PROBE: SIGNIFICANT CHANGE UP
GLUCOSE BLDC GLUCOMTR-MCNC: 100 MG/DL — HIGH (ref 70–99)
GLUCOSE BLDC GLUCOMTR-MCNC: 100 MG/DL — HIGH (ref 70–99)
GLUCOSE BLDC GLUCOMTR-MCNC: 102 MG/DL — HIGH (ref 70–99)
GLUCOSE BLDC GLUCOMTR-MCNC: 102 MG/DL — HIGH (ref 70–99)
GLUCOSE BLDC GLUCOMTR-MCNC: 111 MG/DL — HIGH (ref 70–99)
GLUCOSE BLDC GLUCOMTR-MCNC: 111 MG/DL — HIGH (ref 70–99)
GLUCOSE SERPL-MCNC: 91 MG/DL — SIGNIFICANT CHANGE UP (ref 70–99)
GLUCOSE SERPL-MCNC: 91 MG/DL — SIGNIFICANT CHANGE UP (ref 70–99)
HADV DNA SPEC QL NAA+PROBE: SIGNIFICANT CHANGE UP
HADV DNA SPEC QL NAA+PROBE: SIGNIFICANT CHANGE UP
HCOV PNL SPEC NAA+PROBE: SIGNIFICANT CHANGE UP
HCOV PNL SPEC NAA+PROBE: SIGNIFICANT CHANGE UP
HCT VFR BLD CALC: 34.3 % — LOW (ref 39–50)
HCT VFR BLD CALC: 34.3 % — LOW (ref 39–50)
HGB BLD-MCNC: 10.6 G/DL — LOW (ref 13–17)
HGB BLD-MCNC: 10.6 G/DL — LOW (ref 13–17)
HMPV RNA SPEC QL NAA+PROBE: SIGNIFICANT CHANGE UP
HMPV RNA SPEC QL NAA+PROBE: SIGNIFICANT CHANGE UP
HPIV1 RNA SPEC QL NAA+PROBE: SIGNIFICANT CHANGE UP
HPIV1 RNA SPEC QL NAA+PROBE: SIGNIFICANT CHANGE UP
HPIV2 RNA SPEC QL NAA+PROBE: SIGNIFICANT CHANGE UP
HPIV2 RNA SPEC QL NAA+PROBE: SIGNIFICANT CHANGE UP
HPIV3 RNA SPEC QL NAA+PROBE: SIGNIFICANT CHANGE UP
HPIV3 RNA SPEC QL NAA+PROBE: SIGNIFICANT CHANGE UP
HPIV4 RNA SPEC QL NAA+PROBE: SIGNIFICANT CHANGE UP
HPIV4 RNA SPEC QL NAA+PROBE: SIGNIFICANT CHANGE UP
MAGNESIUM SERPL-MCNC: 2 MG/DL — SIGNIFICANT CHANGE UP (ref 1.6–2.6)
MAGNESIUM SERPL-MCNC: 2 MG/DL — SIGNIFICANT CHANGE UP (ref 1.6–2.6)
MCHC RBC-ENTMCNC: 26.9 PG — LOW (ref 27–34)
MCHC RBC-ENTMCNC: 26.9 PG — LOW (ref 27–34)
MCHC RBC-ENTMCNC: 30.9 GM/DL — LOW (ref 32–36)
MCHC RBC-ENTMCNC: 30.9 GM/DL — LOW (ref 32–36)
MCV RBC AUTO: 87.1 FL — SIGNIFICANT CHANGE UP (ref 80–100)
MCV RBC AUTO: 87.1 FL — SIGNIFICANT CHANGE UP (ref 80–100)
PHOSPHATE SERPL-MCNC: 3.3 MG/DL — SIGNIFICANT CHANGE UP (ref 2.4–4.7)
PHOSPHATE SERPL-MCNC: 3.3 MG/DL — SIGNIFICANT CHANGE UP (ref 2.4–4.7)
PLATELET # BLD AUTO: 202 K/UL — SIGNIFICANT CHANGE UP (ref 150–400)
PLATELET # BLD AUTO: 202 K/UL — SIGNIFICANT CHANGE UP (ref 150–400)
POTASSIUM SERPL-MCNC: 3.7 MMOL/L — SIGNIFICANT CHANGE UP (ref 3.5–5.3)
POTASSIUM SERPL-MCNC: 3.7 MMOL/L — SIGNIFICANT CHANGE UP (ref 3.5–5.3)
POTASSIUM SERPL-SCNC: 3.7 MMOL/L — SIGNIFICANT CHANGE UP (ref 3.5–5.3)
POTASSIUM SERPL-SCNC: 3.7 MMOL/L — SIGNIFICANT CHANGE UP (ref 3.5–5.3)
RAPID RVP RESULT: DETECTED
RAPID RVP RESULT: DETECTED
RBC # BLD: 3.94 M/UL — LOW (ref 4.2–5.8)
RBC # BLD: 3.94 M/UL — LOW (ref 4.2–5.8)
RBC # FLD: 17.1 % — HIGH (ref 10.3–14.5)
RBC # FLD: 17.1 % — HIGH (ref 10.3–14.5)
RV+EV RNA SPEC QL NAA+PROBE: SIGNIFICANT CHANGE UP
RV+EV RNA SPEC QL NAA+PROBE: SIGNIFICANT CHANGE UP
SARS-COV-2 RNA SPEC QL NAA+PROBE: DETECTED
SARS-COV-2 RNA SPEC QL NAA+PROBE: DETECTED
SODIUM SERPL-SCNC: 140 MMOL/L — SIGNIFICANT CHANGE UP (ref 135–145)
SODIUM SERPL-SCNC: 140 MMOL/L — SIGNIFICANT CHANGE UP (ref 135–145)
TROPONIN T, HIGH SENSITIVITY RESULT: 20 NG/L — SIGNIFICANT CHANGE UP (ref 0–51)
TROPONIN T, HIGH SENSITIVITY RESULT: 20 NG/L — SIGNIFICANT CHANGE UP (ref 0–51)
WBC # BLD: 5.94 K/UL — SIGNIFICANT CHANGE UP (ref 3.8–10.5)
WBC # BLD: 5.94 K/UL — SIGNIFICANT CHANGE UP (ref 3.8–10.5)
WBC # FLD AUTO: 5.94 K/UL — SIGNIFICANT CHANGE UP (ref 3.8–10.5)
WBC # FLD AUTO: 5.94 K/UL — SIGNIFICANT CHANGE UP (ref 3.8–10.5)

## 2023-12-15 PROCEDURE — 99233 SBSQ HOSP IP/OBS HIGH 50: CPT

## 2023-12-15 PROCEDURE — 99255 IP/OBS CONSLTJ NEW/EST HI 80: CPT

## 2023-12-15 PROCEDURE — 99221 1ST HOSP IP/OBS SF/LOW 40: CPT

## 2023-12-15 PROCEDURE — 99223 1ST HOSP IP/OBS HIGH 75: CPT

## 2023-12-15 PROCEDURE — 99024 POSTOP FOLLOW-UP VISIT: CPT

## 2023-12-15 PROCEDURE — 99222 1ST HOSP IP/OBS MODERATE 55: CPT

## 2023-12-15 PROCEDURE — 92960 CARDIOVERSION ELECTRIC EXT: CPT

## 2023-12-15 PROCEDURE — 99253 IP/OBS CNSLTJ NEW/EST LOW 45: CPT

## 2023-12-15 PROCEDURE — 93010 ELECTROCARDIOGRAM REPORT: CPT

## 2023-12-15 PROCEDURE — 99254 IP/OBS CNSLTJ NEW/EST MOD 60: CPT

## 2023-12-15 RX ORDER — INSULIN LISPRO 100/ML
VIAL (ML) SUBCUTANEOUS EVERY 6 HOURS
Refills: 0 | Status: DISCONTINUED | OUTPATIENT
Start: 2023-12-15 | End: 2023-12-19

## 2023-12-15 RX ORDER — ATORVASTATIN CALCIUM 80 MG/1
20 TABLET, FILM COATED ORAL AT BEDTIME
Refills: 0 | Status: DISCONTINUED | OUTPATIENT
Start: 2023-12-15 | End: 2023-12-19

## 2023-12-15 RX ORDER — DAPAGLIFLOZIN 10 MG/1
10 TABLET, FILM COATED ORAL EVERY 24 HOURS
Refills: 0 | Status: DISCONTINUED | OUTPATIENT
Start: 2023-12-15 | End: 2023-12-15

## 2023-12-15 RX ORDER — AMIODARONE HYDROCHLORIDE 400 MG/1
0.5 TABLET ORAL
Qty: 450 | Refills: 0 | Status: DISCONTINUED | OUTPATIENT
Start: 2023-12-15 | End: 2023-12-15

## 2023-12-15 RX ORDER — AMIODARONE HYDROCHLORIDE 400 MG/1
0.18 TABLET ORAL
Qty: 450 | Refills: 0 | Status: DISCONTINUED | OUTPATIENT
Start: 2023-12-15 | End: 2023-12-15

## 2023-12-15 RX ORDER — INSULIN GLARGINE 100 [IU]/ML
6 INJECTION, SOLUTION SUBCUTANEOUS AT BEDTIME
Refills: 0 | Status: DISCONTINUED | OUTPATIENT
Start: 2023-12-15 | End: 2023-12-19

## 2023-12-15 RX ORDER — ONDANSETRON 8 MG/1
4 TABLET, FILM COATED ORAL EVERY 8 HOURS
Refills: 0 | Status: DISCONTINUED | OUTPATIENT
Start: 2023-12-15 | End: 2023-12-19

## 2023-12-15 RX ORDER — AMIODARONE HYDROCHLORIDE 400 MG/1
200 TABLET ORAL DAILY
Refills: 0 | Status: CANCELLED | OUTPATIENT
Start: 2023-12-22 | End: 2023-12-19

## 2023-12-15 RX ORDER — SPIRONOLACTONE 25 MG/1
25 TABLET, FILM COATED ORAL DAILY
Refills: 0 | Status: DISCONTINUED | OUTPATIENT
Start: 2023-12-15 | End: 2023-12-19

## 2023-12-15 RX ORDER — GABAPENTIN 400 MG/1
100 CAPSULE ORAL
Refills: 0 | Status: DISCONTINUED | OUTPATIENT
Start: 2023-12-15 | End: 2023-12-19

## 2023-12-15 RX ORDER — DEXTROSE 50 % IN WATER 50 %
25 SYRINGE (ML) INTRAVENOUS ONCE
Refills: 0 | Status: DISCONTINUED | OUTPATIENT
Start: 2023-12-15 | End: 2023-12-19

## 2023-12-15 RX ORDER — METOPROLOL TARTRATE 50 MG
5 TABLET ORAL EVERY 6 HOURS
Refills: 0 | Status: DISCONTINUED | OUTPATIENT
Start: 2023-12-15 | End: 2023-12-19

## 2023-12-15 RX ORDER — ASPIRIN/CALCIUM CARB/MAGNESIUM 324 MG
81 TABLET ORAL DAILY
Refills: 0 | Status: DISCONTINUED | OUTPATIENT
Start: 2023-12-15 | End: 2023-12-19

## 2023-12-15 RX ORDER — SODIUM CHLORIDE 9 MG/ML
3 INJECTION INTRAMUSCULAR; INTRAVENOUS; SUBCUTANEOUS EVERY 8 HOURS
Refills: 0 | Status: DISCONTINUED | OUTPATIENT
Start: 2023-12-15 | End: 2023-12-19

## 2023-12-15 RX ORDER — APIXABAN 2.5 MG/1
5 TABLET, FILM COATED ORAL EVERY 12 HOURS
Refills: 0 | Status: DISCONTINUED | OUTPATIENT
Start: 2023-12-15 | End: 2023-12-19

## 2023-12-15 RX ORDER — ACETAMINOPHEN 500 MG
650 TABLET ORAL EVERY 6 HOURS
Refills: 0 | Status: DISCONTINUED | OUTPATIENT
Start: 2023-12-15 | End: 2023-12-19

## 2023-12-15 RX ORDER — GLUCAGON INJECTION, SOLUTION 0.5 MG/.1ML
1 INJECTION, SOLUTION SUBCUTANEOUS ONCE
Refills: 0 | Status: DISCONTINUED | OUTPATIENT
Start: 2023-12-15 | End: 2023-12-19

## 2023-12-15 RX ORDER — FUROSEMIDE 40 MG
40 TABLET ORAL DAILY
Refills: 0 | Status: DISCONTINUED | OUTPATIENT
Start: 2023-12-15 | End: 2023-12-19

## 2023-12-15 RX ORDER — AMIODARONE HYDROCHLORIDE 400 MG/1
400 TABLET ORAL EVERY 12 HOURS
Refills: 0 | Status: DISCONTINUED | OUTPATIENT
Start: 2023-12-15 | End: 2023-12-19

## 2023-12-15 RX ORDER — TAMSULOSIN HYDROCHLORIDE 0.4 MG/1
0.4 CAPSULE ORAL AT BEDTIME
Refills: 0 | Status: DISCONTINUED | OUTPATIENT
Start: 2023-12-15 | End: 2023-12-19

## 2023-12-15 RX ORDER — METOPROLOL TARTRATE 50 MG
100 TABLET ORAL DAILY
Refills: 0 | Status: DISCONTINUED | OUTPATIENT
Start: 2023-12-15 | End: 2023-12-19

## 2023-12-15 RX ORDER — LANOLIN ALCOHOL/MO/W.PET/CERES
3 CREAM (GRAM) TOPICAL AT BEDTIME
Refills: 0 | Status: DISCONTINUED | OUTPATIENT
Start: 2023-12-15 | End: 2023-12-19

## 2023-12-15 RX ORDER — SACUBITRIL AND VALSARTAN 24; 26 MG/1; MG/1
1 TABLET, FILM COATED ORAL
Refills: 0 | Status: DISCONTINUED | OUTPATIENT
Start: 2023-12-15 | End: 2023-12-19

## 2023-12-15 RX ORDER — AMIODARONE HYDROCHLORIDE 400 MG/1
150 TABLET ORAL ONCE
Refills: 0 | Status: COMPLETED | OUTPATIENT
Start: 2023-12-15 | End: 2023-12-15

## 2023-12-15 RX ORDER — PANTOPRAZOLE SODIUM 20 MG/1
40 TABLET, DELAYED RELEASE ORAL
Refills: 0 | Status: DISCONTINUED | OUTPATIENT
Start: 2023-12-15 | End: 2023-12-19

## 2023-12-15 RX ORDER — AMIODARONE HYDROCHLORIDE 400 MG/1
1 TABLET ORAL
Qty: 450 | Refills: 0 | Status: DISCONTINUED | OUTPATIENT
Start: 2023-12-15 | End: 2023-12-15

## 2023-12-15 RX ORDER — INFLUENZA VIRUS VACCINE 15; 15; 15; 15 UG/.5ML; UG/.5ML; UG/.5ML; UG/.5ML
0.7 SUSPENSION INTRAMUSCULAR ONCE
Refills: 0 | Status: DISCONTINUED | OUTPATIENT
Start: 2023-12-15 | End: 2023-12-19

## 2023-12-15 RX ORDER — SODIUM CHLORIDE 9 MG/ML
1000 INJECTION, SOLUTION INTRAVENOUS
Refills: 0 | Status: DISCONTINUED | OUTPATIENT
Start: 2023-12-15 | End: 2023-12-19

## 2023-12-15 RX ORDER — DEXTROSE 50 % IN WATER 50 %
15 SYRINGE (ML) INTRAVENOUS ONCE
Refills: 0 | Status: DISCONTINUED | OUTPATIENT
Start: 2023-12-15 | End: 2023-12-19

## 2023-12-15 RX ADMIN — Medication 40 MILLIGRAM(S): at 07:29

## 2023-12-15 RX ADMIN — SODIUM CHLORIDE 3 MILLILITER(S): 9 INJECTION INTRAMUSCULAR; INTRAVENOUS; SUBCUTANEOUS at 07:39

## 2023-12-15 RX ADMIN — Medication 81 MILLIGRAM(S): at 15:22

## 2023-12-15 RX ADMIN — PANTOPRAZOLE SODIUM 40 MILLIGRAM(S): 20 TABLET, DELAYED RELEASE ORAL at 07:30

## 2023-12-15 RX ADMIN — SODIUM CHLORIDE 3 MILLILITER(S): 9 INJECTION INTRAMUSCULAR; INTRAVENOUS; SUBCUTANEOUS at 22:27

## 2023-12-15 RX ADMIN — TAMSULOSIN HYDROCHLORIDE 0.4 MILLIGRAM(S): 0.4 CAPSULE ORAL at 22:59

## 2023-12-15 RX ADMIN — ATORVASTATIN CALCIUM 20 MILLIGRAM(S): 80 TABLET, FILM COATED ORAL at 22:59

## 2023-12-15 RX ADMIN — SACUBITRIL AND VALSARTAN 1 TABLET(S): 24; 26 TABLET, FILM COATED ORAL at 22:59

## 2023-12-15 RX ADMIN — GABAPENTIN 100 MILLIGRAM(S): 400 CAPSULE ORAL at 18:18

## 2023-12-15 RX ADMIN — INSULIN GLARGINE 6 UNIT(S): 100 INJECTION, SOLUTION SUBCUTANEOUS at 22:58

## 2023-12-15 RX ADMIN — APIXABAN 5 MILLIGRAM(S): 2.5 TABLET, FILM COATED ORAL at 18:18

## 2023-12-15 RX ADMIN — APIXABAN 5 MILLIGRAM(S): 2.5 TABLET, FILM COATED ORAL at 07:29

## 2023-12-15 RX ADMIN — SACUBITRIL AND VALSARTAN 1 TABLET(S): 24; 26 TABLET, FILM COATED ORAL at 09:43

## 2023-12-15 RX ADMIN — GABAPENTIN 100 MILLIGRAM(S): 400 CAPSULE ORAL at 07:29

## 2023-12-15 RX ADMIN — AMIODARONE HYDROCHLORIDE 33.3 MG/MIN: 400 TABLET ORAL at 11:12

## 2023-12-15 RX ADMIN — SODIUM CHLORIDE 3 MILLILITER(S): 9 INJECTION INTRAMUSCULAR; INTRAVENOUS; SUBCUTANEOUS at 14:00

## 2023-12-15 RX ADMIN — AMIODARONE HYDROCHLORIDE 618 MILLIGRAM(S): 400 TABLET ORAL at 10:56

## 2023-12-15 RX ADMIN — AMIODARONE HYDROCHLORIDE 400 MILLIGRAM(S): 400 TABLET ORAL at 18:18

## 2023-12-15 RX ADMIN — SPIRONOLACTONE 25 MILLIGRAM(S): 25 TABLET, FILM COATED ORAL at 07:39

## 2023-12-15 RX ADMIN — Medication 100 MILLIGRAM(S): at 07:29

## 2023-12-15 NOTE — H&P ADULT - NSICDXPASTMEDICALHX_GEN_ALL_CORE_FT
PAST MEDICAL HISTORY:  CHF with unknown LVEF     Coronary artery disease involving native coronary artery of native heart, unspecified whether angina     History of pleural empyema     Hyperlipidemia, unspecified hyperlipidemia type     Primary hypertension

## 2023-12-15 NOTE — PATIENT PROFILE ADULT - CONTRAINDICATIONS & PRECAUTIONS (SELECT ALL THAT APPLY)
"Pt has a flat blunted affect with a depressed mood. Pt didn't attend groups. Pts speech is mono tone. VSS. Pt rates anxiety at 4/10 and depression 6/10. Pt reports no SI/HI and contracts for safety. Pt endorses auditory hallucinations. When asked what the voices were saying pt would not elaborate and stated \"it doesn't matter\". Pt was medication compliant with no cheeking noted. No reported or observed medication side effects. Pt was visible on unit but withdrawn to self. Pts psychiatrist from his act team came and had a visit this afternoon. Continue current POC.  " none...

## 2023-12-15 NOTE — H&P ADULT - HISTORY OF PRESENT ILLNESS
72 y/o male with hx of CAD s/p CABG, ICM with EF of 20-25%, Afib not on AC, HLD, HTN, CKD-4. Patient with recent admission from 11/18-12/4 for left sided empyema w/ strep intermedius s/p VATS w/ decortication with post op Afib with RVR s/p QUIN/DCC and started on Eliquis on 12/1. He states he was doing ok at [facility] since DC on 12/4 when he started feeling palpitations and SOB. Denies CP, no fever, cough. He completed his full course of IV abx at this time. Patient found to be in Afib w/ RVR. Got multiple doses of PO/IV metoprolol with good response. Denies missing any doses of  his medications as far as he knows at [facility]. Seen by Cardiology and advised for admission for likely repeat QUIN/Cardioversion. Currently denies any other complaints.     .   74 y/o male with hx of CAD s/p CABG, ICM with EF of 20-25%, Afib not on AC, HLD, HTN, CKD-4. Patient with recent admission from 11/18-12/4 for left sided empyema w/ strep intermedius s/p VATS w/ decortication with post op Afib with RVR s/p QUIN/DCC and started on Eliquis on 12/1. He states he was doing ok at [facility] since DC on 12/4 when he started feeling palpitations and SOB. Denies CP, no fever, cough. He completed his full course of IV abx at this time. Patient found to be in Afib w/ RVR. Got multiple doses of PO/IV metoprolol with good response. Denies missing any doses of  his medications as far as he knows at [facility]. Seen by Cardiology and advised for admission for likely repeat QUIN/Cardioversion. Currently denies any other complaints.     .

## 2023-12-15 NOTE — PROGRESS NOTE ADULT - ASSESSMENT
74 y/o male with hx of CAD s/p CABG, ICM with EF of 20-25%, Afib not on AC, HLD, HTN, CKD-4. Patient with recent admission from 11/18-12/4 for left sided empyema w/ strep intermedius s/p VATS w/ decortication with post op Afib with RVR s/p QUIN/DCC and started on Eliquis on 12/1. He states he was doing ok at [facility] since DC on 12/4 when he started feeling palpitations and SOB.    PAF w/ RVR:  -Admit to tele  -Cont. Eliquis  -for dccv today   -ep following  - eliquis    COVID pos. PCR:  -Not hypoxic  -Isolation  -Supportive care  -No role for steroids/Remdesevir at this time     S/P VATs:  -Repeat CXR with improved aeration  -No fever, leucocytosis  -spoke to ct surgery   - patient was to be on iv abx - ID consulted by ct surgery     CAD s/p CABG:  -Trop neg    ICM w/ EF of 20-25%:  -No evidence of decompensated CHF at this time  -Cont. o/p regimen     HTN:  -Cont. o/p regimen     HLD:  -Statin     Chronic anemia:  -Stable  -Chronic    CKD-3:  -Stable, Cr. at baseline       pt consult  patient is active  72 y/o male with hx of CAD s/p CABG, ICM with EF of 20-25%, Afib not on AC, HLD, HTN, CKD-4. Patient with recent admission from 11/18-12/4 for left sided empyema w/ strep intermedius s/p VATS w/ decortication with post op Afib with RVR s/p QUIN/DCC and started on Eliquis on 12/1. He states he was doing ok at [facility] since DC on 12/4 when he started feeling palpitations and SOB.    PAF w/ RVR:  -Admit to tele  -Cont. Eliquis  -for dccv today   -ep following  - eliquis    COVID pos. PCR:  -Not hypoxic  -Isolation  -Supportive care  -No role for steroids/Remdesevir at this time     S/P VATs:  -Repeat CXR with improved aeration  -No fever, leucocytosis  -spoke to ct surgery   - patient was to be on iv abx - ID consulted by ct surgery     CAD s/p CABG:  -Trop neg    ICM w/ EF of 20-25%:  -No evidence of decompensated CHF at this time  -Cont. o/p regimen     HTN:  -Cont. o/p regimen     HLD:  -Statin     Chronic anemia:  -Stable  -Chronic    CKD-3:  -Stable, Cr. at baseline       pt consult  patient is active

## 2023-12-15 NOTE — PROGRESS NOTE ADULT - ASSESSMENT
72 year old male with a PMHx of HTN, CAD s/p CABG x 4 (2021), HLD, and obesity, recently hospitalized at [facility] 11/18/23 until 12/6/23 for AF RVR, acute on chronic systolic HF exacerbation, Large Left empyema (pleural fluid cultures + strept intermedius) s/p FB, Left VATS total decortication on 11/27/23 with Dr. Encarnacion. Postoperative course significant for successful DCCV 12/1/23 with EP. Patient remains on IV Ceftriaxone per ID for empyema until 12/25/23. Patient now readmitted from [facility] for SOB, found with recurrent AF with RVR, with plan for potential cardioversion later today. Thoracic surgery team called for follow up given recent VATS, decort.  72 year old male with a PMHx of HTN, CAD s/p CABG x 4 (2021), HLD, and obesity, recently hospitalized at [facility] 11/18/23 until 12/6/23 for AF RVR, acute on chronic systolic HF exacerbation, Large Left empyema (pleural fluid cultures + strept intermedius) s/p FB, Left VATS total decortication on 11/27/23 with Dr. Encarnacion. Postoperative course significant for successful DCCV 12/1/23 with EP. Patient remains on IV Ceftriaxone per ID for empyema until 12/25/23. Patient now readmitted from [facility] for SOB, found with recurrent AF with RVR, with plan for potential cardioversion later today. Thoracic surgery team called for follow up given recent VATS, decort.

## 2023-12-15 NOTE — CONSULT NOTE ADULT - NS ATTEND AMEND GEN_ALL_CORE FT
.  .  This is a 73 year old male with HTN, HL, obesity, CAD s/p 4VCABG (2020, [facility]), CKD 4, HFrEF (LVEF: 20-25%; likely AF/AFL mediated), left lung empyema (s/p VATS 11/27), typical AFL s/p QUIN/CV (12/1/23) who presents with COVDI-19 infection and recurrent typical flutter with RVR.    After his last admission he was planned to start amiodarone however this was not initiated. Rate control will be challenging so would recommend repeating cardioversion. I would pursue catheter ablation today however the patient has been on Farxiga and so there is an elevated risk of euglycemic DKA. He has been on uninterrupted AC so will repeat CV followed by amiodarone load. Although he has mild transaminitis, I think it is reasonable to use amiodarone to maintain NSR until definitive therapy with ablation can be performed.    Recommendations:  - Continue GDMT for HFrEF  - Cardioversion today followed by amiodarone  - Monitor LFTs    Thank you for this consult. We will follow with you.    Gaurav Bolivar MD  Clinical Cardiac Electrophysiology .  .  This is a 73 year old male with HTN, HL, obesity, CAD s/p 4VCABG (2020, [facility]), CKD 4, HFrEF (LVEF: 20-25%; likely AF/AFL mediated), left lung empyema (s/p VATS 11/27), typical AFL s/p QUIN/CV (12/1/23) who presents with COVDI-19 infection and recurrent typical flutter with RVR.    After his last admission he was planned to start amiodarone however this was not initiated. Rate control will be challenging so would recommend repeating cardioversion. I would pursue catheter ablation today however the patient has been on Farxiga and so there is an elevated risk of euglycemic DKA. He has been on uninterrupted AC so will repeat CV followed by amiodarone load. Although he has mild transaminitis, I think it is reasonable to use amiodarone to maintain NSR until definitive therapy with ablation can be performed.    Recommendations:  - Continue GDMT for HFrEF  - Cardioversion today followed by amiodarone  - Monitor LFTs    Thank you for this consult. We will follow with you.    Gaurav Bolivar MD  Clinical Cardiac Electrophysiology

## 2023-12-15 NOTE — H&P ADULT - NSICDXFAMILYHX_GEN_ALL_CORE_FT
FAMILY HISTORY:  Father  Still living? No  Family history of coronary artery bypass surgery, Age at diagnosis: 61-70    Mother  Still living? No  Family history of hypotension, Age at diagnosis: Age Unknown

## 2023-12-15 NOTE — PATIENT PROFILE ADULT - FALL HARM RISK - HARM RISK INTERVENTIONS
Assistance with ambulation/Assistance OOB with selected safe patient handling equipment/Communicate Risk of Fall with Harm to all staff/Discuss with provider need for PT consult/Monitor gait and stability/Provide patient with walking aids - walker, cane, crutches/Reinforce activity limits and safety measures with patient and family/Tailored Fall Risk Interventions/Visual Cue: Yellow wristband and red socks/Bed in lowest position, wheels locked, appropriate side rails in place/Call bell, personal items and telephone in reach/Instruct patient to call for assistance before getting out of bed or chair/Non-slip footwear when patient is out of bed/[place] to call system/Physically safe environment - no spills, clutter or unnecessary equipment/Purposeful Proactive Rounding/Room/bathroom lighting operational, light cord in reach Assistance with ambulation/Assistance OOB with selected safe patient handling equipment/Communicate Risk of Fall with Harm to all staff/Discuss with provider need for PT consult/Monitor gait and stability/Provide patient with walking aids - walker, cane, crutches/Reinforce activity limits and safety measures with patient and family/Tailored Fall Risk Interventions/Visual Cue: Yellow wristband and red socks/Bed in lowest position, wheels locked, appropriate side rails in place/Call bell, personal items and telephone in reach/Instruct patient to call for assistance before getting out of bed or chair/Non-slip footwear when patient is out of bed/[place] to call system/Physically safe environment - no spills, clutter or unnecessary equipment/Purposeful Proactive Rounding/Room/bathroom lighting operational, light cord in reach

## 2023-12-15 NOTE — PROGRESS NOTE ADULT - SUBJECTIVE AND OBJECTIVE BOX
GRETA AGUAYO    [id number]    73y      Male    INTERVAL HPI/OVERNIGHT EVENTS:  patient being seen for afib with rvr, covid and med management. patient seen at bedside and is npo for DCCV today    patient is on amio drip.      REVIEW OF SYSTEMS:    CONSTITUTIONAL: No fever, weight loss, or fatigue  RESPIRATORY: No cough, wheezing, hemoptysis; No shortness of breath  CARDIOVASCULAR: No chest pain, palpitations  GASTROINTESTINAL: No abdominal or epigastric pain. No nausea, vomiting  NEUROLOGICAL: No headaches, memory loss, loss of strength.  MISCELLANEOUS:      Vital Signs Last 24 Hrs  T(C): 36.6 (15 Dec 2023 11:00), Max: 36.7 (14 Dec 2023 16:22)  T(F): 97.9 (15 Dec 2023 11:00), Max: 98.1 (14 Dec 2023 16:22)  HR: 133 (15 Dec 2023 11:10) (110 - 139)  BP: 131/72 (15 Dec 2023 11:10) (99/59 - 131/72)  BP(mean): 79 (15 Dec 2023 06:11) (72 - 79)  RR: 20 (15 Dec 2023 11:10) (18 - 20)  SpO2: 98% (15 Dec 2023 11:10) (96% - 98%)    Parameters below as of 15 Dec 2023 11:10  Patient On (Oxygen Delivery Method): room air        PHYSICAL EXAM:    · Constitutional	no distress  · Constitutional Comments	elderly male sitting up in bed in NAD  · Eyes	conjunctiva clear  · Respiratory	no wheezes; no rales  · Respiratory Comments	left sided sutures in place at VATS site  · Cardiovascular	S1 S2 present; Irregularly irregular rhythm; tachycardia  · Gastrointestinal	soft; nontender  · Mental Status	AAOX3  · Skin	warm and dry  · Skin Comments	Stage 2 sacral decub POA  · Musculoskeletal	no joint swelling  · Psychiatric	normal affect        LABS:                        10.6   5.94  )-----------( 202      ( 15 Dec 2023 02:19 )             34.3     12-15    140  |  104  |  37.1<H>  ----------------------------<  91  3.7   |  20.0<L>  |  1.51<H>    Ca    8.3<L>      15 Dec 2023 02:19  Phos  3.3     12-15  Mg     2.0     12-15    TPro  6.6  /  Alb  2.6<L>  /  TBili  0.5  /  DBili  x   /  AST  136<H>  /  ALT  95<H>  /  AlkPhos  134<H>  12-14    PT/INR - ( 14 Dec 2023 17:00 )   PT: 20.7 sec;   INR: 1.90 ratio         PTT - ( 14 Dec 2023 17:00 )  PTT:38.1 sec  Urinalysis Basic - ( 15 Dec 2023 02:19 )    Color: x / Appearance: x / SG: x / pH: x  Gluc: 91 mg/dL / Ketone: x  / Bili: x / Urobili: x   Blood: x / Protein: x / Nitrite: x   Leuk Esterase: x / RBC: x / WBC x   Sq Epi: x / Non Sq Epi: x / Bacteria: x      MEDICATIONS  (STANDING):  aMIOdarone Infusion 0.5 mG/Min (16.7 mL/Hr) IV Continuous <Continuous>  aMIOdarone Infusion 1 mG/Min (33.3 mL/Hr) IV Continuous <Continuous>  apixaban 5 milliGRAM(s) Oral every 12 hours  aspirin enteric coated 81 milliGRAM(s) Oral daily  atorvastatin 20 milliGRAM(s) Oral at bedtime  dextrose 5%. 1000 milliLiter(s) (50 mL/Hr) IV Continuous <Continuous>  dextrose 50% Injectable 25 Gram(s) IV Push once  furosemide    Tablet 40 milliGRAM(s) Oral daily  gabapentin 100 milliGRAM(s) Oral two times a day  glucagon  Injectable 1 milliGRAM(s) IntraMuscular once  influenza  Vaccine (HIGH DOSE) 0.7 milliLiter(s) IntraMuscular once  insulin glargine Injectable (LANTUS) 6 Unit(s) SubCutaneous at bedtime  insulin lispro (ADMELOG) corrective regimen sliding scale   SubCutaneous every 6 hours  metoprolol succinate  milliGRAM(s) Oral daily  pantoprazole    Tablet 40 milliGRAM(s) Oral before breakfast  sacubitril 49 mG/valsartan 51 mG 1 Tablet(s) Oral two times a day  sodium chloride 0.9% lock flush 3 milliLiter(s) IV Push every 8 hours  spironolactone 25 milliGRAM(s) Oral daily  tamsulosin 0.4 milliGRAM(s) Oral at bedtime    MEDICATIONS  (PRN):  acetaminophen     Tablet .. 650 milliGRAM(s) Oral every 6 hours PRN Temp greater or equal to 38C (100.4F), Mild Pain (1 - 3)  aluminum hydroxide/magnesium hydroxide/simethicone Suspension 30 milliLiter(s) Oral every 4 hours PRN Dyspepsia  dextrose Oral Gel 15 Gram(s) Oral once PRN Blood Glucose LESS THAN 70 milliGRAM(s)/deciliter  melatonin 3 milliGRAM(s) Oral at bedtime PRN Insomnia  metoprolol tartrate Injectable 5 milliGRAM(s) IV Push every 6 hours PRN HR >130 sustained  ondansetron Injectable 4 milliGRAM(s) IV Push every 8 hours PRN Nausea and/or Vomiting      RADIOLOGY & ADDITIONAL TESTS:   GRETA AGUAYO    [id number]    73y      Male    INTERVAL HPI/OVERNIGHT EVENTS:  patient being seen for afib with rvr, covid and med management. patient seen at bedside and is npo for DCCV today    patient is on amio drip.      REVIEW OF SYSTEMS:    CONSTITUTIONAL: No fever, weight loss, or fatigue  RESPIRATORY: No cough, wheezing, hemoptysis; No shortness of breath  CARDIOVASCULAR: No chest pain, palpitations  GASTROINTESTINAL: No abdominal or epigastric pain. No nausea, vomiting  NEUROLOGICAL: No headaches, memory loss, loss of strength.  MISCELLANEOUS:      Vital Signs Last 24 Hrs  T(C): 36.6 (15 Dec 2023 11:00), Max: 36.7 (14 Dec 2023 16:22)  T(F): 97.9 (15 Dec 2023 11:00), Max: 98.1 (14 Dec 2023 16:22)  HR: 133 (15 Dec 2023 11:10) (110 - 139)  BP: 131/72 (15 Dec 2023 11:10) (99/59 - 131/72)  BP(mean): 79 (15 Dec 2023 06:11) (72 - 79)  RR: 20 (15 Dec 2023 11:10) (18 - 20)  SpO2: 98% (15 Dec 2023 11:10) (96% - 98%)    Parameters below as of 15 Dec 2023 11:10  Patient On (Oxygen Delivery Method): room air        PHYSICAL EXAM:    · Constitutional	no distress  · Constitutional Comments	elderly male sitting up in bed in NAD  · Eyes	conjunctiva clear  · Respiratory	no wheezes; no rales  · Respiratory Comments	left sided sutures in place at VATS site  · Cardiovascular	S1 S2 present; Irregularly irregular rhythm; tachycardia  · Gastrointestinal	soft; nontender  · Mental Status	AAOX3  · Skin	warm and dry  · Skin Comments	Stage 2 sacral decub POA  · Musculoskeletal	no joint swelling  · Psychiatric	normal affect        LABS:                        10.6   5.94  )-----------( 202      ( 15 Dec 2023 02:19 )             34.3     12-15    140  |  104  |  37.1<H>  ----------------------------<  91  3.7   |  20.0<L>  |  1.51<H>    Ca    8.3<L>      15 Dec 2023 02:19  Phos  3.3     12-15  Mg     2.0     12-15    TPro  6.6  /  Alb  2.6<L>  /  TBili  0.5  /  DBili  x   /  AST  136<H>  /  ALT  95<H>  /  AlkPhos  134<H>  12-14    PT/INR - ( 14 Dec 2023 17:00 )   PT: 20.7 sec;   INR: 1.90 ratio         PTT - ( 14 Dec 2023 17:00 )  PTT:38.1 sec  Urinalysis Basic - ( 15 Dec 2023 02:19 )    Color: x / Appearance: x / SG: x / pH: x  Gluc: 91 mg/dL / Ketone: x  / Bili: x / Urobili: x   Blood: x / Protein: x / Nitrite: x   Leuk Esterase: x / RBC: x / WBC x   Sq Epi: x / Non Sq Epi: x / Bacteria: x      MEDICATIONS  (STANDING):  aMIOdarone Infusion 0.5 mG/Min (16.7 mL/Hr) IV Continuous <Continuous>  aMIOdarone Infusion 1 mG/Min (33.3 mL/Hr) IV Continuous <Continuous>  apixaban 5 milliGRAM(s) Oral every 12 hours  aspirin enteric coated 81 milliGRAM(s) Oral daily  atorvastatin 20 milliGRAM(s) Oral at bedtime  dextrose 5%. 1000 milliLiter(s) (50 mL/Hr) IV Continuous <Continuous>  dextrose 50% Injectable 25 Gram(s) IV Push once  furosemide    Tablet 40 milliGRAM(s) Oral daily  gabapentin 100 milliGRAM(s) Oral two times a day  glucagon  Injectable 1 milliGRAM(s) IntraMuscular once  influenza  Vaccine (HIGH DOSE) 0.7 milliLiter(s) IntraMuscular once  insulin glargine Injectable (LANTUS) 6 Unit(s) SubCutaneous at bedtime  insulin lispro (ADMELOG) corrective regimen sliding scale   SubCutaneous every 6 hours  metoprolol succinate  milliGRAM(s) Oral daily  pantoprazole    Tablet 40 milliGRAM(s) Oral before breakfast  sacubitril 49 mG/valsartan 51 mG 1 Tablet(s) Oral two times a day  sodium chloride 0.9% lock flush 3 milliLiter(s) IV Push every 8 hours  spironolactone 25 milliGRAM(s) Oral daily  tamsulosin 0.4 milliGRAM(s) Oral at bedtime    MEDICATIONS  (PRN):  acetaminophen     Tablet .. 650 milliGRAM(s) Oral every 6 hours PRN Temp greater or equal to 38C (100.4F), Mild Pain (1 - 3)  aluminum hydroxide/magnesium hydroxide/simethicone Suspension 30 milliLiter(s) Oral every 4 hours PRN Dyspepsia  dextrose Oral Gel 15 Gram(s) Oral once PRN Blood Glucose LESS THAN 70 milliGRAM(s)/deciliter  melatonin 3 milliGRAM(s) Oral at bedtime PRN Insomnia  metoprolol tartrate Injectable 5 milliGRAM(s) IV Push every 6 hours PRN HR >130 sustained  ondansetron Injectable 4 milliGRAM(s) IV Push every 8 hours PRN Nausea and/or Vomiting      RADIOLOGY & ADDITIONAL TESTS:

## 2023-12-15 NOTE — INPATIENT CERTIFICATION FOR MEDICARE PATIENTS - IN ORDER TO MEET MEDICARE REQUIREMENTS.
In order to meet Medicare requirements, the clinical documentation must support the information cited in the admission order.  Please be sure to provide detailed and clear documentation about the following in the admitting note/history and physical: 07-Jan-2021

## 2023-12-15 NOTE — PROGRESS NOTE ADULT - SUBJECTIVE AND OBJECTIVE BOX
Pt doing well s/p uncomplicated DCCV (200J x 1). Denies complaint.     Exam:   VSS, NAD, A&O x 3  Skin: no erythema/edema/blistering at defib pad sites.   Card: S1/S2, RRR, no m/g/r  Resp: lungs CTA b/l  Abd: S/NT/ND  Ext: no edema, distal pulses intact    Assessment:   73y Marcus/o male, with HTN, hyperlipidemia, obesity, CAD s/p CABG x4 (2020 Talha Abrol at [facility]), CKD 4, HFrEF, EF 20-25%, AFL s/p DCCV 12/1/23 (Eliquis 5mg Q12HR), L lung empyema s/p VATS on 11/27, who presented to [facility] ED last night with complaints of worsening SOB x 2 days and was noted to be in AFL with RVR. Pt with recent admission to [facility] from 11/18 - 12/6 with acute HF exacerbation (EF 20-25%), new AFL with RVR, and L lung empyema requiring VATS procedure. Cultures at the time revealed streptococcus intermedius, and pt was treated with a course of IV ceftriaxone. Prior to discharge pt underwent successful QUIN / DCCV with conversion to sinus rhythm and was started on ELiquis 5mg Q12HR and was planned for a PO amiodarone load, but pt was never discharged with amiodarone.     Pt now once again with symptomatic AFL with RVR (SOB) and incidentally noted to be COVID +.  Now s/p successful DCCV (200 J x1) with conversion to sinus rhythm.     Plan:  1) AFL with RVR  - s/p successful DCCV  - c/w Toprol 100mg QD  - c/w Amiodarone infusion @ 1mg/min x 6 hours followed by .5mg/min x 18 hours. Will plan to initiate PO load following infusion tomorrow  - TFTs & LFTs should be monitored q 3-6 months while on amiodarone therapy. Anticipate <1 year of amiodarone therapy for this patient; however if > 1 year, patient will need annual fundoscopic exam and PFTs. Patient is advised of associated risks and need for monitoring; all questions answered to pt's expressed understanding.   - Maintain NPO status for potential DCCV later today. Pt reports strict compliance with Eliquis  - c/w Eliquis 5mg Q12HR uninterrupted. Strict compliance enforced with patient.    2) HFrEF (EF 20-25%)  - GDMT: Entresto / Toprol  - Appears Euvolemic on exam  - Repeat TTE in 3 months. Potential ICD implant if no recovery in EF and if in line with GOC.    3) COVID-19  - Pt is afebrile   - Lungs CTA  - Management as per primary team           Pt doing well s/p uncomplicated DCCV (200J x 1). Denies complaint.     Exam:   VSS, NAD, A&O x 3  Skin: no erythema/edema/blistering at defib pad sites.   Card: S1/S2, RRR, no m/g/r  Resp: lungs CTA b/l  Abd: S/NT/ND  Ext: no edema, distal pulses intact    Assessment:   73y Marcus/o male, with HTN, hyperlipidemia, obesity, CAD s/p CABG x4 (2020 Talha Abrol at [facility]), CKD 4, HFrEF, EF 20-25%, AFL s/p DCCV 12/1/23 (Eliquis 5mg Q12HR), L lung empyema s/p VATS on 11/27, who presented to [facility] ED last night with complaints of worsening SOB x 2 days and was noted to be in AFL with RVR. Pt with recent admission to [facility] from 11/18 - 12/6 with acute HF exacerbation (EF 20-25%), new AFL with RVR, and L lung empyema requiring VATS procedure. Cultures at the time revealed streptococcus intermedius, and pt was treated with a course of IV ceftriaxone. Prior to discharge pt underwent successful QUIN / DCCV with conversion to sinus rhythm and was started on ELiquis 5mg Q12HR and was planned for a PO amiodarone load, but pt was never discharged with amiodarone.     Pt now once again with symptomatic AFL with RVR (SOB) and incidentally noted to be COVID +.  Now s/p successful DCCV (200 J x1) with conversion to sinus rhythm.     Plan:  1) AFL with RVR  - s/p successful DCCV  - c/w Toprol 100mg QD  - c/w Amiodarone infusion @ 1mg/min x 6 hours followed by .5mg/min x 18 hours. Will plan to initiate PO load following infusion tomorrow  - TFTs & LFTs should be monitored q 3-6 months while on amiodarone therapy. Anticipate <1 year of amiodarone therapy for this patient; however if > 1 year, patient will need annual fundoscopic exam and PFTs. Patient is advised of associated risks and need for monitoring; all questions answered to pt's expressed understanding.   - Maintain NPO status for potential DCCV later today. Pt reports strict compliance with Eliquis  - c/w Eliquis 5mg Q12HR uninterrupted. Strict compliance enforced with patient.    2) HFrEF (EF 20-25%)  - GDMT: Entresto / Toprol  - Appears Euvolemic on exam  - Repeat TTE in 3 months. Potential ICD implant if no recovery in EF and if in line with GOC.    3) COVID-19  - Pt is afebrile   - Lungs CTA  - Management as per primary team           Pt doing well s/p uncomplicated DCCV (200J x 1). Denies complaint.     Exam:   VSS, NAD, A&O x 3  Skin: no erythema/edema/blistering at defib pad sites.   Card: S1/S2, RRR, no m/g/r  Resp: lungs CTA b/l  Abd: S/NT/ND  Ext: no edema, distal pulses intact    EKG: SR with intact conduction. IVCD. APC    Assessment:   73y Marcus/o male, with HTN, hyperlipidemia, obesity, CAD s/p CABG x4 (2020 Talha Abrol at [facility]), CKD 4, HFrEF, EF 20-25%, AFL s/p DCCV 12/1/23 (Eliquis 5mg Q12HR), L lung empyema s/p VATS on 11/27, who presented to [facility] ED last night with complaints of worsening SOB x 2 days and was noted to be in AFL with RVR. Pt with recent admission to [facility] from 11/18 - 12/6 with acute HF exacerbation (EF 20-25%), new AFL with RVR, and L lung empyema requiring VATS procedure. Cultures at the time revealed streptococcus intermedius, and pt was treated with a course of IV ceftriaxone. Prior to discharge pt underwent successful QUIN / DCCV with conversion to sinus rhythm and was started on ELiquis 5mg Q12HR and was planned for a PO amiodarone load, but pt was never discharged with amiodarone.     Pt now once again with symptomatic AFL with RVR (SOB) and incidentally noted to be COVID +.  Now s/p successful DCCV (200 J x1) with conversion to sinus rhythm.     Plan:  1) AFL with RVR  - s/p successful DCCV  - c/w Toprol 100mg QD  - c/w Amiodarone infusion @ 1mg/min x 6 hours followed by .5mg/min x 18 hours. Will plan to initiate PO load following infusion tomorrow  - TFTs & LFTs should be monitored q 3-6 months while on amiodarone therapy. Anticipate <1 year of amiodarone therapy for this patient; however if > 1 year, patient will need annual fundoscopic exam and PFTs. Patient is advised of associated risks and need for monitoring; all questions answered to pt's expressed understanding.   - Maintain NPO status for potential DCCV later today. Pt reports strict compliance with Eliquis  - c/w Eliquis 5mg Q12HR uninterrupted. Strict compliance enforced with patient.    2) HFrEF (EF 20-25%)  - GDMT: Entresto / Toprol  - Appears Euvolemic on exam  - Repeat TTE in 3 months. Potential ICD implant if no recovery in EF and if in line with GOC.    3) COVID-19  - Pt is afebrile   - Lungs CTA  - Management as per primary team           Pt doing well s/p uncomplicated DCCV (200J x 1). Denies complaint.     Exam:   VSS, NAD, A&O x 3  Skin: no erythema/edema/blistering at defib pad sites.   Card: S1/S2, RRR, no m/g/r  Resp: lungs CTA b/l  Abd: S/NT/ND  Ext: no edema, distal pulses intact    EKG: SR with intact conduction. IVCD. APC    Assessment:   73y Marcus/o male, with HTN, hyperlipidemia, obesity, CAD s/p CABG x4 (2020 Talha Abrol at [facility]), CKD 4, HFrEF, EF 20-25%, AFL s/p DCCV 12/1/23 (Eliquis 5mg Q12HR), L lung empyema s/p VATS on 11/27, who presented to [facility] ED last night with complaints of worsening SOB x 2 days and was noted to be in AFL with RVR. Pt with recent admission to [facility] from 11/18 - 12/6 with acute HF exacerbation (EF 20-25%), new AFL with RVR, and L lung empyema requiring VATS procedure. Cultures at the time revealed streptococcus intermedius, and pt was treated with a course of IV ceftriaxone. Prior to discharge pt underwent successful QUIN / DCCV with conversion to sinus rhythm and was started on ELiquis 5mg Q12HR and was planned for a PO amiodarone load, but pt was never discharged with amiodarone.     Pt now once again with symptomatic AFL with RVR (SOB) and incidentally noted to be COVID +.  Now s/p successful DCCV (200 J x1) with conversion to sinus rhythm.     Plan:  1) AFL with RVR  - s/p successful DCCV  - c/w Toprol 100mg QD  - c/w Amiodarone infusion @ 1mg/min x 6 hours followed by .5mg/min x 18 hours. Will plan to initiate PO load following infusion tomorrow  - TFTs & LFTs should be monitored q 3-6 months while on amiodarone therapy. Anticipate <1 year of amiodarone therapy for this patient; however if > 1 year, patient will need annual fundoscopic exam and PFTs. Patient is advised of associated risks and need for monitoring; all questions answered to pt's expressed understanding.   - Maintain NPO status for potential DCCV later today. Pt reports strict compliance with Eliquis  - c/w Eliquis 5mg Q12HR uninterrupted. Strict compliance enforced with patient.    2) HFrEF (EF 20-25%)  - GDMT: Entresto / Toprol  - Appears Euvolemic on exam  - Repeat TTE in 3 months. Potential ICD implant if no recovery in EF and if in line with GOC.    3) COVID-19  - Pt is afebrile   - Lungs CTA  - Management as per primary team           Pt doing well s/p uncomplicated DCCV (200J x 1). Denies complaint.     Exam:   VSS, NAD, A&O x 3  Skin: no erythema/edema/blistering at defib pad sites.   Card: S1/S2, RRR, no m/g/r  Resp: lungs CTA b/l  Abd: S/NT/ND  Ext: no edema, distal pulses intact    EKG: SR with intact conduction. IVCD. APC    Assessment:   73y Marcus/o male, with HTN, hyperlipidemia, obesity, CAD s/p CABG x4 (2020 Talha Abrol at [facility]), CKD 4, HFrEF, EF 20-25%, AFL s/p DCCV 12/1/23 (Eliquis 5mg Q12HR), L lung empyema s/p VATS on 11/27, who presented to [facility] ED last night with complaints of worsening SOB x 2 days and was noted to be in AFL with RVR. Pt with recent admission to [facility] from 11/18 - 12/6 with acute HF exacerbation (EF 20-25%), new AFL with RVR, and L lung empyema requiring VATS procedure. Cultures at the time revealed streptococcus intermedius, and pt was treated with a course of IV ceftriaxone. Prior to discharge pt underwent successful QUIN / DCCV with conversion to sinus rhythm and was started on ELiquis 5mg Q12HR and was planned for a PO amiodarone load, but pt was never discharged with amiodarone.     Pt now once again with symptomatic AFL with RVR (SOB) and incidentally noted to be COVID +.  Now s/p successful DCCV (200 J x1) with conversion to sinus rhythm.     Plan:  1) AFL with RVR  - s/p successful DCCV  - c/w Toprol 100mg QD  - c/w Amiodarone infusion @ 1mg/min x 6 hours followed by .5mg/min x 18 hours. Will plan to initiate PO load following infusion tomorrow  - TFTs & LFTs should be monitored q 3-6 months while on amiodarone therapy. Anticipate <1 year of amiodarone therapy for this patient; however if > 1 year, patient will need annual fundoscopic exam and PFTs. Patient is advised of associated risks and need for monitoring; all questions answered to pt's expressed understanding.   - Maintain NPO status for potential DCCV later today. Pt reports strict compliance with Eliquis  - c/w Eliquis 5mg Q12HR uninterrupted. Strict compliance enforced with patient.    2) HFrEF (EF 20-25%)  - GDMT: Entresto / Toprol  - Appears Euvolemic on exam  - Repeat TTE in 3 months. Potential ICD implant if no recovery in EF and if in line with GOC.    3) COVID-19  - Pt is afebrile   - Lungs CTA  - Management as per primary team      ADDENDUM:  Will discontinue Amiodarone infusion and start PO load now. 400mg Q12HR x 7 days, followed by 200mg QD       Pt doing well s/p uncomplicated DCCV (200J x 1). Denies complaint.     Exam:   VSS, NAD, A&O x 3  Skin: no erythema/edema/blistering at defib pad sites.   Card: S1/S2, RRR, no m/g/r  Resp: lungs CTA b/l  Abd: S/NT/ND  Ext: no edema, distal pulses intact    EKG: SR with intact conduction. IVCD. APC    Assessment:   73y Marcus/o male, with HTN, hyperlipidemia, obesity, CAD s/p CABG x4 (2020 Talha Abrol at [facility]), CKD 4, HFrEF, EF 20-25%, AFL s/p DCCV 12/1/23 (Eliquis 5mg Q12HR), L lung empyema s/p VATS on 11/27, who presented to [facility] ED last night with complaints of worsening SOB x 2 days and was noted to be in AFL with RVR. Pt with recent admission to [facility] from 11/18 - 12/6 with acute HF exacerbation (EF 20-25%), new AFL with RVR, and L lung empyema requiring VATS procedure. Cultures at the time revealed streptococcus intermedius, and pt was treated with a course of IV ceftriaxone. Prior to discharge pt underwent successful QUIN / DCCV with conversion to sinus rhythm and was started on ELiquis 5mg Q12HR and was planned for a PO amiodarone load, but pt was never discharged with amiodarone.     Pt now once again with symptomatic AFL with RVR (SOB) and incidentally noted to be COVID +.  Now s/p successful DCCV (200 J x1) with conversion to sinus rhythm.     Plan:  1) AFL with RVR  - s/p successful DCCV  - c/w Toprol 100mg QD  - c/w Amiodarone infusion @ 1mg/min x 6 hours followed by .5mg/min x 18 hours. Will plan to initiate PO load following infusion tomorrow  - TFTs & LFTs should be monitored q 3-6 months while on amiodarone therapy. Anticipate <1 year of amiodarone therapy for this patient; however if > 1 year, patient will need annual fundoscopic exam and PFTs. Patient is advised of associated risks and need for monitoring; all questions answered to pt's expressed understanding.   - Maintain NPO status for potential DCCV later today. Pt reports strict compliance with Eliquis  - c/w Eliquis 5mg Q12HR uninterrupted. Strict compliance enforced with patient.    2) HFrEF (EF 20-25%)  - GDMT: Entresto / Toprol  - Appears Euvolemic on exam  - Repeat TTE in 3 months. Potential ICD implant if no recovery in EF and if in line with GOC.    3) COVID-19  - Pt is afebrile   - Lungs CTA  - Management as per primary team      ADDENDUM:  Will discontinue Amiodarone infusion and start PO load now. 400mg Q12HR x 7 days, followed by 200mg QD       Pt doing well s/p uncomplicated DCCV (200J x 1). Denies complaint.     Exam:   VSS, NAD, A&O x 3  Skin: no erythema/edema/blistering at defib pad sites.   Card: S1/S2, RRR, no m/g/r  Resp: lungs CTA b/l  Abd: S/NT/ND  Ext: no edema, distal pulses intact    EKG: SR with intact conduction. IVCD. APC    Assessment:   73y Marcus/o male, with HTN, hyperlipidemia, obesity, CAD s/p CABG x4 (2020 Talha Abrol at [facility]), CKD 4, HFrEF, EF 20-25%, AFL s/p DCCV 12/1/23 (Eliquis 5mg Q12HR), L lung empyema s/p VATS on 11/27, who presented to [facility] ED last night with complaints of worsening SOB x 2 days and was noted to be in AFL with RVR. Pt with recent admission to [facility] from 11/18 - 12/6 with acute HF exacerbation (EF 20-25%), new AFL with RVR, and L lung empyema requiring VATS procedure. Cultures at the time revealed streptococcus intermedius, and pt was treated with a course of IV ceftriaxone. Prior to discharge pt underwent successful QUIN / DCCV with conversion to sinus rhythm and was started on ELiquis 5mg Q12HR and was planned for a PO amiodarone load, but pt was never discharged with amiodarone.     Pt now once again with symptomatic AFL with RVR (SOB) and incidentally noted to be COVID +.  Now s/p successful DCCV (200 J x1) with conversion to sinus rhythm.     Plan:  1) AFL with RVR  - s/p successful DCCV  - c/w Toprol 100mg QD  - c/w Amiodarone infusion @ 1mg/min x 6 hours followed by .5mg/min x 18 hours. Will plan to initiate PO load following infusion tomorrow  - TFTs & LFTs should be monitored q 3-6 months while on amiodarone therapy. Anticipate <1 year of amiodarone therapy for this patient; however if > 1 year, patient will need annual fundoscopic exam and PFTs. Patient is advised of associated risks and need for monitoring; all questions answered to pt's expressed understanding.   - c/w Eliquis 5mg Q12HR uninterrupted. Strict compliance enforced with patient.    2) HFrEF (EF 20-25%)  - GDMT: Entresto / Toprol  - Appears Euvolemic on exam  - Repeat TTE in 3 months. Potential ICD implant if no recovery in EF and if in line with GOC.    3) COVID-19  - Pt is afebrile   - Lungs CTA  - Management as per primary team      ADDENDUM:  Will discontinue Amiodarone infusion and start PO load now. 400mg Q12HR x 7 days, followed by 200mg QD       Pt doing well s/p uncomplicated DCCV (200J x 1). Denies complaint.     Exam:   VSS, NAD, A&O x 3  Skin: no erythema/edema/blistering at defib pad sites.   Card: S1/S2, RRR, no m/g/r  Resp: lungs CTA b/l  Abd: S/NT/ND  Ext: no edema, distal pulses intact    EKG: SR with intact conduction. IVCD. APC    Assessment:   73y Marcus/o male, with HTN, hyperlipidemia, obesity, CAD s/p CABG x4 (2020 Talha Abrol at [facility]), CKD 4, HFrEF, EF 20-25%, AFL s/p DCCV 12/1/23 (Eliquis 5mg Q12HR), L lung empyema s/p VATS on 11/27, who presented to [facility] ED last night with complaints of worsening SOB x 2 days and was noted to be in AFL with RVR. Pt with recent admission to [facility] from 11/18 - 12/6 with acute HF exacerbation (EF 20-25%), new AFL with RVR, and L lung empyema requiring VATS procedure. Cultures at the time revealed streptococcus intermedius, and pt was treated with a course of IV ceftriaxone. Prior to discharge pt underwent successful QUIN / DCCV with conversion to sinus rhythm and was started on ELiquis 5mg Q12HR and was planned for a PO amiodarone load, but pt was never discharged with amiodarone.     Pt now once again with symptomatic AFL with RVR (SOB) and incidentally noted to be COVID +.  Now s/p successful DCCV (200 J x1) with conversion to sinus rhythm.     Plan:  1) AFL with RVR  - s/p successful DCCV  - c/w Toprol 100mg QD  - c/w Amiodarone infusion @ 1mg/min x 6 hours followed by .5mg/min x 18 hours. Will plan to initiate PO load following infusion tomorrow  - TFTs & LFTs should be monitored q 3-6 months while on amiodarone therapy. Anticipate <1 year of amiodarone therapy for this patient; however if > 1 year, patient will need annual fundoscopic exam and PFTs. Patient is advised of associated risks and need for monitoring; all questions answered to pt's expressed understanding.   - c/w Eliquis 5mg Q12HR uninterrupted. Strict compliance enforced with patient.    2) HFrEF (EF 20-25%)  - GDMT: Entresto / Toprol  - Appears Euvolemic on exam  - Repeat TTE in 3 months. Potential ICD implant if no recovery in EF and if in line with GOC.    3) COVID-19  - Pt is afebrile   - Lungs CTA  - Management as per primary team      ADDENDUM:  Will discontinue Amiodarone infusion and start PO load now. 400mg Q12HR x 7 days, followed by 200mg QD

## 2023-12-15 NOTE — CHART NOTE - NSCHARTNOTEFT_GEN_A_CORE
72 year old male with a PMHx of HTN, CAD s/p CABG x 4 (2021), HLD, and obesity, recently hospitalized at [facility] 11/18/23 until 12/6/23 for AF RVR, acute on chronic systolic HF exacerbation, Large Left empyema (pleural fluid cultures + strept intermedius) s/p FB, Left VATS total decortication on 11/27/23 with Dr. Encarnacion. Postoperative course significant for successful DCCV 12/1/23 with EP. Patient remains on IV Ceftriaxone per ID for empyema until 12/25/23. Patient now readmitted from [facility] for SOB, found with recurrent AF with RVR, with plan for potential cardioversion later today. Thoracic surgery team called for follow up given recent VATS, decort.       Pt seen and examined at the bedside, on room air with O2 sat >95%. CXR reviewed with surgeons in AM rounds and remains stable (improved) since discharge. Surgical sutures removed at the bedside with some drainage noted, gauze and tape applied. Please keep incisions dry, change dressing as needed. Pt recovering well from surgery, no surgical intervention needed at this time from thoracic surgery.     Of note, patient was supposed to be discharged to rehab on IV Ceftriaxone through 12/25 via midline. After speaking with the family and rehab facility it was discovered that the patient had not been receiving IV abx at rehab. Spoke with ID, Dr. Wasserman, he is aware and will see patient and follow up recommendations. Spoke with hospitalist, Dr. Riojas, made aware of situation.     Thoracic surgery will sign off, please call back with any questions or concerns.     Plan discussed with Dr. Encarnacion. 72 year old male with a PMHx of HTN, CAD s/p CABG x 4 (2021), HLD, and obesity, recently hospitalized at [facility] 11/18/23 until 12/6/23 for AF RVR, acute on chronic systolic HF exacerbation, Large Left empyema (pleural fluid cultures + strept intermedius) s/p FB, Left VATS total decortication on 11/27/23 with Dr. Encarnacion. Postoperative course significant for successful DCCV 12/1/23 with EP. Patient remains on IV Ceftriaxone per ID for empyema until 12/25/23. Patient now readmitted from [facility] for SOB, found with recurrent AF with RVR, with plan for potential cardioversion later today. Thoracic surgery team called for follow up given recent VATS, decort.       Pt seen and examined at the bedside, on room air with O2 sat >95%. CXR reviewed with surgeons in AM rounds and remains stable (improved) since discharge. Surgical sutures removed at the bedside with some drainage noted, gauze and tape applied. Please keep incisions dry, change dressing as needed. Pt recovering well from surgery, no surgical intervention needed at this time from thoracic surgery.     Of note, patient was supposed to be discharged to rehab on IV Ceftriaxone through 12/25 via midline. After speaking with the family and rehab facility it was discovered that the patient had not been receiving IV abx at rehab. Spoke with ID, Dr. Wasserman, he is aware and will see patient and follow up recommendations. Spoke with hospitalist, Dr. Riojas, made aware of situation.     Thoracic surgery will sign off, please call back with any questions or concerns.     Plan discussed with Dr. Encarnacion.

## 2023-12-15 NOTE — H&P ADULT - ASSESSMENT
72 y/o male with recurrent PAF w/ RVR, COVID pos. PCR, Recent Strep empyema s/p VATS/decortication, Hx of CAD s/p CABG w/ ICM w/ EF of 20-25%, HTN, HLD, Chronic anemia, CKD-3    PAF w/ RVR:  -Admit to tele  -Cont. Eliquis, denies bleeding/Falls, hbg stable  -Cont. PO/IV BB  -NPO except meds for possible QUIN/DCC today  -EKG as above  -OOB. ambulate, PT/SW as from [facility]  -Fall risk    COVID pos. PCR:  -Not hypoxic  -Isolation  -Supportive care  -No role for steroids/Remdesevir at this time     S/P VATs:  -Repeat CXR with improved aeration  -No fever, leucocytosis  -CT Sx consulted to eval for suture removal     CAD s/p CABG:  -Cont. o/p regimen  -No anginal symptoms  -Trop neg    ICM w/ EF of 20-25%:  -No evidence of decompensated CHF at this time  -Cont. o/p regimen     HTN:  -Cont. o/p regimen     HLD:  -Statin     Chronic anemia:  -Stable  -Chronic    CKD-3:  -Stable, Cr. at baseline     Discussed with ED staff, Patient who agrees with above plan of care.

## 2023-12-15 NOTE — ED ADULT NURSE REASSESSMENT NOTE - NS ED NURSE REASSESS COMMENT FT1
Assumed care of patient at 07:30 from RN SAM Sepulveda. Charting as noted. Patient AA&Ox4, resp even/unlabored, MAEx4, skin warm, dry, intact, presents to ED c/o recurrent AFIB with RVR. At time of assessment, patient denies pain/discomfort, denies CP/SOB, denies any further complaints or physical symptoms. Patient updated on the plan of care, awaiting in pt bed, pt now admitted for further management of care. Stretcher locked in lowest position. Pt remains on CM in AFIB, rate 140, SpO2 98% on room air. No signs of acute distress noted, safety maintained. Assumed care of patient at 07:30 from RN SAM Sepulveda. Charting as noted. Patient AA&Ox4, resp even/unlabored, MAEx4, skin warm, dry, intact, presents to ED c/o recurrent AFIB with RVR. At time of assessment, patient denies pain/discomfort, denies CP/SOB, denies any further complaints or physical symptoms. Patient updated on the plan of care, awaiting in pt bed, pt now admitted for further management of care. Stretcher locked in lowest position. Pt remains on CM in sinus tach, rate 110, SpO2 98% on room air. No signs of acute distress noted, safety maintained. Assumed care of patient at 07:30 from RN SAM Sepulveda. Charting as noted. Patient AA&Ox4, resp even/unlabored, MAEx4, skin warm, dry, intact, presents to ED c/o recurrent AFIB with RVR. At time of assessment, patient denies pain/discomfort, denies CP/SOB, denies any further complaints or physical symptoms. Patient updated on the plan of care, awaiting in pt bed, pt now admitted for further management of care. Stretcher locked in lowest position. Pt remains on CM in A Flutter, rate 110, SpO2 98% on room air. No signs of acute distress noted, safety maintained.

## 2023-12-15 NOTE — CONSULT NOTE ADULT - SUBJECTIVE AND OBJECTIVE BOX
INFECTIOUS DISEASES AND INTERNAL MEDICINE at [place]  =======================================================  Saul Ivey MD  Diplomates American Board of Internal Medicine and Infectious Diseases  Telephone [phone]  Fax            [phone]  =======================================================    GRETA [id number]      HPI:  72 y/o male with hx of CAD s/p CABG, ICM with EF of 20-25%, Afib not on AC, HLD, HTN, CKD-4. Patient with recent admission from 11/18-12/4 for left sided empyema w/ strep intermedius s/p VATS w/ decortication with post op Afib with RVR s/p QUIN/DCC and started on Eliquis on 12/1. He states he was doing ok at [facility] ([facility] since DC on 12/4 when he started feeling palpitations and SOB. Denies CP, no fever, cough. He completed his full course of IV abx at this time. Patient found to be in Afib w/ RVR. Got multiple doses of PO/IV metoprolol with good response. Denies missing any doses of  his medications as far as he knows at [facility] ([facility] Seen by Cardiology and advised for admission for likely repeat QUIN/Cardioversion. Currently denies any other complaints.   AS ABOVE ADMITTED WITH AFIB  PT  WAS DISCHARGED  LAST WEEK AND PLAN WAS IV ABX ROCEPHIN  AT [facility] ([facility] UNFORTUNATELY IT APPEARS PT DID NOT RECEIVE ABX AND MIDLINE WAS REMOVED   ASKED TO EVALUATE FROM ID STANDPOINT     .   (15 Dec 2023 06:11)      PAST MEDICAL & SURGICAL HISTORY:  Coronary artery disease involving native coronary artery of native heart, unspecified whether angina      CHF with unknown LVEF      Primary hypertension      Hyperlipidemia, unspecified hyperlipidemia type      History of pleural empyema      S/P CABG x 5      History of cholecystectomy          ANTIBIOTICS      Allergies    No Known Allergies    Intolerances        SOCIAL HISTORY:     FAMILY HX   FAMILY HISTORY:  Family history of coronary artery bypass surgery (Father)    Family history of hypotension (Mother)        Vital Signs Last 24 Hrs  T(C): 36.6 (15 Dec 2023 11:00), Max: 36.7 (14 Dec 2023 16:22)  T(F): 97.9 (15 Dec 2023 11:00), Max: 98.1 (14 Dec 2023 16:22)  HR: 133 (15 Dec 2023 11:10) (110 - 139)  BP: 131/72 (15 Dec 2023 11:10) (99/59 - 131/72)  BP(mean): 79 (15 Dec 2023 06:11) (72 - 79)  RR: 20 (15 Dec 2023 11:10) (18 - 20)  SpO2: 98% (15 Dec 2023 11:10) (96% - 98%)    Parameters below as of 15 Dec 2023 11:10  Patient On (Oxygen Delivery Method): room air      Drug Dosing Weight  Height (cm): 175.3 (14 Dec 2023 16:22)  Weight (kg): 127 (14 Dec 2023 16:22)  BMI (kg/m2): 41.3 (14 Dec 2023 16:22)  BSA (m2): 2.38 (14 Dec 2023 16:22)      REVIEW OF SYSTEMS:    CONSTITUTIONAL:  As per HPI.    HEENT:  Eyes:  No diplopia or blurred vision. ENT:  No earache, sore throat or runny nose.    CARDIOVASCULAR:  No pressure, squeezing, strangling, tightness, heaviness or aching about the chest, neck, axilla or epigastrium.    RESPIRATORY:  No cough, shortness of breath, PND or orthopnea.    GASTROINTESTINAL:  No nausea, vomiting or diarrhea.    GENITOURINARY:  No dysuria, frequency or urgency.    MUSCULOSKELETAL:  As per HPI.    SKIN:  No change in skin, hair or nails.    NEUROLOGIC:  No paresthesias, fasciculations, seizures or weakness.                  PHYSICAL EXAMINATION:    GENERAL: The patient is a _____in no apparent distress. ___     VITAL SIGNS: T(C): 36.6 (12-15-23 @ 11:00), Max: 36.7 (12-14-23 @ 16:22)  HR: 133 (12-15-23 @ 11:10) (110 - 139)  BP: 131/72 (12-15-23 @ 11:10) (99/59 - 131/72)  RR: 20 (12-15-23 @ 11:10) (18 - 20)  SpO2: 98% (12-15-23 @ 11:10) (96% - 98%)  Wt(kg): --    HEENT: Head is normocephalic and atraumatic.  ANICTERIC  NECK: Supple. No carotid bruits.  No lymphadenopathy or thyromegaly.    LUNGS:COARSE BREATH SOUNDS    HEART: Regular rate and rhythm without murmur.    ABDOMEN: Soft, nontender, and nondistended.  Positive bowel sounds.  No hepatosplenomegaly was noted. NO REBOUND NO GUARDING    EXTREMITIES: NO EDEMA NO ERYTHEMA    NEUROLOGIC: NON FOCAL      SKIN: No ulceration or induration present. NO RASH        BLOOD CULTURES       URINE CX          LABS:                        10.6   5.94  )-----------( 202      ( 15 Dec 2023 02:19 )             34.3     12-15    140  |  104  |  37.1<H>  ----------------------------<  91  3.7   |  20.0<L>  |  1.51<H>    Ca    8.3<L>      15 Dec 2023 02:19  Phos  3.3     12-15  Mg     2.0     12-15    TPro  6.6  /  Alb  2.6<L>  /  TBili  0.5  /  DBili  x   /  AST  136<H>  /  ALT  95<H>  /  AlkPhos  134<H>  12-14    PT/INR - ( 14 Dec 2023 17:00 )   PT: 20.7 sec;   INR: 1.90 ratio         PTT - ( 14 Dec 2023 17:00 )  PTT:38.1 sec  Urinalysis Basic - ( 15 Dec 2023 02:19 )    Color: x / Appearance: x / SG: x / pH: x  Gluc: 91 mg/dL / Ketone: x  / Bili: x / Urobili: x   Blood: x / Protein: x / Nitrite: x   Leuk Esterase: x / RBC: x / WBC x   Sq Epi: x / Non Sq Epi: x / Bacteria: x        RADIOLOGY & ADDITIONAL STUDIES:      ASSESSMENT/PLAN    72 y/o male with hx of CAD s/p CABG, ICM with EF of 20-25%, Afib not on AC, HLD, HTN, CKD-4. Patient with recent admission from 11/18-12/4 for left sided empyema w/ strep intermedius s/p VATS w/ decortication with post op Afib with RVR s/p QUIN/DCC and started on Eliquis on 12/1. He states he was doing ok at [facility] ([facility] since DC on 12/4 when he started feeling palpitations and SOB. Denies CP, no fever, cough. He completed his full course of IV abx at this time. Patient found to be in Afib w/ RVR. Got multiple doses of PO/IV metoprolol with good response. Denies missing any doses of  his medications as far as he knows at [facility] ([facility] Seen by Cardiology and advised for admission for likely repeat QUIN/Cardioversion. Currently denies any other complaints.   AS ABOVE ADMITTED WITH AFIB  PT  WAS DISCHARGED  LAST WEEK AND PLAN WAS IV ABX ROCEPHIN  AT [facility] ([facility] UNFORTUNATELY IT APPEARS PT DID NOT RECEIVE ABX AND MIDLINE WAS REMOVED   PT REMAINS AFEBRILE AND IMPROVED  WOULD RECOMMEND RAL ABX FOR  AT LEAST 2 MORE WEEKS  AUGMENTIN 875 BID  WOULD ALSO SUGGEST REPEAT CT SCAN TO EVALUATE  EMPYEMA  PT ALSO WITH COVID BUT AFEBRILE NON TOXIC NO HYPOXIA  WOULD DEFER TREATMENT FOR THIS COVID  WILL FOLLOW UP   WILL D/W HOSPITALIST            HEMRES KRUEGER MD INFECTIOUS DISEASES AND INTERNAL MEDICINE at [place]  =======================================================  Saul Ivey MD  Diplomates American Board of Internal Medicine and Infectious Diseases  Telephone [phone]  Fax            [phone]  =======================================================    GRETA [id number]      HPI:  74 y/o male with hx of CAD s/p CABG, ICM with EF of 20-25%, Afib not on AC, HLD, HTN, CKD-4. Patient with recent admission from 11/18-12/4 for left sided empyema w/ strep intermedius s/p VATS w/ decortication with post op Afib with RVR s/p QUIN/DCC and started on Eliquis on 12/1. He states he was doing ok at [facility] since DC on 12/4 when he started feeling palpitations and SOB. Denies CP, no fever, cough. He completed his full course of IV abx at this time. Patient found to be in Afib w/ RVR. Got multiple doses of PO/IV metoprolol with good response. Denies missing any doses of  his medications as far as he knows at [facility]. Seen by Cardiology and advised for admission for likely repeat QUIN/Cardioversion. Currently denies any other complaints.   AS ABOVE ADMITTED WITH AFIB  PT  WAS DISCHARGED  LAST WEEK AND PLAN WAS IV ABX ROCEPHIN  AT [facility] UNFORTUNATELY IT APPEARS PT DID NOT RECEIVE ABX AND MIDLINE WAS REMOVED   ASKED TO EVALUATE FROM ID STANDPOINT     .   (15 Dec 2023 06:11)      PAST MEDICAL & SURGICAL HISTORY:  Coronary artery disease involving native coronary artery of native heart, unspecified whether angina      CHF with unknown LVEF      Primary hypertension      Hyperlipidemia, unspecified hyperlipidemia type      History of pleural empyema      S/P CABG x 5      History of cholecystectomy          ANTIBIOTICS      Allergies    No Known Allergies    Intolerances        SOCIAL HISTORY:     FAMILY HX   FAMILY HISTORY:  Family history of coronary artery bypass surgery (Father)    Family history of hypotension (Mother)        Vital Signs Last 24 Hrs  T(C): 36.6 (15 Dec 2023 11:00), Max: 36.7 (14 Dec 2023 16:22)  T(F): 97.9 (15 Dec 2023 11:00), Max: 98.1 (14 Dec 2023 16:22)  HR: 133 (15 Dec 2023 11:10) (110 - 139)  BP: 131/72 (15 Dec 2023 11:10) (99/59 - 131/72)  BP(mean): 79 (15 Dec 2023 06:11) (72 - 79)  RR: 20 (15 Dec 2023 11:10) (18 - 20)  SpO2: 98% (15 Dec 2023 11:10) (96% - 98%)    Parameters below as of 15 Dec 2023 11:10  Patient On (Oxygen Delivery Method): room air      Drug Dosing Weight  Height (cm): 175.3 (14 Dec 2023 16:22)  Weight (kg): 127 (14 Dec 2023 16:22)  BMI (kg/m2): 41.3 (14 Dec 2023 16:22)  BSA (m2): 2.38 (14 Dec 2023 16:22)      REVIEW OF SYSTEMS:    CONSTITUTIONAL:  As per HPI.    HEENT:  Eyes:  No diplopia or blurred vision. ENT:  No earache, sore throat or runny nose.    CARDIOVASCULAR:  No pressure, squeezing, strangling, tightness, heaviness or aching about the chest, neck, axilla or epigastrium.    RESPIRATORY:  No cough, shortness of breath, PND or orthopnea.    GASTROINTESTINAL:  No nausea, vomiting or diarrhea.    GENITOURINARY:  No dysuria, frequency or urgency.    MUSCULOSKELETAL:  As per HPI.    SKIN:  No change in skin, hair or nails.    NEUROLOGIC:  No paresthesias, fasciculations, seizures or weakness.                  PHYSICAL EXAMINATION:    GENERAL: The patient is a _____in no apparent distress. ___     VITAL SIGNS: T(C): 36.6 (12-15-23 @ 11:00), Max: 36.7 (12-14-23 @ 16:22)  HR: 133 (12-15-23 @ 11:10) (110 - 139)  BP: 131/72 (12-15-23 @ 11:10) (99/59 - 131/72)  RR: 20 (12-15-23 @ 11:10) (18 - 20)  SpO2: 98% (12-15-23 @ 11:10) (96% - 98%)  Wt(kg): --    HEENT: Head is normocephalic and atraumatic.  ANICTERIC  NECK: Supple. No carotid bruits.  No lymphadenopathy or thyromegaly.    LUNGS:COARSE BREATH SOUNDS    HEART: Regular rate and rhythm without murmur.    ABDOMEN: Soft, nontender, and nondistended.  Positive bowel sounds.  No hepatosplenomegaly was noted. NO REBOUND NO GUARDING    EXTREMITIES: NO EDEMA NO ERYTHEMA    NEUROLOGIC: NON FOCAL      SKIN: No ulceration or induration present. NO RASH        BLOOD CULTURES       URINE CX          LABS:                        10.6   5.94  )-----------( 202      ( 15 Dec 2023 02:19 )             34.3     12-15    140  |  104  |  37.1<H>  ----------------------------<  91  3.7   |  20.0<L>  |  1.51<H>    Ca    8.3<L>      15 Dec 2023 02:19  Phos  3.3     12-15  Mg     2.0     12-15    TPro  6.6  /  Alb  2.6<L>  /  TBili  0.5  /  DBili  x   /  AST  136<H>  /  ALT  95<H>  /  AlkPhos  134<H>  12-14    PT/INR - ( 14 Dec 2023 17:00 )   PT: 20.7 sec;   INR: 1.90 ratio         PTT - ( 14 Dec 2023 17:00 )  PTT:38.1 sec  Urinalysis Basic - ( 15 Dec 2023 02:19 )    Color: x / Appearance: x / SG: x / pH: x  Gluc: 91 mg/dL / Ketone: x  / Bili: x / Urobili: x   Blood: x / Protein: x / Nitrite: x   Leuk Esterase: x / RBC: x / WBC x   Sq Epi: x / Non Sq Epi: x / Bacteria: x        RADIOLOGY & ADDITIONAL STUDIES:      ASSESSMENT/PLAN    74 y/o male with hx of CAD s/p CABG, ICM with EF of 20-25%, Afib not on AC, HLD, HTN, CKD-4. Patient with recent admission from 11/18-12/4 for left sided empyema w/ strep intermedius s/p VATS w/ decortication with post op Afib with RVR s/p QUIN/DCC and started on Eliquis on 12/1. He states he was doing ok at [facility] since DC on 12/4 when he started feeling palpitations and SOB. Denies CP, no fever, cough. He completed his full course of IV abx at this time. Patient found to be in Afib w/ RVR. Got multiple doses of PO/IV metoprolol with good response. Denies missing any doses of  his medications as far as he knows at [facility]. Seen by Cardiology and advised for admission for likely repeat QUIN/Cardioversion. Currently denies any other complaints.   AS ABOVE ADMITTED WITH AFIB  PT  WAS DISCHARGED  LAST WEEK AND PLAN WAS IV ABX ROCEPHIN  AT [facility] UNFORTUNATELY IT APPEARS PT DID NOT RECEIVE ABX AND MIDLINE WAS REMOVED   PT REMAINS AFEBRILE AND IMPROVED  WOULD RECOMMEND RAL ABX FOR  AT LEAST 2 MORE WEEKS  AUGMENTIN 875 BID  WOULD ALSO SUGGEST REPEAT CT SCAN TO EVALUATE  EMPYEMA  PT ALSO WITH COVID BUT AFEBRILE NON TOXIC NO HYPOXIA  WOULD DEFER TREATMENT FOR THIS COVID  WILL FOLLOW UP   WILL D/W HOSPITALIST            HERMES KRUEGER MD

## 2023-12-15 NOTE — PATIENT PROFILE ADULT - HISTORY OF COVID-19 VACCINATION
Date of Service: 08/03/2023    REFERRAL:    Emigdio Reaves. Marya Borges, [address] Ana Tafoya MD.    PRIMARY PHYSICIAN:  Kimberley Gamboa MD.    PROCEDURE:    1.  Consultation. 2.  Right sacroiliac joint injection performed under fluoroscopic guidance. PREPROCEDURE DIAGNOSIS:    Right sacroiliac joint inflammation and pain. The patient is a very pleasant gentleman seen on 2 previous occasions. He has experienced significant short-term pain relief. His long-term pain relief has been minimal.  We have elected to proceed with repeat sacroiliac joint injection today. If he again experiences only short-term relief, we would strongly consider a sacroiliac joint fusion. This was discussed at length with the patient. The risks and possible complications were reviewed. We will proceed with a right sacroiliac joint injection under fluoroscopic guidance today. The patient's medications, allergies, past medical history and social history are all reviewed. There are no significant changes. Physical exam was again remarkable primarily for reproduction of pain with pressure over the right sacroiliac joint. The right sacroiliac joint thrust test is positive. The patient was brought to our fluoroscopy suite and placed in the prone position. Sterile prep and drape was performed. Our 22-gauge needle was placed in the lower third of the right sacroiliac joint. An excellent right sacroiliac joint arthrogram was obtained with 0.25 mL of Omnipaque contrast material.  This is followed by injection of 2 mL of 0.25% Bupivacaine along with 8 mg of Dexamethasone. The patient tolerated this extremely well. He was observed and subsequently discharged home in excellent condition. He will call in one week to report his response. We will then consider future options. Thank you for allowing me to participate in this pleasant patient's care. I will keep you informed of his progress.       Dictated By: Darshana Holden Meet Antunez MD  Signing Provider: MD KANIKA Gar/alison ([id number])   DD: 08/03/2023 1:02:33 PM TD: 08/03/2023 11:53:58 PM Vaccine status unknown

## 2023-12-15 NOTE — PROGRESS NOTE ADULT - PROBLEM SELECTOR PLAN 1
Left Empyema with +Pleural fluid culture with streptococcus intermedius.  Underwent VATS/decort on 11/27/23.    Continue Ceftriaxone until 12/25 via Midline as per ID.  Leukocytosis resolved.   CXR remains stable since discharge.   SpO2 remains stable on room air.   + sutures remain at the chest tube insertion site.  Plan to be discussed with Thoracic surgery attending in AM rounds.

## 2023-12-15 NOTE — PROGRESS NOTE ADULT - SUBJECTIVE AND OBJECTIVE BOX
POD #18 s/p FB, Left VATS total decortication     PAST MEDICAL & SURGICAL HISTORY:  Coronary artery disease involving native coronary artery of native heart, unspecified whether angina  CHF with unknown LVEF  Primary hypertension  Hyperlipidemia, unspecified hyperlipidemia type  S/P CABG x 5  History of cholecystectomy    FAMILY HISTORY:  Family history of coronary artery bypass surgery (Father)  Family history of hypotension (Mother)    Brief Hospital Course: 72 year old male with a PMHx of HTN, CAD s/p CABG x 4 (2021), HLD, and obesity, recently hospitalized at [facility] 11/18/23 until 12/6/23 for AF RVR, acute on chronic systolic HF exacerbation, Large Left empyema (pleural fluid cultures + strept intermedius) s/p FB, Left VATS total decortication on 11/27/23 with Dr. Encarnacion. Postoperative course significant for successful DCCV 12/1/23 with EP. Patient remains on IV Ceftriaxone per ID for empyema until 12/25/23. Patient now readmitted from [facility] for SOB, found with recurrent AF with RVR, with plan for potential cardioversion later today. Thoracic surgery team called for follow up given recent VATS, decort.     Significant recent/past 24 hr events: Left chest incisions with +sutures, cleaned with chlorhexidine Remains AF RVR HR 120s on telemetry.     Subjective: Patient lying in bed in NAD. Was tolerating diet. Now remains NPO for possible procedure later today. +SAVAGE. +Passing gas. No pain elicited at this time. Denies fevers, chills, lightheadedness, dizziness, HA, CP, palpitations, SOB, cough, abdominal pain, N/V, diarrhea, numbness/tingling in extremities, or any other acute complaints. ROS negative x 10 systems except as noted above.    MEDICATIONS  (STANDING):  Medications reviewed in chart.     Allergies: No Known Allergies    Vitals   T(C): 36.7 (14 Dec 2023 16:22), Max: 36.7 (14 Dec 2023 16:22)  T(F): 98.1 (14 Dec 2023 16:22), Max: 98.1 (14 Dec 2023 16:22)  HR: 118 (14 Dec 2023 19:47) (118 - 125)  BP: 114/82 (14 Dec 2023 16:22) (114/82 - 114/82)  RR: 18 (14 Dec 2023 16:22) (18 - 18)  SpO2: 96% (14 Dec 2023 16:22) (96% - 96%)  O2 Parameters below as of 14 Dec 2023 16:22  Patient On (Oxygen Delivery Method): room air    Physical Exam  General: Lying in bed in NAD, deconditioned  Neuro: A+O x 3, non-focal, speech clear and intact  HEENT:  NCAT, No conjunctival edema or icterus, no thrush.    Neck:  Supple, trachea midline  Pulm: +Diminished BSs at bases b/l, no accessory muscle use noted  CV: tachycardic rate, irregularly irregular rhythm, +S1S2  Abd: obese, soft, NT, ND, + BS  Ext: LEE x 4, + LE edema, no cyanosis, distal motor/neuro/circ intact  Skin: warm, dry, perfused  Incisions: Left chest surgical sites C/D/I w/ sutures    LABS                        11.1   6.28  )-----------( 216      ( 14 Dec 2023 17:00 )             35.8     12-14    135  |  100  |  39.1<H>  ----------------------------<  97  4.0   |  21.0<L>  |  1.54<H>    Ca    8.7      14 Dec 2023 17:00    TPro  6.6  /  Alb  2.6<L>  /  TBili  0.5  /  DBili  x   /  AST  136<H>  /  ALT  95<H>  /  AlkPhos  134<H>  12-14    PT/INR - ( 14 Dec 2023 17:00 )   PT: 20.7 sec;   INR: 1.90 ratio       PTT - ( 14 Dec 2023 17:00 )  PTT:38.1 sec    Urinalysis Basic - ( 14 Dec 2023 17:00 )  Color: x / Appearance: x / SG: x / pH: x  Gluc: 97 mg/dL / Ketone: x  / Bili: x / Urobili: x   Blood: x / Protein: x / Nitrite: x   Leuk Esterase: x / RBC: x / WBC x   Sq Epi: x / Non Sq Epi: x / Bacteria: x    Last CXR:  < from: Xray Chest 1 View- PORTABLE-Routine (Xray Chest 1 View- PORTABLE-Routine in AM.) (12.06.23 @ 05:01) >  Frontal expiratory view of the chest shows the heart to be similar in size. The lungs show less pulmonary congestion with similar left effusion and there is no evidence of pneumothorax nor right pleural effusion.  IMPRESSION:  Decreased congestion. Similar left effusion.  < end of copied text >     POD #18 s/p FB, Left VATS total decortication     PAST MEDICAL & SURGICAL HISTORY:  Coronary artery disease involving native coronary artery of native heart, unspecified whether angina  CHF with unknown LVEF  Primary hypertension  Hyperlipidemia, unspecified hyperlipidemia type  S/P CABG x 5  History of cholecystectomy    FAMILY HISTORY:  Family history of coronary artery bypass surgery (Father)  Family history of hypotension (Mother)    Brief Hospital Course: 72 year old male with a PMHx of HTN, CAD s/p CABG x 4 (2021), HLD, and obesity, recently hospitalized at [facility] 11/18/23 until 12/6/23 for AF RVR, acute on chronic systolic HF exacerbation, Large Left empyema (pleural fluid cultures + strept intermedius) s/p FB, Left VATS total decortication on 11/27/23 with Dr. Encarnacion. Postoperative course significant for successful DCCV 12/1/23 with EP. Patient remains on IV Ceftriaxone per ID for empyema until 12/25/23. Patient now readmitted from [facility] for SOB, found with recurrent AF with RVR, with plan for potential cardioversion later today. Thoracic surgery team called for follow up given recent VATS, decort.     Significant recent/past 24 hr events: Left chest incisions with +sutures, cleaned with chlorhexidine Remains AF RVR HR 120s on telemetry.     Subjective: Patient lying in bed in NAD. Was tolerating diet. Now remains NPO for possible procedure later today. +SAVAGE. +Passing gas. No pain elicited at this time. Denies fevers, chills, lightheadedness, dizziness, HA, CP, palpitations, SOB, cough, abdominal pain, N/V, diarrhea, numbness/tingling in extremities, or any other acute complaints. ROS negative x 10 systems except as noted above.    MEDICATIONS  (STANDING):  Medications reviewed in chart.     Allergies: No Known Allergies    Vitals   T(C): 36.7 (14 Dec 2023 16:22), Max: 36.7 (14 Dec 2023 16:22)  T(F): 98.1 (14 Dec 2023 16:22), Max: 98.1 (14 Dec 2023 16:22)  HR: 118 (14 Dec 2023 19:47) (118 - 125)  BP: 114/82 (14 Dec 2023 16:22) (114/82 - 114/82)  RR: 18 (14 Dec 2023 16:22) (18 - 18)  SpO2: 96% (14 Dec 2023 16:22) (96% - 96%)  O2 Parameters below as of 14 Dec 2023 16:22  Patient On (Oxygen Delivery Method): room air    Physical Exam  General: Lying in bed in NAD, deconditioned  Neuro: A+O x 3, non-focal, speech clear and intact  HEENT:  NCAT, No conjunctival edema or icterus, no thrush.    Neck:  Supple, trachea midline  Pulm: +Diminished BSs at bases b/l, no accessory muscle use noted  CV: tachycardic rate, irregularly irregular rhythm, +S1S2  Abd: obese, soft, NT, ND, + BS  Ext: LEE x 4, + LE edema, no cyanosis, distal motor/neuro/circ intact  Skin: warm, dry, perfused  Incisions: Left chest surgical sites C/D/I w/ sutures    LABS                        11.1   6.28  )-----------( 216      ( 14 Dec 2023 17:00 )             35.8     12-14    135  |  100  |  39.1<H>  ----------------------------<  97  4.0   |  21.0<L>  |  1.54<H>    Ca    8.7      14 Dec 2023 17:00    TPro  6.6  /  Alb  2.6<L>  /  TBili  0.5  /  DBili  x   /  AST  136<H>  /  ALT  95<H>  /  AlkPhos  134<H>  12-14    PT/INR - ( 14 Dec 2023 17:00 )   PT: 20.7 sec;   INR: 1.90 ratio       PTT - ( 14 Dec 2023 17:00 )  PTT:38.1 sec    Urinalysis Basic - ( 14 Dec 2023 17:00 )  Color: x / Appearance: x / SG: x / pH: x  Gluc: 97 mg/dL / Ketone: x  / Bili: x / Urobili: x   Blood: x / Protein: x / Nitrite: x   Leuk Esterase: x / RBC: x / WBC x   Sq Epi: x / Non Sq Epi: x / Bacteria: x    Last CXR:  < from: Xray Chest 1 View- PORTABLE-Routine (Xray Chest 1 View- PORTABLE-Routine in AM.) (12.06.23 @ 05:01) >  Frontal expiratory view of the chest shows the heart to be similar in size. The lungs show less pulmonary congestion with similar left effusion and there is no evidence of pneumothorax nor right pleural effusion.  IMPRESSION:  Decreased congestion. Similar left effusion.  < end of copied text >

## 2023-12-15 NOTE — CONSULT NOTE ADULT - SUBJECTIVE AND OBJECTIVE BOX
[facility]  [facility]   Office: [address] [address]  Telephone: [phone] Fax:[phone]      HPI:  Pt is a 74 y/o male, with HTN, hyperlipidemia, obesity, CAD s/p CABG x4 (2020 Talha Yaneli at [facility]), CKD 4, HFrEF, EF 20-25%, AFL s/p DCCV 12/1/23 (Eliquis 5mg Q12HR), L lung empyema s/p VATS on 11/27, who presented to [facility] ED last night with complaints of worsening SOB x 2 days and was noted to be in AFL with RVR. Pt with recent admission to [facility] from 11/18 - 12/6 with acute HF exacerbation (EF 20-25%), new AFL with RVR, and L lung empyema requiring VATS procedure. Cultures at the time revealed streptococcus intermedius, and pt was treated with a course of IV ceftriaxone. Prior to discharge pt underwent successful QUIN / DCCV with conversion to sinus rhythm and was started on ELiquis 5mg Q12HR and was planned for a PO amiodarone load, but pt was never discharged with amiodarone. Pt now once again with symptomatic AFL with RVR (SOB) and incidentally noted to be COVID +. At time of assessment pt denies chest pain, palpitations, dizziness, fever, chills, cough.     EKG: AFL with RVR. Narrow QRSS  Telemetry: AFL with RVR. Ventricular rates 110s-140s    Cardiology Summary:   QUIN 12/1/23:  Left ventricular ejection fraction, by visual estimation, is 20 to 25%        PAST MEDICAL & SURGICAL HISTORY:  Coronary artery disease involving native coronary artery of native heart, unspecified whether angina      CHF with unknown LVEF      Primary hypertension      Hyperlipidemia, unspecified hyperlipidemia type      History of pleural empyema      S/P CABG x 5      History of cholecystectomy          REVIEW OF SYSTEMS:    CONSTITUTIONAL: No fever, weight loss, or fatigue  RESPIRATORY: No cough, wheezing, chills or hemoptysis. + shortness of breath  CARDIOVASCULAR: see HPI  GASTROINTESTINAL: No abdominal or epigastric pain. No nausea, vomiting, or hematemesis; No diarrhea or constipation. No melena or hematochezia.  NEUROLOGICAL: No headaches, memory loss, loss of strength, numbness, or tremors  SKIN: No itching, burning, rashes, or lesions   PSYCHIATRIC: No depression, anxiety, mood swings, or difficulty sleeping  HEME/LYMPH: No easy bruising, or bleeding gums      MEDICATIONS  (STANDING):  aMIOdarone Infusion 0.5 mG/Min (16.7 mL/Hr) IV Continuous <Continuous>  aMIOdarone Infusion 0.18 mG/Min (6 mL/Hr) IV Continuous <Continuous>  aMIOdarone IVPB 150 milliGRAM(s) IV Intermittent once  apixaban 5 milliGRAM(s) Oral every 12 hours  aspirin enteric coated 81 milliGRAM(s) Oral daily  atorvastatin 20 milliGRAM(s) Oral at bedtime  dextrose 5%. 1000 milliLiter(s) (50 mL/Hr) IV Continuous <Continuous>  dextrose 50% Injectable 25 Gram(s) IV Push once  furosemide    Tablet 40 milliGRAM(s) Oral daily  gabapentin 100 milliGRAM(s) Oral two times a day  glucagon  Injectable 1 milliGRAM(s) IntraMuscular once  insulin glargine Injectable (LANTUS) 6 Unit(s) SubCutaneous at bedtime  insulin lispro (ADMELOG) corrective regimen sliding scale   SubCutaneous every 6 hours  metoprolol succinate  milliGRAM(s) Oral daily  pantoprazole    Tablet 40 milliGRAM(s) Oral before breakfast  sacubitril 49 mG/valsartan 51 mG 1 Tablet(s) Oral two times a day  sodium chloride 0.9% lock flush 3 milliLiter(s) IV Push every 8 hours  spironolactone 25 milliGRAM(s) Oral daily  tamsulosin 0.4 milliGRAM(s) Oral at bedtime    MEDICATIONS  (PRN):  acetaminophen     Tablet .. 650 milliGRAM(s) Oral every 6 hours PRN Temp greater or equal to 38C (100.4F), Mild Pain (1 - 3)  aluminum hydroxide/magnesium hydroxide/simethicone Suspension 30 milliLiter(s) Oral every 4 hours PRN Dyspepsia  dextrose Oral Gel 15 Gram(s) Oral once PRN Blood Glucose LESS THAN 70 milliGRAM(s)/deciliter  melatonin 3 milliGRAM(s) Oral at bedtime PRN Insomnia  metoprolol tartrate Injectable 5 milliGRAM(s) IV Push every 6 hours PRN HR >130 sustained  ondansetron Injectable 4 milliGRAM(s) IV Push every 8 hours PRN Nausea and/or Vomiting      Allergies    No Known Allergies    Intolerances        SOCIAL HISTORY:  Former smoker  Denies ETOH  Denies illicit drug use      FAMILY HISTORY:  Family history of coronary artery bypass surgery (Father)    Family history of hypotension (Mother)        Vital Signs Last 24 Hrs  T(C): 36.7 (15 Dec 2023 06:11), Max: 36.7 (14 Dec 2023 16:22)  T(F): 98 (15 Dec 2023 06:11), Max: 98.1 (14 Dec 2023 16:22)  HR: 112 (15 Dec 2023 06:11) (110 - 137)  BP: 103/68 (15 Dec 2023 06:11) (99/59 - 114/82)  BP(mean): 79 (15 Dec 2023 06:11) (72 - 79)  RR: 18 (15 Dec 2023 06:11) (18 - 20)  SpO2: 96% (15 Dec 2023 06:11) (96% - 97%)    Parameters below as of 15 Dec 2023 06:11  Patient On (Oxygen Delivery Method): room air        Physical Exam:    Constitutional: NAD  Head: normocephalic atraumatic  Chest wall: normal in appearance, nontender to palpation  Resp: effort normal, breath sounds clear to auscultation bilaterally  Cardiac: Heart regular rate and rhythm, no murmurs, rubs, or gallops.  No edema.  Abdomen: soft, nondistended, bowel sounds active, nontender to palpation in all four quadrants    Musculoskeletal: full range of motion all extremities with no pain, tenderness, swelling, or erythema    Neuro: Alert and oriented x 3, motor & sensation grossly in tact  Skin: color normal, no rashes or injury  Psych: mood calm       LABS:                        10.6   5.94  )-----------( 202      ( 15 Dec 2023 02:19 )             34.3   12-15    140  |  104  |  37.1<H>  ----------------------------<  91  3.7   |  20.0<L>  |  1.51<H>    Ca    8.3<L>      15 Dec 2023 02:19  Phos  3.3     12-15  Mg     2.0     12-15    TPro  6.6  /  Alb  2.6<L>  /  TBili  0.5  /  DBili  x   /  AST  136<H>  /  ALT  95<H>  /  AlkPhos  134<H>  12-14  LIVER FUNCTIONS - ( 14 Dec 2023 17:00 )  Alb: 2.6 g/dL / Pro: 6.6 g/dL / ALK PHOS: 134 U/L / ALT: 95 U/L / AST: 136 U/L / GGT: x           PT/INR - ( 14 Dec 2023 17:00 )   PT: 20.7 sec;   INR: 1.90 ratio         PTT - ( 14 Dec 2023 17:00 )  PTT:38.1 sec    Urinalysis Basic - ( 15 Dec 2023 02:19 )    Color: x / Appearance: x / SG: x / pH: x  Gluc: 91 mg/dL / Ketone: x  / Bili: x / Urobili: x   Blood: x / Protein: x / Nitrite: x   Leuk Esterase: x / RBC: x / WBC x   Sq Epi: x / Non Sq Epi: x / Bacteria: x        RADIOLOGY & ADDITIONAL STUDIES:        < from: QUIN Echo Doppler (12.01.23 @ 11:13) >  Summary:   1. Left ventricularejection fraction, by visual estimation, is 20 to   25%.   2. Severely decreased global left ventricular systolic function.   3. Normal RV size with moderately reduced RV systolic function, RVSP   least 29 mmHg.   4. Moderately enlarged left atrium.   5. No left atrial appendage thrombus, left atrial appendage enlargement   and normal left atrial appendage velocities.   6. Mild thickening of the anterior and posterior mitral valve leaflets   with mild posterior leaflet prolapse.   7. Moderate mitral valve regurgitation.   8. Color flow doppler and intravenous injection of agitated saline   demonstrates the presence of an intact intra atrial septum.   9. Lipomatous hypertrophy of the intra-atrial septum.  10. Moderate complex atheromas at the aortic arch.  11. There is no evidence of pericardial effusion.  12. Post-QUIN patient underwent external synchronized direct current   cardioversion with 300 Joules x1 from Aflutter RVR 130s into sinus rhythm   by Dr. Ceballos.    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 Pt with recent admission to [facility] from 11/18 - 12/6 with acute HF exacerbation (EF 20-25%), new AFL with RVR, and L lung empyema requiring VATS procedure. Cultures at the time revealed streptococcus intermedius, and pt was treated with a course of IV ceftriaxone. Prior to discharge pt underwent successful QUIN / DCCV with conversion to sinus rhythm and was started on ELiquis 5mg Q12HR and was planned for a PO amiodarone load, but pt was never discharged with amiodarone. Pt now once again with symptomatic AFL with RVR (SOB) and incidentally noted to be COVID +. At time of assessment pt denies chest pain, palpitations, dizziness, fever, chills, cough.       Plan:  1) AFL with RVR  - Ventricular rates elevated 120-140s  - c/w Toprol 100mg QD  - Give 150mg Amiodarone bolus x 1, followed by infusion @ 1mg/min x 6 hours followed by .5mg/min x 18 hours. Will plan to initiate PO load following infusion  - TFTs & LFTs should be monitored q 3-6 months while on amiodarone therapy. Anticipate <1 year of amiodarone therapy for this patient; however if > 1 year, patient will need annual fundoscopic exam and PFTs. Patient is advised of associated risks and need for monitoring; all questions answered to pt's expressed understanding.   - Maintain NPO status for potential DCCV later today. Pt reports strict compliance with Eliquis  - c/w Eliquis 5mg Q12HR    2) HFrEF (EF 20-25%)  - GDMT: Entresto / Toprol  - Appears Euvolemic on exam  - Previously planned for outpatient ischemic evaluation as per cardiology  - Further management as per general cards    3) COVID-19  - Pt is afebrile   - Lungs CTA  - Management as per primary team    Discussed with Dr. Bolivar, further recommendations to follow      [facility]  [facility]   Office: [address] [address]  Telephone: [phone] Fax:[phone]      HPI:  Pt is a 74 y/o male, with HTN, hyperlipidemia, obesity, CAD s/p CABG x4 (2020 Talha Yaneli at [facility]), CKD 4, HFrEF, EF 20-25%, AFL s/p DCCV 12/1/23 (Eliquis 5mg Q12HR), L lung empyema s/p VATS on 11/27, who presented to [facility] ED last night with complaints of worsening SOB x 2 days and was noted to be in AFL with RVR. Pt with recent admission to [facility] from 11/18 - 12/6 with acute HF exacerbation (EF 20-25%), new AFL with RVR, and L lung empyema requiring VATS procedure. Cultures at the time revealed streptococcus intermedius, and pt was treated with a course of IV ceftriaxone. Prior to discharge pt underwent successful QUIN / DCCV with conversion to sinus rhythm and was started on ELiquis 5mg Q12HR and was planned for a PO amiodarone load, but pt was never discharged with amiodarone. Pt now once again with symptomatic AFL with RVR (SOB) and incidentally noted to be COVID +. At time of assessment pt denies chest pain, palpitations, dizziness, fever, chills, cough.     EKG: AFL with RVR. Narrow QRSS  Telemetry: AFL with RVR. Ventricular rates 110s-140s    Cardiology Summary:   QUIN 12/1/23:  Left ventricular ejection fraction, by visual estimation, is 20 to 25%        PAST MEDICAL & SURGICAL HISTORY:  Coronary artery disease involving native coronary artery of native heart, unspecified whether angina      CHF with unknown LVEF      Primary hypertension      Hyperlipidemia, unspecified hyperlipidemia type      History of pleural empyema      S/P CABG x 5      History of cholecystectomy          REVIEW OF SYSTEMS:    CONSTITUTIONAL: No fever, weight loss, or fatigue  RESPIRATORY: No cough, wheezing, chills or hemoptysis. + shortness of breath  CARDIOVASCULAR: see HPI  GASTROINTESTINAL: No abdominal or epigastric pain. No nausea, vomiting, or hematemesis; No diarrhea or constipation. No melena or hematochezia.  NEUROLOGICAL: No headaches, memory loss, loss of strength, numbness, or tremors  SKIN: No itching, burning, rashes, or lesions   PSYCHIATRIC: No depression, anxiety, mood swings, or difficulty sleeping  HEME/LYMPH: No easy bruising, or bleeding gums      MEDICATIONS  (STANDING):  aMIOdarone Infusion 0.5 mG/Min (16.7 mL/Hr) IV Continuous <Continuous>  aMIOdarone Infusion 0.18 mG/Min (6 mL/Hr) IV Continuous <Continuous>  aMIOdarone IVPB 150 milliGRAM(s) IV Intermittent once  apixaban 5 milliGRAM(s) Oral every 12 hours  aspirin enteric coated 81 milliGRAM(s) Oral daily  atorvastatin 20 milliGRAM(s) Oral at bedtime  dextrose 5%. 1000 milliLiter(s) (50 mL/Hr) IV Continuous <Continuous>  dextrose 50% Injectable 25 Gram(s) IV Push once  furosemide    Tablet 40 milliGRAM(s) Oral daily  gabapentin 100 milliGRAM(s) Oral two times a day  glucagon  Injectable 1 milliGRAM(s) IntraMuscular once  insulin glargine Injectable (LANTUS) 6 Unit(s) SubCutaneous at bedtime  insulin lispro (ADMELOG) corrective regimen sliding scale   SubCutaneous every 6 hours  metoprolol succinate  milliGRAM(s) Oral daily  pantoprazole    Tablet 40 milliGRAM(s) Oral before breakfast  sacubitril 49 mG/valsartan 51 mG 1 Tablet(s) Oral two times a day  sodium chloride 0.9% lock flush 3 milliLiter(s) IV Push every 8 hours  spironolactone 25 milliGRAM(s) Oral daily  tamsulosin 0.4 milliGRAM(s) Oral at bedtime    MEDICATIONS  (PRN):  acetaminophen     Tablet .. 650 milliGRAM(s) Oral every 6 hours PRN Temp greater or equal to 38C (100.4F), Mild Pain (1 - 3)  aluminum hydroxide/magnesium hydroxide/simethicone Suspension 30 milliLiter(s) Oral every 4 hours PRN Dyspepsia  dextrose Oral Gel 15 Gram(s) Oral once PRN Blood Glucose LESS THAN 70 milliGRAM(s)/deciliter  melatonin 3 milliGRAM(s) Oral at bedtime PRN Insomnia  metoprolol tartrate Injectable 5 milliGRAM(s) IV Push every 6 hours PRN HR >130 sustained  ondansetron Injectable 4 milliGRAM(s) IV Push every 8 hours PRN Nausea and/or Vomiting      Allergies    No Known Allergies    Intolerances        SOCIAL HISTORY:  Former smoker  Denies ETOH  Denies illicit drug use      FAMILY HISTORY:  Family history of coronary artery bypass surgery (Father)    Family history of hypotension (Mother)        Vital Signs Last 24 Hrs  T(C): 36.7 (15 Dec 2023 06:11), Max: 36.7 (14 Dec 2023 16:22)  T(F): 98 (15 Dec 2023 06:11), Max: 98.1 (14 Dec 2023 16:22)  HR: 112 (15 Dec 2023 06:11) (110 - 137)  BP: 103/68 (15 Dec 2023 06:11) (99/59 - 114/82)  BP(mean): 79 (15 Dec 2023 06:11) (72 - 79)  RR: 18 (15 Dec 2023 06:11) (18 - 20)  SpO2: 96% (15 Dec 2023 06:11) (96% - 97%)    Parameters below as of 15 Dec 2023 06:11  Patient On (Oxygen Delivery Method): room air        Physical Exam:    Constitutional: NAD  Head: normocephalic atraumatic  Chest wall: normal in appearance, nontender to palpation  Resp: effort normal, breath sounds clear to auscultation bilaterally  Cardiac: Heart regular rate and rhythm, no murmurs, rubs, or gallops.  No edema.  Abdomen: soft, nondistended, bowel sounds active, nontender to palpation in all four quadrants    Musculoskeletal: full range of motion all extremities with no pain, tenderness, swelling, or erythema    Neuro: Alert and oriented x 3, motor & sensation grossly in tact  Skin: color normal, no rashes or injury  Psych: mood calm       LABS:                        10.6   5.94  )-----------( 202      ( 15 Dec 2023 02:19 )             34.3   12-15    140  |  104  |  37.1<H>  ----------------------------<  91  3.7   |  20.0<L>  |  1.51<H>    Ca    8.3<L>      15 Dec 2023 02:19  Phos  3.3     12-15  Mg     2.0     12-15    TPro  6.6  /  Alb  2.6<L>  /  TBili  0.5  /  DBili  x   /  AST  136<H>  /  ALT  95<H>  /  AlkPhos  134<H>  12-14  LIVER FUNCTIONS - ( 14 Dec 2023 17:00 )  Alb: 2.6 g/dL / Pro: 6.6 g/dL / ALK PHOS: 134 U/L / ALT: 95 U/L / AST: 136 U/L / GGT: x           PT/INR - ( 14 Dec 2023 17:00 )   PT: 20.7 sec;   INR: 1.90 ratio         PTT - ( 14 Dec 2023 17:00 )  PTT:38.1 sec    Urinalysis Basic - ( 15 Dec 2023 02:19 )    Color: x / Appearance: x / SG: x / pH: x  Gluc: 91 mg/dL / Ketone: x  / Bili: x / Urobili: x   Blood: x / Protein: x / Nitrite: x   Leuk Esterase: x / RBC: x / WBC x   Sq Epi: x / Non Sq Epi: x / Bacteria: x        RADIOLOGY & ADDITIONAL STUDIES:        < from: QUIN Echo Doppler (12.01.23 @ 11:13) >  Summary:   1. Left ventricularejection fraction, by visual estimation, is 20 to   25%.   2. Severely decreased global left ventricular systolic function.   3. Normal RV size with moderately reduced RV systolic function, RVSP   least 29 mmHg.   4. Moderately enlarged left atrium.   5. No left atrial appendage thrombus, left atrial appendage enlargement   and normal left atrial appendage velocities.   6. Mild thickening of the anterior and posterior mitral valve leaflets   with mild posterior leaflet prolapse.   7. Moderate mitral valve regurgitation.   8. Color flow doppler and intravenous injection of agitated saline   demonstrates the presence of an intact intra atrial septum.   9. Lipomatous hypertrophy of the intra-atrial septum.  10. Moderate complex atheromas at the aortic arch.  11. There is no evidence of pericardial effusion.  12. Post-QUIN patient underwent external synchronized direct current   cardioversion with 300 Joules x1 from Aflutter RVR 130s into sinus rhythm   by Dr. Ceballos.    < end of copied text >          Assessment:    Pt is a 74 y/o male, with HTN, hyperlipidemia, obesity, CAD s/p CABG x4 (2020 Talha Abrol at [facility]), CKD 4, HFrEF, EF 20-25%, AFL s/p DCCV 12/1/23 (Eliquis 5mg Q12HR), L lung empyema s/p VATS on 11/27, who presented to [facility] ED last night with complaints of worsening SOB x 2 days and was noted to be in AFL with RVR. Pt with recent admission to [facility] from 11/18 - 12/6 with acute HF exacerbation (EF 20-25%), new AFL with RVR, and L lung empyema requiring VATS procedure. Cultures at the time revealed streptococcus intermedius, and pt was treated with a course of IV ceftriaxone. Prior to discharge pt underwent successful QUIN / DCCV with conversion to sinus rhythm and was started on ELiquis 5mg Q12HR and was planned for a PO amiodarone load, but pt was never discharged with amiodarone. Pt now once again with symptomatic AFL with RVR (SOB) and incidentally noted to be COVID +. At time of assessment pt denies chest pain, palpitations, dizziness, fever, chills, cough.       Plan:  1) AFL with RVR  - Ventricular rates elevated 120-140s  - c/w Toprol 100mg QD  - Give 150mg Amiodarone bolus x 1, followed by infusion @ 1mg/min x 6 hours followed by .5mg/min x 18 hours. Will plan to initiate PO load following infusion  - TFTs & LFTs should be monitored q 3-6 months while on amiodarone therapy. Anticipate <1 year of amiodarone therapy for this patient; however if > 1 year, patient will need annual fundoscopic exam and PFTs. Patient is advised of associated risks and need for monitoring; all questions answered to pt's expressed understanding.   - Maintain NPO status for potential DCCV later today. Pt reports strict compliance with Eliquis  - c/w Eliquis 5mg Q12HR    2) HFrEF (EF 20-25%)  - GDMT: Entresto / Toprol  - Appears Euvolemic on exam  - Previously planned for outpatient ischemic evaluation as per cardiology  - Further management as per general cards    3) COVID-19  - Pt is afebrile   - Lungs CTA  - Management as per primary team    Discussed with Dr. Bolivar, further recommendations to follow

## 2023-12-16 DIAGNOSIS — U07.1 COVID-19: ICD-10-CM

## 2023-12-16 LAB
ALBUMIN SERPL ELPH-MCNC: 2.3 G/DL — LOW (ref 3.3–5.2)
ALBUMIN SERPL ELPH-MCNC: 2.3 G/DL — LOW (ref 3.3–5.2)
ALP SERPL-CCNC: 111 U/L — SIGNIFICANT CHANGE UP (ref 40–120)
ALP SERPL-CCNC: 111 U/L — SIGNIFICANT CHANGE UP (ref 40–120)
ALT FLD-CCNC: 69 U/L — HIGH
ALT FLD-CCNC: 69 U/L — HIGH
ANION GAP SERPL CALC-SCNC: 13 MMOL/L — SIGNIFICANT CHANGE UP (ref 5–17)
ANION GAP SERPL CALC-SCNC: 13 MMOL/L — SIGNIFICANT CHANGE UP (ref 5–17)
AST SERPL-CCNC: 69 U/L — HIGH
AST SERPL-CCNC: 69 U/L — HIGH
BASOPHILS # BLD AUTO: 0.04 K/UL — SIGNIFICANT CHANGE UP (ref 0–0.2)
BASOPHILS # BLD AUTO: 0.04 K/UL — SIGNIFICANT CHANGE UP (ref 0–0.2)
BASOPHILS NFR BLD AUTO: 0.7 % — SIGNIFICANT CHANGE UP (ref 0–2)
BASOPHILS NFR BLD AUTO: 0.7 % — SIGNIFICANT CHANGE UP (ref 0–2)
BILIRUB SERPL-MCNC: 0.5 MG/DL — SIGNIFICANT CHANGE UP (ref 0.4–2)
BILIRUB SERPL-MCNC: 0.5 MG/DL — SIGNIFICANT CHANGE UP (ref 0.4–2)
BUN SERPL-MCNC: 37.8 MG/DL — HIGH (ref 8–20)
BUN SERPL-MCNC: 37.8 MG/DL — HIGH (ref 8–20)
CALCIUM SERPL-MCNC: 8.2 MG/DL — LOW (ref 8.4–10.5)
CALCIUM SERPL-MCNC: 8.2 MG/DL — LOW (ref 8.4–10.5)
CHLORIDE SERPL-SCNC: 107 MMOL/L — SIGNIFICANT CHANGE UP (ref 96–108)
CHLORIDE SERPL-SCNC: 107 MMOL/L — SIGNIFICANT CHANGE UP (ref 96–108)
CO2 SERPL-SCNC: 20 MMOL/L — LOW (ref 22–29)
CO2 SERPL-SCNC: 20 MMOL/L — LOW (ref 22–29)
CREAT SERPL-MCNC: 1.54 MG/DL — HIGH (ref 0.5–1.3)
CREAT SERPL-MCNC: 1.54 MG/DL — HIGH (ref 0.5–1.3)
EGFR: 48 ML/MIN/1.73M2 — LOW
EGFR: 48 ML/MIN/1.73M2 — LOW
EOSINOPHIL # BLD AUTO: 0.15 K/UL — SIGNIFICANT CHANGE UP (ref 0–0.5)
EOSINOPHIL # BLD AUTO: 0.15 K/UL — SIGNIFICANT CHANGE UP (ref 0–0.5)
EOSINOPHIL NFR BLD AUTO: 2.7 % — SIGNIFICANT CHANGE UP (ref 0–6)
EOSINOPHIL NFR BLD AUTO: 2.7 % — SIGNIFICANT CHANGE UP (ref 0–6)
GLUCOSE BLDC GLUCOMTR-MCNC: 106 MG/DL — HIGH (ref 70–99)
GLUCOSE BLDC GLUCOMTR-MCNC: 106 MG/DL — HIGH (ref 70–99)
GLUCOSE BLDC GLUCOMTR-MCNC: 107 MG/DL — HIGH (ref 70–99)
GLUCOSE BLDC GLUCOMTR-MCNC: 107 MG/DL — HIGH (ref 70–99)
GLUCOSE BLDC GLUCOMTR-MCNC: 109 MG/DL — HIGH (ref 70–99)
GLUCOSE BLDC GLUCOMTR-MCNC: 118 MG/DL — HIGH (ref 70–99)
GLUCOSE BLDC GLUCOMTR-MCNC: 118 MG/DL — HIGH (ref 70–99)
GLUCOSE BLDC GLUCOMTR-MCNC: 126 MG/DL — HIGH (ref 70–99)
GLUCOSE BLDC GLUCOMTR-MCNC: 126 MG/DL — HIGH (ref 70–99)
GLUCOSE SERPL-MCNC: 97 MG/DL — SIGNIFICANT CHANGE UP (ref 70–99)
GLUCOSE SERPL-MCNC: 97 MG/DL — SIGNIFICANT CHANGE UP (ref 70–99)
HCT VFR BLD CALC: 33.5 % — LOW (ref 39–50)
HCT VFR BLD CALC: 33.5 % — LOW (ref 39–50)
HGB BLD-MCNC: 9.9 G/DL — LOW (ref 13–17)
HGB BLD-MCNC: 9.9 G/DL — LOW (ref 13–17)
IMM GRANULOCYTES NFR BLD AUTO: 0.9 % — SIGNIFICANT CHANGE UP (ref 0–0.9)
IMM GRANULOCYTES NFR BLD AUTO: 0.9 % — SIGNIFICANT CHANGE UP (ref 0–0.9)
LYMPHOCYTES # BLD AUTO: 1.24 K/UL — SIGNIFICANT CHANGE UP (ref 1–3.3)
LYMPHOCYTES # BLD AUTO: 1.24 K/UL — SIGNIFICANT CHANGE UP (ref 1–3.3)
LYMPHOCYTES # BLD AUTO: 22.3 % — SIGNIFICANT CHANGE UP (ref 13–44)
LYMPHOCYTES # BLD AUTO: 22.3 % — SIGNIFICANT CHANGE UP (ref 13–44)
MAGNESIUM SERPL-MCNC: 2.2 MG/DL — SIGNIFICANT CHANGE UP (ref 1.6–2.6)
MAGNESIUM SERPL-MCNC: 2.2 MG/DL — SIGNIFICANT CHANGE UP (ref 1.6–2.6)
MCHC RBC-ENTMCNC: 26.3 PG — LOW (ref 27–34)
MCHC RBC-ENTMCNC: 26.3 PG — LOW (ref 27–34)
MCHC RBC-ENTMCNC: 29.6 GM/DL — LOW (ref 32–36)
MCHC RBC-ENTMCNC: 29.6 GM/DL — LOW (ref 32–36)
MCV RBC AUTO: 88.9 FL — SIGNIFICANT CHANGE UP (ref 80–100)
MCV RBC AUTO: 88.9 FL — SIGNIFICANT CHANGE UP (ref 80–100)
MONOCYTES # BLD AUTO: 0.55 K/UL — SIGNIFICANT CHANGE UP (ref 0–0.9)
MONOCYTES # BLD AUTO: 0.55 K/UL — SIGNIFICANT CHANGE UP (ref 0–0.9)
MONOCYTES NFR BLD AUTO: 9.9 % — SIGNIFICANT CHANGE UP (ref 2–14)
MONOCYTES NFR BLD AUTO: 9.9 % — SIGNIFICANT CHANGE UP (ref 2–14)
NEUTROPHILS # BLD AUTO: 3.53 K/UL — SIGNIFICANT CHANGE UP (ref 1.8–7.4)
NEUTROPHILS # BLD AUTO: 3.53 K/UL — SIGNIFICANT CHANGE UP (ref 1.8–7.4)
NEUTROPHILS NFR BLD AUTO: 63.5 % — SIGNIFICANT CHANGE UP (ref 43–77)
NEUTROPHILS NFR BLD AUTO: 63.5 % — SIGNIFICANT CHANGE UP (ref 43–77)
PLATELET # BLD AUTO: 199 K/UL — SIGNIFICANT CHANGE UP (ref 150–400)
PLATELET # BLD AUTO: 199 K/UL — SIGNIFICANT CHANGE UP (ref 150–400)
POTASSIUM SERPL-MCNC: 4.3 MMOL/L — SIGNIFICANT CHANGE UP (ref 3.5–5.3)
POTASSIUM SERPL-MCNC: 4.3 MMOL/L — SIGNIFICANT CHANGE UP (ref 3.5–5.3)
POTASSIUM SERPL-SCNC: 4.3 MMOL/L — SIGNIFICANT CHANGE UP (ref 3.5–5.3)
POTASSIUM SERPL-SCNC: 4.3 MMOL/L — SIGNIFICANT CHANGE UP (ref 3.5–5.3)
PROT SERPL-MCNC: 6.5 G/DL — LOW (ref 6.6–8.7)
PROT SERPL-MCNC: 6.5 G/DL — LOW (ref 6.6–8.7)
RBC # BLD: 3.77 M/UL — LOW (ref 4.2–5.8)
RBC # BLD: 3.77 M/UL — LOW (ref 4.2–5.8)
RBC # FLD: 17.4 % — HIGH (ref 10.3–14.5)
RBC # FLD: 17.4 % — HIGH (ref 10.3–14.5)
SODIUM SERPL-SCNC: 140 MMOL/L — SIGNIFICANT CHANGE UP (ref 135–145)
SODIUM SERPL-SCNC: 140 MMOL/L — SIGNIFICANT CHANGE UP (ref 135–145)
WBC # BLD: 5.56 K/UL — SIGNIFICANT CHANGE UP (ref 3.8–10.5)
WBC # BLD: 5.56 K/UL — SIGNIFICANT CHANGE UP (ref 3.8–10.5)
WBC # FLD AUTO: 5.56 K/UL — SIGNIFICANT CHANGE UP (ref 3.8–10.5)
WBC # FLD AUTO: 5.56 K/UL — SIGNIFICANT CHANGE UP (ref 3.8–10.5)

## 2023-12-16 PROCEDURE — 99232 SBSQ HOSP IP/OBS MODERATE 35: CPT

## 2023-12-16 PROCEDURE — 99233 SBSQ HOSP IP/OBS HIGH 50: CPT | Mod: GC

## 2023-12-16 PROCEDURE — 71250 CT THORAX DX C-: CPT | Mod: 26

## 2023-12-16 RX ADMIN — APIXABAN 5 MILLIGRAM(S): 2.5 TABLET, FILM COATED ORAL at 06:10

## 2023-12-16 RX ADMIN — SODIUM CHLORIDE 3 MILLILITER(S): 9 INJECTION INTRAMUSCULAR; INTRAVENOUS; SUBCUTANEOUS at 07:45

## 2023-12-16 RX ADMIN — GABAPENTIN 100 MILLIGRAM(S): 400 CAPSULE ORAL at 06:10

## 2023-12-16 RX ADMIN — SACUBITRIL AND VALSARTAN 1 TABLET(S): 24; 26 TABLET, FILM COATED ORAL at 06:10

## 2023-12-16 RX ADMIN — SODIUM CHLORIDE 3 MILLILITER(S): 9 INJECTION INTRAMUSCULAR; INTRAVENOUS; SUBCUTANEOUS at 13:24

## 2023-12-16 RX ADMIN — INSULIN GLARGINE 6 UNIT(S): 100 INJECTION, SOLUTION SUBCUTANEOUS at 22:06

## 2023-12-16 RX ADMIN — AMIODARONE HYDROCHLORIDE 400 MILLIGRAM(S): 400 TABLET ORAL at 18:36

## 2023-12-16 RX ADMIN — ATORVASTATIN CALCIUM 20 MILLIGRAM(S): 80 TABLET, FILM COATED ORAL at 22:06

## 2023-12-16 RX ADMIN — SACUBITRIL AND VALSARTAN 1 TABLET(S): 24; 26 TABLET, FILM COATED ORAL at 18:36

## 2023-12-16 RX ADMIN — Medication 1 TABLET(S): at 18:37

## 2023-12-16 RX ADMIN — Medication 40 MILLIGRAM(S): at 06:12

## 2023-12-16 RX ADMIN — PANTOPRAZOLE SODIUM 40 MILLIGRAM(S): 20 TABLET, DELAYED RELEASE ORAL at 06:10

## 2023-12-16 RX ADMIN — SODIUM CHLORIDE 3 MILLILITER(S): 9 INJECTION INTRAMUSCULAR; INTRAVENOUS; SUBCUTANEOUS at 21:55

## 2023-12-16 RX ADMIN — APIXABAN 5 MILLIGRAM(S): 2.5 TABLET, FILM COATED ORAL at 18:36

## 2023-12-16 RX ADMIN — AMIODARONE HYDROCHLORIDE 400 MILLIGRAM(S): 400 TABLET ORAL at 06:11

## 2023-12-16 RX ADMIN — TAMSULOSIN HYDROCHLORIDE 0.4 MILLIGRAM(S): 0.4 CAPSULE ORAL at 22:06

## 2023-12-16 RX ADMIN — SPIRONOLACTONE 25 MILLIGRAM(S): 25 TABLET, FILM COATED ORAL at 06:12

## 2023-12-16 RX ADMIN — GABAPENTIN 100 MILLIGRAM(S): 400 CAPSULE ORAL at 18:36

## 2023-12-16 RX ADMIN — Medication 100 MILLIGRAM(S): at 06:12

## 2023-12-16 RX ADMIN — Medication 81 MILLIGRAM(S): at 12:21

## 2023-12-16 NOTE — PROGRESS NOTE ADULT - SUBJECTIVE AND OBJECTIVE BOX
Subjective: No acute events documented overnight. Pt resting comfortably at time of assessment. Denies chest pain, SOB, palpitations, dizziness.      TELE: Sinus rhythm with intact conduction. Brief sarahy of AT overnight.    MEDICATIONS  (STANDING):  aMIOdarone    Tablet 400 milliGRAM(s) Oral every 12 hours  apixaban 5 milliGRAM(s) Oral every 12 hours  aspirin enteric coated 81 milliGRAM(s) Oral daily  atorvastatin 20 milliGRAM(s) Oral at bedtime  dextrose 5%. 1000 milliLiter(s) (50 mL/Hr) IV Continuous <Continuous>  dextrose 50% Injectable 25 Gram(s) IV Push once  furosemide    Tablet 40 milliGRAM(s) Oral daily  gabapentin 100 milliGRAM(s) Oral two times a day  glucagon  Injectable 1 milliGRAM(s) IntraMuscular once  influenza  Vaccine (HIGH DOSE) 0.7 milliLiter(s) IntraMuscular once  insulin glargine Injectable (LANTUS) 6 Unit(s) SubCutaneous at bedtime  insulin lispro (ADMELOG) corrective regimen sliding scale   SubCutaneous every 6 hours  metoprolol succinate  milliGRAM(s) Oral daily  pantoprazole    Tablet 40 milliGRAM(s) Oral before breakfast  sacubitril 49 mG/valsartan 51 mG 1 Tablet(s) Oral two times a day  sodium chloride 0.9% lock flush 3 milliLiter(s) IV Push every 8 hours  spironolactone 25 milliGRAM(s) Oral daily  tamsulosin 0.4 milliGRAM(s) Oral at bedtime    MEDICATIONS  (PRN):  acetaminophen     Tablet .. 650 milliGRAM(s) Oral every 6 hours PRN Temp greater or equal to 38C (100.4F), Mild Pain (1 - 3)  aluminum hydroxide/magnesium hydroxide/simethicone Suspension 30 milliLiter(s) Oral every 4 hours PRN Dyspepsia  dextrose Oral Gel 15 Gram(s) Oral once PRN Blood Glucose LESS THAN 70 milliGRAM(s)/deciliter  melatonin 3 milliGRAM(s) Oral at bedtime PRN Insomnia  metoprolol tartrate Injectable 5 milliGRAM(s) IV Push every 6 hours PRN HR >130 sustained  ondansetron Injectable 4 milliGRAM(s) IV Push every 8 hours PRN Nausea and/or Vomiting      Allergies    No Known Allergies    Intolerances        Vital Signs Last 24 Hrs  T(C): 36.9 (16 Dec 2023 05:00), Max: 37.1 (15 Dec 2023 19:00)  T(F): 98.4 (16 Dec 2023 05:00), Max: 98.7 (15 Dec 2023 19:00)  HR: 75 (16 Dec 2023 06:00) (69 - 139)  BP: 130/75 (16 Dec 2023 06:00) (96/58 - 131/72)  BP(mean): --  RR: 19 (16 Dec 2023 06:00) (18 - 20)  SpO2: 93% (16 Dec 2023 06:00) (93% - 99%)    Parameters below as of 16 Dec 2023 06:00  Patient On (Oxygen Delivery Method): room air        Physical Exam:  Constitutional: NAD  Resp: effort normal, breath sounds clear to auscultation bilaterally  Cardiac: Heart regular rate and rhythm, no murmurs, rubs, or gallops.  No edema.  Abdomen: soft, nondistended, bowel sounds active, nontender to palpation in all four quadrants    Neuro: Alert and oriented x 3, motor & sensation grossly in tact  Skin: color normal, no rashes or injury  Psych: mood calm       LABS:                        9.9    5.56  )-----------( 199      ( 16 Dec 2023 05:36 )             33.5     12-16    140  |  107  |  37.8<H>  ----------------------------<  97  4.3   |  20.0<L>  |  1.54<H>    Ca    8.2<L>      16 Dec 2023 05:36  Phos  3.3     12-15  Mg     2.2     12-16    TPro  6.5<L>  /  Alb  2.3<L>  /  TBili  0.5  /  DBili  x   /  AST  69<H>  /  ALT  69<H>  /  AlkPhos  111  12-16    PT/INR - ( 14 Dec 2023 17:00 )   PT: 20.7 sec;   INR: 1.90 ratio         PTT - ( 14 Dec 2023 17:00 )  PTT:38.1 sec  Urinalysis Basic - ( 16 Dec 2023 05:36 )    Color: x / Appearance: x / SG: x / pH: x  Gluc: 97 mg/dL / Ketone: x  / Bili: x / Urobili: x   Blood: x / Protein: x / Nitrite: x   Leuk Esterase: x / RBC: x / WBC x   Sq Epi: x / Non Sq Epi: x / Bacteria: x        RADIOLOGY & ADDITIONAL TESTS:      < from: QUIN Echo Doppler (12.01.23 @ 11:13) >  Summary:   1. Left ventricularejection fraction, by visual estimation, is 20 to   25%.   2. Severely decreased global left ventricular systolic function.   3. Normal RV size with moderately reduced RV systolic function, RVSP   least 29 mmHg.   4. Moderately enlarged left atrium.   5. No left atrial appendage thrombus, left atrial appendage enlargement   and normal left atrial appendage velocities.   6. Mild thickening of the anterior and posterior mitral valve leaflets   with mild posterior leaflet prolapse.   7. Moderate mitral valve regurgitation.   8. Color flow doppler and intravenous injection of agitated saline   demonstrates the presence of an intact intra atrial septum.   9. Lipomatous hypertrophy of the intra-atrial septum.  10. Moderate complex atheromas at the aortic arch.  11. There is no evidence of pericardial effusion.  12. Post-QUIN patient underwent external synchronized direct current   cardioversion with 300 Joules x1 from Aflutter RVR 130s into sinus rhythm   by Dr. Ceballos.        Assessment:    73y y/o male, with HTN, hyperlipidemia, obesity, CAD s/p CABG x4 (2020 [facility] at [facility]), CKD 4, HFrEF, EF 20-25%, AFL s/p DCCV 12/1/23 (Eliquis 5mg Q12HR), L lung empyema s/p VATS on 11/27, who presented to [facility] ED last night with complaints of worsening SOB x 2 days and was noted to be in AFL with RVR. Pt with recent admission to [facility] from 11/18 - 12/6 with acute HF exacerbation (EF 20-25%), new AFL with RVR, and L lung empyema requiring VATS procedure. Cultures at the time revealed streptococcus intermedius, and pt was treated with a course of IV ceftriaxone. Prior to discharge pt underwent successful QUIN / DCCV with conversion to sinus rhythm and was started on ELiquis 5mg Q12HR and was planned for a PO amiodarone load, but pt was never discharged with amiodarone.     Pt now once again with symptomatic AFL with RVR (SOB) and incidentally noted to be COVID +.  Now POD # 1 s/p successful DCCV (200 J x1) with conversion to sinus rhythm. Pt was started on PO amiodarone load and has since maintained SR. Pt resting comfortably at time of assessment. Denies chest pain, SOB, palpitations, dizziness.       Plan:  1) AFL with RVR  - s/p successful DCCV  - c/w Toprol 100mg QD  - c/w Amiodarone 400mg Q12HR for 7 days (Day 2/7), then start 200mg QD  - TFTs & LFTs should be monitored q 3-6 months while on amiodarone therapy. Anticipate <1 year of amiodarone therapy for this patient; however if > 1 year, patient will need annual fundoscopic exam and PFTs. Patient is advised of associated risks and need for monitoring; all questions answered to pt's expressed understanding.   - c/w Eliquis 5mg Q12HR uninterrupted. Strict compliance enforced with patient.  - Outpaitient follow up with Dr. Bolivar in 2 - 4 weeks    2) HFrEF (EF 20-25%)  - GDMT: Entresto / Toprol  - Appears Euvolemic on exam  - Repeat TTE in 3 months. Potential ICD implant if no recovery in EF and if in line with GOC.    3) COVID-19  - Pt is afebrile   - Lungs CTA  - Management as per primary team    No further EP intervention required at this time. Will discuss with Dr. Bolivar        Subjective: No acute events documented overnight. Pt resting comfortably at time of assessment. Denies chest pain, SOB, palpitations, dizziness.      TELE: Sinus rhythm with intact conduction. Brief sarahy of AT overnight.    MEDICATIONS  (STANDING):  aMIOdarone    Tablet 400 milliGRAM(s) Oral every 12 hours  apixaban 5 milliGRAM(s) Oral every 12 hours  aspirin enteric coated 81 milliGRAM(s) Oral daily  atorvastatin 20 milliGRAM(s) Oral at bedtime  dextrose 5%. 1000 milliLiter(s) (50 mL/Hr) IV Continuous <Continuous>  dextrose 50% Injectable 25 Gram(s) IV Push once  furosemide    Tablet 40 milliGRAM(s) Oral daily  gabapentin 100 milliGRAM(s) Oral two times a day  glucagon  Injectable 1 milliGRAM(s) IntraMuscular once  influenza  Vaccine (HIGH DOSE) 0.7 milliLiter(s) IntraMuscular once  insulin glargine Injectable (LANTUS) 6 Unit(s) SubCutaneous at bedtime  insulin lispro (ADMELOG) corrective regimen sliding scale   SubCutaneous every 6 hours  metoprolol succinate  milliGRAM(s) Oral daily  pantoprazole    Tablet 40 milliGRAM(s) Oral before breakfast  sacubitril 49 mG/valsartan 51 mG 1 Tablet(s) Oral two times a day  sodium chloride 0.9% lock flush 3 milliLiter(s) IV Push every 8 hours  spironolactone 25 milliGRAM(s) Oral daily  tamsulosin 0.4 milliGRAM(s) Oral at bedtime    MEDICATIONS  (PRN):  acetaminophen     Tablet .. 650 milliGRAM(s) Oral every 6 hours PRN Temp greater or equal to 38C (100.4F), Mild Pain (1 - 3)  aluminum hydroxide/magnesium hydroxide/simethicone Suspension 30 milliLiter(s) Oral every 4 hours PRN Dyspepsia  dextrose Oral Gel 15 Gram(s) Oral once PRN Blood Glucose LESS THAN 70 milliGRAM(s)/deciliter  melatonin 3 milliGRAM(s) Oral at bedtime PRN Insomnia  metoprolol tartrate Injectable 5 milliGRAM(s) IV Push every 6 hours PRN HR >130 sustained  ondansetron Injectable 4 milliGRAM(s) IV Push every 8 hours PRN Nausea and/or Vomiting      Allergies    No Known Allergies    Intolerances        Vital Signs Last 24 Hrs  T(C): 36.9 (16 Dec 2023 05:00), Max: 37.1 (15 Dec 2023 19:00)  T(F): 98.4 (16 Dec 2023 05:00), Max: 98.7 (15 Dec 2023 19:00)  HR: 75 (16 Dec 2023 06:00) (69 - 139)  BP: 130/75 (16 Dec 2023 06:00) (96/58 - 131/72)  BP(mean): --  RR: 19 (16 Dec 2023 06:00) (18 - 20)  SpO2: 93% (16 Dec 2023 06:00) (93% - 99%)    Parameters below as of 16 Dec 2023 06:00  Patient On (Oxygen Delivery Method): room air        Physical Exam:  Constitutional: NAD  Resp: effort normal, breath sounds clear to auscultation bilaterally  Cardiac: Heart regular rate and rhythm, no murmurs, rubs, or gallops.  No edema.  Abdomen: soft, nondistended, bowel sounds active, nontender to palpation in all four quadrants    Neuro: Alert and oriented x 3, motor & sensation grossly in tact  Skin: color normal, no rashes or injury  Psych: mood calm       LABS:                        9.9    5.56  )-----------( 199      ( 16 Dec 2023 05:36 )             33.5     12-16    140  |  107  |  37.8<H>  ----------------------------<  97  4.3   |  20.0<L>  |  1.54<H>    Ca    8.2<L>      16 Dec 2023 05:36  Phos  3.3     12-15  Mg     2.2     12-16    TPro  6.5<L>  /  Alb  2.3<L>  /  TBili  0.5  /  DBili  x   /  AST  69<H>  /  ALT  69<H>  /  AlkPhos  111  12-16    PT/INR - ( 14 Dec 2023 17:00 )   PT: 20.7 sec;   INR: 1.90 ratio         PTT - ( 14 Dec 2023 17:00 )  PTT:38.1 sec  Urinalysis Basic - ( 16 Dec 2023 05:36 )    Color: x / Appearance: x / SG: x / pH: x  Gluc: 97 mg/dL / Ketone: x  / Bili: x / Urobili: x   Blood: x / Protein: x / Nitrite: x   Leuk Esterase: x / RBC: x / WBC x   Sq Epi: x / Non Sq Epi: x / Bacteria: x        RADIOLOGY & ADDITIONAL TESTS:      < from: QUIN Echo Doppler (12.01.23 @ 11:13) >  Summary:   1. Left ventricularejection fraction, by visual estimation, is 20 to   25%.   2. Severely decreased global left ventricular systolic function.   3. Normal RV size with moderately reduced RV systolic function, RVSP   least 29 mmHg.   4. Moderately enlarged left atrium.   5. No left atrial appendage thrombus, left atrial appendage enlargement   and normal left atrial appendage velocities.   6. Mild thickening of the anterior and posterior mitral valve leaflets   with mild posterior leaflet prolapse.   7. Moderate mitral valve regurgitation.   8. Color flow doppler and intravenous injection of agitated saline   demonstrates the presence of an intact intra atrial septum.   9. Lipomatous hypertrophy of the intra-atrial septum.  10. Moderate complex atheromas at the aortic arch.  11. There is no evidence of pericardial effusion.  12. Post-QUIN patient underwent external synchronized direct current   cardioversion with 300 Joules x1 from Aflutter RVR 130s into sinus rhythm   by Dr. Ceballos.        Assessment:    73y y/o male, with HTN, hyperlipidemia, obesity, CAD s/p CABG x4 (2020 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 Pt with recent admission to [facility] from 11/18 - 12/6 with acute HF exacerbation (EF 20-25%), new AFL with RVR, and L lung empyema requiring VATS procedure. Cultures at the time revealed streptococcus intermedius, and pt was treated with a course of IV ceftriaxone. Prior to discharge pt underwent successful QUIN / DCCV with conversion to sinus rhythm and was started on ELiquis 5mg Q12HR and was planned for a PO amiodarone load, but pt was never discharged with amiodarone.     Pt now once again with symptomatic AFL with RVR (SOB) and incidentally noted to be COVID +.  Now POD # 1 s/p successful DCCV (200 J x1) with conversion to sinus rhythm. Pt was started on PO amiodarone load and has since maintained SR. Pt resting comfortably at time of assessment. Denies chest pain, SOB, palpitations, dizziness.       Plan:  1) AFL with RVR  - s/p successful DCCV  - c/w Toprol 100mg QD  - c/w Amiodarone 400mg Q12HR for 7 days (Day 2/7), then start 200mg QD  - TFTs & LFTs should be monitored q 3-6 months while on amiodarone therapy. Anticipate <1 year of amiodarone therapy for this patient; however if > 1 year, patient will need annual fundoscopic exam and PFTs. Patient is advised of associated risks and need for monitoring; all questions answered to pt's expressed understanding.   - c/w Eliquis 5mg Q12HR uninterrupted. Strict compliance enforced with patient.  - Outpaitient follow up with Dr. Bolivar in 2 - 4 weeks    2) HFrEF (EF 20-25%)  - GDMT: Entresto / Toprol  - Appears Euvolemic on exam  - Repeat TTE in 3 months. Potential ICD implant if no recovery in EF and if in line with GOC.    3) COVID-19  - Pt is afebrile   - Lungs CTA  - Management as per primary team    No further EP intervention required at this time. Will discuss with Dr. Bolivar        Subjective: No acute events documented overnight. Pt resting comfortably at time of assessment. Denies chest pain, SOB, palpitations, dizziness.    TELE: Sinus rhythm with intact conduction. Brief sarahy of AT overnight.    MEDICATIONS  (STANDING):  aMIOdarone    Tablet 400 milliGRAM(s) Oral every 12 hours  apixaban 5 milliGRAM(s) Oral every 12 hours  aspirin enteric coated 81 milliGRAM(s) Oral daily  atorvastatin 20 milliGRAM(s) Oral at bedtime  dextrose 5%. 1000 milliLiter(s) (50 mL/Hr) IV Continuous <Continuous>  dextrose 50% Injectable 25 Gram(s) IV Push once  furosemide    Tablet 40 milliGRAM(s) Oral daily  gabapentin 100 milliGRAM(s) Oral two times a day  glucagon  Injectable 1 milliGRAM(s) IntraMuscular once  influenza  Vaccine (HIGH DOSE) 0.7 milliLiter(s) IntraMuscular once  insulin glargine Injectable (LANTUS) 6 Unit(s) SubCutaneous at bedtime  insulin lispro (ADMELOG) corrective regimen sliding scale   SubCutaneous every 6 hours  metoprolol succinate  milliGRAM(s) Oral daily  pantoprazole    Tablet 40 milliGRAM(s) Oral before breakfast  sacubitril 49 mG/valsartan 51 mG 1 Tablet(s) Oral two times a day  sodium chloride 0.9% lock flush 3 milliLiter(s) IV Push every 8 hours  spironolactone 25 milliGRAM(s) Oral daily  tamsulosin 0.4 milliGRAM(s) Oral at bedtime    MEDICATIONS  (PRN):  acetaminophen     Tablet .. 650 milliGRAM(s) Oral every 6 hours PRN Temp greater or equal to 38C (100.4F), Mild Pain (1 - 3)  aluminum hydroxide/magnesium hydroxide/simethicone Suspension 30 milliLiter(s) Oral every 4 hours PRN Dyspepsia  dextrose Oral Gel 15 Gram(s) Oral once PRN Blood Glucose LESS THAN 70 milliGRAM(s)/deciliter  melatonin 3 milliGRAM(s) Oral at bedtime PRN Insomnia  metoprolol tartrate Injectable 5 milliGRAM(s) IV Push every 6 hours PRN HR >130 sustained  ondansetron Injectable 4 milliGRAM(s) IV Push every 8 hours PRN Nausea and/or Vomiting      Allergies    No Known Allergies    Intolerances        Vital Signs Last 24 Hrs  T(C): 36.9 (16 Dec 2023 05:00), Max: 37.1 (15 Dec 2023 19:00)  T(F): 98.4 (16 Dec 2023 05:00), Max: 98.7 (15 Dec 2023 19:00)  HR: 75 (16 Dec 2023 06:00) (69 - 139)  BP: 130/75 (16 Dec 2023 06:00) (96/58 - 131/72)  BP(mean): --  RR: 19 (16 Dec 2023 06:00) (18 - 20)  SpO2: 93% (16 Dec 2023 06:00) (93% - 99%)    Parameters below as of 16 Dec 2023 06:00  Patient On (Oxygen Delivery Method): room air        Physical Exam:  Constitutional: NAD  Resp: effort normal, breath sounds clear to auscultation bilaterally  Cardiac: Heart regular rate and rhythm, no murmurs, rubs, or gallops.  No edema.  Abdomen: soft, nondistended, bowel sounds active, nontender to palpation in all four quadrants    Neuro: Alert and oriented x 3, motor & sensation grossly in tact  Skin: color normal, no rashes or injury  Psych: mood calm       LABS:                        9.9    5.56  )-----------( 199      ( 16 Dec 2023 05:36 )             33.5     12-16    140  |  107  |  37.8<H>  ----------------------------<  97  4.3   |  20.0<L>  |  1.54<H>    Ca    8.2<L>      16 Dec 2023 05:36  Phos  3.3     12-15  Mg     2.2     12-16    TPro  6.5<L>  /  Alb  2.3<L>  /  TBili  0.5  /  DBili  x   /  AST  69<H>  /  ALT  69<H>  /  AlkPhos  111  12-16    PT/INR - ( 14 Dec 2023 17:00 )   PT: 20.7 sec;   INR: 1.90 ratio         PTT - ( 14 Dec 2023 17:00 )  PTT:38.1 sec  Urinalysis Basic - ( 16 Dec 2023 05:36 )    Color: x / Appearance: x / SG: x / pH: x  Gluc: 97 mg/dL / Ketone: x  / Bili: x / Urobili: x   Blood: x / Protein: x / Nitrite: x   Leuk Esterase: x / RBC: x / WBC x   Sq Epi: x / Non Sq Epi: x / Bacteria: x        RADIOLOGY & ADDITIONAL TESTS:      < from: QUIN Echo Doppler (12.01.23 @ 11:13) >  Summary:   1. Left ventricularejection fraction, by visual estimation, is 20 to   25%.   2. Severely decreased global left ventricular systolic function.   3. Normal RV size with moderately reduced RV systolic function, RVSP   least 29 mmHg.   4. Moderately enlarged left atrium.   5. No left atrial appendage thrombus, left atrial appendage enlargement   and normal left atrial appendage velocities.   6. Mild thickening of the anterior and posterior mitral valve leaflets   with mild posterior leaflet prolapse.   7. Moderate mitral valve regurgitation.   8. Color flow doppler and intravenous injection of agitated saline   demonstrates the presence of an intact intra atrial septum.   9. Lipomatous hypertrophy of the intra-atrial septum.  10. Moderate complex atheromas at the aortic arch.  11. There is no evidence of pericardial effusion.  12. Post-QUIN patient underwent external synchronized direct current   cardioversion with 300 Joules x1 from Aflutter RVR 130s into sinus rhythm   by Dr. Ceballos.        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 Pt with recent admission to [facility] from 11/18 - 12/6 with acute HF exacerbation (EF 20-25%), new AFL with RVR, and L lung empyema requiring VATS procedure. Cultures at the time revealed streptococcus intermedius, and pt was treated with a course of IV ceftriaxone. Prior to discharge pt underwent successful QUIN / DCCV with conversion to sinus rhythm and was started on ELiquis 5mg Q12HR and was planned for a PO amiodarone load, but pt was never discharged with amiodarone.     Pt now once again with symptomatic AFL with RVR (SOB) and incidentally noted to be COVID +.  Now POD # 1 s/p successful DCCV (200 J x1) with conversion to sinus rhythm. Pt was started on PO amiodarone load and has since maintained SR. Pt resting comfortably at time of assessment. Denies chest pain, SOB, palpitations, dizziness.       Plan:  1) AFL with RVR  - s/p successful DCCV  - c/w Toprol 100mg QD  - c/w Amiodarone 400mg Q12HR for 7 days (Day 2/7), then start 200mg QD  - TFTs & LFTs should be monitored q 3-6 months while on amiodarone therapy. Anticipate <1 year of amiodarone therapy for this patient; however if > 1 year, patient will need annual fundoscopic exam and PFTs. Patient is advised of associated risks and need for monitoring; all questions answered to pt's expressed understanding.   - c/w Eliquis 5mg Q12HR uninterrupted. Strict compliance enforced with patient.  - Outpaitient follow up with Dr. Bolivar in 2 - 4 weeks    2) HFrEF (EF 20-25%)  - GDMT: Entresto / Toprol  - Appears Euvolemic on exam  - Repeat TTE in 3 months. Potential ICD implant if no recovery in EF and if in line with GOC.    3) COVID-19  - Pt is afebrile   - Lungs CTA  - Management as per primary team    No further EP intervention required at this time. Will discuss with Dr. Bolivar        Subjective: No acute events documented overnight. Pt resting comfortably at time of assessment. Denies chest pain, SOB, palpitations, dizziness.    TELE: Sinus rhythm with intact conduction. Brief sarahy of AT overnight.    MEDICATIONS  (STANDING):  aMIOdarone    Tablet 400 milliGRAM(s) Oral every 12 hours  apixaban 5 milliGRAM(s) Oral every 12 hours  aspirin enteric coated 81 milliGRAM(s) Oral daily  atorvastatin 20 milliGRAM(s) Oral at bedtime  dextrose 5%. 1000 milliLiter(s) (50 mL/Hr) IV Continuous <Continuous>  dextrose 50% Injectable 25 Gram(s) IV Push once  furosemide    Tablet 40 milliGRAM(s) Oral daily  gabapentin 100 milliGRAM(s) Oral two times a day  glucagon  Injectable 1 milliGRAM(s) IntraMuscular once  influenza  Vaccine (HIGH DOSE) 0.7 milliLiter(s) IntraMuscular once  insulin glargine Injectable (LANTUS) 6 Unit(s) SubCutaneous at bedtime  insulin lispro (ADMELOG) corrective regimen sliding scale   SubCutaneous every 6 hours  metoprolol succinate  milliGRAM(s) Oral daily  pantoprazole    Tablet 40 milliGRAM(s) Oral before breakfast  sacubitril 49 mG/valsartan 51 mG 1 Tablet(s) Oral two times a day  sodium chloride 0.9% lock flush 3 milliLiter(s) IV Push every 8 hours  spironolactone 25 milliGRAM(s) Oral daily  tamsulosin 0.4 milliGRAM(s) Oral at bedtime    MEDICATIONS  (PRN):  acetaminophen     Tablet .. 650 milliGRAM(s) Oral every 6 hours PRN Temp greater or equal to 38C (100.4F), Mild Pain (1 - 3)  aluminum hydroxide/magnesium hydroxide/simethicone Suspension 30 milliLiter(s) Oral every 4 hours PRN Dyspepsia  dextrose Oral Gel 15 Gram(s) Oral once PRN Blood Glucose LESS THAN 70 milliGRAM(s)/deciliter  melatonin 3 milliGRAM(s) Oral at bedtime PRN Insomnia  metoprolol tartrate Injectable 5 milliGRAM(s) IV Push every 6 hours PRN HR >130 sustained  ondansetron Injectable 4 milliGRAM(s) IV Push every 8 hours PRN Nausea and/or Vomiting      Allergies    No Known Allergies    Intolerances        Vital Signs Last 24 Hrs  T(C): 36.9 (16 Dec 2023 05:00), Max: 37.1 (15 Dec 2023 19:00)  T(F): 98.4 (16 Dec 2023 05:00), Max: 98.7 (15 Dec 2023 19:00)  HR: 75 (16 Dec 2023 06:00) (69 - 139)  BP: 130/75 (16 Dec 2023 06:00) (96/58 - 131/72)  BP(mean): --  RR: 19 (16 Dec 2023 06:00) (18 - 20)  SpO2: 93% (16 Dec 2023 06:00) (93% - 99%)    Parameters below as of 16 Dec 2023 06:00  Patient On (Oxygen Delivery Method): room air        Physical Exam:  Constitutional: NAD  Resp: effort normal, breath sounds clear to auscultation bilaterally  Cardiac: Heart regular rate and rhythm, no murmurs, rubs, or gallops.  No edema.  Abdomen: soft, nondistended, bowel sounds active, nontender to palpation in all four quadrants    Neuro: Alert and oriented x 3, motor & sensation grossly in tact  Skin: color normal, no rashes or injury  Psych: mood calm       LABS:                        9.9    5.56  )-----------( 199      ( 16 Dec 2023 05:36 )             33.5     12-16    140  |  107  |  37.8<H>  ----------------------------<  97  4.3   |  20.0<L>  |  1.54<H>    Ca    8.2<L>      16 Dec 2023 05:36  Phos  3.3     12-15  Mg     2.2     12-16    TPro  6.5<L>  /  Alb  2.3<L>  /  TBili  0.5  /  DBili  x   /  AST  69<H>  /  ALT  69<H>  /  AlkPhos  111  12-16    PT/INR - ( 14 Dec 2023 17:00 )   PT: 20.7 sec;   INR: 1.90 ratio         PTT - ( 14 Dec 2023 17:00 )  PTT:38.1 sec  Urinalysis Basic - ( 16 Dec 2023 05:36 )    Color: x / Appearance: x / SG: x / pH: x  Gluc: 97 mg/dL / Ketone: x  / Bili: x / Urobili: x   Blood: x / Protein: x / Nitrite: x   Leuk Esterase: x / RBC: x / WBC x   Sq Epi: x / Non Sq Epi: x / Bacteria: x        RADIOLOGY & ADDITIONAL TESTS:      < from: QUIN Echo Doppler (12.01.23 @ 11:13) >  Summary:   1. Left ventricularejection fraction, by visual estimation, is 20 to   25%.   2. Severely decreased global left ventricular systolic function.   3. Normal RV size with moderately reduced RV systolic function, RVSP   least 29 mmHg.   4. Moderately enlarged left atrium.   5. No left atrial appendage thrombus, left atrial appendage enlargement   and normal left atrial appendage velocities.   6. Mild thickening of the anterior and posterior mitral valve leaflets   with mild posterior leaflet prolapse.   7. Moderate mitral valve regurgitation.   8. Color flow doppler and intravenous injection of agitated saline   demonstrates the presence of an intact intra atrial septum.   9. Lipomatous hypertrophy of the intra-atrial septum.  10. Moderate complex atheromas at the aortic arch.  11. There is no evidence of pericardial effusion.  12. Post-QUIN patient underwent external synchronized direct current   cardioversion with 300 Joules x1 from Aflutter RVR 130s into sinus rhythm   by Dr. Ceballos.        Assessment:    73y y/o male, with HTN, hyperlipidemia, obesity, CAD s/p CABG x4 (2020 [facility]), CKD 4, HFrEF, EF 20-25%, AFL s/p DCCV 12/1/23 (Eliquis 5mg Q12HR), L lung empyema s/p VATS on 11/27, who presented to [facility] ED last night with complaints of worsening SOB x 2 days and was noted to be in AFL with RVR. Pt with recent admission to [facility] from 11/18 - 12/6 with acute HF exacerbation (EF 20-25%), new AFL with RVR, and L lung empyema requiring VATS procedure. Cultures at the time revealed streptococcus intermedius, and pt was treated with a course of IV ceftriaxone. Prior to discharge pt underwent successful QUIN / DCCV with conversion to sinus rhythm and was started on ELiquis 5mg Q12HR and was planned for a PO amiodarone load, but pt was never discharged with amiodarone.     Pt now once again with symptomatic AFL with RVR (SOB) and incidentally noted to be COVID +.  Now POD # 1 s/p successful DCCV (200 J x1) with conversion to sinus rhythm. Pt was started on PO amiodarone load and has since maintained SR. Pt resting comfortably at time of assessment. Denies chest pain, SOB, palpitations, dizziness.       Plan:  1) AFL with RVR  - s/p successful DCCV  - c/w Toprol 100mg QD  - c/w Amiodarone 400mg Q12HR for 7 days (Day 2/7), then start 200mg QD  - TFTs & LFTs should be monitored q 3-6 months while on amiodarone therapy. Anticipate <1 year of amiodarone therapy for this patient; however if > 1 year, patient will need annual fundoscopic exam and PFTs. Patient is advised of associated risks and need for monitoring; all questions answered to pt's expressed understanding.   - c/w Eliquis 5mg Q12HR uninterrupted. Strict compliance enforced with patient.  - Outpaitient follow up with Dr. Bolivar in 2 - 4 weeks    2) HFrEF (EF 20-25%)  - GDMT: Entresto / Toprol  - Appears Euvolemic on exam  - Repeat TTE in 3 months. Potential ICD implant if no recovery in EF and if in line with GOC.    3) COVID-19  - Pt is afebrile   - Lungs CTA  - Management as per primary team    No further EP intervention required at this time. Will discuss with Dr. Bolivar

## 2023-12-16 NOTE — PROGRESS NOTE ADULT - ATTENDING COMMENTS
Agree with above   Patient seen and examined together with medical resident. Son at bedside. he is s/p cardioversion for afib. Reports feeling much better. NO new complains.   Covid (+), but has no respiratory symptoms   Vital signs,  labs and imaging  reviewed   Assessment and plan discussed with son   Plan for PT evaluation and discharge planning back to rehab

## 2023-12-16 NOTE — PROGRESS NOTE ADULT - SUBJECTIVE AND OBJECTIVE BOX
SUBJECTIVE:    Chief Complaint: Patient is a 73y old  Male who presents with a chief complaint of Recurrent Afib w/ RVR (16 Dec 2023 08:42)    INTERVAL HPI/OVERNIGHT EVENTS: Patient seen and examined at bedside at AM. No clinically acute event overnight.   Denies any chest pain, palpitations, SOB, N/V/D, or any other complaints.     MEDICATIONS  (STANDING):  aMIOdarone    Tablet 400 milliGRAM(s) Oral every 12 hours  apixaban 5 milliGRAM(s) Oral every 12 hours  aspirin enteric coated 81 milliGRAM(s) Oral daily  atorvastatin 20 milliGRAM(s) Oral at bedtime  dextrose 5%. 1000 milliLiter(s) (50 mL/Hr) IV Continuous <Continuous>  dextrose 50% Injectable 25 Gram(s) IV Push once  furosemide    Tablet 40 milliGRAM(s) Oral daily  gabapentin 100 milliGRAM(s) Oral two times a day  glucagon  Injectable 1 milliGRAM(s) IntraMuscular once  influenza  Vaccine (HIGH DOSE) 0.7 milliLiter(s) IntraMuscular once  insulin glargine Injectable (LANTUS) 6 Unit(s) SubCutaneous at bedtime  insulin lispro (ADMELOG) corrective regimen sliding scale   SubCutaneous every 6 hours  metoprolol succinate  milliGRAM(s) Oral daily  pantoprazole    Tablet 40 milliGRAM(s) Oral before breakfast  sacubitril 49 mG/valsartan 51 mG 1 Tablet(s) Oral two times a day  sodium chloride 0.9% lock flush 3 milliLiter(s) IV Push every 8 hours  spironolactone 25 milliGRAM(s) Oral daily  tamsulosin 0.4 milliGRAM(s) Oral at bedtime    MEDICATIONS  (PRN):  acetaminophen     Tablet .. 650 milliGRAM(s) Oral every 6 hours PRN Temp greater or equal to 38C (100.4F), Mild Pain (1 - 3)  aluminum hydroxide/magnesium hydroxide/simethicone Suspension 30 milliLiter(s) Oral every 4 hours PRN Dyspepsia  dextrose Oral Gel 15 Gram(s) Oral once PRN Blood Glucose LESS THAN 70 milliGRAM(s)/deciliter  melatonin 3 milliGRAM(s) Oral at bedtime PRN Insomnia  metoprolol tartrate Injectable 5 milliGRAM(s) IV Push every 6 hours PRN HR >130 sustained  ondansetron Injectable 4 milliGRAM(s) IV Push every 8 hours PRN Nausea and/or Vomiting      Allergies    No Known Allergies    REVIEW OF SYSTEMS:  All other review of systems negative, except as noted in HPI    OBJECTIVE:    Vital Signs Last 24 Hrs  T(C): 36.9 (16 Dec 2023 05:00), Max: 37.1 (15 Dec 2023 19:00)  T(F): 98.4 (16 Dec 2023 05:00), Max: 98.7 (15 Dec 2023 19:00)  HR: 75 (16 Dec 2023 06:00) (69 - 139)  BP: 130/75 (16 Dec 2023 06:00) (96/58 - 131/72)  BP(mean): --  RR: 19 (16 Dec 2023 06:00) (18 - 20)  SpO2: 93% (16 Dec 2023 06:00) (93% - 99%)    Parameters below as of 16 Dec 2023 06:00  Patient On (Oxygen Delivery Method): room air        PHYSICAL EXAM  GENERAL: Obese man, NAD, lying in bed comfortably  HEAD:  Atraumatic, Normocephalic  EYES: EOMI, PERRLA, conjunctiva and sclera clear  ENT: Moist mucous membranes  NECK: Supple, No JVD  CHEST/LUNG: Decreased BS left lung base; No rales, rhonchi, wheezing, or rubs. Unlabored respirations  HEART: Regular rate and rhythm; No murmurs, rubs, or gallops  ABDOMEN: BSx4; Soft, nontender, nondistended  EXTREMITIES:  2+ Peripheral Pulses, brisk capillary refill. No clubbing, cyanosis, or edema  NERVOUS SYSTEM:  A&Ox3, no focal deficits       Lab/ Imaging:    LABS:                        9.9    5.56  )-----------( 199      ( 16 Dec 2023 05:36 )             33.5     12-16    140  |  107  |  37.8<H>  ----------------------------<  97  4.3   |  20.0<L>  |  1.54<H>    Ca    8.2<L>      16 Dec 2023 05:36  Phos  3.3     12-15  Mg     2.2     12-16    TPro  6.5<L>  /  Alb  2.3<L>  /  TBili  0.5  /  DBili  x   /  AST  69<H>  /  ALT  69<H>  /  AlkPhos  111  12-16    PT/INR - ( 14 Dec 2023 17:00 )   PT: 20.7 sec;   INR: 1.90 ratio         PTT - ( 14 Dec 2023 17:00 )  PTT:38.1 sec  Urinalysis Basic - ( 16 Dec 2023 05:36 )    Color: x / Appearance: x / SG: x / pH: x  Gluc: 97 mg/dL / Ketone: x  / Bili: x / Urobili: x   Blood: x / Protein: x / Nitrite: x   Leuk Esterase: x / RBC: x / WBC x   Sq Epi: x / Non Sq Epi: x / Bacteria: x      CAPILLARY BLOOD GLUCOSE  POCT Blood Glucose.: 106 mg/dL (16 Dec 2023 08:39)  POCT Blood Glucose.: 118 mg/dL (16 Dec 2023 01:07)  POCT Blood Glucose.: 111 mg/dL (15 Dec 2023 22:57)  POCT Blood Glucose.: 100 mg/dL (15 Dec 2023 15:20)

## 2023-12-16 NOTE — PROGRESS NOTE ADULT - NS ATTEND AMEND GEN_ALL_CORE FT
.  .  Doing well after cardioversion and maintaining NSR. LFTs stable. Continue amiodarone and anticoagulation. Will plan for outpt ablation.    Thank you for this consultation. EP will sign off. Please contact EP team with any questions.    Gaurav Bolivar MD  Clinical Cardiac Electrophysiology

## 2023-12-16 NOTE — PROGRESS NOTE ADULT - ASSESSMENT
72 y/o male w/ PMHx of CAD s/p CABG, ICM with EF of 20-25%, Afib not on AC, HLD, HTN, CKD-4. Patient with recent admission from 11/18-12/4 for left sided empyema w/ strep intermedius s/p VATS w/ decortication with post op Afib with RVR s/p QUIN/DCC (on Eliquis on 12/1). Admitted for Atrial Flutter w/ RVR & COVID    #Atrial Flutter w/ RVR   - S/p DCCV (12/15)  - c/w Amiodarone 400mg Q12HR for 7 days (Day 2/7), then start 200mg QD  - c/w Toprol 100mg QD  - c/w [id number] Q12  - EP notes appreciated  - Outpaitient follow up with Dr. Bolivar in 2 - 4 weeks    #COVID-19 Positive  - COVID RVP (+)  - On RA  - Isolation  - Supportive care  - No role for steroids/Remdesevir at this time     #Empyema (improving)  - S/P VATs Decortication  - Rpt CXR with improved aeration  - No fever, leucocytosis  - ID following  - Augmentin 875/125 BID x 2 weeks  - CT surgery notes appreciated    #CAD s/p CABG:  -Trop neg    I#CM w/ EF of 20-25%:  - Euvolemic  - GDMT: Entresto / Toprol per EP    #HTN  #HLD:  - Cont. o/p regimen   - c/w  Statin     # Chronic anemia:  - Hb Stable  - Monitor H&H    #CKD-3a:  -Stable, Cr. at baseline     Dispo: Pending PT Consult  Diet: DASH  DVT PPx: Eliquis  74 y/o male w/ PMHx of CAD s/p CABG, ICM with EF of 20-25%, Afib not on AC, HLD, HTN, CKD-4. Patient with recent admission from 11/18-12/4 for left sided empyema w/ strep intermedius s/p VATS w/ decortication with post op Afib with RVR s/p QUIN/DCC (on Eliquis on 12/1). Admitted for Atrial Flutter w/ RVR & COVID    #Atrial Flutter w/ RVR   - S/p DCCV (12/15)  - c/w Amiodarone 400mg Q12HR for 7 days (Day 2/7), then start 200mg QD  - c/w Toprol 100mg QD  - c/w [id number] Q12  - EP notes appreciated  - Outpaitient follow up with Dr. Bolivar in 2 - 4 weeks    #COVID-19 Positive  - COVID RVP (+)  - On RA  - Isolation  - Supportive care  - No role for steroids/Remdesevir at this time     #Empyema (improving)  - S/P VATs Decortication  - Rpt CXR with improved aeration  - No fever, leucocytosis  - ID following  - Augmentin 875/125 BID x 2 weeks  - CT surgery notes appreciated    #CAD s/p CABG:  -Trop neg    I#CM w/ EF of 20-25%:  - Euvolemic  - GDMT: Entresto / Toprol per EP    #HTN  #HLD:  - Cont. o/p regimen   - c/w  Statin     # Chronic anemia:  - Hb Stable  - Monitor H&H    #CKD-3a:  -Stable, Cr. at baseline     Dispo: Pending PT Consult  Diet: DASH  DVT PPx: Eliquis  74 y/o male w/ PMHx of CAD s/p CABG, ICM with EF of 20-25%, Afib not on AC, HLD, HTN, CKD-4. Patient with recent admission from 11/18-12/4 for left sided empyema w/ strep intermedius s/p VATS w/ decortication with post op Afib with RVR s/p QUIN/DCC (on Eliquis on 12/1). Admitted for Atrial Flutter w/ RVR & COVID    #Atrial Flutter w/ RVR   - S/p DCCV (12/15)  - c/w Amiodarone 400mg Q12HR for 7 days (Day 2/7), then start 200mg QD  - c/w Toprol 100mg QD  - c/w [id number] Q12  - EP notes appreciated  - Outpaitient follow up with Dr. Bolivar in 2 - 4 weeks    #COVID-19 Positive  - COVID RVP (+)  - On RA  - Isolation  - Supportive care  - No role for steroids/Remdesevir at this time     #Empyema (improving)  - S/P VATs Decortication  - Rpt CXR with improved aeration  - No fever, leucocytosis  - ID following  - Augmentin 875/125 BID x 2 weeks  - CT surgery notes appreciated  - Rpt CT Chest (pending)    #CAD s/p CABG:  -Trop neg    I#CM w/ EF of 20-25%:  - Euvolemic  - GDMT: Entresto / Toprol per EP    #HTN  #HLD:  - Cont. o/p regimen   - c/w  Statin     # Chronic anemia:  - Hb Stable  - Monitor H&H    #CKD-3a:  -Stable, Cr. at baseline     Dispo: Pending PT Consult  Diet: DASH  DVT PPx: Eliquis  74 y/o male w/ PMHx of CAD s/p CABG, ICM with EF of 20-25%, Afib not on AC, HLD, HTN, CKD-4. Patient with recent admission from 11/18-12/4 for left sided empyema w/ strep intermedius s/p VATS w/ decortication with post op Afib with RVR s/p QUIN/DCC (on Eliquis on 12/1). Admitted for Atrial Flutter w/ RVR & COVID    #Atrial Flutter w/ RVR   - S/p DCCV (12/15)  - c/w Amiodarone 400mg Q12HR for 7 days (Day 2/7), then start 200mg QD  - c/w Toprol 100mg QD  - c/w [id number] Q12  - EP notes appreciated  - Outpaitient follow up with Dr. Bolivar in 2 - 4 weeks    #COVID-19 Positive  - COVID RVP (+)  - On RA  - Isolation  - Supportive care  - No role for steroids/Remdesevir at this time     #Empyema (improving)  - S/P VATs Decortication  - Rpt CXR with improved aeration  - No fever, leucocytosis  - ID following  - Augmentin 875/125 BID x 2 weeks  - CT surgery notes appreciated  - Rpt CT Chest (pending)    #CAD s/p CABG:  -Trop neg    I#CM w/ EF of 20-25%:  - Euvolemic  - GDMT: Entresto / Toprol per EP    #HTN  #HLD:  - Cont. o/p regimen   - c/w  Statin     # Chronic anemia:  - Hb Stable  - Monitor H&H    #CKD-3a:  -Stable, Cr. at baseline     Dispo: Pending PT Consult  Diet: DASH  DVT PPx: Eliquis  74 y/o male w/ PMHx of CAD s/p CABG, ICM with EF of 20-25%, Afib not on AC, HLD, HTN, CKD-4. Patient with recent admission from 11/18-12/4 for left sided empyema w/ strep intermedius s/p VATS w/ decortication with post op Afib with RVR s/p QUIN/DCC (on Eliquis on 12/1). Admitted for Atrial Flutter w/ RVR & COVID    #Atrial Flutter w/ RVR   - S/p DCCV (12/15)  - c/w Amiodarone 400mg Q12HR for 7 days (Day 2/7), then start 200mg QD  - c/w Toprol 100mg QD  - c/w Amvoyft1zf Q12  - EP notes appreciated  - Outpaitient follow up with Dr. Bolivar in 2 - 4 weeks    #COVID-19 Positive  - COVID RVP (+)  - On RA  - Isolation  - Supportive care  - No role for steroids/Remdesevir at this time     #Empyema (improving)  - S/P VATs Decortication  - Rpt CXR with improved aeration  - No fever, leucocytosis  - ID following  - Augmentin 875/125 BID x 2 weeks  - CT surgery notes appreciated  - Rpt CT Chest (12/16): Decreasing left sided effusion    #CAD s/p CABG:  -Trop neg    I#CM w/ EF of 20-25%:  - Euvolemic  - GDMT: Entresto / Toprol per EP    #HTN  #HLD:  - Cont. o/p regimen   - c/w  Statin     # Chronic anemia:  - Hb Stable  - Monitor H&H    #CKD-3a:  -Stable, Cr. at baseline     Dispo: Pending PT Consult  Diet: DASH  DVT PPx: Eliquis  74 y/o male w/ PMHx of CAD s/p CABG, ICM with EF of 20-25%, Afib not on AC, HLD, HTN, CKD-4. Patient with recent admission from 11/18-12/4 for left sided empyema w/ strep intermedius s/p VATS w/ decortication with post op Afib with RVR s/p QUIN/DCC (on Eliquis on 12/1). Admitted for Atrial Flutter w/ RVR & COVID    #Atrial Flutter w/ RVR   - S/p DCCV (12/15)  - c/w Amiodarone 400mg Q12HR for 7 days (Day 2/7), then start 200mg QD  - c/w Toprol 100mg QD  - c/w Hepixey5qk Q12  - EP notes appreciated  - Outpaitient follow up with Dr. Bolivar in 2 - 4 weeks    #COVID-19 Positive  - COVID RVP (+)  - On RA  - Isolation  - Supportive care  - No role for steroids/Remdesevir at this time     #Empyema (improving)  - S/P VATs Decortication  - Rpt CXR with improved aeration  - No fever, leucocytosis  - ID following  - Augmentin 875/125 BID x 2 weeks  - CT surgery notes appreciated  - Rpt CT Chest (12/16): Decreasing left sided effusion    #CAD s/p CABG:  -Trop neg    I#CM w/ EF of 20-25%:  - Euvolemic  - GDMT: Entresto / Toprol per EP    #HTN  #HLD:  - Cont. o/p regimen   - c/w  Statin     # Chronic anemia:  - Hb Stable  - Monitor H&H    #CKD-3a:  -Stable, Cr. at baseline     Dispo: Pending PT Consult  Diet: DASH  DVT PPx: Eliquis

## 2023-12-17 LAB
ALBUMIN SERPL ELPH-MCNC: 2.4 G/DL — LOW (ref 3.3–5.2)
ALBUMIN SERPL ELPH-MCNC: 2.4 G/DL — LOW (ref 3.3–5.2)
ALP SERPL-CCNC: 112 U/L — SIGNIFICANT CHANGE UP (ref 40–120)
ALP SERPL-CCNC: 112 U/L — SIGNIFICANT CHANGE UP (ref 40–120)
ALT FLD-CCNC: 54 U/L — HIGH
ALT FLD-CCNC: 54 U/L — HIGH
ANION GAP SERPL CALC-SCNC: 12 MMOL/L — SIGNIFICANT CHANGE UP (ref 5–17)
ANION GAP SERPL CALC-SCNC: 12 MMOL/L — SIGNIFICANT CHANGE UP (ref 5–17)
AST SERPL-CCNC: 50 U/L — HIGH
AST SERPL-CCNC: 50 U/L — HIGH
BASOPHILS # BLD AUTO: 0.03 K/UL — SIGNIFICANT CHANGE UP (ref 0–0.2)
BASOPHILS # BLD AUTO: 0.03 K/UL — SIGNIFICANT CHANGE UP (ref 0–0.2)
BASOPHILS NFR BLD AUTO: 0.6 % — SIGNIFICANT CHANGE UP (ref 0–2)
BASOPHILS NFR BLD AUTO: 0.6 % — SIGNIFICANT CHANGE UP (ref 0–2)
BILIRUB SERPL-MCNC: 0.5 MG/DL — SIGNIFICANT CHANGE UP (ref 0.4–2)
BILIRUB SERPL-MCNC: 0.5 MG/DL — SIGNIFICANT CHANGE UP (ref 0.4–2)
BUN SERPL-MCNC: 35.3 MG/DL — HIGH (ref 8–20)
BUN SERPL-MCNC: 35.3 MG/DL — HIGH (ref 8–20)
CALCIUM SERPL-MCNC: 8.2 MG/DL — LOW (ref 8.4–10.5)
CALCIUM SERPL-MCNC: 8.2 MG/DL — LOW (ref 8.4–10.5)
CHLORIDE SERPL-SCNC: 103 MMOL/L — SIGNIFICANT CHANGE UP (ref 96–108)
CHLORIDE SERPL-SCNC: 103 MMOL/L — SIGNIFICANT CHANGE UP (ref 96–108)
CO2 SERPL-SCNC: 21 MMOL/L — LOW (ref 22–29)
CO2 SERPL-SCNC: 21 MMOL/L — LOW (ref 22–29)
CREAT SERPL-MCNC: 1.52 MG/DL — HIGH (ref 0.5–1.3)
CREAT SERPL-MCNC: 1.52 MG/DL — HIGH (ref 0.5–1.3)
EGFR: 49 ML/MIN/1.73M2 — LOW
EGFR: 49 ML/MIN/1.73M2 — LOW
EOSINOPHIL # BLD AUTO: 0.19 K/UL — SIGNIFICANT CHANGE UP (ref 0–0.5)
EOSINOPHIL # BLD AUTO: 0.19 K/UL — SIGNIFICANT CHANGE UP (ref 0–0.5)
EOSINOPHIL NFR BLD AUTO: 4.1 % — SIGNIFICANT CHANGE UP (ref 0–6)
EOSINOPHIL NFR BLD AUTO: 4.1 % — SIGNIFICANT CHANGE UP (ref 0–6)
GLUCOSE BLDC GLUCOMTR-MCNC: 116 MG/DL — HIGH (ref 70–99)
GLUCOSE BLDC GLUCOMTR-MCNC: 116 MG/DL — HIGH (ref 70–99)
GLUCOSE BLDC GLUCOMTR-MCNC: 132 MG/DL — HIGH (ref 70–99)
GLUCOSE BLDC GLUCOMTR-MCNC: 132 MG/DL — HIGH (ref 70–99)
GLUCOSE BLDC GLUCOMTR-MCNC: 148 MG/DL — HIGH (ref 70–99)
GLUCOSE BLDC GLUCOMTR-MCNC: 148 MG/DL — HIGH (ref 70–99)
GLUCOSE BLDC GLUCOMTR-MCNC: 189 MG/DL — HIGH (ref 70–99)
GLUCOSE BLDC GLUCOMTR-MCNC: 189 MG/DL — HIGH (ref 70–99)
GLUCOSE SERPL-MCNC: 105 MG/DL — HIGH (ref 70–99)
GLUCOSE SERPL-MCNC: 105 MG/DL — HIGH (ref 70–99)
HCT VFR BLD CALC: 34.1 % — LOW (ref 39–50)
HCT VFR BLD CALC: 34.1 % — LOW (ref 39–50)
HGB BLD-MCNC: 10.4 G/DL — LOW (ref 13–17)
HGB BLD-MCNC: 10.4 G/DL — LOW (ref 13–17)
IMM GRANULOCYTES NFR BLD AUTO: 1.1 % — HIGH (ref 0–0.9)
IMM GRANULOCYTES NFR BLD AUTO: 1.1 % — HIGH (ref 0–0.9)
LYMPHOCYTES # BLD AUTO: 0.97 K/UL — LOW (ref 1–3.3)
LYMPHOCYTES # BLD AUTO: 0.97 K/UL — LOW (ref 1–3.3)
LYMPHOCYTES # BLD AUTO: 21 % — SIGNIFICANT CHANGE UP (ref 13–44)
LYMPHOCYTES # BLD AUTO: 21 % — SIGNIFICANT CHANGE UP (ref 13–44)
MAGNESIUM SERPL-MCNC: 2.1 MG/DL — SIGNIFICANT CHANGE UP (ref 1.6–2.6)
MAGNESIUM SERPL-MCNC: 2.1 MG/DL — SIGNIFICANT CHANGE UP (ref 1.6–2.6)
MCHC RBC-ENTMCNC: 26.2 PG — LOW (ref 27–34)
MCHC RBC-ENTMCNC: 26.2 PG — LOW (ref 27–34)
MCHC RBC-ENTMCNC: 30.5 GM/DL — LOW (ref 32–36)
MCHC RBC-ENTMCNC: 30.5 GM/DL — LOW (ref 32–36)
MCV RBC AUTO: 85.9 FL — SIGNIFICANT CHANGE UP (ref 80–100)
MCV RBC AUTO: 85.9 FL — SIGNIFICANT CHANGE UP (ref 80–100)
MONOCYTES # BLD AUTO: 0.42 K/UL — SIGNIFICANT CHANGE UP (ref 0–0.9)
MONOCYTES # BLD AUTO: 0.42 K/UL — SIGNIFICANT CHANGE UP (ref 0–0.9)
MONOCYTES NFR BLD AUTO: 9.1 % — SIGNIFICANT CHANGE UP (ref 2–14)
MONOCYTES NFR BLD AUTO: 9.1 % — SIGNIFICANT CHANGE UP (ref 2–14)
NEUTROPHILS # BLD AUTO: 2.96 K/UL — SIGNIFICANT CHANGE UP (ref 1.8–7.4)
NEUTROPHILS # BLD AUTO: 2.96 K/UL — SIGNIFICANT CHANGE UP (ref 1.8–7.4)
NEUTROPHILS NFR BLD AUTO: 64.1 % — SIGNIFICANT CHANGE UP (ref 43–77)
NEUTROPHILS NFR BLD AUTO: 64.1 % — SIGNIFICANT CHANGE UP (ref 43–77)
PHOSPHATE SERPL-MCNC: 2.8 MG/DL — SIGNIFICANT CHANGE UP (ref 2.4–4.7)
PHOSPHATE SERPL-MCNC: 2.8 MG/DL — SIGNIFICANT CHANGE UP (ref 2.4–4.7)
PLATELET # BLD AUTO: 206 K/UL — SIGNIFICANT CHANGE UP (ref 150–400)
PLATELET # BLD AUTO: 206 K/UL — SIGNIFICANT CHANGE UP (ref 150–400)
POTASSIUM SERPL-MCNC: 4.2 MMOL/L — SIGNIFICANT CHANGE UP (ref 3.5–5.3)
POTASSIUM SERPL-MCNC: 4.2 MMOL/L — SIGNIFICANT CHANGE UP (ref 3.5–5.3)
POTASSIUM SERPL-SCNC: 4.2 MMOL/L — SIGNIFICANT CHANGE UP (ref 3.5–5.3)
POTASSIUM SERPL-SCNC: 4.2 MMOL/L — SIGNIFICANT CHANGE UP (ref 3.5–5.3)
PROT SERPL-MCNC: 6.5 G/DL — LOW (ref 6.6–8.7)
PROT SERPL-MCNC: 6.5 G/DL — LOW (ref 6.6–8.7)
RBC # BLD: 3.97 M/UL — LOW (ref 4.2–5.8)
RBC # BLD: 3.97 M/UL — LOW (ref 4.2–5.8)
RBC # FLD: 17 % — HIGH (ref 10.3–14.5)
RBC # FLD: 17 % — HIGH (ref 10.3–14.5)
SODIUM SERPL-SCNC: 136 MMOL/L — SIGNIFICANT CHANGE UP (ref 135–145)
SODIUM SERPL-SCNC: 136 MMOL/L — SIGNIFICANT CHANGE UP (ref 135–145)
WBC # BLD: 4.62 K/UL — SIGNIFICANT CHANGE UP (ref 3.8–10.5)
WBC # BLD: 4.62 K/UL — SIGNIFICANT CHANGE UP (ref 3.8–10.5)
WBC # FLD AUTO: 4.62 K/UL — SIGNIFICANT CHANGE UP (ref 3.8–10.5)
WBC # FLD AUTO: 4.62 K/UL — SIGNIFICANT CHANGE UP (ref 3.8–10.5)

## 2023-12-17 PROCEDURE — 99233 SBSQ HOSP IP/OBS HIGH 50: CPT

## 2023-12-17 PROCEDURE — 71045 X-RAY EXAM CHEST 1 VIEW: CPT | Mod: 26

## 2023-12-17 RX ORDER — SODIUM CHLORIDE 0.65 %
1 AEROSOL, SPRAY (ML) NASAL
Refills: 0 | Status: DISCONTINUED | OUTPATIENT
Start: 2023-12-17 | End: 2023-12-19

## 2023-12-17 RX ORDER — DEXAMETHASONE 0.5 MG/5ML
6 ELIXIR ORAL DAILY
Refills: 0 | Status: DISCONTINUED | OUTPATIENT
Start: 2023-12-17 | End: 2023-12-19

## 2023-12-17 RX ORDER — FLUTICASONE PROPIONATE 50 MCG
1 SPRAY, SUSPENSION NASAL
Refills: 0 | Status: DISCONTINUED | OUTPATIENT
Start: 2023-12-17 | End: 2023-12-19

## 2023-12-17 RX ADMIN — SACUBITRIL AND VALSARTAN 1 TABLET(S): 24; 26 TABLET, FILM COATED ORAL at 17:15

## 2023-12-17 RX ADMIN — GABAPENTIN 100 MILLIGRAM(S): 400 CAPSULE ORAL at 06:08

## 2023-12-17 RX ADMIN — Medication 1 SPRAY(S): at 17:22

## 2023-12-17 RX ADMIN — SPIRONOLACTONE 25 MILLIGRAM(S): 25 TABLET, FILM COATED ORAL at 06:08

## 2023-12-17 RX ADMIN — SODIUM CHLORIDE 3 MILLILITER(S): 9 INJECTION INTRAMUSCULAR; INTRAVENOUS; SUBCUTANEOUS at 21:36

## 2023-12-17 RX ADMIN — PANTOPRAZOLE SODIUM 40 MILLIGRAM(S): 20 TABLET, DELAYED RELEASE ORAL at 06:13

## 2023-12-17 RX ADMIN — ATORVASTATIN CALCIUM 20 MILLIGRAM(S): 80 TABLET, FILM COATED ORAL at 22:12

## 2023-12-17 RX ADMIN — SODIUM CHLORIDE 3 MILLILITER(S): 9 INJECTION INTRAMUSCULAR; INTRAVENOUS; SUBCUTANEOUS at 13:16

## 2023-12-17 RX ADMIN — Medication 40 MILLIGRAM(S): at 06:08

## 2023-12-17 RX ADMIN — APIXABAN 5 MILLIGRAM(S): 2.5 TABLET, FILM COATED ORAL at 17:16

## 2023-12-17 RX ADMIN — INSULIN GLARGINE 6 UNIT(S): 100 INJECTION, SOLUTION SUBCUTANEOUS at 22:12

## 2023-12-17 RX ADMIN — AMIODARONE HYDROCHLORIDE 400 MILLIGRAM(S): 400 TABLET ORAL at 17:15

## 2023-12-17 RX ADMIN — GABAPENTIN 100 MILLIGRAM(S): 400 CAPSULE ORAL at 17:14

## 2023-12-17 RX ADMIN — Medication 1 TABLET(S): at 17:14

## 2023-12-17 RX ADMIN — Medication 100 MILLIGRAM(S): at 06:08

## 2023-12-17 RX ADMIN — SODIUM CHLORIDE 3 MILLILITER(S): 9 INJECTION INTRAMUSCULAR; INTRAVENOUS; SUBCUTANEOUS at 21:39

## 2023-12-17 RX ADMIN — Medication 1 TABLET(S): at 06:08

## 2023-12-17 RX ADMIN — Medication 6 MILLIGRAM(S): at 15:58

## 2023-12-17 RX ADMIN — Medication 1 SPRAY(S): at 15:58

## 2023-12-17 RX ADMIN — APIXABAN 5 MILLIGRAM(S): 2.5 TABLET, FILM COATED ORAL at 06:08

## 2023-12-17 RX ADMIN — Medication 1200 MILLIGRAM(S): at 15:58

## 2023-12-17 RX ADMIN — TAMSULOSIN HYDROCHLORIDE 0.4 MILLIGRAM(S): 0.4 CAPSULE ORAL at 22:12

## 2023-12-17 RX ADMIN — SACUBITRIL AND VALSARTAN 1 TABLET(S): 24; 26 TABLET, FILM COATED ORAL at 06:08

## 2023-12-17 RX ADMIN — SODIUM CHLORIDE 3 MILLILITER(S): 9 INJECTION INTRAMUSCULAR; INTRAVENOUS; SUBCUTANEOUS at 06:30

## 2023-12-17 RX ADMIN — AMIODARONE HYDROCHLORIDE 400 MILLIGRAM(S): 400 TABLET ORAL at 06:09

## 2023-12-17 RX ADMIN — Medication 81 MILLIGRAM(S): at 09:18

## 2023-12-17 NOTE — DIETITIAN INITIAL EVALUATION ADULT - PERTINENT MEDS FT
MEDICATIONS  (STANDING):  aMIOdarone    Tablet 400 milliGRAM(s) Oral every 12 hours  amoxicillin  875 milliGRAM(s)/clavulanate 1 Tablet(s) Oral two times a day  apixaban 5 milliGRAM(s) Oral every 12 hours  aspirin enteric coated 81 milliGRAM(s) Oral daily  atorvastatin 20 milliGRAM(s) Oral at bedtime  dextrose 5%. 1000 milliLiter(s) (50 mL/Hr) IV Continuous <Continuous>  dextrose 50% Injectable 25 Gram(s) IV Push once  furosemide    Tablet 40 milliGRAM(s) Oral daily  gabapentin 100 milliGRAM(s) Oral two times a day  glucagon  Injectable 1 milliGRAM(s) IntraMuscular once  influenza  Vaccine (HIGH DOSE) 0.7 milliLiter(s) IntraMuscular once  insulin glargine Injectable (LANTUS) 6 Unit(s) SubCutaneous at bedtime  insulin lispro (ADMELOG) corrective regimen sliding scale   SubCutaneous every 6 hours  metoprolol succinate  milliGRAM(s) Oral daily  pantoprazole    Tablet 40 milliGRAM(s) Oral before breakfast  sacubitril 49 mG/valsartan 51 mG 1 Tablet(s) Oral two times a day  sodium chloride 0.9% lock flush 3 milliLiter(s) IV Push every 8 hours  spironolactone 25 milliGRAM(s) Oral daily  tamsulosin 0.4 milliGRAM(s) Oral at bedtime    MEDICATIONS  (PRN):  acetaminophen     Tablet .. 650 milliGRAM(s) Oral every 6 hours PRN Temp greater or equal to 38C (100.4F), Mild Pain (1 - 3)  aluminum hydroxide/magnesium hydroxide/simethicone Suspension 30 milliLiter(s) Oral every 4 hours PRN Dyspepsia  dextrose Oral Gel 15 Gram(s) Oral once PRN Blood Glucose LESS THAN 70 milliGRAM(s)/deciliter  melatonin 3 milliGRAM(s) Oral at bedtime PRN Insomnia  metoprolol tartrate Injectable 5 milliGRAM(s) IV Push every 6 hours PRN HR >130 sustained  ondansetron Injectable 4 milliGRAM(s) IV Push every 8 hours PRN Nausea and/or Vomiting

## 2023-12-17 NOTE — DIETITIAN INITIAL EVALUATION ADULT - ORAL INTAKE PTA/DIET HISTORY
Last assessment noted. Pt with stage 2 coccyx noted. Has been previously educated on diet. HT is 6'1" and 280#. Pt eating well as per flow sheets.

## 2023-12-17 NOTE — PROGRESS NOTE ADULT - SUBJECTIVE AND OBJECTIVE BOX
Patient is a 73y old  Male who presents with a chief complaint of Atrial flutter     (17 Dec 2023 12:25)      INTERVAL HPI/OVERNIGHT EVENTS: seen and examined. Wife at bedside. c/o cough, not able to bring up phlegm,  and nasal congestion     MEDICATIONS  (STANDING):  aMIOdarone    Tablet 400 milliGRAM(s) Oral every 12 hours  amoxicillin  875 milliGRAM(s)/clavulanate 1 Tablet(s) Oral two times a day  apixaban 5 milliGRAM(s) Oral every 12 hours  aspirin enteric coated 81 milliGRAM(s) Oral daily  atorvastatin 20 milliGRAM(s) Oral at bedtime  dextrose 5%. 1000 milliLiter(s) (50 mL/Hr) IV Continuous <Continuous>  dextrose 50% Injectable 25 Gram(s) IV Push once  fluticasone propionate 50 MICROgram(s)/spray Nasal Spray 1 Spray(s) Both Nostrils two times a day  furosemide    Tablet 40 milliGRAM(s) Oral daily  gabapentin 100 milliGRAM(s) Oral two times a day  glucagon  Injectable 1 milliGRAM(s) IntraMuscular once  guaiFENesin ER 1200 milliGRAM(s) Oral every 12 hours  influenza  Vaccine (HIGH DOSE) 0.7 milliLiter(s) IntraMuscular once  insulin glargine Injectable (LANTUS) 6 Unit(s) SubCutaneous at bedtime  insulin lispro (ADMELOG) corrective regimen sliding scale   SubCutaneous every 6 hours  metoprolol succinate  milliGRAM(s) Oral daily  pantoprazole    Tablet 40 milliGRAM(s) Oral before breakfast  sacubitril 49 mG/valsartan 51 mG 1 Tablet(s) Oral two times a day  sodium chloride 0.9% lock flush 3 milliLiter(s) IV Push every 8 hours  spironolactone 25 milliGRAM(s) Oral daily  tamsulosin 0.4 milliGRAM(s) Oral at bedtime    MEDICATIONS  (PRN):  acetaminophen     Tablet .. 650 milliGRAM(s) Oral every 6 hours PRN Temp greater or equal to 38C (100.4F), Mild Pain (1 - 3)  aluminum hydroxide/magnesium hydroxide/simethicone Suspension 30 milliLiter(s) Oral every 4 hours PRN Dyspepsia  dextrose Oral Gel 15 Gram(s) Oral once PRN Blood Glucose LESS THAN 70 milliGRAM(s)/deciliter  melatonin 3 milliGRAM(s) Oral at bedtime PRN Insomnia  metoprolol tartrate Injectable 5 milliGRAM(s) IV Push every 6 hours PRN HR >130 sustained  ondansetron Injectable 4 milliGRAM(s) IV Push every 8 hours PRN Nausea and/or Vomiting  sodium chloride 0.65% Nasal 1 Spray(s) Both Nostrils every 2 hours PRN Congestion      Allergies    No Known Allergies    Intolerances        REVIEW OF SYSTEMS:  CONSTITUTIONAL: No fever, weight loss, or fatigue  RESPIRATORY: No cough, wheezing, chills or hemoptysis; No shortness of breath  CARDIOVASCULAR: No chest pain, palpitations, dizziness, or leg swelling  GASTROINTESTINAL: No abdominal or epigastric pain. No nausea, vomiting, or hematemesis; No diarrhea or constipation. No melena or hematochezia.  NEUROLOGICAL: No headaches, memory loss, loss of strength, numbness, or tremors  MUSCULOSKELETAL: No joint pain or swelling; No muscle, back, or extremity pain      Vital Signs Last 24 Hrs  T(C): 36.5 (17 Dec 2023 09:38), Max: 36.7 (17 Dec 2023 07:58)  T(F): 97.7 (17 Dec 2023 09:38), Max: 98 (17 Dec 2023 07:58)  HR: 68 (17 Dec 2023 09:38) (64 - 75)  BP: 112/61 (17 Dec 2023 09:38) (106/71 - 132/71)  BP(mean): --  RR: 18 (17 Dec 2023 09:38) (18 - 20)  SpO2: 97% (17 Dec 2023 10:00) (94% - 97%)    Parameters below as of 17 Dec 2023 10:00  Patient On (Oxygen Delivery Method): room air        PHYSICAL EXAM:  GENERAL: NAD, obese male, laying in bed   HEAD:  Atraumatic, Normocephalic  EYES: EOMI, PERRLA, conjunctiva and sclera clear  NECK: Supple, No JVD  NERVOUS SYSTEM:  Alert & Oriented X3, No gross focal deficits  CHEST/LUNG: mild wheezing; No rales, rhonchi, wheezing, or rubs  HEART: Regular rate and rhythm; No murmurs, rubs, or gallops  ABDOMEN: Soft, Nontender, Nondistended; Bowel sounds present  EXTREMITIES:  No clubbing, cyanosis, or edema  SKIN: No rashes or lesions    LABS:                        10.4   4.62  )-----------( 206      ( 17 Dec 2023 06:32 )             34.1     12-17    136  |  103  |  35.3<H>  ----------------------------<  105<H>  4.2   |  21.0<L>  |  1.52<H>    Ca    8.2<L>      17 Dec 2023 06:32  Phos  2.8     12-17  Mg     2.1     12-17    TPro  6.5<L>  /  Alb  2.4<L>  /  TBili  0.5  /  DBili  x   /  AST  50<H>  /  ALT  54<H>  /  AlkPhos  112  12-17      Urinalysis Basic - ( 17 Dec 2023 06:32 )    Color: x / Appearance: x / SG: x / pH: x  Gluc: 105 mg/dL / Ketone: x  / Bili: x / Urobili: x   Blood: x / Protein: x / Nitrite: x   Leuk Esterase: x / RBC: x / WBC x   Sq Epi: x / Non Sq Epi: x / Bacteria: x      CAPILLARY BLOOD GLUCOSE      POCT Blood Glucose.: 132 mg/dL (17 Dec 2023 12:21)  POCT Blood Glucose.: 116 mg/dL (17 Dec 2023 06:07)  POCT Blood Glucose.: 107 mg/dL (16 Dec 2023 23:16)  POCT Blood Glucose.: 109 mg/dL (16 Dec 2023 22:05)  POCT Blood Glucose.: 109 mg/dL (16 Dec 2023 17:36)      RADIOLOGY & ADDITIONAL TESTS:    Imaging Personally Reviewed:  [x ] YES  [ ] NO    Consultant(s) Notes Reviewed:  [x ] YES  [ ] NO    Care Discussed with Consultants/Other Providers [ ] YES  [ ] NO    Plan of Care discussed with Housestaff [x ]YES [ ] NO

## 2023-12-17 NOTE — DIETITIAN INITIAL EVALUATION ADULT - OTHER INFO
72 y/o male w/ PMHx of CAD s/p CABG, ICM with EF of 20-25%, Afib not on AC, HLD, HTN, CKD-4. Patient with recent admission from 11/18-12/4 for left sided empyema w/ strep intermedius s/p VATS w/ decortication with post op Afib with RVR s/p QUIN/DCC (on Eliquis on 12/1). Admitted for Atrial Flutter w/ RVR & COVID   74 y/o male w/ PMHx of CAD s/p CABG, ICM with EF of 20-25%, Afib not on AC, HLD, HTN, CKD-4. Patient with recent admission from 11/18-12/4 for left sided empyema w/ strep intermedius s/p VATS w/ decortication with post op Afib with RVR s/p QUIN/DCC (on Eliquis on 12/1). Admitted for Atrial Flutter w/ RVR & COVID

## 2023-12-17 NOTE — DIETITIAN INITIAL EVALUATION ADULT - ADD RECOMMEND
Rx: MVI daily, vitamin C (500mg daily), and zinc sulfate (220mg daily x 10 days) to aid in wound healing.   Encourage diet compliance

## 2023-12-17 NOTE — PROGRESS NOTE ADULT - ASSESSMENT
72 y/o male w/ PMHx of CAD s/p CABG, ICM with EF of 20-25%, Afib not on AC, HLD, HTN, CKD-4. Patient with recent admission from 11/18-12/4 for left sided empyema w/ strep intermedius s/p VATS w/ decortication with post op Afib with RVR s/p QUIN/DCC (on Eliquis on 12/1). Admitted for Atrial Flutter w/ RVR & COVID        #COVID-19 Positive  Start Dexamethasone 6 mg IV daily   Mucinex for cough   Flonase and nasal saline       #Atrial Flutter w/ RVR   - S/p DCCV (12/15)  - c/w Amiodarone 400mg Q12HR for 7 days (Day 3/7), then start 200mg QD  - c/w Toprol 100mg QD  - c/w Ftizcdm8fn Q12  - EP notes appreciated  - Outpaitient follow up with Dr. Bolivar in 2 - 4 weeks        #Empyema (improving)  - S/P VATs Decortication  - Rpt CXR with improved aeration  - No fever, leucocytosis  - ID following  - Augmentin 875/125 BID x 2 weeks  - CT surgery notes appreciated  - Rpt CT Chest (12/16): Decreasing left sided effusion    #CAD s/p CABG:  -Trop neg    I#CM w/ EF of 20-25%:  - Euvolemic  - GDMT: Entresto / Toprol per EP    #HTN  #HLD:  - Cont. o/p regimen   - c/w  Statin     # Chronic anemia:  - Hb Stable  - Monitor H&H    #CKD-3a:  -Stable, Cr. at baseline     Dispo: Pending PT Consult  Diet: DASH  DVT PPx: Eliquis  72 y/o male w/ PMHx of CAD s/p CABG, ICM with EF of 20-25%, Afib not on AC, HLD, HTN, CKD-4. Patient with recent admission from 11/18-12/4 for left sided empyema w/ strep intermedius s/p VATS w/ decortication with post op Afib with RVR s/p QUIN/DCC (on Eliquis on 12/1). Admitted for Atrial Flutter w/ RVR & COVID        #COVID-19 Positive  Start Dexamethasone 6 mg IV daily   Mucinex for cough   Flonase and nasal saline       #Atrial Flutter w/ RVR   - S/p DCCV (12/15)  - c/w Amiodarone 400mg Q12HR for 7 days (Day 3/7), then start 200mg QD  - c/w Toprol 100mg QD  - c/w Monkfzp8cv Q12  - EP notes appreciated  - Outpaitient follow up with Dr. Bolivar in 2 - 4 weeks        #Empyema (improving)  - S/P VATs Decortication  - Rpt CXR with improved aeration  - No fever, leucocytosis  - ID following  - Augmentin 875/125 BID x 2 weeks  - CT surgery notes appreciated  - Rpt CT Chest (12/16): Decreasing left sided effusion    #CAD s/p CABG:  -Trop neg    I#CM w/ EF of 20-25%:  - Euvolemic  - GDMT: Entresto / Toprol per EP    #HTN  #HLD:  - Cont. o/p regimen   - c/w  Statin     # Chronic anemia:  - Hb Stable  - Monitor H&H    #CKD-3a:  -Stable, Cr. at baseline     Dispo: Pending PT Consult  Diet: DASH  DVT PPx: Eliquis

## 2023-12-17 NOTE — DIETITIAN INITIAL EVALUATION ADULT - PERTINENT LABORATORY DATA
12-17    136  |  103  |  35.3<H>  ----------------------------<  105<H>  4.2   |  21.0<L>  |  1.52<H>    Ca    8.2<L>      17 Dec 2023 06:32  Phos  2.8     12-17  Mg     2.1     12-17    TPro  6.5<L>  /  Alb  2.4<L>  /  TBili  0.5  /  DBili  x   /  AST  50<H>  /  ALT  54<H>  /  AlkPhos  112  12-17  POCT Blood Glucose.: 132 mg/dL (12-17-23 @ 12:21)  A1C with Estimated Average Glucose Result: 7.3 % (11-28-23 @ 00:40)

## 2023-12-17 NOTE — CHART NOTE - NSCHARTNOTEFT_GEN_A_CORE
Called by RN, pt c/o SOB this AM    Pt seen and examined at bedside. Pt states he hasn't had any S/S of COVID however overnight developed some SOB, congestion, and cough. Denies any chest pain, palpitations or any other complaints at this time.     Vital Signs  T(C): 36.5 (17 Dec 2023 04:23), Max: 36.8 (16 Dec 2023 10:19)  T(F): 97.7 (17 Dec 2023 04:23), Max: 98.2 (16 Dec 2023 10:19)  HR: 75 (17 Dec 2023 04:23) (67 - 75)  BP: 132/71 (17 Dec 2023 04:23) (106/71 - 132/71)  RR: 18 (17 Dec 2023 04:23) (18 - 20)  SpO2: 94% (17 Dec 2023 04:23) (94% - 96%)    Parameters below as of 17 Dec 2023 04:23  Patient On (Oxygen Delivery Method): room air    PHYSICAL EXAM:  GENERAL: Pt lying in bed comfortably in NAD  CHEST/LUNG: Unlabored respirations. Decreased breath sounds LL. No rales, rhonchi or wheezing.   HEART: +S1, S2, Regular rate and rhythm   ABDOMEN: Bowel sounds present; Soft, Nontender, Nondistended. No guarding or rigidity      Will check CXR and sign out to day team

## 2023-12-18 ENCOUNTER — APPOINTMENT (OUTPATIENT)
Dept: THORACIC SURGERY | Facility: CLINIC | Age: 73
End: 2023-12-18

## 2023-12-18 ENCOUNTER — TRANSCRIPTION ENCOUNTER (OUTPATIENT)
Age: 73
End: 2023-12-18

## 2023-12-18 DIAGNOSIS — J86.9 PYOTHORAX W/OUT FISTULA: ICD-10-CM

## 2023-12-18 PROBLEM — Z87.09 PERSONAL HISTORY OF OTHER DISEASES OF THE RESPIRATORY SYSTEM: Chronic | Status: ACTIVE | Noted: 2023-12-15

## 2023-12-18 LAB
ALBUMIN SERPL ELPH-MCNC: 2.9 G/DL — LOW (ref 3.3–5.2)
ALBUMIN SERPL ELPH-MCNC: 2.9 G/DL — LOW (ref 3.3–5.2)
ALP SERPL-CCNC: 124 U/L — HIGH (ref 40–120)
ALP SERPL-CCNC: 124 U/L — HIGH (ref 40–120)
ALT FLD-CCNC: 42 U/L — HIGH
ALT FLD-CCNC: 42 U/L — HIGH
ANION GAP SERPL CALC-SCNC: 14 MMOL/L — SIGNIFICANT CHANGE UP (ref 5–17)
ANION GAP SERPL CALC-SCNC: 14 MMOL/L — SIGNIFICANT CHANGE UP (ref 5–17)
AST SERPL-CCNC: 30 U/L — SIGNIFICANT CHANGE UP
AST SERPL-CCNC: 30 U/L — SIGNIFICANT CHANGE UP
BASOPHILS # BLD AUTO: 0.01 K/UL — SIGNIFICANT CHANGE UP (ref 0–0.2)
BASOPHILS # BLD AUTO: 0.01 K/UL — SIGNIFICANT CHANGE UP (ref 0–0.2)
BASOPHILS NFR BLD AUTO: 0.2 % — SIGNIFICANT CHANGE UP (ref 0–2)
BASOPHILS NFR BLD AUTO: 0.2 % — SIGNIFICANT CHANGE UP (ref 0–2)
BILIRUB SERPL-MCNC: 0.5 MG/DL — SIGNIFICANT CHANGE UP (ref 0.4–2)
BILIRUB SERPL-MCNC: 0.5 MG/DL — SIGNIFICANT CHANGE UP (ref 0.4–2)
BUN SERPL-MCNC: 34.6 MG/DL — HIGH (ref 8–20)
BUN SERPL-MCNC: 34.6 MG/DL — HIGH (ref 8–20)
CALCIUM SERPL-MCNC: 8.7 MG/DL — SIGNIFICANT CHANGE UP (ref 8.4–10.5)
CALCIUM SERPL-MCNC: 8.7 MG/DL — SIGNIFICANT CHANGE UP (ref 8.4–10.5)
CHLORIDE SERPL-SCNC: 104 MMOL/L — SIGNIFICANT CHANGE UP (ref 96–108)
CHLORIDE SERPL-SCNC: 104 MMOL/L — SIGNIFICANT CHANGE UP (ref 96–108)
CO2 SERPL-SCNC: 20 MMOL/L — LOW (ref 22–29)
CO2 SERPL-SCNC: 20 MMOL/L — LOW (ref 22–29)
CREAT SERPL-MCNC: 1.37 MG/DL — HIGH (ref 0.5–1.3)
CREAT SERPL-MCNC: 1.37 MG/DL — HIGH (ref 0.5–1.3)
EGFR: 55 ML/MIN/1.73M2 — LOW
EGFR: 55 ML/MIN/1.73M2 — LOW
EOSINOPHIL # BLD AUTO: 0 K/UL — SIGNIFICANT CHANGE UP (ref 0–0.5)
EOSINOPHIL # BLD AUTO: 0 K/UL — SIGNIFICANT CHANGE UP (ref 0–0.5)
EOSINOPHIL NFR BLD AUTO: 0 % — SIGNIFICANT CHANGE UP (ref 0–6)
EOSINOPHIL NFR BLD AUTO: 0 % — SIGNIFICANT CHANGE UP (ref 0–6)
GLUCOSE BLDC GLUCOMTR-MCNC: 146 MG/DL — HIGH (ref 70–99)
GLUCOSE BLDC GLUCOMTR-MCNC: 146 MG/DL — HIGH (ref 70–99)
GLUCOSE BLDC GLUCOMTR-MCNC: 147 MG/DL — HIGH (ref 70–99)
GLUCOSE BLDC GLUCOMTR-MCNC: 147 MG/DL — HIGH (ref 70–99)
GLUCOSE BLDC GLUCOMTR-MCNC: 153 MG/DL — HIGH (ref 70–99)
GLUCOSE BLDC GLUCOMTR-MCNC: 153 MG/DL — HIGH (ref 70–99)
GLUCOSE BLDC GLUCOMTR-MCNC: 159 MG/DL — HIGH (ref 70–99)
GLUCOSE BLDC GLUCOMTR-MCNC: 159 MG/DL — HIGH (ref 70–99)
GLUCOSE BLDC GLUCOMTR-MCNC: 177 MG/DL — HIGH (ref 70–99)
GLUCOSE BLDC GLUCOMTR-MCNC: 177 MG/DL — HIGH (ref 70–99)
GLUCOSE BLDC GLUCOMTR-MCNC: 203 MG/DL — HIGH (ref 70–99)
GLUCOSE BLDC GLUCOMTR-MCNC: 203 MG/DL — HIGH (ref 70–99)
GLUCOSE SERPL-MCNC: 154 MG/DL — HIGH (ref 70–99)
GLUCOSE SERPL-MCNC: 154 MG/DL — HIGH (ref 70–99)
HCT VFR BLD CALC: 37.2 % — LOW (ref 39–50)
HCT VFR BLD CALC: 37.2 % — LOW (ref 39–50)
HGB BLD-MCNC: 11.2 G/DL — LOW (ref 13–17)
HGB BLD-MCNC: 11.2 G/DL — LOW (ref 13–17)
IMM GRANULOCYTES NFR BLD AUTO: 1.8 % — HIGH (ref 0–0.9)
IMM GRANULOCYTES NFR BLD AUTO: 1.8 % — HIGH (ref 0–0.9)
LYMPHOCYTES # BLD AUTO: 0.93 K/UL — LOW (ref 1–3.3)
LYMPHOCYTES # BLD AUTO: 0.93 K/UL — LOW (ref 1–3.3)
LYMPHOCYTES # BLD AUTO: 21 % — SIGNIFICANT CHANGE UP (ref 13–44)
LYMPHOCYTES # BLD AUTO: 21 % — SIGNIFICANT CHANGE UP (ref 13–44)
MAGNESIUM SERPL-MCNC: 2.3 MG/DL — SIGNIFICANT CHANGE UP (ref 1.8–2.6)
MAGNESIUM SERPL-MCNC: 2.3 MG/DL — SIGNIFICANT CHANGE UP (ref 1.8–2.6)
MCHC RBC-ENTMCNC: 25.6 PG — LOW (ref 27–34)
MCHC RBC-ENTMCNC: 25.6 PG — LOW (ref 27–34)
MCHC RBC-ENTMCNC: 30.1 GM/DL — LOW (ref 32–36)
MCHC RBC-ENTMCNC: 30.1 GM/DL — LOW (ref 32–36)
MCV RBC AUTO: 84.9 FL — SIGNIFICANT CHANGE UP (ref 80–100)
MCV RBC AUTO: 84.9 FL — SIGNIFICANT CHANGE UP (ref 80–100)
MONOCYTES # BLD AUTO: 0.2 K/UL — SIGNIFICANT CHANGE UP (ref 0–0.9)
MONOCYTES # BLD AUTO: 0.2 K/UL — SIGNIFICANT CHANGE UP (ref 0–0.9)
MONOCYTES NFR BLD AUTO: 4.5 % — SIGNIFICANT CHANGE UP (ref 2–14)
MONOCYTES NFR BLD AUTO: 4.5 % — SIGNIFICANT CHANGE UP (ref 2–14)
NEUTROPHILS # BLD AUTO: 3.21 K/UL — SIGNIFICANT CHANGE UP (ref 1.8–7.4)
NEUTROPHILS # BLD AUTO: 3.21 K/UL — SIGNIFICANT CHANGE UP (ref 1.8–7.4)
NEUTROPHILS NFR BLD AUTO: 72.5 % — SIGNIFICANT CHANGE UP (ref 43–77)
NEUTROPHILS NFR BLD AUTO: 72.5 % — SIGNIFICANT CHANGE UP (ref 43–77)
PHOSPHATE SERPL-MCNC: 3.3 MG/DL — SIGNIFICANT CHANGE UP (ref 2.4–4.7)
PHOSPHATE SERPL-MCNC: 3.3 MG/DL — SIGNIFICANT CHANGE UP (ref 2.4–4.7)
PLATELET # BLD AUTO: 240 K/UL — SIGNIFICANT CHANGE UP (ref 150–400)
PLATELET # BLD AUTO: 240 K/UL — SIGNIFICANT CHANGE UP (ref 150–400)
POTASSIUM SERPL-MCNC: 4.5 MMOL/L — SIGNIFICANT CHANGE UP (ref 3.5–5.3)
POTASSIUM SERPL-MCNC: 4.5 MMOL/L — SIGNIFICANT CHANGE UP (ref 3.5–5.3)
POTASSIUM SERPL-SCNC: 4.5 MMOL/L — SIGNIFICANT CHANGE UP (ref 3.5–5.3)
POTASSIUM SERPL-SCNC: 4.5 MMOL/L — SIGNIFICANT CHANGE UP (ref 3.5–5.3)
PROT SERPL-MCNC: 7.2 G/DL — SIGNIFICANT CHANGE UP (ref 6.6–8.7)
PROT SERPL-MCNC: 7.2 G/DL — SIGNIFICANT CHANGE UP (ref 6.6–8.7)
RBC # BLD: 4.38 M/UL — SIGNIFICANT CHANGE UP (ref 4.2–5.8)
RBC # BLD: 4.38 M/UL — SIGNIFICANT CHANGE UP (ref 4.2–5.8)
RBC # FLD: 16.7 % — HIGH (ref 10.3–14.5)
RBC # FLD: 16.7 % — HIGH (ref 10.3–14.5)
SODIUM SERPL-SCNC: 138 MMOL/L — SIGNIFICANT CHANGE UP (ref 135–145)
SODIUM SERPL-SCNC: 138 MMOL/L — SIGNIFICANT CHANGE UP (ref 135–145)
WBC # BLD: 4.43 K/UL — SIGNIFICANT CHANGE UP (ref 3.8–10.5)
WBC # BLD: 4.43 K/UL — SIGNIFICANT CHANGE UP (ref 3.8–10.5)
WBC # FLD AUTO: 4.43 K/UL — SIGNIFICANT CHANGE UP (ref 3.8–10.5)
WBC # FLD AUTO: 4.43 K/UL — SIGNIFICANT CHANGE UP (ref 3.8–10.5)

## 2023-12-18 PROCEDURE — 99239 HOSP IP/OBS DSCHRG MGMT >30: CPT | Mod: GC

## 2023-12-18 PROCEDURE — 99232 SBSQ HOSP IP/OBS MODERATE 35: CPT | Mod: GC

## 2023-12-18 RX ORDER — APIXABAN 2.5 MG/1
1 TABLET, FILM COATED ORAL
Qty: 0 | Refills: 0 | DISCHARGE
Start: 2023-12-18

## 2023-12-18 RX ORDER — METOPROLOL TARTRATE 50 MG
1 TABLET ORAL
Qty: 0 | Refills: 0 | DISCHARGE
Start: 2023-12-18

## 2023-12-18 RX ORDER — FLUTICASONE PROPIONATE 50 MCG
1 SPRAY, SUSPENSION NASAL
Qty: 0 | Refills: 0 | DISCHARGE
Start: 2023-12-18

## 2023-12-18 RX ORDER — ZINC SULFATE TAB 220 MG (50 MG ZINC EQUIVALENT) 220 (50 ZN) MG
1 TAB ORAL
Qty: 0 | Refills: 0 | DISCHARGE
Start: 2023-12-18

## 2023-12-18 RX ORDER — SPIRONOLACTONE 25 MG/1
1 TABLET, FILM COATED ORAL
Qty: 0 | Refills: 0 | DISCHARGE
Start: 2023-12-18

## 2023-12-18 RX ORDER — CHLORHEXIDINE GLUCONATE 213 G/1000ML
1 SOLUTION TOPICAL DAILY
Refills: 0 | Status: DISCONTINUED | OUTPATIENT
Start: 2023-12-18 | End: 2023-12-19

## 2023-12-18 RX ORDER — ATORVASTATIN CALCIUM 80 MG/1
1 TABLET, FILM COATED ORAL
Qty: 0 | Refills: 0 | DISCHARGE
Start: 2023-12-18

## 2023-12-18 RX ORDER — ASCORBIC ACID 60 MG
500 TABLET,CHEWABLE ORAL DAILY
Refills: 0 | Status: DISCONTINUED | OUTPATIENT
Start: 2023-12-18 | End: 2023-12-19

## 2023-12-18 RX ORDER — AMIODARONE HYDROCHLORIDE 400 MG/1
2 TABLET ORAL
Qty: 0 | Refills: 0 | DISCHARGE
Start: 2023-12-18 | End: 2023-12-21

## 2023-12-18 RX ORDER — ASCORBIC ACID 60 MG
1 TABLET,CHEWABLE ORAL
Qty: 0 | Refills: 0 | DISCHARGE
Start: 2023-12-18

## 2023-12-18 RX ORDER — LANOLIN ALCOHOL/MO/W.PET/CERES
1 CREAM (GRAM) TOPICAL
Qty: 0 | Refills: 0 | DISCHARGE
Start: 2023-12-18

## 2023-12-18 RX ORDER — ASPIRIN/CALCIUM CARB/MAGNESIUM 324 MG
1 TABLET ORAL
Qty: 0 | Refills: 0 | DISCHARGE
Start: 2023-12-18

## 2023-12-18 RX ORDER — GABAPENTIN 400 MG/1
1 CAPSULE ORAL
Qty: 0 | Refills: 0 | DISCHARGE
Start: 2023-12-18

## 2023-12-18 RX ORDER — ZINC SULFATE TAB 220 MG (50 MG ZINC EQUIVALENT) 220 (50 ZN) MG
220 TAB ORAL DAILY
Refills: 0 | Status: DISCONTINUED | OUTPATIENT
Start: 2023-12-18 | End: 2023-12-19

## 2023-12-18 RX ORDER — PANTOPRAZOLE SODIUM 20 MG/1
1 TABLET, DELAYED RELEASE ORAL
Qty: 0 | Refills: 0 | DISCHARGE
Start: 2023-12-18

## 2023-12-18 RX ADMIN — GABAPENTIN 100 MILLIGRAM(S): 400 CAPSULE ORAL at 05:55

## 2023-12-18 RX ADMIN — Medication 2: at 23:20

## 2023-12-18 RX ADMIN — Medication 2: at 08:46

## 2023-12-18 RX ADMIN — GABAPENTIN 100 MILLIGRAM(S): 400 CAPSULE ORAL at 17:53

## 2023-12-18 RX ADMIN — Medication 1200 MILLIGRAM(S): at 00:25

## 2023-12-18 RX ADMIN — Medication 81 MILLIGRAM(S): at 13:13

## 2023-12-18 RX ADMIN — PANTOPRAZOLE SODIUM 40 MILLIGRAM(S): 20 TABLET, DELAYED RELEASE ORAL at 05:57

## 2023-12-18 RX ADMIN — Medication 2: at 17:52

## 2023-12-18 RX ADMIN — Medication 1200 MILLIGRAM(S): at 18:34

## 2023-12-18 RX ADMIN — TAMSULOSIN HYDROCHLORIDE 0.4 MILLIGRAM(S): 0.4 CAPSULE ORAL at 21:43

## 2023-12-18 RX ADMIN — Medication 40 MILLIGRAM(S): at 05:55

## 2023-12-18 RX ADMIN — Medication 1200 MILLIGRAM(S): at 13:13

## 2023-12-18 RX ADMIN — Medication 1 TABLET(S): at 17:54

## 2023-12-18 RX ADMIN — APIXABAN 5 MILLIGRAM(S): 2.5 TABLET, FILM COATED ORAL at 05:54

## 2023-12-18 RX ADMIN — SACUBITRIL AND VALSARTAN 1 TABLET(S): 24; 26 TABLET, FILM COATED ORAL at 05:58

## 2023-12-18 RX ADMIN — Medication 6 MILLIGRAM(S): at 05:54

## 2023-12-18 RX ADMIN — Medication 1 SPRAY(S): at 05:54

## 2023-12-18 RX ADMIN — CHLORHEXIDINE GLUCONATE 1 APPLICATION(S): 213 SOLUTION TOPICAL at 13:14

## 2023-12-18 RX ADMIN — SODIUM CHLORIDE 3 MILLILITER(S): 9 INJECTION INTRAMUSCULAR; INTRAVENOUS; SUBCUTANEOUS at 21:42

## 2023-12-18 RX ADMIN — Medication 1 TABLET(S): at 05:57

## 2023-12-18 RX ADMIN — ZINC SULFATE TAB 220 MG (50 MG ZINC EQUIVALENT) 220 MILLIGRAM(S): 220 (50 ZN) TAB at 13:12

## 2023-12-18 RX ADMIN — Medication 4: at 00:27

## 2023-12-18 RX ADMIN — ATORVASTATIN CALCIUM 20 MILLIGRAM(S): 80 TABLET, FILM COATED ORAL at 21:42

## 2023-12-18 RX ADMIN — SPIRONOLACTONE 25 MILLIGRAM(S): 25 TABLET, FILM COATED ORAL at 05:55

## 2023-12-18 RX ADMIN — SODIUM CHLORIDE 3 MILLILITER(S): 9 INJECTION INTRAMUSCULAR; INTRAVENOUS; SUBCUTANEOUS at 13:08

## 2023-12-18 RX ADMIN — SACUBITRIL AND VALSARTAN 1 TABLET(S): 24; 26 TABLET, FILM COATED ORAL at 17:54

## 2023-12-18 RX ADMIN — AMIODARONE HYDROCHLORIDE 400 MILLIGRAM(S): 400 TABLET ORAL at 17:54

## 2023-12-18 RX ADMIN — Medication 1 SPRAY(S): at 18:34

## 2023-12-18 RX ADMIN — Medication 500 MILLIGRAM(S): at 13:12

## 2023-12-18 RX ADMIN — APIXABAN 5 MILLIGRAM(S): 2.5 TABLET, FILM COATED ORAL at 17:54

## 2023-12-18 RX ADMIN — Medication 100 MILLIGRAM(S): at 05:55

## 2023-12-18 RX ADMIN — INSULIN GLARGINE 6 UNIT(S): 100 INJECTION, SOLUTION SUBCUTANEOUS at 21:42

## 2023-12-18 RX ADMIN — Medication 1 TABLET(S): at 13:12

## 2023-12-18 RX ADMIN — AMIODARONE HYDROCHLORIDE 400 MILLIGRAM(S): 400 TABLET ORAL at 05:54

## 2023-12-18 NOTE — PHYSICAL THERAPY INITIAL EVALUATION ADULT - PATIENT/FAMILY AGREES WITH PLAN
Clinic hours for Dr. Cardenas:  Monday 7 am - 5 pm  Tuesday 7 am - 5 pm  Wednesday 7 am - 5 pm  Thursday 7 am - 5 pm  Friday  7 am - 4 pm    If you need a refill on your prescription please call your pharmacy and let them know. Please be proactive and call before your medication runs out. The pharmacy will then contact us for the refill. Please allow 24-48 hours for the refill to be processed.     If your physician has ordered additional laboratory or radiology testing as part of your ongoing plan of care, please allow 7-10 business days from the day of your lab draw or test for the results to be sent and reviewed by your provider. If your results are critical and require more immediate intervention, you will be contacted sooner. Your results will be conveyed to you via a phone call or letter.    You may be receiving a patient satisfaction survey in the mail. Please take the time to complete, as your feedback is very important to us. We strive to make your experience exceptional and your comments help us with that goal. We look forward to hearing from you.      
yes

## 2023-12-18 NOTE — DISCHARGE NOTE PROVIDER - NSDCMRMEDTOKEN_GEN_ALL_CORE_FT
acetaminophen 325 mg oral tablet: 3 tab(s) orally every 6 hours As needed Temp greater or equal to 38C (100.4F), Mild Pain (1 - 3)  amiodarone 200 mg oral tablet: 2 tab(s) orally every 12 hours Amiodarone 400mg Q12HR for 7 days (till 12/21 ), then start 200mg QD  amiodarone 200 mg oral tablet: 1 tab(s) orally once a day - c/w Amiodarone 400mg Q12HR for 7 days (tiill 12/21), then start 200mg QD  amoxicillin-clavulanate 875 mg-125 mg oral tablet: 1 tab(s) orally 2 times a day Continue  Augmentin 875/125 BID x 2 weeks (12/30)  apixaban 5 mg oral tablet: 1 tab(s) orally every 12 hours  ascorbic acid 500 mg oral tablet: 1 tab(s) orally once a day  aspirin 81 mg oral delayed release tablet: 1 tab(s) orally once a day  atorvastatin 20 mg oral tablet: 1 tab(s) orally once a day (at bedtime)  dapagliflozin 10 mg oral tablet: 1 tab(s) orally once a day  fluticasone 50 mcg/inh nasal spray: 1 spray(s) nasal 2 times a day  furosemide 40 mg oral tablet: 1 tab(s) orally once a day  gabapentin 100 mg oral capsule: 1 cap(s) orally 2 times a day  guaiFENesin 1200 mg oral tablet, extended release: 1 tab(s) orally every 12 hours  insulin glargine 100 units/mL subcutaneous solution: 6 unit(s) subcutaneous once a day (at bedtime)  melatonin 3 mg oral tablet: 1 tab(s) orally once a day (at bedtime) As needed Insomnia  metoprolol succinate 100 mg oral tablet, extended release: 1 tab(s) orally once a day  Multiple Vitamins oral tablet: 1 tab(s) orally once a day  pantoprazole 40 mg oral delayed release tablet: 1 tab(s) orally once a day (before a meal)  sacubitril-valsartan 49 mg-51 mg oral tablet: 1 tab(s) orally 2 times a day  spironolactone 25 mg oral tablet: 1 tab(s) orally once a day  tamsulosin 0.4 mg oral capsule: 1 cap(s) orally once a day (at bedtime)  zinc sulfate 220 mg oral capsule: 1 cap(s) orally once a day

## 2023-12-18 NOTE — PROGRESS NOTE ADULT - PROVIDER SPECIALTY LIST ADULT
Electrophysiology
Internal Medicine
Internal Medicine
Electrophysiology
Hospitalist
Internal Medicine
Thoracic Surgery

## 2023-12-18 NOTE — PROGRESS NOTE ADULT - REASON FOR ADMISSION
Recurrent Afib w/ RVR

## 2023-12-18 NOTE — DISCHARGE NOTE NURSING/CASE MANAGEMENT/SOCIAL WORK - PATIENT PORTAL LINK FT
You can access the FollowMyHealth Patient Portal offered by [facility] by registering at the following website: [email or web address]. By joining [employer]’s FollowMyHealth portal, you will also be able to view your health information using other applications (apps) compatible with our system. You can access the FollowMyHealth Patient Portal offered by [facility] by registering at the following website: [email or web address]. By joining [employer]’s FollowMyHealth portal, you will also be able to view your health information using other applications (apps) compatible with our system.

## 2023-12-18 NOTE — DISCHARGE NOTE PROVIDER - NSDCFUADDAPPT_GEN_ALL_CORE_FT
FOR BERTIN PLACEMENT    Please see below for post hospital follow up information     1. VIVO Meds To Bed: [phone]/NA    2. Home Care:N/A    3. Transitional Care Services: [phone]    4, A. Primary Care Appointment:N/A    4, B. Specialty Appointment:Cardio-Appointment made with  KATHLEEN López  on  1/8  at  3:30   at [address] [place] [address] . If you are unable to attend your pre-scheduled appointment, please contact the office directly at [phone] to reschedule.            5. [facility] Education Packet Provided  FOR BERTIN PLACEMENT    Please see below for post hospital follow up information     1. VIVO Meds To Bed: [phone]    2. Home Care:N/A    3. Transitional Care Services: [phone]    4, A. Primary Care Appointment:N/A    4, B. Specialty Appointment:Cardio-Appointment made with  KATHLEEN López  on  1/8  at  3:30   at [address] [place] [address] . If you are unable to attend your pre-scheduled appointment, please contact the office directly at [phone] to reschedule.            5. [facility] Education Packet Provided

## 2023-12-18 NOTE — DISCHARGE NOTE PROVIDER - PROVIDER TOKENS
PROVIDER:[TOKEN:[[id number]]],PROVIDER:[TOKEN:[[id number]]] PROVIDER:[TOKEN:[[id number]]],PROVIDER:[TOKEN:[[id number]]]

## 2023-12-18 NOTE — DISCHARGE NOTE PROVIDER - CARE PROVIDER_API CALL
Jeff Campbell  Internal Medicine  [address] [place] [address]  Phone: [phone]  Fax: [phone]  Follow Up Time:     DALE GAINES, Aryan MAURICIO  Internal Medicine  Phone: ()-  Fax: ()-  Follow Up Time:    Jeff Campbell  Internal Medicine  [address] [place] [id number]  Phone: [phone]  Fax: [phone]  Follow Up Time:     DALE GAINES, Aryan MAURICIO  Internal Medicine  Phone: ()-  Fax: ()-  Follow Up Time:

## 2023-12-18 NOTE — DISCHARGE NOTE PROVIDER - NSDCCPCAREPLAN_GEN_ALL_CORE_FT
PRINCIPAL DISCHARGE DIAGNOSIS  Diagnosis: Atrial flutter with rapid ventricular response  Assessment and Plan of Treatment: - DCCV (Cardioversion) done on 12/15  - Continue Amiodarone 400mg 2 times a day for 7 days (till 12/21/2023), then start 200mg once daily  - Continue Toprol 100mg daily   - Continue Vkovcpa9jk twice daily      SECONDARY DISCHARGE DIAGNOSES  Diagnosis: Empyema of lung  Assessment and Plan of Treatment: - Conitnue Augmentin 875/125 twice dailyx 2 weeks (till 12/30)    Diagnosis: 2019 novel coronavirus disease (COVID-19)  Assessment and Plan of Treatment: - Continue with Mucinex as needed.     PRINCIPAL DISCHARGE DIAGNOSIS  Diagnosis: Atrial flutter with rapid ventricular response  Assessment and Plan of Treatment: - DCCV (Cardioversion) done on 12/15  - Continue Amiodarone 400mg 2 times a day for 7 days (till 12/21/2023), then start 200mg once daily  - Continue Toprol 100mg daily   - Continue Zpdrivo8jf twice daily      SECONDARY DISCHARGE DIAGNOSES  Diagnosis: Empyema of lung  Assessment and Plan of Treatment: - Conitnue Augmentin 875/125 twice dailyx 2 weeks (till 12/30)    Diagnosis: 2019 novel coronavirus disease (COVID-19)  Assessment and Plan of Treatment: - Continue with Mucinex as needed.     PRINCIPAL DISCHARGE DIAGNOSIS  Diagnosis: Atrial flutter with rapid ventricular response  Assessment and Plan of Treatment: - DCCV (Cardioversion) done on 12/15  - Continue Amiodarone 400mg 2 times a day for 7 days (till 12/21/2023), then start 200mg once daily  - Continue Toprol 100mg daily   - Continue Gqnxzwf0wz twice daily      SECONDARY DISCHARGE DIAGNOSES  Diagnosis: Empyema of lung  Assessment and Plan of Treatment: - Conitnue Augmentin 875/125 twice dailyx 2 weeks (till 12/30)    Diagnosis: HTN (hypertension)  Assessment and Plan of Treatment: Take your medications as prescribed    Diagnosis: CHF (congestive heart failure)  Assessment and Plan of Treatment: Take your medications as prescribed    Diagnosis: CAD (coronary atherosclerotic disease)  Assessment and Plan of Treatment: Take your medications as prescribed    Diagnosis: 2019 novel coronavirus disease (COVID-19)  Assessment and Plan of Treatment: - Continue with Mucinex as needed.     PRINCIPAL DISCHARGE DIAGNOSIS  Diagnosis: Atrial flutter with rapid ventricular response  Assessment and Plan of Treatment: - DCCV (Cardioversion) done on 12/15  - Continue Amiodarone 400mg 2 times a day for 7 days (till 12/21/2023), then start 200mg once daily  - Continue Toprol 100mg daily   - Continue Mnqnydx4gp twice daily      SECONDARY DISCHARGE DIAGNOSES  Diagnosis: Empyema of lung  Assessment and Plan of Treatment: - Conitnue Augmentin 875/125 twice dailyx 2 weeks (till 12/30)    Diagnosis: HTN (hypertension)  Assessment and Plan of Treatment: Take your medications as prescribed    Diagnosis: CHF (congestive heart failure)  Assessment and Plan of Treatment: Take your medications as prescribed    Diagnosis: CAD (coronary atherosclerotic disease)  Assessment and Plan of Treatment: Take your medications as prescribed    Diagnosis: 2019 novel coronavirus disease (COVID-19)  Assessment and Plan of Treatment: - Continue with Mucinex as needed.

## 2023-12-18 NOTE — DISCHARGE NOTE NURSING/CASE MANAGEMENT/SOCIAL WORK - NSDCPEFALRISK_GEN_ALL_CORE
For information on Fall & Injury Prevention, visit: [email or web address] OR  [email or web address] OR  https://www.cdc.gov/steadi/patient.html For information on Fall & Injury Prevention, visit: [email or web address] OR  [email or web address] OR  https://www.cdc.gov/steadi/patient.html

## 2023-12-18 NOTE — DISCHARGE NOTE NURSING/CASE MANAGEMENT/SOCIAL WORK - NSDCCRNAME_GEN_ALL_CORE_FT
[facility]  [address] [place] [address]  Phone: [phone]  [facility]  [address] [place] [address]  Phone: [phone]

## 2023-12-18 NOTE — DISCHARGE NOTE PROVIDER - ATTENDING DISCHARGE PHYSICAL EXAMINATION:
Vital Signs Last 24 Hrs  T(C): 36.5 (18 Dec 2023 10:48), Max: 37.2 (17 Dec 2023 17:14)  T(F): 97.7 (18 Dec 2023 10:48), Max: 98.9 (17 Dec 2023 17:14)  HR: 66 (18 Dec 2023 10:48) (61 - 73)  BP: 132/81 (18 Dec 2023 10:48) (122/56 - 142/84)  BP(mean): --  RR: 18 (18 Dec 2023 10:48) (18 - 20)  SpO2: 93% (18 Dec 2023 10:48) (93% - 96%)  Parameters below as of 18 Dec 2023 10:48  Patient On (Oxygen Delivery Method): room air   Vital Signs Last 24 Hrs  T(C): 36.5 (18 Dec 2023 10:48), Max: 37.2 (17 Dec 2023 17:14)  T(F): 97.7 (18 Dec 2023 10:48), Max: 98.9 (17 Dec 2023 17:14)  HR: 66 (18 Dec 2023 10:48) (61 - 73)  BP: 132/81 (18 Dec 2023 10:48) (122/56 - 142/84)  BP(mean): --  RR: 18 (18 Dec 2023 10:48) (18 - 20)  SpO2: 93% (18 Dec 2023 10:48) (93% - 96%)  Parameters below as of 18 Dec 2023 10:48  Patient On (Oxygen Delivery Method): room air    PHYSICAL EXAM  GENERAL: Obese man, NAD, lying in bed comfortably  HEAD:  Atraumatic, Normocephalic  EYES: EOMI, PERRLA, conjunctiva and sclera clear  ENT: Moist mucous membranes  NECK: Supple, No JVD  CHEST/LUNG: Decreased BS left lung base; No rales, rhonchi, wheezing, or rubs. Unlabored respirations  HEART: Regular rate and rhythm; No murmurs, rubs, or gallops  ABDOMEN: BSx4; Soft, nontender, nondistended  EXTREMITIES:  2+ Peripheral Pulses, brisk capillary refill. No clubbing, cyanosis, or edema  NERVOUS SYSTEM:  A&Ox3, no focal deficits  SKIN: S/p chest tube, site healing, no discharges, minimal erythema Vital Signs Last 24 Hrs  T(C): 36.7 (19 Dec 2023 09:15), Max: 36.7 (19 Dec 2023 09:15)  T(F): 98 (19 Dec 2023 09:15), Max: 98 (19 Dec 2023 09:15)  HR: 67 (19 Dec 2023 09:15) (65 - 67)  BP: 146/85 (19 Dec 2023 09:15) (132/81 - 146/85)  BP(mean): --  RR: 18 (19 Dec 2023 09:15) (18 - 18)  SpO2: 96% (19 Dec 2023 09:15) (93% - 96%)    Parameters below as of 19 Dec 2023 09:15  Patient On (Oxygen Delivery Method): room air        PHYSICAL EXAM  GENERAL: Obese man, NAD, lying in bed comfortably  HEAD:  Atraumatic, Normocephalic  EYES: EOMI, PERRLA, conjunctiva and sclera clear  ENT: Moist mucous membranes  NECK: Supple, No JVD  CHEST/LUNG: Decreased BS left lung base; No rales, rhonchi, wheezing, or rubs. Unlabored respirations  HEART: Regular rate and rhythm; No murmurs, rubs, or gallops  ABDOMEN: BSx4; Soft, nontender, nondistended  EXTREMITIES:  2+ Peripheral Pulses, brisk capillary refill. No clubbing, cyanosis, or edema  NERVOUS SYSTEM:  A&Ox3, no focal deficits  SKIN: S/p chest tube, site healing, no discharges, minimal erythema

## 2023-12-18 NOTE — PROGRESS NOTE ADULT - ASSESSMENT
74 y/o male w/ PMHx of CAD s/p CABG, ICM with EF of 20-25%, Afib not on AC, HLD, HTN, CKD-4. Patient with recent admission from 11/18-12/4 for left sided empyema w/ strep intermedius s/p VATS w/ decortication with post op Afib with RVR s/p QUIN/DCC (on Eliquis on 12/1). Admitted for Atrial Flutter w/ RVR & COVID    #COVID-19 Positive  #Nasal Congestion  - C/w Dexamethasone 6 mg IV daily   - Mucinex for cough   - c/w Flonase and nasal saline       #Atrial Flutter w/ RVR   - S/p DCCV (12/15)  - c/w Amiodarone 400mg Q12HR for 7 days (Day 3/7), then start 200mg QD  - c/w Toprol 100mg QD  - c/w Ddogzju7cd Q12  - EP notes appreciated  - Outpaitient follow up with Dr. Bolivar in 2 - 4 weeks      #Empyema (improving)  - S/P VATs Decortication  - Rpt CXR with improved aeration  - No fever, leucocytosis  - ID following  - Augmentin 875/125 BID x 2 weeks  - CT surgery notes appreciated  - Rpt CT Chest (12/16): Decreasing left sided effusion    #CAD s/p CABG:  -Trop neg    I#CM w/ EF of 20-25%:  - Euvolemic  - GDMT: Entresto / Toprol per EP    #HTN  #HLD:  - Cont. o/p regimen   - c/w  Statin     # Chronic anemia:  - Hb Stable  - Monitor H&H    #CKD-3a:  -Stable, Cr. at baseline     Dispo: Pending PT Consult  Diet: DASH  DVT PPx: Eliquis  72 y/o male w/ PMHx of CAD s/p CABG, ICM with EF of 20-25%, Afib not on AC, HLD, HTN, CKD-4. Patient with recent admission from 11/18-12/4 for left sided empyema w/ strep intermedius s/p VATS w/ decortication with post op Afib with RVR s/p QUIN/DCC (on Eliquis on 12/1). Admitted for Atrial Flutter w/ RVR & COVID    #COVID-19 Positive  #Nasal Congestion  - C/w Dexamethasone 6 mg IV daily   - Mucinex for cough   - c/w Flonase and nasal saline       #Atrial Flutter w/ RVR   - S/p DCCV (12/15)  - c/w Amiodarone 400mg Q12HR for 7 days (Day 3/7), then start 200mg QD  - c/w Toprol 100mg QD  - c/w Gosqldg4ud Q12  - EP notes appreciated  - Outpaitient follow up with Dr. Bolivar in 2 - 4 weeks      #Empyema (improving)  - S/P VATs Decortication  - Rpt CXR with improved aeration  - No fever, leucocytosis  - ID following  - Augmentin 875/125 BID x 2 weeks  - CT surgery notes appreciated  - Rpt CT Chest (12/16): Decreasing left sided effusion    #CAD s/p CABG:  -Trop neg    I#CM w/ EF of 20-25%:  - Euvolemic  - GDMT: Entresto / Toprol per EP    #HTN  #HLD:  - Cont. o/p regimen   - c/w  Statin     # Chronic anemia:  - Hb Stable  - Monitor H&H    #CKD-3a:  -Stable, Cr. at baseline     Dispo: Pending PT Consult  Diet: DASH  DVT PPx: Eliquis  74 y/o male w/ PMHx of CAD s/p CABG, ICM with EF of 20-25%, Afib not on AC, HLD, HTN, CKD-4. Patient with recent admission from 11/18-12/4 for left sided empyema w/ strep intermedius s/p VATS w/ decortication with post op Afib with RVR s/p QUIN/DCC (on Eliquis on 12/1). Admitted for Atrial Flutter w/ RVR & COVID    #COVID-19 Positive  #Nasal Congestion  - C/w Dexamethasone 6 mg IV daily   - Mucinex for cough   - c/w Flonase and nasal saline       #Atrial Flutter w/ RVR   - S/p DCCV (12/15)  - c/w Amiodarone 400mg Q12HR for 7 days (Day 4/7), then start 200mg QD  - c/w Toprol 100mg QD  - c/w Axycejl0zp Q12  - EP notes appreciated  - Outpaitient follow up with Dr. Bolivar in 2 - 4 weeks      #Empyema (improving)  - S/P VATs Decortication  - Rpt CXR with improved aeration  - No fever, leucocytosis  - ID following  - Augmentin 875/125 BID x 2 weeks till 12/31  - CT surgery notes appreciated  - Rpt CT Chest (12/16): Decreasing left sided effusion    #CAD s/p CABG:  -Trop neg    I#CM w/ EF of 20-25%:  - Euvolemic  - GDMT: Entresto / Toprol per EP    #HTN  #HLD:  - Cont. o/p regimen   - c/w  Statin     # Chronic anemia:  - Hb Stable  - Monitor H&H    #CKD-3a:  -Stable, Cr. at baseline     Dispo: Pending PT Consult  Diet: DASH  DVT PPx: Eliquis  74 y/o male w/ PMHx of CAD s/p CABG, ICM with EF of 20-25%, Afib not on AC, HLD, HTN, CKD-4. Patient with recent admission from 11/18-12/4 for left sided empyema w/ strep intermedius s/p VATS w/ decortication with post op Afib with RVR s/p QUIN/DCC (on Eliquis on 12/1). Admitted for Atrial Flutter w/ RVR & COVID    #COVID-19 Positive  #Nasal Congestion  - C/w Dexamethasone 6 mg IV daily   - Mucinex for cough   - c/w Flonase and nasal saline       #Atrial Flutter w/ RVR   - S/p DCCV (12/15)  - c/w Amiodarone 400mg Q12HR for 7 days (Day 4/7), then start 200mg QD  - c/w Toprol 100mg QD  - c/w Qrbqnqt1yj Q12  - EP notes appreciated  - Outpaitient follow up with Dr. Bolivar in 2 - 4 weeks      #Empyema (improving)  - S/P VATs Decortication  - Rpt CXR with improved aeration  - No fever, leucocytosis  - ID following  - Augmentin 875/125 BID x 2 weeks till 12/31  - CT surgery notes appreciated  - Rpt CT Chest (12/16): Decreasing left sided effusion    #CAD s/p CABG:  -Trop neg    I#CM w/ EF of 20-25%:  - Euvolemic  - GDMT: Entresto / Toprol per EP    #HTN  #HLD:  - Cont. o/p regimen   - c/w  Statin     # Chronic anemia:  - Hb Stable  - Monitor H&H    #CKD-3a:  -Stable, Cr. at baseline     Dispo: Pending PT Consult  Diet: DASH  DVT PPx: Eliquis

## 2023-12-18 NOTE — DISCHARGE NOTE PROVIDER - CARE PROVIDERS DIRECT ADDRESSES
,[email or web address],DirectAddress_Unknown ,[email or web address],[email or web address]

## 2023-12-18 NOTE — PROGRESS NOTE ADULT - SUBJECTIVE AND OBJECTIVE BOX
SUBJECTIVE:    Chief Complaint: Patient is a 73y old  Male who presents with a chief complaint of Recurrent Afib w/ RVR (17 Dec 2023 13:52)      INTERVAL HPI/OVERNIGHT EVENTS: Patient seen and examined at bedside at AM. No clinically acute event overnight.   Denies any chest pain, palpitations, SOB, leg edema, N/V/D, or any other complaints.     MEDICATIONS  (STANDING):  aMIOdarone    Tablet 400 milliGRAM(s) Oral every 12 hours  amoxicillin  875 milliGRAM(s)/clavulanate 1 Tablet(s) Oral two times a day  apixaban 5 milliGRAM(s) Oral every 12 hours  aspirin enteric coated 81 milliGRAM(s) Oral daily  atorvastatin 20 milliGRAM(s) Oral at bedtime  dexAMETHasone  Injectable 6 milliGRAM(s) IV Push daily  dextrose 5%. 1000 milliLiter(s) (50 mL/Hr) IV Continuous <Continuous>  dextrose 50% Injectable 25 Gram(s) IV Push once  fluticasone propionate 50 MICROgram(s)/spray Nasal Spray 1 Spray(s) Both Nostrils two times a day  furosemide    Tablet 40 milliGRAM(s) Oral daily  gabapentin 100 milliGRAM(s) Oral two times a day  glucagon  Injectable 1 milliGRAM(s) IntraMuscular once  guaiFENesin ER 1200 milliGRAM(s) Oral every 12 hours  influenza  Vaccine (HIGH DOSE) 0.7 milliLiter(s) IntraMuscular once  insulin glargine Injectable (LANTUS) 6 Unit(s) SubCutaneous at bedtime  insulin lispro (ADMELOG) corrective regimen sliding scale   SubCutaneous every 6 hours  metoprolol succinate  milliGRAM(s) Oral daily  pantoprazole    Tablet 40 milliGRAM(s) Oral before breakfast  sacubitril 49 mG/valsartan 51 mG 1 Tablet(s) Oral two times a day  sodium chloride 0.9% lock flush 3 milliLiter(s) IV Push every 8 hours  spironolactone 25 milliGRAM(s) Oral daily  tamsulosin 0.4 milliGRAM(s) Oral at bedtime    MEDICATIONS  (PRN):  acetaminophen     Tablet .. 650 milliGRAM(s) Oral every 6 hours PRN Temp greater or equal to 38C (100.4F), Mild Pain (1 - 3)  aluminum hydroxide/magnesium hydroxide/simethicone Suspension 30 milliLiter(s) Oral every 4 hours PRN Dyspepsia  dextrose Oral Gel 15 Gram(s) Oral once PRN Blood Glucose LESS THAN 70 milliGRAM(s)/deciliter  melatonin 3 milliGRAM(s) Oral at bedtime PRN Insomnia  metoprolol tartrate Injectable 5 milliGRAM(s) IV Push every 6 hours PRN HR >130 sustained  ondansetron Injectable 4 milliGRAM(s) IV Push every 8 hours PRN Nausea and/or Vomiting  sodium chloride 0.65% Nasal 1 Spray(s) Both Nostrils every 2 hours PRN Congestion      Allergies    No Known Allergies    Intolerances        REVIEW OF SYSTEMS:  All other review of systems negative, except as noted in HPI    OBJECTIVE:    Vital Signs Last 24 Hrs  T(C): 36.3 (18 Dec 2023 05:29), Max: 37.2 (17 Dec 2023 17:14)  T(F): 97.3 (18 Dec 2023 05:29), Max: 98.9 (17 Dec 2023 17:14)  HR: 61 (18 Dec 2023 05:29) (61 - 73)  BP: 122/56 (18 Dec 2023 05:29) (112/61 - 142/84)  BP(mean): --  RR: 18 (18 Dec 2023 05:29) (18 - 20)  SpO2: 96% (18 Dec 2023 05:29) (93% - 97%)    Parameters below as of 18 Dec 2023 05:29  Patient On (Oxygen Delivery Method): room air        PHYSICAL EXAM:  GENERAL: Obese man, NAD, lying in bed comfortably  HEAD:  Atraumatic, Normocephalic  EYES: EOMI, PERRLA, conjunctiva and sclera clear  ENT: Moist mucous membranes  NECK: Supple, No JVD  CHEST/LUNG: Decreased BS left lung base; No rales, rhonchi, wheezing, or rubs. Unlabored respirations  HEART: Regular rate and rhythm; No murmurs, rubs, or gallops  ABDOMEN: BSx4; Soft, nontender, nondistended  EXTREMITIES:  2+ Peripheral Pulses, brisk capillary refill. No clubbing, cyanosis, or edema  NERVOUS SYSTEM:  A&Ox3, no focal deficits    Lab/ Imaging:    LABS:                        11.2   4.43  )-----------( 240      ( 18 Dec 2023 06:51 )             37.2     12-17    136  |  103  |  35.3<H>  ----------------------------<  105<H>  4.2   |  21.0<L>  |  1.52<H>    Ca    8.2<L>      17 Dec 2023 06:32  Phos  2.8     12-17  Mg     2.1     12-17    TPro  6.5<L>  /  Alb  2.4<L>  /  TBili  0.5  /  DBili  x   /  AST  50<H>  /  ALT  54<H>  /  AlkPhos  112  12-17      Urinalysis Basic - ( 17 Dec 2023 06:32 )    Color: x / Appearance: x / SG: x / pH: x  Gluc: 105 mg/dL / Ketone: x  / Bili: x / Urobili: x   Blood: x / Protein: x / Nitrite: x   Leuk Esterase: x / RBC: x / WBC x   Sq Epi: x / Non Sq Epi: x / Bacteria: x      CAPILLARY BLOOD GLUCOSE      POCT Blood Glucose.: 146 mg/dL (18 Dec 2023 05:53)  POCT Blood Glucose.: 203 mg/dL (18 Dec 2023 00:26)  POCT Blood Glucose.: 189 mg/dL (17 Dec 2023 22:11)  POCT Blood Glucose.: 148 mg/dL (17 Dec 2023 17:14)  POCT Blood Glucose.: 132 mg/dL (17 Dec 2023 12:21)       SUBJECTIVE:    Chief Complaint: Patient is a 73y old  Male who presents with a chief complaint of Recurrent Afib w/ RVR (17 Dec 2023 13:52)      INTERVAL HPI/OVERNIGHT EVENTS: Patient seen and examined at bedside at AM. No clinically acute event overnight. Reports productive cough.   Denies any chest pain, palpitations, fevers, SOB, leg edema, N/V/D, or any other complaints.     MEDICATIONS  (STANDING):  aMIOdarone    Tablet 400 milliGRAM(s) Oral every 12 hours  amoxicillin  875 milliGRAM(s)/clavulanate 1 Tablet(s) Oral two times a day  apixaban 5 milliGRAM(s) Oral every 12 hours  aspirin enteric coated 81 milliGRAM(s) Oral daily  atorvastatin 20 milliGRAM(s) Oral at bedtime  dexAMETHasone  Injectable 6 milliGRAM(s) IV Push daily  dextrose 5%. 1000 milliLiter(s) (50 mL/Hr) IV Continuous <Continuous>  dextrose 50% Injectable 25 Gram(s) IV Push once  fluticasone propionate 50 MICROgram(s)/spray Nasal Spray 1 Spray(s) Both Nostrils two times a day  furosemide    Tablet 40 milliGRAM(s) Oral daily  gabapentin 100 milliGRAM(s) Oral two times a day  glucagon  Injectable 1 milliGRAM(s) IntraMuscular once  guaiFENesin ER 1200 milliGRAM(s) Oral every 12 hours  influenza  Vaccine (HIGH DOSE) 0.7 milliLiter(s) IntraMuscular once  insulin glargine Injectable (LANTUS) 6 Unit(s) SubCutaneous at bedtime  insulin lispro (ADMELOG) corrective regimen sliding scale   SubCutaneous every 6 hours  metoprolol succinate  milliGRAM(s) Oral daily  pantoprazole    Tablet 40 milliGRAM(s) Oral before breakfast  sacubitril 49 mG/valsartan 51 mG 1 Tablet(s) Oral two times a day  sodium chloride 0.9% lock flush 3 milliLiter(s) IV Push every 8 hours  spironolactone 25 milliGRAM(s) Oral daily  tamsulosin 0.4 milliGRAM(s) Oral at bedtime    MEDICATIONS  (PRN):  acetaminophen     Tablet .. 650 milliGRAM(s) Oral every 6 hours PRN Temp greater or equal to 38C (100.4F), Mild Pain (1 - 3)  aluminum hydroxide/magnesium hydroxide/simethicone Suspension 30 milliLiter(s) Oral every 4 hours PRN Dyspepsia  dextrose Oral Gel 15 Gram(s) Oral once PRN Blood Glucose LESS THAN 70 milliGRAM(s)/deciliter  melatonin 3 milliGRAM(s) Oral at bedtime PRN Insomnia  metoprolol tartrate Injectable 5 milliGRAM(s) IV Push every 6 hours PRN HR >130 sustained  ondansetron Injectable 4 milliGRAM(s) IV Push every 8 hours PRN Nausea and/or Vomiting  sodium chloride 0.65% Nasal 1 Spray(s) Both Nostrils every 2 hours PRN Congestion      Allergies  No Known Allergies    REVIEW OF SYSTEMS:  All other review of systems negative, except as noted in HPI    OBJECTIVE:    Vital Signs Last 24 Hrs  T(C): 36.3 (18 Dec 2023 05:29), Max: 37.2 (17 Dec 2023 17:14)  T(F): 97.3 (18 Dec 2023 05:29), Max: 98.9 (17 Dec 2023 17:14)  HR: 61 (18 Dec 2023 05:29) (61 - 73)  BP: 122/56 (18 Dec 2023 05:29) (112/61 - 142/84)  BP(mean): --  RR: 18 (18 Dec 2023 05:29) (18 - 20)  SpO2: 96% (18 Dec 2023 05:29) (93% - 97%)    Parameters below as of 18 Dec 2023 05:29  Patient On (Oxygen Delivery Method): room air        PHYSICAL EXAM:  GENERAL: Obese man, NAD, lying in bed comfortably  HEAD:  Atraumatic, Normocephalic  EYES: EOMI, PERRLA, conjunctiva and sclera clear  ENT: Moist mucous membranes  NECK: Supple, No JVD  CHEST/LUNG: Decreased BS left lung base; No rales, rhonchi, wheezing, or rubs. Unlabored respirations  HEART: Regular rate and rhythm; No murmurs, rubs, or gallops  ABDOMEN: BSx4; Soft, nontender, nondistended  EXTREMITIES:  2+ Peripheral Pulses, brisk capillary refill. No clubbing, cyanosis, or edema  NERVOUS SYSTEM:  A&Ox3, no focal deficits    Lab/ Imaging:    LABS:                        11.2   4.43  )-----------( 240      ( 18 Dec 2023 06:51 )             37.2     12-17    136  |  103  |  35.3<H>  ----------------------------<  105<H>  4.2   |  21.0<L>  |  1.52<H>    Ca    8.2<L>      17 Dec 2023 06:32  Phos  2.8     12-17  Mg     2.1     12-17    TPro  6.5<L>  /  Alb  2.4<L>  /  TBili  0.5  /  DBili  x   /  AST  50<H>  /  ALT  54<H>  /  AlkPhos  112  12-17      Urinalysis Basic - ( 17 Dec 2023 06:32 )    Color: x / Appearance: x / SG: x / pH: x  Gluc: 105 mg/dL / Ketone: x  / Bili: x / Urobili: x   Blood: x / Protein: x / Nitrite: x   Leuk Esterase: x / RBC: x / WBC x   Sq Epi: x / Non Sq Epi: x / Bacteria: x      CAPILLARY BLOOD GLUCOSE      POCT Blood Glucose.: 146 mg/dL (18 Dec 2023 05:53)  POCT Blood Glucose.: 203 mg/dL (18 Dec 2023 00:26)  POCT Blood Glucose.: 189 mg/dL (17 Dec 2023 22:11)  POCT Blood Glucose.: 148 mg/dL (17 Dec 2023 17:14)  POCT Blood Glucose.: 132 mg/dL (17 Dec 2023 12:21)

## 2023-12-18 NOTE — DISCHARGE NOTE NURSING/CASE MANAGEMENT/SOCIAL WORK - NSDCFUADDAPPT_GEN_ALL_CORE_FT
FOR BERTIN PLACEMENT    Please see below for post hospital follow up information     1. [facility] Meds To Bed: [phone]/NA    2. Home Care:N/A    3. Transitional Care Services: [phone]    4, A. Primary Care Appointment:N/A    4, B. Specialty Appointment:Cardio-Appointment made with  KATHLEEN López  on  1/8  at  3:30   at [address] [place] [address] . If you are unable to attend your pre-scheduled appointment, please contact the office directly at [phone] to reschedule.            5. STAR Education Packet Provided  FOR BERTIN PLACEMENT    Please see below for post hospital follow up information     1. [facility] Meds To Bed: [phone]/NA    2. Home Care:N/A    3. Transitional Care Services: [phone]    4, A. Primary Care Appointment:N/A    4, B. Specialty Appointment:Cardio-Appointment made with  KATHLEEN López  on  1/8  at  3:30   at [address] [place] [address] . If you are unable to attend your pre-scheduled appointment, please contact the office directly at [phone] to reschedule.            5. STAR Education Packet Provided

## 2023-12-18 NOTE — DISCHARGE NOTE PROVIDER - HOSPITAL COURSE
73-year-old male with a past medical history of coronary artery disease (CAD) status post CABG, ischemic cardiomyopathy (ICM) with an ejection fraction of 20-25%, atrial fibrillation (Afib) not on anticoagulation (AC), hyperlipidemia (HLD), hypertension (HTN), and chronic kidney disease stage 4 (CKD-4) recently admitted  for left-sided empyema caused by Streptococcus intermedius, treated with video-assisted thoracoscopic surgery (VATS) with decortication. Postoperatively, he experienced Afib with rapid ventricular response (RVR), necessitating a transesophageal echocardiogram (QUIN) and direct current cardioversion (DCC). He started Eliquis on December 1. TNow, admitted for  atrial flutter with RVR, and he was found to be positive for COVID-19.    For his COVID-19 diagnosis, he received two doses of Dexamethasone and was prescribed Mucinex for his cough, along with continued Flonase and nasal saline for nasal congestion. Regarding his atrial flutter with RVR, he underwent DCCV on December 15. His current medications include Amiodarone 400mg every 12 hours for seven days (currently on day 4/7), and then transition to 200mg daily, Toprol 100mg daily, and continuation of Eliquis 5mg twice daily. Electrophysiology consulted, and an outpatient follow-up with Dr. Bolivar was scheduled within 2 to 4 weeks. His empyema was improving; he was status post VATS decortication, with repeat chest X-rays (CXR) showing improved aeration. He had no fever or leukocytosis, and infectious diseases (ID) recommended Augmentin 875/125mg twice daily for two weeks, ending on December 30. Cardiothoracic (CT) surgery notes were also appreciated. A repeat CT chest on December 16 showed a decreasing left-sided effusion.    He is medically stable to be discharged with plan to continue his oral Antibiotics (augmentin) till 12/30/2023.

## 2023-12-18 NOTE — DISCHARGE NOTE PROVIDER - NSDCFUSCHEDAPPT_GEN_ALL_CORE_FT
[facility] Physician [facility]  CARDIOLOGY [address] [facility]  Scheduled Appointment: 01/08/2024     [facility] Physician [facility]  CARDIOLOGY [address] [facility]  Scheduled Appointment: 01/08/2024

## 2023-12-18 NOTE — PHYSICAL THERAPY INITIAL EVALUATION ADULT - PERTINENT HX OF CURRENT PROBLEM, REHAB EVAL
74 y/o male with hx of CAD s/p CABG, ICM with EF of 20-25%, Afib not on AC, HLD, HTN, CKD-4. Patient with recent admission from 11/18-12/4 for left sided empyema w/ strep intermedius s/p VATS w/ decortication with post op Afib with RVR s/p QUIN/DCC and started on Eliquis on 12/1. He states he was doing ok at [facility] since DC on 12/4 when he started feeling palpitations and SOB. Denies CP, no fever, cough. He completed his full course of IV abx at this time. Patient found to be in Afib w/ RVR. Got multiple doses of PO/IV metoprolol with good response. Denies missing any doses of  his medications as far as he knows at [facility]. 74 y/o male with hx of CAD s/p CABG, ICM with EF of 20-25%, Afib not on AC, HLD, HTN, CKD-4. Patient with recent admission from 11/18-12/4 for left sided empyema w/ strep intermedius s/p VATS w/ decortication with post op Afib with RVR s/p QUIN/DCC and started on Eliquis on 12/1. He states he was doing ok at [facility] since DC on 12/4 when he started feeling palpitations and SOB. Denies CP, no fever, cough. He completed his full course of IV abx at this time. Patient found to be in Afib w/ RVR. Got multiple doses of PO/IV metoprolol with good response. Denies missing any doses of  his medications as far as he knows at [facility].

## 2023-12-18 NOTE — PHYSICAL THERAPY INITIAL EVALUATION ADULT - LEVEL OF INDEPENDENCE: STAND/SIT, REHAB EVAL
attempted 3x with bed raised, pt able to perform 50% of transfer, unable to achieve full stand/maximum assist (25% patients effort)

## 2023-12-19 ENCOUNTER — TRANSCRIPTION ENCOUNTER (OUTPATIENT)
Age: 73
End: 2023-12-19

## 2023-12-19 VITALS
DIASTOLIC BLOOD PRESSURE: 71 MMHG | RESPIRATION RATE: 18 BRPM | TEMPERATURE: 98 F | HEART RATE: 67 BPM | SYSTOLIC BLOOD PRESSURE: 122 MMHG | OXYGEN SATURATION: 95 %

## 2023-12-19 LAB
GLUCOSE BLDC GLUCOMTR-MCNC: 143 MG/DL — HIGH (ref 70–99)
GLUCOSE BLDC GLUCOMTR-MCNC: 143 MG/DL — HIGH (ref 70–99)
MRSA PCR RESULT.: SIGNIFICANT CHANGE UP
MRSA PCR RESULT.: SIGNIFICANT CHANGE UP
S AUREUS DNA NOSE QL NAA+PROBE: SIGNIFICANT CHANGE UP
S AUREUS DNA NOSE QL NAA+PROBE: SIGNIFICANT CHANGE UP

## 2023-12-19 PROCEDURE — 96376 TX/PRO/DX INJ SAME DRUG ADON: CPT

## 2023-12-19 PROCEDURE — 84443 ASSAY THYROID STIM HORMONE: CPT

## 2023-12-19 PROCEDURE — 0225U NFCT DS DNA&RNA 21 SARSCOV2: CPT

## 2023-12-19 PROCEDURE — 99239 HOSP IP/OBS DSCHRG MGMT >30: CPT | Mod: GC

## 2023-12-19 PROCEDURE — 93005 ELECTROCARDIOGRAM TRACING: CPT

## 2023-12-19 PROCEDURE — 80048 BASIC METABOLIC PNL TOTAL CA: CPT

## 2023-12-19 PROCEDURE — 85610 PROTHROMBIN TIME: CPT

## 2023-12-19 PROCEDURE — 85025 COMPLETE CBC W/AUTO DIFF WBC: CPT

## 2023-12-19 PROCEDURE — 84100 ASSAY OF PHOSPHORUS: CPT

## 2023-12-19 PROCEDURE — 85730 THROMBOPLASTIN TIME PARTIAL: CPT

## 2023-12-19 PROCEDURE — 96374 THER/PROPH/DIAG INJ IV PUSH: CPT

## 2023-12-19 PROCEDURE — 82962 GLUCOSE BLOOD TEST: CPT

## 2023-12-19 PROCEDURE — 84436 ASSAY OF TOTAL THYROXINE: CPT

## 2023-12-19 PROCEDURE — 87641 MR-STAPH DNA AMP PROBE: CPT

## 2023-12-19 PROCEDURE — 85027 COMPLETE CBC AUTOMATED: CPT

## 2023-12-19 PROCEDURE — 71045 X-RAY EXAM CHEST 1 VIEW: CPT

## 2023-12-19 PROCEDURE — 87640 STAPH A DNA AMP PROBE: CPT

## 2023-12-19 PROCEDURE — 71250 CT THORAX DX C-: CPT

## 2023-12-19 PROCEDURE — 99285 EMERGENCY DEPT VISIT HI MDM: CPT | Mod: 25

## 2023-12-19 PROCEDURE — 36415 COLL VENOUS BLD VENIPUNCTURE: CPT

## 2023-12-19 PROCEDURE — 92960 CARDIOVERSION ELECTRIC EXT: CPT

## 2023-12-19 PROCEDURE — 83735 ASSAY OF MAGNESIUM: CPT

## 2023-12-19 PROCEDURE — 80053 COMPREHEN METABOLIC PANEL: CPT

## 2023-12-19 PROCEDURE — 94640 AIRWAY INHALATION TREATMENT: CPT

## 2023-12-19 PROCEDURE — 83880 ASSAY OF NATRIURETIC PEPTIDE: CPT

## 2023-12-19 PROCEDURE — 84484 ASSAY OF TROPONIN QUANT: CPT

## 2023-12-19 RX ADMIN — AMIODARONE HYDROCHLORIDE 400 MILLIGRAM(S): 400 TABLET ORAL at 05:12

## 2023-12-19 RX ADMIN — Medication 100 MILLIGRAM(S): at 05:15

## 2023-12-19 RX ADMIN — SACUBITRIL AND VALSARTAN 1 TABLET(S): 24; 26 TABLET, FILM COATED ORAL at 05:15

## 2023-12-19 RX ADMIN — Medication 1 TABLET(S): at 05:12

## 2023-12-19 RX ADMIN — GABAPENTIN 100 MILLIGRAM(S): 400 CAPSULE ORAL at 05:13

## 2023-12-19 RX ADMIN — SODIUM CHLORIDE 3 MILLILITER(S): 9 INJECTION INTRAMUSCULAR; INTRAVENOUS; SUBCUTANEOUS at 05:19

## 2023-12-19 RX ADMIN — Medication 1 SPRAY(S): at 05:27

## 2023-12-19 RX ADMIN — Medication 1200 MILLIGRAM(S): at 05:13

## 2023-12-19 RX ADMIN — SPIRONOLACTONE 25 MILLIGRAM(S): 25 TABLET, FILM COATED ORAL at 05:15

## 2023-12-19 RX ADMIN — PANTOPRAZOLE SODIUM 40 MILLIGRAM(S): 20 TABLET, DELAYED RELEASE ORAL at 05:26

## 2023-12-19 RX ADMIN — Medication 6 MILLIGRAM(S): at 05:13

## 2023-12-19 RX ADMIN — Medication 40 MILLIGRAM(S): at 05:13

## 2023-12-19 RX ADMIN — APIXABAN 5 MILLIGRAM(S): 2.5 TABLET, FILM COATED ORAL at 05:13

## 2023-12-20 ENCOUNTER — TRANSCRIPTION ENCOUNTER (OUTPATIENT)
Age: 73
End: 2023-12-20

## 2023-12-20 LAB
CULTURE RESULTS: SIGNIFICANT CHANGE UP
CULTURE RESULTS: SIGNIFICANT CHANGE UP
SPECIMEN SOURCE: SIGNIFICANT CHANGE UP
SPECIMEN SOURCE: SIGNIFICANT CHANGE UP

## 2023-12-22 RX ORDER — AMIODARONE HYDROCHLORIDE 400 MG/1
1 TABLET ORAL
Qty: 0 | Refills: 0 | DISCHARGE
Start: 2023-12-22

## 2024-01-02 ENCOUNTER — NON-APPOINTMENT (OUTPATIENT)
Age: 74
End: 2024-01-02

## 2024-01-08 ENCOUNTER — APPOINTMENT (OUTPATIENT)
Dept: CARDIOLOGY | Facility: CLINIC | Age: 74
End: 2024-01-08

## 2024-02-26 ENCOUNTER — APPOINTMENT (OUTPATIENT)
Dept: CARDIOLOGY | Facility: CLINIC | Age: 74
End: 2024-02-26
Payer: MEDICARE

## 2024-02-26 VITALS
SYSTOLIC BLOOD PRESSURE: 156 MMHG | OXYGEN SATURATION: 100 % | DIASTOLIC BLOOD PRESSURE: 84 MMHG | BODY MASS INDEX: 38.06 KG/M2 | HEIGHT: 72 IN | HEART RATE: 68 BPM | WEIGHT: 281 LBS

## 2024-02-26 DIAGNOSIS — I50.9 HEART FAILURE, UNSPECIFIED: ICD-10-CM

## 2024-02-26 DIAGNOSIS — E11.43 TYPE 2 DIABETES MELLITUS WITH DIABETIC AUTONOMIC (POLY)NEUROPATHY: ICD-10-CM

## 2024-02-26 DIAGNOSIS — N40.1 BENIGN PROSTATIC HYPERPLASIA WITH LOWER URINARY TRACT SYMPMS: ICD-10-CM

## 2024-02-26 DIAGNOSIS — K21.9 GASTRO-ESOPHAGEAL REFLUX DISEASE W/OUT ESOPHAGITIS: ICD-10-CM

## 2024-02-26 PROCEDURE — G2211 COMPLEX E/M VISIT ADD ON: CPT

## 2024-02-26 PROCEDURE — 93000 ELECTROCARDIOGRAM COMPLETE: CPT | Mod: 59

## 2024-02-26 PROCEDURE — 93242 EXT ECG>48HR<7D RECORDING: CPT

## 2024-02-26 PROCEDURE — 99205 OFFICE O/P NEW HI 60 MIN: CPT

## 2024-02-26 RX ORDER — SPIRONOLACTONE 25 MG/1
25 TABLET ORAL DAILY
Qty: 90 | Refills: 3 | Status: ACTIVE | COMMUNITY
Start: 1900-01-01 | End: 1900-01-01

## 2024-02-26 RX ORDER — EMPAGLIFLOZIN 25 MG/1
25 TABLET, FILM COATED ORAL DAILY
Qty: 90 | Refills: 3 | Status: ACTIVE | COMMUNITY
Start: 1900-01-01 | End: 1900-01-01

## 2024-02-26 RX ORDER — TAMSULOSIN HYDROCHLORIDE 0.4 MG/1
0.4 CAPSULE ORAL
Refills: 0 | Status: ACTIVE | COMMUNITY

## 2024-02-26 RX ORDER — APIXABAN 5 MG/1
5 TABLET, FILM COATED ORAL
Qty: 180 | Refills: 3 | Status: ACTIVE | COMMUNITY
Start: 1900-01-01 | End: 1900-01-01

## 2024-02-26 RX ORDER — METOPROLOL SUCCINATE 100 MG/1
100 TABLET, EXTENDED RELEASE ORAL DAILY
Qty: 90 | Refills: 3 | Status: ACTIVE | COMMUNITY
Start: 1900-01-01 | End: 1900-01-01

## 2024-02-26 RX ORDER — AMIODARONE HYDROCHLORIDE 200 MG/1
200 TABLET ORAL DAILY
Qty: 90 | Refills: 3 | Status: ACTIVE | COMMUNITY
Start: 2024-02-26 | End: 1900-01-01

## 2024-02-26 RX ORDER — ATORVASTATIN CALCIUM 20 MG/1
20 TABLET, FILM COATED ORAL DAILY
Qty: 90 | Refills: 3 | Status: ACTIVE | COMMUNITY
Start: 1900-01-01 | End: 1900-01-01

## 2024-02-26 RX ORDER — AMIODARONE HYDROCHLORIDE 200 MG/1
200 TABLET ORAL
Refills: 0 | Status: DISCONTINUED | COMMUNITY
End: 2024-02-26

## 2024-02-28 NOTE — HISTORY OF PRESENT ILLNESS
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   EP: Dr. Slade Bolivar. does not want to follow as does not want an ablation.  Wants to continue with amiodarone. No side effects. Went to rehab and graduated out of rehab. Now home and active.  Tolerating apixiban no bleeding side effects. He feels well  He presents with his sister.  Denies chest pain/pressure, palpitations, irregular and/or rapid heart beat, SOB, SAVAGE, syncope/near syncope, dizziness, orthopnea, PND, cough, edema, f/c, n/v/d, hematuria, or hematochezia.  Willing to undergo cardiac testing to evaluate for cardiac function. Requesting medication refills of 90 days.  No high risk behaviors since hospitalization. Denies any alarm symptoms.

## 2024-02-28 NOTE — DISCUSSION/SUMMARY
[FreeTextEntry1] : Patient presents for: cardiovascular evaluation    + needs refills wants to trial medication and does not want ablation at this time   Orders placed: 2d TTE to evaluate for cardiac function now 3 months GDMT--> if EF does not improve despite GDMT will need CRT-D evaluation    Patient warm and euvolemic. No evidence of active ACS, arrhythmia or decompensated heart failure on examination today in office. EKG: sinus rhythm We went over the EKG in office today, all questions answered. Cardiac testing recent reviewed. Vitals reviewed. elevated BP on arrival, on recheck 130/84 continue to monitor.   I've asked the patient to make the following Cardiovascular cardiometabolic lifestyle adjustments: Nutritional improvement, low salt, less dairy, minimal fat, bake items instead of frying Exercise improvement and tolerance per ACC/AHA guideline Stress reduction High risk behavior abstinence. weight loss reduction     Amidarone for some reason the patient was taking 400mg in rehab for several weeks qday. Crystal adjusted to Amiodarone 200mg Qday.  qtc slightly elevated, recommend repeat EKG in 1-2 weeks to evaluate for Qtc interval with reduced dose amiodarone.  Continue with rest of medications at this time.     I've asked the patient to keep a blood pressure journal. The patient should take BP in both arms twice daily and keep a log for the next 1 week.      Follow up: with me in 3 months     The patient is aware of alarm cardiac type symptoms, will call with questions or concerns and is aware to activate 911 and/or present to the closest emergency department if concerns.   As with all my patients, I would strongly pursue preventative cardiology, lifestyle modifications which are necessary for long-term cardiovascular health. The following is recommended: Advised a plant based, limited salt, low fat, minimal dairy diet, stress reduction/psychosomatic management, sleep hygiene/CATHERINE testing and healthy weight loss, annual influenza vaccine, medication compliance, high risk behavior mitigation (I.e. tobacco abstinence, alcohol abstinence, recreational/illicit drug use abstinence), increased exercise activity as per AHA/ACC standard for long term cardiovascular risk reduction.  [EKG obtained to assist in diagnosis and management of assessed problem(s)] : EKG obtained to assist in diagnosis and management of assessed problem(s)

## 2024-02-28 NOTE — ASSESSMENT
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

## 2024-02-28 NOTE — CARDIOLOGY SUMMARY
[de-identified] : 2/26: sinus rhythm [de-identified] : QUIN Echo Doppler (12.01.23 @ 11:13) > Summary:  1. Left ventricularejection fraction, by visual estimation, is 20 to 25%.  2. Severely decreased global left ventricular systolic function.  3. Normal RV size with moderately reduced RV systolic function, RVSP  least 29 mmHg.  4. Moderately enlarged left atrium.  5. No left atrial appendage thrombus, left atrial appendage enlargement  and normal left atrial appendage velocities.  6. Mild thickening of the anterior and posterior mitral valve leaflets  with mild posterior leaflet prolapse.  7. Moderate mitral valve regurgitation.  8. Color flow doppler and intravenous injection of agitated saline  demonstrates the presence of an intact intra atrial septum.  9. Lipomatous hypertrophy of the intra-atrial septum. 10. Moderate complex atheromas at the aortic arch. 11. There is no evidence of pericardial effusion. 12. Post-QUIN patient underwent external synchronized direct current  cardioversion with 300 Joules x1 from Aflutter RVR 130s into sinus rhythm

## 2024-02-28 NOTE — PHYSICAL EXAM
[Well Developed] : well developed [Well Nourished] : well nourished [Obese] : obese [No Acute Distress] : no acute distress [Normal Conjunctiva] : normal conjunctiva [No Carotid Bruit] : no carotid bruit [Normal Venous Pressure] : normal venous pressure [Normal S1, S2] : normal S1, S2 [No Murmur] : no murmur [No Rub] : no rub [No Gallop] : no gallop [Clear Lung Fields] : clear lung fields [Good Air Entry] : good air entry [No Respiratory Distress] : no respiratory distress  [Non Tender] : non-tender [Soft] : abdomen soft [No Masses/organomegaly] : no masses/organomegaly [Normal Gait] : normal gait [Normal Bowel Sounds] : normal bowel sounds [No Edema] : no edema [No Cyanosis] : no cyanosis [No Clubbing] : no clubbing [No Varicosities] : no varicosities [Normal Radial B/L] : normal radial B/L [Normal PT B/L] : normal PT B/L [No Rash] : no rash [No Skin Lesions] : no skin lesions [Moves all extremities] : moves all extremities [Normal Speech] : normal speech [Alert and Oriented] : alert and oriented [Normal memory] : normal memory [de-identified] : walker

## 2024-02-28 NOTE — REASON FOR VISIT
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

## 2024-03-13 ENCOUNTER — NON-APPOINTMENT (OUTPATIENT)
Age: 74
End: 2024-03-13

## 2024-03-15 ENCOUNTER — APPOINTMENT (OUTPATIENT)
Dept: CARDIOLOGY | Facility: CLINIC | Age: 74
End: 2024-03-15
Payer: MEDICARE

## 2024-03-15 ENCOUNTER — TRANSCRIPTION ENCOUNTER (OUTPATIENT)
Age: 74
End: 2024-03-15

## 2024-03-15 PROCEDURE — 93306 TTE W/DOPPLER COMPLETE: CPT

## 2024-03-18 ENCOUNTER — NON-APPOINTMENT (OUTPATIENT)
Age: 74
End: 2024-03-18

## 2024-05-03 ENCOUNTER — APPOINTMENT (OUTPATIENT)
Dept: CARDIOLOGY | Facility: CLINIC | Age: 74
End: 2024-05-03
Payer: MEDICARE

## 2024-05-03 VITALS
OXYGEN SATURATION: 100 % | HEART RATE: 73 BPM | WEIGHT: 281 LBS | DIASTOLIC BLOOD PRESSURE: 83 MMHG | HEIGHT: 72 IN | SYSTOLIC BLOOD PRESSURE: 159 MMHG | TEMPERATURE: 98.2 F | BODY MASS INDEX: 38.06 KG/M2

## 2024-05-03 DIAGNOSIS — Z95.1 PRESENCE OF AORTOCORONARY BYPASS GRAFT: ICD-10-CM

## 2024-05-03 DIAGNOSIS — I42.0 ISCHEMIC CARDIOMYOPATHY: ICD-10-CM

## 2024-05-03 DIAGNOSIS — E78.5 HYPERLIPIDEMIA, UNSPECIFIED: ICD-10-CM

## 2024-05-03 DIAGNOSIS — I10 ESSENTIAL (PRIMARY) HYPERTENSION: ICD-10-CM

## 2024-05-03 DIAGNOSIS — I48.3 TYPICAL ATRIAL FLUTTER: ICD-10-CM

## 2024-05-03 DIAGNOSIS — I25.5 ISCHEMIC CARDIOMYOPATHY: ICD-10-CM

## 2024-05-03 PROCEDURE — 93000 ELECTROCARDIOGRAM COMPLETE: CPT

## 2024-05-03 PROCEDURE — G2211 COMPLEX E/M VISIT ADD ON: CPT

## 2024-05-03 PROCEDURE — 99214 OFFICE O/P EST MOD 30 MIN: CPT

## 2024-05-03 RX ORDER — FUROSEMIDE 40 MG/1
40 TABLET ORAL DAILY
Qty: 90 | Refills: 3 | Status: ACTIVE | COMMUNITY
Start: 1900-01-01 | End: 1900-01-01

## 2024-05-03 RX ORDER — LOSARTAN POTASSIUM 100 MG/1
100 TABLET, FILM COATED ORAL DAILY
Qty: 90 | Refills: 3 | Status: ACTIVE | COMMUNITY
Start: 2024-05-03 | End: 1900-01-01

## 2024-05-03 RX ORDER — PANTOPRAZOLE SODIUM 40 MG/1
40 TABLET, DELAYED RELEASE ORAL DAILY
Refills: 0 | Status: DISCONTINUED | COMMUNITY
End: 2024-05-03

## 2024-05-03 RX ORDER — SACUBITRIL AND VALSARTAN 49; 51 MG/1; MG/1
49-51 TABLET, FILM COATED ORAL TWICE DAILY
Qty: 180 | Refills: 3 | Status: DISCONTINUED | COMMUNITY
Start: 1900-01-01 | End: 2024-05-03

## 2024-05-05 PROBLEM — Z95.1 S/P CABG X 4: Status: ACTIVE | Noted: 2024-02-26

## 2024-05-05 PROBLEM — E78.5 HYPERLIPIDEMIA: Status: ACTIVE | Noted: 2024-02-26

## 2024-05-05 PROBLEM — I10 HYPERTENSION, BENIGN: Status: ACTIVE | Noted: 2024-02-26

## 2024-05-05 PROBLEM — I25.5 ISCHEMIC CONGESTIVE CARDIOMYOPATHY: Status: ACTIVE | Noted: 2024-02-26

## 2024-05-05 PROBLEM — I48.3 TYPICAL ATRIAL FLUTTER: Status: ACTIVE | Noted: 2024-01-02

## 2024-05-06 ENCOUNTER — NON-APPOINTMENT (OUTPATIENT)
Age: 74
End: 2024-05-06

## 2024-05-31 NOTE — REASON FOR VISIT
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

## 2024-05-31 NOTE — CARDIOLOGY SUMMARY
[de-identified] : 5/3/24: sinus rhythm 2/26: sinus rhythm [de-identified] : 3/15/24: EF 50-55%, Mild LVH, Mild grade 1 DD, Mild MR/TR   QUIN Echo Doppler (12.01.23 @ 11:13) > Summary:  1. Left ventricularejection fraction, by visual estimation, is 20 to 25%.  2. Severely decreased global left ventricular systolic function.  3. Normal RV size with moderately reduced RV systolic function, RVSP  least 29 mmHg.  4. Moderately enlarged left atrium.  5. No left atrial appendage thrombus, left atrial appendage enlargement  and normal left atrial appendage velocities.  6. Mild thickening of the anterior and posterior mitral valve leaflets  with mild posterior leaflet prolapse.  7. Moderate mitral valve regurgitation.  8. Color flow doppler and intravenous injection of agitated saline  demonstrates the presence of an intact intra atrial septum.  9. Lipomatous hypertrophy of the intra-atrial septum. 10. Moderate complex atheromas at the aortic arch. 11. There is no evidence of pericardial effusion. 12. Post-QUIN patient underwent external synchronized direct current  cardioversion with 300 Joules x1 from Aflutter RVR 130s into sinus rhythm

## 2024-05-31 NOTE — ADDENDUM
[FreeTextEntry1] : 5/31/2024  Mr. Jose Blunt is scheduled for Dental Cleaning. He's stable from cardiology point of view for upcoming procedure. No need to hold Eliquis and aspirin for the procedure.  Roland Sureshoracion, NP

## 2024-05-31 NOTE — DISCUSSION/SUMMARY
[FreeTextEntry1] : Patient presents for: cardiovascular evaluation   PAF chronic anticoagulation apixian hx of DCCV Grade 1 DD  + donut hole with apixiban, jardiance and entresto. EF recovery 20-25%-->50-55% 03/2024   Orders placed: - adjust entresto 49/51 to losartan 100mg qday given patient cost concern - offered assistance with jardiance and apixiban, patient declines at this time and will continue with jardiance and apixiban. Can consider change of jardiance to farxiga if covered. - patient declines use of weight loss GLP medications at this time during our conversation. - will call for BP log at home. - prolonged qtc on ekg, will plan LFT/TFT/repeat Holter--> d/c amiodarone at next visit - recommended patient obtain PFT and opthamologic exam    Patient warm and euvolemic. No evidence of active ACS, arrhythmia or decompensated heart failure on examination today in office. EKG: sinus rhythm We went over the EKG in office today, all questions answered. Cardiac testing recent reviewed. Vitals reviewed.   I've asked the patient to make the following Cardiovascular cardiometabolic lifestyle adjustments: Nutritional improvement, low salt, less dairy, minimal fat, bake items instead of frying Exercise improvement and tolerance per ACC/AHA guideline Stress reduction High risk behavior abstinence. weight loss reduction   I've asked the patient to keep a blood pressure journal. The patient should take BP in both arms twice daily and keep a log for the next 1 week.      Follow up: with me in 4-6 months     The patient is aware of alarm cardiac type symptoms, will call with questions or concerns and is aware to activate 911 and/or present to the closest emergency department if concerns.   As with all my patients, I would strongly pursue preventative cardiology, lifestyle modifications which are necessary for long-term cardiovascular health. The following is recommended: Advised a plant based, limited salt, low fat, minimal dairy diet, stress reduction/psychosomatic management, sleep hygiene/CATHERINE testing and healthy weight loss, annual influenza vaccine, medication compliance, high risk behavior mitigation (I.e. tobacco abstinence, alcohol abstinence, recreational/illicit drug use abstinence), increased exercise activity as per AHA/ACC standard for long term cardiovascular risk reduction.

## 2024-05-31 NOTE — HISTORY OF PRESENT ILLNESS
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   EP: Dr. Slade Bolivar. does not want to follow as does not want an ablation.  Wants to continue with amiodarone. No side effects. Went to rehab and graduated out of rehab. Now home and active.  Tolerating apixiban no bleeding side effects. He feels well  He presents with his sister.  Denies chest pain/pressure, palpitations, irregular and/or rapid heart beat, SOB, SAVAGE, syncope/near syncope, dizziness, orthopnea, PND, cough, edema, f/c, n/v/d, hematuria, or hematochezia.  Willing to undergo cardiac testing to evaluate for cardiac function. Requesting medication refills of 90 days.  No high risk behaviors since hospitalization. Denies any alarm symptoms.    5/3/24: Denies chest pain/pressure, palpitations, irregular and/or rapid heart beat, SOB, SAVAGE, syncope/near syncope, dizziness, orthopnea, PND, cough, edema, f/c, n/v/d, hematuria, or hematochezia. no issues with current medication regimen, no palpitations. No bleeding concerns. No medication side effects Feels well, more active, now going for walks and hopeful to lose weight. In donut hole with medications so inquiring whether jardiance, apixiban and entresto can be adjusted as high out of pocket expense.

## 2024-05-31 NOTE — ASSESSMENT
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

## 2024-09-02 LAB — HBA1C MFR BLD HPLC: 5.4

## 2024-09-15 NOTE — DISCHARGE NOTE NURSING/CASE MANAGEMENT/SOCIAL WORK - NSDCPEELIQUISDIET_GEN_ALL_CORE
Eat healthy foods you enjoy. Apixaban/Eliquis DOES NOT have a special diet. Limit your alcohol intake.
39w

## 2024-11-15 ENCOUNTER — NON-APPOINTMENT (OUTPATIENT)
Age: 74
End: 2024-11-15

## 2024-11-15 ENCOUNTER — APPOINTMENT (OUTPATIENT)
Dept: CARDIOLOGY | Facility: CLINIC | Age: 74
End: 2024-11-15
Payer: MEDICARE

## 2024-11-15 VITALS
HEART RATE: 74 BPM | HEIGHT: 72 IN | OXYGEN SATURATION: 98 % | DIASTOLIC BLOOD PRESSURE: 91 MMHG | WEIGHT: 315 LBS | BODY MASS INDEX: 42.66 KG/M2 | SYSTOLIC BLOOD PRESSURE: 165 MMHG

## 2024-11-15 DIAGNOSIS — Z95.1 PRESENCE OF AORTOCORONARY BYPASS GRAFT: ICD-10-CM

## 2024-11-15 PROCEDURE — G2211 COMPLEX E/M VISIT ADD ON: CPT

## 2024-11-15 PROCEDURE — 93000 ELECTROCARDIOGRAM COMPLETE: CPT

## 2024-11-15 PROCEDURE — 99214 OFFICE O/P EST MOD 30 MIN: CPT

## 2025-02-19 ENCOUNTER — RX RENEWAL (OUTPATIENT)
Age: 75
End: 2025-02-19

## 2025-03-14 ENCOUNTER — RX RENEWAL (OUTPATIENT)
Age: 75
End: 2025-03-14

## 2025-05-28 NOTE — PROGRESS NOTE ADULT - NS PANP OPT1 GEN_ALL_CORE
I attest my time as PA/NP is greater than 50% of the total combined time spent on qualifying patient care activities by the PA/NP and attending.
I have spent a total of 76minutes to prepare to see the patient, obtaining and reviewing history, physical examination, explaining the diagnosis, prognosis and treatment plan with the patient/family/caregiver. I also have spent the time ordering studies and testing, interpreting results, medicine reconciliation, subspecialty consultation and documentation as above.

## 2025-05-31 ENCOUNTER — NON-APPOINTMENT (OUTPATIENT)
Age: 75
End: 2025-05-31

## 2025-06-02 ENCOUNTER — NON-APPOINTMENT (OUTPATIENT)
Age: 75
End: 2025-06-02

## 2025-06-02 ENCOUNTER — APPOINTMENT (OUTPATIENT)
Dept: CARDIOLOGY | Facility: CLINIC | Age: 75
End: 2025-06-02
Payer: MEDICARE

## 2025-06-02 VITALS
SYSTOLIC BLOOD PRESSURE: 138 MMHG | OXYGEN SATURATION: 96 % | HEART RATE: 85 BPM | WEIGHT: 315 LBS | HEIGHT: 72 IN | DIASTOLIC BLOOD PRESSURE: 88 MMHG | BODY MASS INDEX: 42.66 KG/M2

## 2025-06-02 DIAGNOSIS — I48.3 TYPICAL ATRIAL FLUTTER: ICD-10-CM

## 2025-06-02 DIAGNOSIS — I25.5 ISCHEMIC CARDIOMYOPATHY: ICD-10-CM

## 2025-06-02 DIAGNOSIS — E78.5 HYPERLIPIDEMIA, UNSPECIFIED: ICD-10-CM

## 2025-06-02 DIAGNOSIS — E66.9 OBESITY, UNSPECIFIED: ICD-10-CM

## 2025-06-02 DIAGNOSIS — E11.43 TYPE 2 DIABETES MELLITUS WITH DIABETIC AUTONOMIC (POLY)NEUROPATHY: ICD-10-CM

## 2025-06-02 DIAGNOSIS — I42.0 ISCHEMIC CARDIOMYOPATHY: ICD-10-CM

## 2025-06-02 DIAGNOSIS — Z95.1 PRESENCE OF AORTOCORONARY BYPASS GRAFT: ICD-10-CM

## 2025-06-02 DIAGNOSIS — I10 ESSENTIAL (PRIMARY) HYPERTENSION: ICD-10-CM

## 2025-06-02 PROCEDURE — 93000 ELECTROCARDIOGRAM COMPLETE: CPT

## 2025-06-02 PROCEDURE — 99401 PREV MED CNSL INDIV APPRX 15: CPT

## 2025-06-02 PROCEDURE — 99214 OFFICE O/P EST MOD 30 MIN: CPT | Mod: 25

## (undated) DEVICE — CHEST DRAIN PLEUR-EVAC DRY/WET ADULT-PEDS SINGLE (QUICK)

## (undated) DEVICE — WARMING BLANKET LOWER ADULT

## (undated) DEVICE — DRSG 4 X 8

## (undated) DEVICE — XI ARM DISSECTOR CURVED BIPOLAR 8MM

## (undated) DEVICE — TUBING CONNECTING 6MM 20FT

## (undated) DEVICE — XI ENDOWRIST SUCTION IRRIGATOR 8MM

## (undated) DEVICE — ENDOCATCH ROBOTIC 8MM (PURPLE)

## (undated) DEVICE — SUT MONOCRYL 4-0 27" PS-2 UNDYED

## (undated) DEVICE — PACK ROBOTIC

## (undated) DEVICE — XI DRAPE ARM

## (undated) DEVICE — TAPE SILK 3"

## (undated) DEVICE — SOL IRR POUR H2O 1000ML

## (undated) DEVICE — DRSG DERMABOND 0.7ML

## (undated) DEVICE — SUT VICRYL 2-0 27" UR-6

## (undated) DEVICE — SUCTION YANKAUER TAPERED BULBOUS NO VENT

## (undated) DEVICE — ELCTR BOVIE TIP BLADE INSULATED 6.5" EDGE

## (undated) DEVICE — ELCTR GROUNDING PAD ADULT COVIDIEN

## (undated) DEVICE — Device

## (undated) DEVICE — XI CORD MONOPOLAR CAUTERY (GREEN)

## (undated) DEVICE — PREP CHLORAPREP HI-LITE ORANGE 26ML

## (undated) DEVICE — DRAPE MAJOR ABDOMINAL W POUCHES

## (undated) DEVICE — DRAPE GENERAL ENDOSCOPY

## (undated) DEVICE — GLV 8 PROTEXIS (WHITE)

## (undated) DEVICE — ELCTR BOVIE PENCIL BLADE 10FT

## (undated) DEVICE — SOL IRR POUR NS 0.9% 1000ML

## (undated) DEVICE — XI ARM FORCEP FENESTRATED BIPOLAR 8MM

## (undated) DEVICE — GLV 7.5 PROTEXIS (WHITE)

## (undated) DEVICE — VENODYNE/SCD SLEEVE CALF MEDIUM

## (undated) DEVICE — SUT PROLENE 1 30" CTX

## (undated) DEVICE — XI CORD BIPOLAR CAUTERY (BLUE)

## (undated) DEVICE — XI DRAPE COLUMN

## (undated) DEVICE — XI SEAL UNIVERSIAL 5-12MM

## (undated) DEVICE — DRAPE TOWEL BLUE STICKY